# Patient Record
Sex: MALE | Race: WHITE | NOT HISPANIC OR LATINO
[De-identification: names, ages, dates, MRNs, and addresses within clinical notes are randomized per-mention and may not be internally consistent; named-entity substitution may affect disease eponyms.]

---

## 2020-02-03 ENCOUNTER — APPOINTMENT (OUTPATIENT)
Dept: ORTHOPEDIC SURGERY | Facility: CLINIC | Age: 65
End: 2020-02-03
Payer: COMMERCIAL

## 2020-02-03 VITALS — HEIGHT: 72 IN | BODY MASS INDEX: 27.09 KG/M2 | WEIGHT: 200 LBS

## 2020-02-03 VITALS — SYSTOLIC BLOOD PRESSURE: 160 MMHG | HEART RATE: 69 BPM | DIASTOLIC BLOOD PRESSURE: 73 MMHG

## 2020-02-03 PROCEDURE — 73030 X-RAY EXAM OF SHOULDER: CPT | Mod: LT

## 2020-02-03 PROCEDURE — 99203 OFFICE O/P NEW LOW 30 MIN: CPT

## 2020-04-15 ENCOUNTER — APPOINTMENT (OUTPATIENT)
Dept: ORTHOPEDIC SURGERY | Facility: CLINIC | Age: 65
End: 2020-04-15

## 2020-06-08 ENCOUNTER — APPOINTMENT (OUTPATIENT)
Dept: ORTHOPEDIC SURGERY | Facility: CLINIC | Age: 65
End: 2020-06-08
Payer: COMMERCIAL

## 2020-06-08 DIAGNOSIS — M17.11 UNILATERAL PRIMARY OSTEOARTHRITIS, RIGHT KNEE: ICD-10-CM

## 2020-06-08 DIAGNOSIS — M23.91 UNSPECIFIED INTERNAL DERANGEMENT OF RIGHT KNEE: ICD-10-CM

## 2020-06-08 PROCEDURE — 73562 X-RAY EXAM OF KNEE 3: CPT | Mod: RT

## 2020-06-08 PROCEDURE — 99214 OFFICE O/P EST MOD 30 MIN: CPT

## 2020-06-16 ENCOUNTER — OUTPATIENT (OUTPATIENT)
Dept: OUTPATIENT SERVICES | Facility: HOSPITAL | Age: 65
LOS: 1 days | End: 2020-06-16
Payer: COMMERCIAL

## 2020-06-16 ENCOUNTER — APPOINTMENT (OUTPATIENT)
Dept: MRI IMAGING | Facility: CLINIC | Age: 65
End: 2020-06-16
Payer: COMMERCIAL

## 2020-06-16 DIAGNOSIS — M17.11 UNILATERAL PRIMARY OSTEOARTHRITIS, RIGHT KNEE: ICD-10-CM

## 2020-06-16 PROCEDURE — 73721 MRI JNT OF LWR EXTRE W/O DYE: CPT | Mod: 26,RT

## 2020-06-16 PROCEDURE — 73721 MRI JNT OF LWR EXTRE W/O DYE: CPT

## 2020-06-19 VITALS
OXYGEN SATURATION: 96 % | WEIGHT: 199.96 LBS | HEART RATE: 52 BPM | HEIGHT: 72 IN | DIASTOLIC BLOOD PRESSURE: 52 MMHG | TEMPERATURE: 98 F | SYSTOLIC BLOOD PRESSURE: 137 MMHG | RESPIRATION RATE: 14 BRPM

## 2020-06-19 NOTE — H&P PST ADULT - ASSESSMENT
this is a 63 y/o male who is scheduled for left shoulder surgery on 6/23/20 this is a 65 y/o male who is scheduled for left shoulder surgery on 6/23/20    COVID negative

## 2020-06-19 NOTE — H&P PST ADULT - NSANTHOSAYNRD_GEN_A_CORE
No. JOLYNN screening performed.  STOP BANG Legend: 0-2 = LOW Risk; 3-4 = INTERMEDIATE Risk; 5-8 = HIGH Risk

## 2020-06-19 NOTE — H&P PST ADULT - NSICDXPASTSURGICALHX_GEN_ALL_CORE_FT
PAST SURGICAL HISTORY:  S/P arthroscopy of right shoulder     S/P eye surgery right eye retina and cataract    S/P foot surgery both

## 2020-06-19 NOTE — H&P PST ADULT - NSICDXPROBLEM_GEN_ALL_CORE_FT
PROBLEM DIAGNOSES  Problem: Left shoulder pain  Assessment and Plan: exam under anesthesia/arthroscopy left shoulder, possible rotator cuff repair

## 2020-06-19 NOTE — H&P PST ADULT - HISTORY OF PRESENT ILLNESS
this is a 63 y/o male who has had left shoulder pain for years which got worse, diagnosed with torn rotator cuff; to have repair of same, no pain meds

## 2020-06-21 ENCOUNTER — OUTPATIENT (OUTPATIENT)
Dept: OUTPATIENT SERVICES | Facility: HOSPITAL | Age: 65
LOS: 1 days | End: 2020-06-21
Payer: COMMERCIAL

## 2020-06-21 DIAGNOSIS — M75.102 UNSPECIFIED ROTATOR CUFF TEAR OR RUPTURE OF LEFT SHOULDER, NOT SPECIFIED AS TRAUMATIC: ICD-10-CM

## 2020-06-21 DIAGNOSIS — Z98.890 OTHER SPECIFIED POSTPROCEDURAL STATES: Chronic | ICD-10-CM

## 2020-06-21 DIAGNOSIS — Z01.818 ENCOUNTER FOR OTHER PREPROCEDURAL EXAMINATION: ICD-10-CM

## 2020-06-21 DIAGNOSIS — Z11.59 ENCOUNTER FOR SCREENING FOR OTHER VIRAL DISEASES: ICD-10-CM

## 2020-06-21 DIAGNOSIS — M25.512 PAIN IN LEFT SHOULDER: ICD-10-CM

## 2020-06-21 LAB — SARS-COV-2 RNA SPEC QL NAA+PROBE: SIGNIFICANT CHANGE UP

## 2020-06-21 PROCEDURE — G0463: CPT

## 2020-06-21 PROCEDURE — U0003: CPT

## 2020-06-22 ENCOUNTER — APPOINTMENT (OUTPATIENT)
Dept: ORTHOPEDIC SURGERY | Facility: CLINIC | Age: 65
End: 2020-06-22
Payer: COMMERCIAL

## 2020-06-22 ENCOUNTER — TRANSCRIPTION ENCOUNTER (OUTPATIENT)
Age: 65
End: 2020-06-22

## 2020-06-22 DIAGNOSIS — S83.241A OTHER TEAR OF MEDIAL MENISCUS, CURRENT INJURY, RIGHT KNEE, INITIAL ENCOUNTER: ICD-10-CM

## 2020-06-22 PROCEDURE — 99214 OFFICE O/P EST MOD 30 MIN: CPT

## 2020-06-23 ENCOUNTER — APPOINTMENT (OUTPATIENT)
Dept: ORTHOPEDIC SURGERY | Facility: HOSPITAL | Age: 65
End: 2020-06-23

## 2020-06-23 ENCOUNTER — OUTPATIENT (OUTPATIENT)
Dept: OUTPATIENT SERVICES | Facility: HOSPITAL | Age: 65
LOS: 1 days | End: 2020-06-23
Payer: COMMERCIAL

## 2020-06-23 ENCOUNTER — RESULT REVIEW (OUTPATIENT)
Age: 65
End: 2020-06-23

## 2020-06-23 VITALS
SYSTOLIC BLOOD PRESSURE: 137 MMHG | WEIGHT: 195.11 LBS | HEIGHT: 72 IN | HEART RATE: 52 BPM | TEMPERATURE: 97 F | RESPIRATION RATE: 14 BRPM | OXYGEN SATURATION: 96 % | DIASTOLIC BLOOD PRESSURE: 52 MMHG

## 2020-06-23 VITALS
SYSTOLIC BLOOD PRESSURE: 142 MMHG | RESPIRATION RATE: 15 BRPM | HEART RATE: 66 BPM | DIASTOLIC BLOOD PRESSURE: 64 MMHG | OXYGEN SATURATION: 95 %

## 2020-06-23 DIAGNOSIS — Z98.890 OTHER SPECIFIED POSTPROCEDURAL STATES: Chronic | ICD-10-CM

## 2020-06-23 DIAGNOSIS — M75.102 UNSPECIFIED ROTATOR CUFF TEAR OR RUPTURE OF LEFT SHOULDER, NOT SPECIFIED AS TRAUMATIC: ICD-10-CM

## 2020-06-23 LAB
ALBUMIN SERPL ELPH-MCNC: 3.3 G/DL — SIGNIFICANT CHANGE UP (ref 3.3–5)
ALP SERPL-CCNC: 65 U/L — SIGNIFICANT CHANGE UP (ref 30–120)
ALT FLD-CCNC: 44 U/L DA — SIGNIFICANT CHANGE UP (ref 10–60)
ANION GAP SERPL CALC-SCNC: 6 MMOL/L — SIGNIFICANT CHANGE UP (ref 5–17)
APTT BLD: 30.9 SEC — SIGNIFICANT CHANGE UP (ref 28.5–37)
AST SERPL-CCNC: 32 U/L — SIGNIFICANT CHANGE UP (ref 10–40)
BILIRUB SERPL-MCNC: 0.4 MG/DL — SIGNIFICANT CHANGE UP (ref 0.2–1.2)
BUN SERPL-MCNC: 16 MG/DL — SIGNIFICANT CHANGE UP (ref 7–23)
CALCIUM SERPL-MCNC: 8.6 MG/DL — SIGNIFICANT CHANGE UP (ref 8.4–10.5)
CHLORIDE SERPL-SCNC: 106 MMOL/L — SIGNIFICANT CHANGE UP (ref 96–108)
CO2 SERPL-SCNC: 29 MMOL/L — SIGNIFICANT CHANGE UP (ref 22–31)
CREAT SERPL-MCNC: 0.86 MG/DL — SIGNIFICANT CHANGE UP (ref 0.5–1.3)
GLUCOSE SERPL-MCNC: 101 MG/DL — HIGH (ref 70–99)
HCT VFR BLD CALC: 41.2 % — SIGNIFICANT CHANGE UP (ref 39–50)
HGB BLD-MCNC: 13.6 G/DL — SIGNIFICANT CHANGE UP (ref 13–17)
INR BLD: 1.23 RATIO — HIGH (ref 0.88–1.16)
MCHC RBC-ENTMCNC: 30.9 PG — SIGNIFICANT CHANGE UP (ref 27–34)
MCHC RBC-ENTMCNC: 33 GM/DL — SIGNIFICANT CHANGE UP (ref 32–36)
MCV RBC AUTO: 93.6 FL — SIGNIFICANT CHANGE UP (ref 80–100)
NRBC # BLD: 0 /100 WBCS — SIGNIFICANT CHANGE UP (ref 0–0)
PLATELET # BLD AUTO: 218 K/UL — SIGNIFICANT CHANGE UP (ref 150–400)
POTASSIUM SERPL-MCNC: 4 MMOL/L — SIGNIFICANT CHANGE UP (ref 3.5–5.3)
POTASSIUM SERPL-SCNC: 4 MMOL/L — SIGNIFICANT CHANGE UP (ref 3.5–5.3)
PROT SERPL-MCNC: 6.5 G/DL — SIGNIFICANT CHANGE UP (ref 6–8.3)
PROTHROM AB SERPL-ACNC: 13.7 SEC — HIGH (ref 10–12.9)
RBC # BLD: 4.4 M/UL — SIGNIFICANT CHANGE UP (ref 4.2–5.8)
RBC # FLD: 12.5 % — SIGNIFICANT CHANGE UP (ref 10.3–14.5)
SODIUM SERPL-SCNC: 141 MMOL/L — SIGNIFICANT CHANGE UP (ref 135–145)
WBC # BLD: 5.12 K/UL — SIGNIFICANT CHANGE UP (ref 3.8–10.5)
WBC # FLD AUTO: 5.12 K/UL — SIGNIFICANT CHANGE UP (ref 3.8–10.5)

## 2020-06-23 PROCEDURE — 29827 SHO ARTHRS SRG RT8TR CUF RPR: CPT | Mod: LT

## 2020-06-23 PROCEDURE — 85027 COMPLETE CBC AUTOMATED: CPT

## 2020-06-23 PROCEDURE — C1713: CPT

## 2020-06-23 PROCEDURE — 88304 TISSUE EXAM BY PATHOLOGIST: CPT

## 2020-06-23 PROCEDURE — 29828 SHO ARTHRS SRG BICP TENODSIS: CPT | Mod: LT

## 2020-06-23 PROCEDURE — 29826 SHO ARTHRS SRG DECOMPRESSION: CPT | Mod: LT

## 2020-06-23 PROCEDURE — 85610 PROTHROMBIN TIME: CPT

## 2020-06-23 PROCEDURE — 29823 SHO ARTHRS SRG XTNSV DBRDMT: CPT | Mod: LT

## 2020-06-23 PROCEDURE — 80053 COMPREHEN METABOLIC PANEL: CPT

## 2020-06-23 PROCEDURE — 36415 COLL VENOUS BLD VENIPUNCTURE: CPT

## 2020-06-23 PROCEDURE — 88304 TISSUE EXAM BY PATHOLOGIST: CPT | Mod: 26

## 2020-06-23 PROCEDURE — 85730 THROMBOPLASTIN TIME PARTIAL: CPT

## 2020-06-23 RX ORDER — APREPITANT 80 MG/1
40 CAPSULE ORAL ONCE
Refills: 0 | Status: COMPLETED | OUTPATIENT
Start: 2020-06-23 | End: 2020-06-23

## 2020-06-23 RX ORDER — HYDROMORPHONE HYDROCHLORIDE 2 MG/ML
0.5 INJECTION INTRAMUSCULAR; INTRAVENOUS; SUBCUTANEOUS
Refills: 0 | Status: DISCONTINUED | OUTPATIENT
Start: 2020-06-23 | End: 2020-06-24

## 2020-06-23 RX ORDER — OXYCODONE AND ACETAMINOPHEN 5; 325 MG/1; MG/1
1 TABLET ORAL
Qty: 20 | Refills: 0
Start: 2020-06-23

## 2020-06-23 RX ORDER — CHLORHEXIDINE GLUCONATE 213 G/1000ML
1 SOLUTION TOPICAL ONCE
Refills: 0 | Status: COMPLETED | OUTPATIENT
Start: 2020-06-23 | End: 2020-06-23

## 2020-06-23 RX ORDER — SODIUM CHLORIDE 9 MG/ML
1000 INJECTION, SOLUTION INTRAVENOUS
Refills: 0 | Status: DISCONTINUED | OUTPATIENT
Start: 2020-06-23 | End: 2020-06-24

## 2020-06-23 RX ORDER — ONDANSETRON 8 MG/1
4 TABLET, FILM COATED ORAL ONCE
Refills: 0 | Status: DISCONTINUED | OUTPATIENT
Start: 2020-06-23 | End: 2020-06-24

## 2020-06-23 RX ORDER — CEFAZOLIN SODIUM 1 G
2000 VIAL (EA) INJECTION ONCE
Refills: 0 | Status: COMPLETED | OUTPATIENT
Start: 2020-06-23 | End: 2020-06-23

## 2020-06-23 RX ADMIN — SODIUM CHLORIDE 75 MILLILITER(S): 9 INJECTION, SOLUTION INTRAVENOUS at 12:08

## 2020-06-23 RX ADMIN — CHLORHEXIDINE GLUCONATE 1 APPLICATION(S): 213 SOLUTION TOPICAL at 08:31

## 2020-06-23 RX ADMIN — APREPITANT 40 MILLIGRAM(S): 80 CAPSULE ORAL at 08:31

## 2020-06-23 NOTE — ASU PATIENT PROFILE, ADULT - PSH
S/P arthroscopy of right shoulder    S/P eye surgery  right eye retina and cataract  S/P foot surgery  both

## 2020-06-23 NOTE — BRIEF OPERATIVE NOTE - OPERATION/FINDINGS
RCT repaired with one OPUS anchor; large subacromial bone spur burred down. Frayed biceps tendon was released.

## 2020-06-23 NOTE — ASU DISCHARGE PLAN (ADULT/PEDIATRIC) - ASU DC SPECIAL INSTRUCTIONSFT
FOLLOW enclosed shoulder arthroscopy brochure.  NO active motion of left shoulder; sling for support; May remove for hygiene and to bend and extend left elbow.   Large bag of ice to left shoulder 30 minutes on and off.    Avoid constipation from pain meds with stool softeners, water and fiver.    Call Dr. Tomlin's office to set up appointment for sutures to be removed from shoulder and to discuss surgical findings.

## 2020-06-23 NOTE — ASU DISCHARGE PLAN (ADULT/PEDIATRIC) - CALL YOUR DOCTOR IF YOU HAVE ANY OF THE FOLLOWING:
Wound/Surgical Site with redness, or foul smelling discharge or pus/Numbness, tingling, color or temperature change to extremity/Pain not relieved by Medications/Inability to tolerate liquids or foods/Nausea and vomiting that does not stop/Bleeding that does not stop/Swelling that gets worse/Fever greater than (need to indicate Fahrenheit or Celsius)

## 2020-06-23 NOTE — BRIEF OPERATIVE NOTE - NSICDXBRIEFPOSTOP_GEN_ALL_CORE_FT
POST-OP DIAGNOSIS:  Tear of left rotator cuff 23-Jun-2020 12:00:34 with impingement syndrome Ana Damon

## 2020-06-23 NOTE — ASU DISCHARGE PLAN (ADULT/PEDIATRIC) - CARE PROVIDER_API CALL
Chao Tomlin  [facility]  [address] [place] [address]  Phone: [phone]  Fax: [phone]  Follow Up Time:

## 2020-06-23 NOTE — BRIEF OPERATIVE NOTE - NSICDXBRIEFPROCEDURE_GEN_ALL_CORE_FT
PROCEDURES:  Left shoulder arthroscopy 23-Jun-2020 11:56:57 with biceps tendon release, subacromial decompression and rotator cuff repair Ana Damon

## 2020-06-29 ENCOUNTER — APPOINTMENT (OUTPATIENT)
Dept: ORTHOPEDIC SURGERY | Facility: CLINIC | Age: 65
End: 2020-06-29
Payer: COMMERCIAL

## 2020-06-29 DIAGNOSIS — M23.92 UNSPECIFIED INTERNAL DERANGEMENT OF LEFT KNEE: ICD-10-CM

## 2020-06-29 PROCEDURE — 99214 OFFICE O/P EST MOD 30 MIN: CPT | Mod: 24

## 2020-06-29 PROCEDURE — 73030 X-RAY EXAM OF SHOULDER: CPT | Mod: LT

## 2020-07-06 ENCOUNTER — OUTPATIENT (OUTPATIENT)
Dept: OUTPATIENT SERVICES | Facility: HOSPITAL | Age: 65
LOS: 1 days | End: 2020-07-06
Payer: COMMERCIAL

## 2020-07-06 ENCOUNTER — RESULT REVIEW (OUTPATIENT)
Age: 65
End: 2020-07-06

## 2020-07-06 ENCOUNTER — APPOINTMENT (OUTPATIENT)
Dept: MRI IMAGING | Facility: CLINIC | Age: 65
End: 2020-07-06
Payer: COMMERCIAL

## 2020-07-06 DIAGNOSIS — Z98.890 OTHER SPECIFIED POSTPROCEDURAL STATES: Chronic | ICD-10-CM

## 2020-07-06 DIAGNOSIS — Z00.8 ENCOUNTER FOR OTHER GENERAL EXAMINATION: ICD-10-CM

## 2020-07-06 PROCEDURE — 73721 MRI JNT OF LWR EXTRE W/O DYE: CPT | Mod: 26,LT

## 2020-07-06 PROCEDURE — 73721 MRI JNT OF LWR EXTRE W/O DYE: CPT

## 2020-07-15 ENCOUNTER — APPOINTMENT (OUTPATIENT)
Dept: ORTHOPEDIC SURGERY | Facility: CLINIC | Age: 65
End: 2020-07-15
Payer: COMMERCIAL

## 2020-07-15 PROCEDURE — 99214 OFFICE O/P EST MOD 30 MIN: CPT | Mod: 24

## 2020-08-03 ENCOUNTER — APPOINTMENT (OUTPATIENT)
Dept: ORTHOPEDIC SURGERY | Facility: CLINIC | Age: 65
End: 2020-08-03
Payer: COMMERCIAL

## 2020-08-03 PROCEDURE — 99214 OFFICE O/P EST MOD 30 MIN: CPT | Mod: 24

## 2020-08-03 PROCEDURE — 73030 X-RAY EXAM OF SHOULDER: CPT | Mod: LT

## 2020-08-03 NOTE — ADDENDUM
[FreeTextEntry1] : I, Jose F Verma, acted solely as a scribe for Dr. Chao Tomlin on this date 08/03/2020.\par All medical record entries made by the Scribe were at my, Dr. Chao Tomlin, direction and personally dictated by me on 08/03/2020. I have reviewed the chart and agree that the record accurately reflects my personal performance of the history, physical exam, assessment and plan. I have also personally directed, reviewed, and agreed with the chart.

## 2020-08-03 NOTE — REASON FOR VISIT
[Post Operative Visit] : a post operative visit for [Knee Pain] : knee pain [FreeTextEntry2] : s/p left shoulder arthroscopy on 06/23/2020.

## 2020-08-03 NOTE — DISCUSSION/SUMMARY
[de-identified] : I discussed the underlying pathophysiology of the patient's condition in great detail with the patient. We discussed the use of ice, Tylenol and anti-inflammatories to relieve pain. The patient was instructed in ROM exercises they are to do at home. At-home strengthening, and stretching exercises were discussed and demonstrated with the patient. We went over the wide ranging benefits of exercise for the patient health and I encouraged him to begin a diligent exercise program. Regarding his left shoulder: He should avoid any heavy lifting, carrying, or overhead activities. He should not lift anything higher than 90° past his shoulder level. At this time, he will start a course of physical therapy for strengthening and flexibility. A prescription for physical therapy was provided. Regarding his knees: Patient understands he needs to consider bilateral knee arthroscopies given conservative measures are not providing enough relief and due to the results of the MRIs. The proper pre and post operative procedures and expectations were discussed in extensive detail with the patient. We had a lengthy discussion about the patient's issues, and talked about the benefits and risks of the procedure. The patient understands the most common risks include infection, allergy to the anesthetic and deep vein thrombosis. The risks are accepted. The patient was given no assurances that all the symptoms will be alleviated. He should begin to obtain medical clearance, including a COVID-19 test. All of his questions were answered. He understands and consents to the plan.\par \par FU 10 days after bilateral knee arthroscopies.\par after undergoing PT for his left shoulder.\par

## 2020-08-03 NOTE — HISTORY OF PRESENT ILLNESS
[de-identified] : The patient is a 65 year male who returns 6 weeks s/p Examination under anesthesia, arthroscopy of left shoulder, rotator cuff repair, biceps release and tenodesis, debridement of labrum and extensive synovectomy of left shoulder as well as subacromial decompression on 6/23/2020. The patient is recovering at home. He reports mild postoperative pain. He is thrilled with his progress and results. He gradually stopped wearing the arm sling a few weeks ago, and presents without it today. He also complains of bilateral knee pain, right worse than left, despite being inactive. He is known to have meniscal tears of both knees. He cannot pivot or twist on either knee, and has been having episodes of buckling and locking. He also notes swelling. He is scheduled for bilateral knee arthroscopies on 8/11/2020.  The right knee is the worst of the 2 and that should be the one that is done first.\par \par Radiology Results done 6/29/2020 revealed:\par o Left Shoulder : AP, internal and external rotation and outlet views were obtained and reveal metallic anchor in excellent position with concentric reduction of the glenohumeral joint.

## 2020-08-03 NOTE — PHYSICAL EXAM
[de-identified] : Constitutional\par o Appearance : well-nourished, well developed, alert, in no acute distress \par Head and Face\par o Head :\par ¦ Inspection : atraumatic, normocephalic\par o Face :\par ¦ Inspection : no visible rash or discoloration\par Respiratory\par o Respiratory Effort: breathing unlabored \par Neurologic\par o Sensation : Normal sensation \par Psychiatric\par o Mood and Affect: mood normal, affect appropriate \par Lymphatic\par o Additional Nodes : No palpable lymph nodes present \par \par o Cervical Spine\par ¦ Inspection/Palpation : alignment midline, normal degree of lordosis present, musculature is nontender to palpation,\par ¦ Range of Motion : arc of motion full in all planes, no crepitance or pain with ROM\par ¦ Skin : normal appearance, no masses or tenderness, trachea midline\par Tests: Negative Spurling’s test\par \par Right Upper Extremity\par o Right Shoulder :\par ¦ Inspection/Palpation : no tenderness, swelling or deformities\par ¦ Range of Motion : full and painless in all planes, no crepitance\par ¦ Strength :  forward elevation, internal rotation 5/5, external rotation 5/5, external rotation at 90 degrees of abduction, supraspinatus, adduction and abduction, biceps/triceps, 5/5\par ¦ Stability : no joint instability on provocative testing \par Tests: Mariano negative, Neer negative, negative drop arm test\par \par Left Upper Extremity\par o Left Shoulder :\par ¦ Inspection/Palpation : no glenohumeral joint or biceps tenderness, no swelling or deformities, arthroscopic portals are well healed, normal sensation to light touch in the left upper extremity.\par ¦ Range of Motion : full external rotation , internal rotation limited by 2 vertebral levels, no crepitance\par ¦ Strength :  internal rotation, external rotation, forward flexion, supraspinatus, adduction and abduction 4-/5, biceps 4+/5, triceps 5/5\par ¦ Stability : no joint instability on provocative testing\par  Tests: Mariano negative, negative Neer and Monserrat test, negative drop arm test, negative lift off test. \par \par Right Lower Extremity \par o Right Knee :\par ¦ Inspection/Palpation : posteromedial joint line tenderness to palpation, no lateral compartment tenderness, no swelling\par ¦ Range of Motion : active flexion and extension full and painless, no crepitance, good patellofemoral glide \par ¦ Stability : no valgus or varus instability present on provocative testing\par ¦ Strength : flexion and extension 5/5\par ¦ Tests and Signs : negative Anterior Drawer, negative Lachman, positive Jennifer \par \par Left Lower Extremity\par o Left Knee :\par ¦ Inspection/Palpation : posteromedial joint line tenderness to palpation, no lateral compartment tenderness, no swelling\par ¦ Range of Motion : active flexion and extension full and painless, no crepitance\par ¦ Stability : no valgus or varus instability present on provocative testing\par ¦ Strength : flexion and extension 5/5\par ¦ Tests and Signs : negative Anterior Drawer, negative Lachman, positive Jennifer \par \par Gait and Station:\par o Gait: antalgic on the right, stiff on the left, no significant extremity swelling or lymphedema, good proprioception and balance\par \par Radiology Results\par o Left Shoulder : AP, internal and external rotation and outlet views were obtained and reveal concentric reduction of the glenohumeral joint with metallic anchor in good position. Unchanged from previous x-rays that were done on 6/29/2020.

## 2020-08-05 VITALS
RESPIRATION RATE: 14 BRPM | DIASTOLIC BLOOD PRESSURE: 67 MMHG | SYSTOLIC BLOOD PRESSURE: 132 MMHG | OXYGEN SATURATION: 97 % | HEIGHT: 72 IN | WEIGHT: 199.96 LBS | TEMPERATURE: 98 F | HEART RATE: 56 BPM

## 2020-08-05 NOTE — H&P PST ADULT - NSICDXFAMILYHX_GEN_ALL_CORE_FT
FAMILY HISTORY:  Mother  Still living? No  Family history of breast cancer, Age at diagnosis: Age Unknown    Sibling  Still living? No  Family history of myotonic dystrophy, Age at diagnosis: Age Unknown  Family history of myotonic dystrophy, Age at diagnosis: Age Unknown  Family history of myotonic dystrophy, Age at diagnosis: Age Unknown  Family history of myotonic dystrophy, Age at diagnosis: Age Unknown

## 2020-08-05 NOTE — H&P PST ADULT - HISTORY OF PRESENT ILLNESS
64 yo male presents c/o bilateral knee pain for the last 10 years. States that pain was managed with intermittent PT and using ice. Denies injury, prior surgery or receiving any injections. Pain worsened over the last year and now is 1-2/10 at rest and increased to 5-6/10 with activity. Pain temporarily relieved with ice.

## 2020-08-05 NOTE — H&P PST ADULT - NSICDXPASTSURGICALHX_GEN_ALL_CORE_FT
PAST SURGICAL HISTORY:  History of arthroscopy of left shoulder June 2020    S/P arthroscopy of right shoulder 2005    S/P eye surgery right eye retina 2016 and cataract 2018    S/P foot surgery plantar fasciitis left 2019 and 2020 and right 2017

## 2020-08-05 NOTE — H&P PST ADULT - NSICDXPROBLEM_GEN_ALL_CORE_FT
PROBLEM DIAGNOSES  Problem: Bilateral knee pain  Assessment and Plan: operative arthroscopy bilateral knees. medical clearance, EKG and blood work were all done 8/4/20. surgical wash instructions reviewed and verbalized understanding. covid PCR appt confirmed for 8/9/20 at 0830

## 2020-08-05 NOTE — H&P PST ADULT - SOURCE OF INFORMATION, PROFILE
patient/medical history obtained via telephone as per current covid19 protocol, physical exam to be done day of surgery

## 2020-08-09 ENCOUNTER — OUTPATIENT (OUTPATIENT)
Dept: OUTPATIENT SERVICES | Facility: HOSPITAL | Age: 65
LOS: 1 days | End: 2020-08-09
Payer: COMMERCIAL

## 2020-08-09 DIAGNOSIS — Z11.59 ENCOUNTER FOR SCREENING FOR OTHER VIRAL DISEASES: ICD-10-CM

## 2020-08-09 DIAGNOSIS — Z98.890 OTHER SPECIFIED POSTPROCEDURAL STATES: Chronic | ICD-10-CM

## 2020-08-09 DIAGNOSIS — Z01.818 ENCOUNTER FOR OTHER PREPROCEDURAL EXAMINATION: ICD-10-CM

## 2020-08-09 DIAGNOSIS — M25.561 PAIN IN RIGHT KNEE: ICD-10-CM

## 2020-08-09 DIAGNOSIS — S83.241D OTHER TEAR OF MEDIAL MENISCUS, CURRENT INJURY, RIGHT KNEE, SUBSEQUENT ENCOUNTER: ICD-10-CM

## 2020-08-09 DIAGNOSIS — S83.242D OTHER TEAR OF MEDIAL MENISCUS, CURRENT INJURY, LEFT KNEE, SUBSEQUENT ENCOUNTER: ICD-10-CM

## 2020-08-09 LAB — SARS-COV-2 RNA SPEC QL NAA+PROBE: SIGNIFICANT CHANGE UP

## 2020-08-09 PROCEDURE — U0003: CPT

## 2020-08-09 PROCEDURE — G0463: CPT

## 2020-08-10 ENCOUNTER — TRANSCRIPTION ENCOUNTER (OUTPATIENT)
Age: 65
End: 2020-08-10

## 2020-08-10 NOTE — ASU PATIENT PROFILE, ADULT - PSH
History of arthroscopy of left shoulder  June 2020  S/P arthroscopy of right shoulder  2005  S/P eye surgery  right eye retina 2016 and cataract 2018  S/P foot surgery  plantar fasciitis left 2019 and 2020 and right 2017

## 2020-08-11 ENCOUNTER — RESULT REVIEW (OUTPATIENT)
Age: 65
End: 2020-08-11

## 2020-08-11 ENCOUNTER — APPOINTMENT (OUTPATIENT)
Dept: ORTHOPEDIC SURGERY | Facility: HOSPITAL | Age: 65
End: 2020-08-11

## 2020-08-11 ENCOUNTER — OUTPATIENT (OUTPATIENT)
Dept: OUTPATIENT SERVICES | Facility: HOSPITAL | Age: 65
LOS: 1 days | End: 2020-08-11
Payer: COMMERCIAL

## 2020-08-11 VITALS
DIASTOLIC BLOOD PRESSURE: 67 MMHG | HEART RATE: 56 BPM | RESPIRATION RATE: 14 BRPM | OXYGEN SATURATION: 97 % | WEIGHT: 197.75 LBS | TEMPERATURE: 98 F | SYSTOLIC BLOOD PRESSURE: 132 MMHG | HEIGHT: 72 IN

## 2020-08-11 VITALS
SYSTOLIC BLOOD PRESSURE: 122 MMHG | HEART RATE: 72 BPM | OXYGEN SATURATION: 97 % | DIASTOLIC BLOOD PRESSURE: 58 MMHG | RESPIRATION RATE: 14 BRPM

## 2020-08-11 DIAGNOSIS — S83.241D OTHER TEAR OF MEDIAL MENISCUS, CURRENT INJURY, RIGHT KNEE, SUBSEQUENT ENCOUNTER: ICD-10-CM

## 2020-08-11 DIAGNOSIS — Z98.890 OTHER SPECIFIED POSTPROCEDURAL STATES: Chronic | ICD-10-CM

## 2020-08-11 DIAGNOSIS — S83.242D OTHER TEAR OF MEDIAL MENISCUS, CURRENT INJURY, LEFT KNEE, SUBSEQUENT ENCOUNTER: ICD-10-CM

## 2020-08-11 PROCEDURE — 29880 ARTHRS KNE SRG MNISECTMY M&L: CPT | Mod: 79,RT

## 2020-08-11 PROCEDURE — 29880 ARTHRS KNE SRG MNISECTMY M&L: CPT | Mod: 50

## 2020-08-11 PROCEDURE — 88304 TISSUE EXAM BY PATHOLOGIST: CPT | Mod: 26

## 2020-08-11 PROCEDURE — 88304 TISSUE EXAM BY PATHOLOGIST: CPT

## 2020-08-11 PROCEDURE — 97161 PT EVAL LOW COMPLEX 20 MIN: CPT

## 2020-08-11 RX ORDER — HYDROMORPHONE HYDROCHLORIDE 2 MG/ML
0.5 INJECTION INTRAMUSCULAR; INTRAVENOUS; SUBCUTANEOUS
Refills: 0 | Status: DISCONTINUED | OUTPATIENT
Start: 2020-08-11 | End: 2020-08-11

## 2020-08-11 RX ORDER — SODIUM CHLORIDE 9 MG/ML
1000 INJECTION, SOLUTION INTRAVENOUS
Refills: 0 | Status: DISCONTINUED | OUTPATIENT
Start: 2020-08-11 | End: 2020-08-11

## 2020-08-11 RX ORDER — ONDANSETRON 8 MG/1
4 TABLET, FILM COATED ORAL ONCE
Refills: 0 | Status: DISCONTINUED | OUTPATIENT
Start: 2020-08-11 | End: 2020-08-11

## 2020-08-11 RX ORDER — OXYCODONE HYDROCHLORIDE 5 MG/1
5 TABLET ORAL ONCE
Refills: 0 | Status: DISCONTINUED | OUTPATIENT
Start: 2020-08-11 | End: 2020-08-11

## 2020-08-11 RX ORDER — CHLORHEXIDINE GLUCONATE 213 G/1000ML
1 SOLUTION TOPICAL DAILY
Refills: 0 | Status: DISCONTINUED | OUTPATIENT
Start: 2020-08-11 | End: 2020-08-11

## 2020-08-11 RX ORDER — CEFAZOLIN SODIUM 1 G
2000 VIAL (EA) INJECTION ONCE
Refills: 0 | Status: COMPLETED | OUTPATIENT
Start: 2020-08-11 | End: 2020-08-11

## 2020-08-11 RX ADMIN — CHLORHEXIDINE GLUCONATE 1 APPLICATION(S): 213 SOLUTION TOPICAL at 08:33

## 2020-08-11 NOTE — BRIEF OPERATIVE NOTE - NSICDXBRIEFPREOP_GEN_ALL_CORE_FT
PRE-OP DIAGNOSIS:  Tear of medial meniscus of knee 11-Aug-2020 12:22:36 both knees with DJD Ana Damon

## 2020-08-11 NOTE — BRIEF OPERATIVE NOTE - NSICDXBRIEFPOSTOP_GEN_ALL_CORE_FT
POST-OP DIAGNOSIS:  DJD (degenerative joint disease) of knee 11-Aug-2020 12:23:35 bilateral knees with medial and lateral menisci tears and plicas Ana Damon

## 2020-08-11 NOTE — BRIEF OPERATIVE NOTE - OPERATION/FINDINGS
Moderate DJD of all compartments of both knees noted with chondrocalcinosis noted throughout.  M/M and L/M tears in both knees.

## 2020-08-11 NOTE — ASU DISCHARGE PLAN (ADULT/PEDIATRIC) - CARE PROVIDER_API CALL
Chao Tomlin  [facility]  [address] [place] [address]  Phone: [phone]  Fax: [phone]  Follow Up Time:

## 2020-08-11 NOTE — PROGRESS NOTE ADULT - ASSESSMENT
64 yo male is scheduled for operative arthroscopy bilateral knees today at [facility]

## 2020-08-11 NOTE — BRIEF OPERATIVE NOTE - NSICDXBRIEFPROCEDURE_GEN_ALL_CORE_FT
PROCEDURES:  Bilateral knee arthroscopies 11-Aug-2020 12:19:36 partial medial and lateral menisectomies, excision plicas and chondroplasty of all compartments Ana Damon

## 2020-08-11 NOTE — ASU DISCHARGE PLAN (ADULT/PEDIATRIC) - CALL YOUR DOCTOR IF YOU HAVE ANY OF THE FOLLOWING:
Numbness, tingling, color or temperature change to extremity/Wound/Surgical Site with redness, or foul smelling discharge or pus/Fever greater than (need to indicate Fahrenheit or Celsius)/Bleeding that does not stop/Pain not relieved by Medications/Inability to tolerate liquids or foods/Nausea and vomiting that does not stop/Swelling that gets worse

## 2020-08-11 NOTE — ASU DISCHARGE PLAN (ADULT/PEDIATRIC) - ASU DC SPECIAL INSTRUCTIONSFT
FOLLOW enclosed arthroscopy brochure.    Call Dr. Tomlni's office to set up appointment for next week.

## 2020-08-11 NOTE — PROGRESS NOTE ADULT - SUBJECTIVE AND OBJECTIVE BOX
physical exam at bedside for admission today  bilateral knee pain >right  vital signs stable  NPO since 8/10/2020  NKDA

## 2020-08-19 ENCOUNTER — APPOINTMENT (OUTPATIENT)
Dept: ORTHOPEDIC SURGERY | Facility: CLINIC | Age: 65
End: 2020-08-19
Payer: COMMERCIAL

## 2020-08-19 PROCEDURE — 99024 POSTOP FOLLOW-UP VISIT: CPT

## 2020-09-30 ENCOUNTER — APPOINTMENT (OUTPATIENT)
Dept: ORTHOPEDIC SURGERY | Facility: CLINIC | Age: 65
End: 2020-09-30
Payer: COMMERCIAL

## 2020-09-30 PROCEDURE — 99213 OFFICE O/P EST LOW 20 MIN: CPT | Mod: 24

## 2020-09-30 NOTE — ADDENDUM
[FreeTextEntry1] : I, Jose F Verma, acted solely as a scribe for Dr. Chao Tomlin on this date 09/30/2020.\par All medical record entries made by the Scribe were at my, Dr. Chao Tomlin, direction and personally dictated by me on 09/30/2020. I have reviewed the chart and agree that the record accurately reflects my personal performance of the history, physical exam, assessment and plan. I have also personally directed, reviewed, and agreed with the chart.

## 2020-09-30 NOTE — HISTORY OF PRESENT ILLNESS
[de-identified] : The patient is a 65 year old male who returns for the 2nd postoperative visit, 7 weeks s/p bilateral knee arthroscopies done on 8/11/2020. The right knee was an Examination under anesthesia, arthroscopy right knee, partial medial and lateral meniscectomy, chondroplasty patellofemoral compartment as well as medial femoral condyle and excision of calcium deposits, excision of pathological plica. The left knee was an underwent Examination under anesthesia, arthroscopy left knee, partial medial and lateral meniscectomy, chondroplasty patellofemoral compartment, excision of calcific deposits as well as excision of pathological plica. He reports 70-80% improvement in his symptoms after his operations. Both knees feel about the same. He is thrilled with his progress and result in both knees. He is also 3 months s/p left shoulder rotator cuff repair done on 6/23/2020, and is doing well. He has been attending PT for both knees and his left shoulder and is still making progress.

## 2020-09-30 NOTE — REASON FOR VISIT
[Post Operative Visit] : a post operative visit for [Aftercare Following Surgery] : aftercare following surgery [FreeTextEntry2] : s/p bilateral knee arthroscopies done on 8/11/2020

## 2020-09-30 NOTE — DISCUSSION/SUMMARY
[de-identified] : I discussed the underlying pathophysiology of the patient's condition in great detail with the patient. We discussed the use of ice, Tylenol and anti-inflammatories to relieve pain. The patient was instructed in ROM exercises they are to do at home. At-home strengthening, and stretching exercises were discussed and demonstrated with the patient. We went over the wide ranging benefits of exercise for the patient health and I encouraged him to begin a diligent exercise program. He needs to avoid high-impact activities such as running and jumping. I recommend alternative activities such as riding a stationary bike on low tension, or the elliptical. He should focus on light weight and high repetition exercises. He should avoid squatting and kneeling. Regarding his shoulder: He should avoid any heavy lifting, carrying, or overhead activities. I am recommending the patient continue going to physical therapy to obtain increases in their strength and mobility. A prescription for PT was provided to the patient. All of his questions were answered. He understands and consents to the plan. \par \par FU 6 weeks.\par after continuing PT for his knees and left shoulder.

## 2020-09-30 NOTE — PHYSICAL EXAM
[de-identified] : Constitutional\par o Appearance : well-nourished, well developed, alert, in no acute distress \par Head and Face\par o Head :\par ¦ Inspection : atraumatic, normocephalic\par o Face :\par ¦ Inspection : no visible rash or discoloration\par Respiratory\par o Respiratory Effort: breathing unlabored \par Neurologic\par o Sensation : Normal sensation \par Psychiatric\par o Mood and Affect: mood normal, affect appropriate \par Lymphatic\par o Additional Nodes : No palpable lymph nodes present \par \par o Cervical Spine\par ¦ Inspection/Palpation : alignment midline, normal degree of lordosis present, musculature is nontender to palpation,\par ¦ Range of Motion : arc of motion full in all planes, no crepitance or pain with ROM\par ¦ Skin : normal appearance, no masses or tenderness, trachea midline\par Tests: Negative Spurling’s test\par \par Right Upper Extremity\par o Right Shoulder :\par ¦ Inspection/Palpation : no tenderness, swelling or deformities\par ¦ Range of Motion : full and painless in all planes, no crepitance\par ¦ Strength :  forward elevation, internal rotation 5/5, external rotation 5/5, external rotation at 90 degrees of abduction, supraspinatus, adduction and abduction, biceps/triceps, 5/5\par ¦ Stability : no joint instability on provocative testing \par Tests: Mariano negative, Neer negative, negative drop arm test\par \par Left Upper Extremity\par o Left Shoulder :\par ¦ Inspection/Palpation : no glenohumeral joint or biceps tenderness, no swelling, atrophy of the supraspinatus, low lying biceps, arthroscopic portals are well healed, normal sensation to light touch in the left upper extremity\par ¦ Range of Motion : full external rotation, full internal rotation with mild discomfort, no crepitance\par ¦ Strength :  internal rotation, external rotation, forward flexion, supraspinatus, adduction and abduction, external rotation at 90° of abduction, 4 to 4+/5 globally, biceps/triceps 5/5\par ¦ Stability : no joint instability on provocative testing\par  Tests: Mariano negative, negative Neer and Monserrat test, negative drop arm test, negative lift off test. \par \par Right Lower Extremity\par o Buttock : no tenderness, swelling or deformities\par o Right Hip :\par ¦ Inspection/Palpation : no tenderness, no swelling or deformities\par ¦ Range of Motion : full and painless in all planes, no crepitance\par ¦ Stability : joint stability intact\par ¦ Strength : extension, flexion, adduction, abduction, internal rotation and external rotation, 5/5\par Tests: Pamela’s test negative \par \par o Right Knee :\par ¦ Inspection/Palpation : minimal posteromedial tenderness, no swelling, arthroscopic portals are well healed. \par ¦ Range of Motion : active flexion and extension full, minimal discomfort on full flexion, no crepitance, good patellofemoral glide \par ¦ Stability : no valgus or varus instability present on provocative testing\par ¦ Strength : flexion and extension 4-/5\par ¦ Tests and Signs : negative Anterior Drawer, negative Lachman, negative Jennifer \par \par Left Lower Extremity\par o Buttock : no tenderness, swelling or deformities \par o Left Hip :\par ¦ Inspection/Palpation : no tenderness, no swelling or deformities\par ¦ Range of Motion : full and painless in all planes, no crepitance\par ¦ Stability : joint stability intact\par ¦ Strength : extension, flexion, adduction, abduction, internal rotation and external rotation, 5/5\par ¦ Tests: Pamela’s test negative \par \par o Left Knee :\par ¦ Inspection/Palpation : minimal posteromedial tenderness, no swelling, arthroscopic portals are well healed. \par ¦ Range of Motion : active flexion and extension full, minimal discomfort on full flexion, no crepitance, good patellofemoral glide\par ¦ Stability : no valgus or varus instability present on provocative testing\par ¦ Strength : flexion and extension 4/5 to 4+/5\par ¦ Tests and Signs : negative Anterior Drawer, negative Lachman, negative Jennifer \par \par Gait and Station:\par o Gait: heel/toe, no significant extremity swelling or lymphedema, good proprioception and balance

## 2020-11-04 ENCOUNTER — APPOINTMENT (OUTPATIENT)
Dept: ORTHOPEDIC SURGERY | Facility: CLINIC | Age: 65
End: 2020-11-04
Payer: COMMERCIAL

## 2020-11-04 DIAGNOSIS — M75.102 UNSPECIFIED ROTATOR CUFF TEAR OR RUPTURE OF LEFT SHOULDER, NOT SPECIFIED AS TRAUMATIC: ICD-10-CM

## 2020-11-04 PROCEDURE — 99024 POSTOP FOLLOW-UP VISIT: CPT

## 2020-12-16 ENCOUNTER — APPOINTMENT (OUTPATIENT)
Dept: ORTHOPEDIC SURGERY | Facility: CLINIC | Age: 65
End: 2020-12-16
Payer: COMMERCIAL

## 2020-12-16 VITALS — HEART RATE: 88 BPM | SYSTOLIC BLOOD PRESSURE: 153 MMHG | DIASTOLIC BLOOD PRESSURE: 81 MMHG

## 2020-12-16 DIAGNOSIS — S83.242D OTHER TEAR OF MEDIAL MENISCUS, CURRENT INJURY, LEFT KNEE, SUBSEQUENT ENCOUNTER: ICD-10-CM

## 2020-12-16 DIAGNOSIS — S83.241D OTHER TEAR OF MEDIAL MENISCUS, CURRENT INJURY, RIGHT KNEE, SUBSEQUENT ENCOUNTER: ICD-10-CM

## 2020-12-16 PROCEDURE — 20610 DRAIN/INJ JOINT/BURSA W/O US: CPT | Mod: RT

## 2020-12-16 PROCEDURE — 99214 OFFICE O/P EST MOD 30 MIN: CPT | Mod: 25

## 2020-12-16 PROCEDURE — 99072 ADDL SUPL MATRL&STAF TM PHE: CPT

## 2021-01-04 ENCOUNTER — APPOINTMENT (OUTPATIENT)
Dept: ORTHOPEDIC SURGERY | Facility: CLINIC | Age: 66
End: 2021-01-04
Payer: COMMERCIAL

## 2021-01-04 PROCEDURE — 99214 OFFICE O/P EST MOD 30 MIN: CPT

## 2021-01-04 PROCEDURE — 99072 ADDL SUPL MATRL&STAF TM PHE: CPT

## 2021-02-08 ENCOUNTER — APPOINTMENT (OUTPATIENT)
Dept: ORTHOPEDIC SURGERY | Facility: CLINIC | Age: 66
End: 2021-02-08
Payer: SELF-PAY

## 2021-02-08 ENCOUNTER — APPOINTMENT (OUTPATIENT)
Dept: ORTHOPEDIC SURGERY | Facility: CLINIC | Age: 66
End: 2021-02-08
Payer: COMMERCIAL

## 2021-02-08 DIAGNOSIS — M19.012 PRIMARY OSTEOARTHRITIS, LEFT SHOULDER: ICD-10-CM

## 2021-02-08 PROCEDURE — 99072 ADDL SUPL MATRL&STAF TM PHE: CPT

## 2021-02-08 PROCEDURE — 99213 OFFICE O/P EST LOW 20 MIN: CPT | Mod: 25

## 2021-02-08 PROCEDURE — 20610 DRAIN/INJ JOINT/BURSA W/O US: CPT | Mod: NC,RT

## 2021-02-10 NOTE — REASON FOR VISIT
[Follow-Up Visit] : a follow-up visit for [Knee Pain] : knee pain [FreeTextEntry2] : s/p bilateral knee arthroscopies done on 8/11/2020, s/p left rotator cuff repair 6/23/2020

## 2021-02-10 NOTE — PHYSICAL EXAM
[de-identified] : Constitutional\par o Appearance : well-nourished, well developed, alert, in no acute distress \par Head and Face\par o Head :\par ¦ Inspection : atraumatic, normocephalic\par o Face :\par ¦ Inspection : no visible rash or discoloration\par Respiratory\par o Respiratory Effort: breathing unlabored \par Neurologic\par o Sensation : Normal sensation \par Psychiatric\par o Mood and Affect: mood normal, affect appropriate \par Lymphatic\par o Additional Nodes : No palpable lymph nodes present \par \par o Cervical Spine\par ¦ Inspection/Palpation : alignment midline, normal degree of lordosis present, musculature is nontender to palpation,\par ¦ Range of Motion : arc of motion full in all planes, no crepitance or pain with ROM\par ¦ Skin : normal appearance, no masses or tenderness, trachea midline\par Tests: Negative Spurling’s test\par \par Right Upper Extremity\par o Right Shoulder :\par ¦ Inspection/Palpation : no tenderness, swelling or deformities\par ¦ Range of Motion : full and painless in all planes, no crepitance\par ¦ Strength :  forward elevation, internal rotation 5/5, external rotation 5/5, external rotation at 90 degrees of abduction, supraspinatus, adduction and abduction, biceps/triceps, 5/5\par ¦ Stability : no joint instability on provocative testing \par Tests: Mariano negative, Neer negative, negative drop arm test\par \par o Right Elbow :\par ¦ Inspection/Palpation : medial epicondylar tenderness, no swelling or deformities\par ¦ Range of Motion : full and painless in all planes, no crepitance\par ¦ Strength : flexion and extension 5/5\par ¦ Stability : no joint instability on provocative testing \par o Sensation : sensation intact to light touch\par Tests: Tinel’s Sign negative, no pain with gripping\par \par Left Upper Extremity\par o Left Shoulder :\par ¦ Inspection/Palpation : anterior capsular tenderness to palpation, no swelling, low lying biceps, well-healed arthroscopic portals\par ¦ Range of Motion : full and painless in all planes, no crepitance\par ¦ Strength : internal and external rotation 5/5, forward flexion, abduction and supraspinatus 5/5, external rotation at 90° of abduction 5/5, biceps/triceps 5/5\par ¦ Stability : no joint instability on provocative testing\par  Tests: Mariano negative, negative Neer and Monserrat test, negative drop arm test, negative lift off test. \par \par Right Lower Extremity\par o Buttock : no tenderness, swelling or deformities\par o Right Hip :\par ¦ Inspection/Palpation : no tenderness, no swelling or deformities\par ¦ Range of Motion : full and painless in all planes, no crepitance\par ¦ Stability : joint stability intact\par ¦ Strength : extension, flexion, adduction, abduction, internal rotation and external rotation, 5/5\par Tests: Pamela’s test negative \par \par o Right Knee :\par ¦ Inspection/Palpation : medial compartment tenderness, no swelling, no warmth, well-healed arthroscopic portals\par ¦ Range of Motion : FROM with minimal discomfort on full flexion, no crepitance, decreased patellofemoral glide \par ¦ Stability : no valgus or varus instability present on provocative testing\par ¦ Strength : flexion and extension 5/5\par ¦ Tests and Signs : negative Anterior Drawer, negative Lachman, negative Jennifer \par \par Left Lower Extremity\par o Buttock : no tenderness, swelling or deformities \par o Left Hip :\par ¦ Inspection/Palpation : no tenderness, no swelling or deformities\par ¦ Range of Motion : full and painless in all planes, no crepitance\par ¦ Stability : joint stability intact\par ¦ Strength : extension, flexion, adduction, abduction, internal rotation and external rotation, 5/5\par ¦ Tests: Pamela’s test negative \par \par o Left Knee :\par ¦ Inspection/Palpation : no medial or lateral compartment tenderness, no swelling, well-healed arthroscopic portals \par ¦ Range of Motion : active flexion and extension full and painless, no crepitance, good patellofemoral glide\par ¦ Stability : no valgus or varus instability present on provocative testing\par ¦ Strength : flexion and extension 5/5\par ¦ Tests and Signs : negative Anterior Drawer, negative Lachman, negative Jennifer \par \par Gait and Station:\par o Gait: normal gait, heel/toe, no significant extremity swelling or lymphedema, good proprioception and balance\par \par o Right Knee injection : Indication- knee osteoarthritis, Anatomic location- right intra-articular joint space, Spray - area was sterilized with Betadine and alcohol and anesthetized with Ethyl Chloride , needle used-20G, Medications given- 2 cc's Euflexxa (1/3), and patient tolerated it well.\par oLot# [id number]   oExp: 2021-09-22

## 2021-02-10 NOTE — DISCUSSION/SUMMARY
[de-identified] : I discussed the underlying pathophysiology of the patient's condition in great detail with the patient. We discussed the use of ice, Tylenol and anti-inflammatories to relieve pain. He will continue with his home exercises. Regarding his left shoulder: He was asked to avoid any shoulder exercises past 90° or straight overhead activities. Regarding his right knee: The patient elected to receive a Euflexxa injection into his right knee today, and tolerated it well. I instructed the pt on ROM exercises, and told them to take it easy. The use of ice and rest was reviewed with the patient. The patient may resume activities tomorrow. I reminded the patient that it takes 4 to 6 weeks after the final injection to feel symptom relief. Regarding his right elbow: At-home strengthening and stretching exercises were discussed and demonstrated with the patient in great detail. If his pain does not improve when he returns in 1 week then we will obtain right elbow x-rays. All of his questions were answered. He understands and consents to the plan.\par \par FU 1 week.\par after a right knee Euflexxa injection (1/3) today (02/08/2021).\par for injection 2/3.

## 2021-02-10 NOTE — HISTORY OF PRESENT ILLNESS
[de-identified] : 65 year old male presents 6 months s/p bilateral knee arthroscopies on 8/11/2020. He has been working diligently with a home exercise program and reports that he has maintained his strength and ROM. He is not limited in his activities and is thrilled with the result of his operations. He does not have any problems with his left knee. He notes that his right knee is still uncomfortable and swollen with activities, which restricts his motion. He notes one buckling episode. He received a right knee cortisone injection on 12/16/2020 with only 1-2 weeks of relief. He notes that his right knee has always been worse than the left. He has been considering viscosupplementation and is interested in trying Euflexxa. He is also s/p left shoulder rotator cuff repair on 6/23/2020. He had been doing well, but notes some discomfort and clicking with ROM that started recently. He admits to doing overhead exercises. He is also complaining of medial right elbow discomfort that has been going on for many years. It is worse when doing pushups and exercises with weights. There was no injury.

## 2021-02-10 NOTE — ADDENDUM
[FreeTextEntry1] : I, Jose F Verma, acted solely as a scribe for Dr. Chao Tomlin on this date 02/08/2021.\par All medical record entries made by the Scribe were at my, Dr. Chao Tomlin, direction and personally dictated by me on 02/08/2021. I have reviewed the chart and agree that the record accurately reflects my personal performance of the history, physical exam, assessment and plan. I have also personally directed, reviewed, and agreed with the chart.

## 2021-02-15 ENCOUNTER — APPOINTMENT (OUTPATIENT)
Dept: ORTHOPEDIC SURGERY | Facility: CLINIC | Age: 66
End: 2021-02-15
Payer: SELF-PAY

## 2021-02-15 PROCEDURE — 73080 X-RAY EXAM OF ELBOW: CPT | Mod: RT

## 2021-02-15 PROCEDURE — 20610 DRAIN/INJ JOINT/BURSA W/O US: CPT | Mod: NC,RT

## 2021-02-15 PROCEDURE — 99214 OFFICE O/P EST MOD 30 MIN: CPT | Mod: 25

## 2021-02-15 NOTE — DISCUSSION/SUMMARY
[de-identified] : Regarding his right knee: I went over the pathophysiology of the patient's symptoms in great detail with the patient. The patient elected to receive a Euflexxa injection into his right knee today, and tolerated it well. I instructed the pt on ROM exercises, and told them to take it easy. The use of ice and rest was reviewed with the patient. The patient may resume activities tomorrow. I reminded the patient that it takes 4 to 6 weeks after the final injection to feel symptom relief.\par Regarding his left shoulder: He was asked to avoid any shoulder exercises past 90° or straight overhead activities to avoid aggravating his shoulder. If his symptoms do not improve then a cortisone injection into the AC joint may be considered.\par Regarding his right elbow: I discussed the underlying pathophysiology of the patient's condition in great detail with the patient. I went over the patient's x-rays with them in great detail. The extent of the patient’s arthritis was discussed in great detail with them. I instructed him on picking up items with hands in the pronated position and not in the supinated position as to avoid aggravating his elbow. At this time, he will start a course of physical therapy for strengthening and flexibility. A prescription for physical therapy was provided. All of his questions were answered. He understands and consents to the plan. \par \par FU 1 week.\par after a right knee Euflexxa injection (2/3) today (02/15/2021).\par for injection 3/3.\par after undergoing PT for his right elbow.

## 2021-02-15 NOTE — HISTORY OF PRESENT ILLNESS
[de-identified] : 65 year old male presents with right knee pain. He is s/p bilateral knee arthroscopies on 8/11/2020. He does not have any problems with his left knee. He notes that his right knee is still uncomfortable and swollen with activities, which restricts his motion. He notes one buckling episode. He received a right knee cortisone injection on 12/16/2020 with only 1-2 weeks of relief. He presents for his second right knee Euflexxa injection today (02/15/2021). He denies any untoward effects after the first injection. He is also complaining of medial right elbow discomfort and sensitivity that has been going on for many years. There was no injury. The pain is worse when doing pushups and exercises with weights. He denies any numbness and tingling into his fingers. He is also s/p left shoulder rotator cuff repair on 6/23/2020. He had been doing well, but notes some discomfort and clicking with ROM that started recently. He admits to doing overhead exercises.

## 2021-02-15 NOTE — REASON FOR VISIT
[Follow-Up Visit] : a follow-up visit for [Knee Pain] : knee pain [FreeTextEntry2] : right elbow pain

## 2021-02-15 NOTE — PHYSICAL EXAM
[de-identified] : Constitutional\par o Appearance : well-nourished, well developed, alert, in no acute distress \par Head and Face\par o Head :\par ¦ Inspection : atraumatic, normocephalic\par o Face :\par ¦ Inspection : no visible rash or discoloration\par Respiratory\par o Respiratory Effort: breathing unlabored \par Neurologic\par o Sensation : Normal sensation \par Psychiatric\par o Mood and Affect: mood normal, affect appropriate \par Lymphatic\par o Additional Nodes : No palpable lymph nodes present \par \par o Cervical Spine\par ¦ Inspection/Palpation : alignment midline, normal degree of lordosis present, musculature is nontender to palpation,\par ¦ Range of Motion : arc of motion full in all planes, no crepitance or pain with ROM\par ¦ Skin : normal appearance, no masses or tenderness, trachea midline\par Tests: Negative Spurling’s test\par \par Right Upper Extremity\par o Right Shoulder :\par ¦ Inspection/Palpation : no tenderness, swelling or deformities\par ¦ Range of Motion : full and painless in all planes, no crepitance\par ¦ Strength :  forward elevation, internal rotation 5/5, external rotation 5/5, external rotation at 90 degrees of abduction, supraspinatus, adduction and abduction, biceps/triceps, 5/5\par ¦ Stability : no joint instability on provocative testing \par Tests: Mariano negative, Neer negative, negative drop arm test\par \par o Right Elbow :\par ¦ Inspection/Palpation : medial epicondylar and flexor pronator muscle group tenderness, no lateral epicondylar tenderness, no swelling or deformities\par ¦ Range of Motion : full and painless in all planes, no crepitance\par ¦ Strength : flexion and extension 5/5\par ¦ Stability : no joint instability on provocative testing\par o Sensation : sensation intact to light touch\par Tests: Tinel’s Sign minimally positive, no pain with gripping, no pain with resisted flexion or pronation\par \par Left Upper Extremity\par o Left Shoulder :\par ¦ Inspection/Palpation : anterior capsular tenderness to palpation, no swelling, low lying biceps, well-healed arthroscopic portals\par ¦ Range of Motion : full and painless in all planes, no crepitance\par ¦ Strength : internal and external rotation 5/5, forward flexion, abduction and supraspinatus 5/5, external rotation at 90° of abduction 5/5, biceps/triceps 5/5\par ¦ Stability : no joint instability on provocative testing\par  Tests: Mariano negative, negative Neer and Monserrat test, negative drop arm test, negative lift off test. \par \par o Left Elbow :\par ¦ Inspection/Palpation : no tenderness, no swelling or deformities\par ¦ Range of Motion : full and painless in all planes, no crepitance\par ¦ Strength : flexion and extension 5/5\par ¦ Stability : no joint instability on provocative testing \par o Sensation : sensation intact to light touch\par Tests: Tinel’s Sign minimally positive\par \par Right Lower Extremity\par o Buttock : no tenderness, swelling or deformities\par o Right Hip :\par ¦ Inspection/Palpation : no tenderness, no swelling or deformities\par ¦ Range of Motion : full and painless in all planes, no crepitance\par ¦ Stability : joint stability intact\par ¦ Strength : extension, flexion, adduction, abduction, internal rotation and external rotation, 5/5\par Tests: Pamela’s test negative \par \par o Right Knee :\par ¦ Inspection/Palpation : medial compartment tenderness, no swelling, no warmth, well-healed arthroscopic portals\par ¦ Range of Motion : FROM with minimal discomfort on full flexion, no crepitance, decreased patellofemoral glide \par ¦ Stability : no valgus or varus instability present on provocative testing\par ¦ Strength : flexion and extension 5/5\par ¦ Tests and Signs : negative Anterior Drawer, negative Lachman, negative Jennifer \par \par Left Lower Extremity\par o Buttock : no tenderness, swelling or deformities \par o Left Hip :\par ¦ Inspection/Palpation : no tenderness, no swelling or deformities\par ¦ Range of Motion : full and painless in all planes, no crepitance\par ¦ Stability : joint stability intact\par ¦ Strength : extension, flexion, adduction, abduction, internal rotation and external rotation, 5/5\par ¦ Tests: Pamela’s test negative \par \par o Left Knee :\par ¦ Inspection/Palpation : no medial or lateral compartment tenderness, no swelling, well-healed arthroscopic portals \par ¦ Range of Motion : active flexion and extension full and painless, no crepitance, good patellofemoral glide\par ¦ Stability : no valgus or varus instability present on provocative testing\par ¦ Strength : flexion and extension 5/5\par ¦ Tests and Signs : negative Anterior Drawer, negative Lachman, negative Jennifer \par \par Gait and Station:\par o Gait: normal gait, heel/toe, no significant extremity swelling or lymphedema, good proprioception and balance\par \par Radiology Results \par o Right Elbow : AP, lateral and oblique views were obtained and 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

## 2021-02-15 NOTE — REVIEW OF SYSTEMS
Telephone Encounter by Padma Dunne at 02/13/18 12:28 PM     Author:  Padma Dunne Service:  (none) Author Type:  Patient      Filed:  02/13/18 12:30 PM Encounter Date:  2/13/2018 Status:  Signed     :  Padma Dunne (Patient )              ROMINA ROSARIO    Patient Age: 28 year old    ACCT STATUS:   MESSAGE:   Received consult order.     Referring Provider:[CP1.1T] Dr. Simms[CP1.1M]  Reason for Referral:[CP1.1T] Depression and Anxiety[CP1.1M]  Additional Information:[CP1.1T] LM asking patient to return call to set up an appointment. [place] message sent as well.[CP1.1M]           Next and Last Visit with Provider and Department  Next visit with SHANTANU COVINGTON JR is on No match found  Next visit with PSYCHIATRY is on No match found  Last visit with SHANTANU COVINGTON JR was on No match found  Last visit with PSYCHIATRY was on No match found     WEIGHT AND HEIGHT: As of 02/10/2018 weight is 145 lbs.(65.772 kg). Height is 5' 3\"(1.600 m).   BMI is 25.69 kg/(m^2) calculated from:     Height 5' 3\" (1.6 m) as of 2/10/18     Weight 145 lb (65.772 kg) as of 2/10/18      No Known Allergies  Current outpatient prescriptions       Medication  Sig Dispense Refill   • venlafaxine (EFFEXOR XR) 37.5 MG 24 hr Cap Take 1 Cap by mouth daily. 30 Cap 0   • venlafaxine (EFFEXOR XR) 75 MG 24 hr Cap Take 1 Cap by mouth daily with breakfast. 30 Cap 0   • aluminum chloride (DRYSOL) 20 % external solution Apply  topically nightly. at bedtime to the affected areas of axillae. 60 mL 5   • amoxicillin (AMOXIL) 500 MG Cap Take 1 Cap by mouth 2 (two) times daily. 60 Cap 5   • ALPRAZolam (XANAX) 0.5 MG tablet Take 1 Tab by mouth 3 (three) times daily as needed for Anxiety (or panic attack). 30 Each 0   • tazarotene (TAZORAC) 0.1 % cream Apply  topically nightly. 45 g 3      PHARMACY to use:           Pharmacy preference(s) on file:    [facility] [place] [address]  [place], [place]    CALL BACK INFO:    ROUTING:          PCP: Haylie Simms MD         INS: Payor: [employer] OPEN ACCESS / Plan: N/A / Product Type: *No Product type* / Note: This is the primary coverage, but no account was found for this location or the patient's primary location.   ADDRESS:  [address] [place] [id number][CP1.1T]       Revision History        User Key Date/Time User Provider Type Action    > CP1.1 02/13/18 12:30 PM Padma Dunne Patient  Sign    M - Manual, T - Template             [Joint Pain] : joint pain [Negative] : Endocrine

## 2021-02-15 NOTE — ADDENDUM
[FreeTextEntry1] : I, Jose F Verma, acted solely as a scribe for Dr. Chao Tomlin on this date 02/15/2021.\par All medical record entries made by the Scribe were at my, Dr. Chao Tomlin, direction and personally dictated by me on 02/15/2021. I have reviewed the chart and agree that the record accurately reflects my personal performance of the history, physical exam, assessment and plan. I have also personally directed, reviewed, and agreed with the chart.

## 2021-02-22 ENCOUNTER — APPOINTMENT (OUTPATIENT)
Dept: ORTHOPEDIC SURGERY | Facility: CLINIC | Age: 66
End: 2021-02-22
Payer: COMMERCIAL

## 2021-02-22 DIAGNOSIS — M19.019 PRIMARY OSTEOARTHRITIS, UNSPECIFIED SHOULDER: ICD-10-CM

## 2021-02-22 PROCEDURE — 99072 ADDL SUPL MATRL&STAF TM PHE: CPT

## 2021-02-22 PROCEDURE — 20610 DRAIN/INJ JOINT/BURSA W/O US: CPT | Mod: NC,RT

## 2021-02-22 PROCEDURE — 99213 OFFICE O/P EST LOW 20 MIN: CPT | Mod: 25

## 2021-02-22 NOTE — DISCUSSION/SUMMARY
[de-identified] : I went over the pathophysiology of the patient's symptoms in great detail with the patient. The patient elected to receive a Euflexxa injection into his right knee today, and tolerated it well. I instructed the pt on ROM exercises, and told them to take it easy. The use of ice and rest was reviewed with the patient. The patient may resume activities tomorrow. I reminded the patient that it takes 4 to 6 weeks after the final injection to feel symptom relief. All of his questions were answered. He understands and consents to the plan. \par \par FU 4-6 weeks.\par after a right knee Euflexxa injection #3/3 today (02/22/2021).\par after undergoing PT for his right elbow.

## 2021-02-22 NOTE — HISTORY OF PRESENT ILLNESS
[de-identified] : 65 year old male presents with right knee pain. He is s/p bilateral knee arthroscopies on 8/11/2020. He does not have any problems with his left knee. He notes that his right knee is still uncomfortable and swollen with activities, which restricts his motion. He notes one buckling episode. He received a right knee cortisone injection on 12/16/2020 with 1-2 weeks of relief. He presents for his third right knee Euflexxa injection today (02/15/2021). He notes no untoward effects after the first 2 injections. He is also complaining of medial right elbow discomfort and sensitivity for many years. There was no injury. The pain is worse when doing pushups and exercises with weights. He denies any numbness and tingling into his fingers. He will be starting physical therapy for the elbow soon. He is also s/p left shoulder rotator cuff repair on 6/23/2020. He had been doing well, but notes some discomfort and clicking with ROM that started recently. He admits to doing overhead exercises.  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

## 2021-02-22 NOTE — ADDENDUM
[FreeTextEntry1] : I, Jose F Verma, acted solely as a scribe for Dr. Chao Tomlin on this date 02/22/2021.\par All medical record entries made by the Scribe were at my, Dr. Chao Tomlin, direction and personally dictated by me on 02/22/2021. I have reviewed the chart and agree that the record accurately reflects my personal performance of the history, physical exam, assessment and plan. I have also personally directed, reviewed, and agreed with the chart..

## 2021-02-22 NOTE — REASON FOR VISIT
[Follow-Up Visit] : a follow-up visit for [Shoulder Pain] : shoulder pain [Knee Pain] : knee pain [FreeTextEntry2] : right elbow pain

## 2021-02-22 NOTE — PHYSICAL EXAM
[de-identified] : Constitutional\par o Appearance : well-nourished, well developed, alert, in no acute distress \par Head and Face\par o Head :\par ¦ Inspection : atraumatic, normocephalic\par o Face :\par ¦ Inspection : no visible rash or discoloration\par Respiratory\par o Respiratory Effort: breathing unlabored \par Neurologic\par o Sensation : Normal sensation \par Psychiatric\par o Mood and Affect: mood normal, affect appropriate \par Lymphatic\par o Additional Nodes : No palpable lymph nodes present \par \par o Cervical Spine\par ¦ Inspection/Palpation : alignment midline, normal degree of lordosis present, musculature is nontender to palpation,\par ¦ Range of Motion : arc of motion full in all planes, no crepitance or pain with ROM\par ¦ Skin : normal appearance, no masses or tenderness, trachea midline\par Tests: Negative Spurling’s test\par \par Right Upper Extremity\par o Right Shoulder :\par ¦ Inspection/Palpation : no tenderness, swelling or deformities\par ¦ Range of Motion : full and painless in all planes, no crepitance\par ¦ Strength :  forward elevation, internal rotation 5/5, external rotation 5/5, external rotation at 90 degrees of abduction, supraspinatus, adduction and abduction, biceps/triceps, 5/5\par ¦ Stability : no joint instability on provocative testing \par Tests: Mariano negative, Neer negative, negative drop arm test\par \par o Right Elbow :\par ¦ Inspection/Palpation : medial epicondylar and flexor pronator muscle group tenderness, no lateral epicondylar tenderness, no swelling or deformities\par ¦ Range of Motion : full and painless in all planes, no crepitance\par ¦ Strength : flexion and extension 5/5\par ¦ Stability : no joint instability on provocative testing\par o Sensation : sensation intact to light touch\par Tests: Tinel’s Sign minimally positive, no pain with gripping, no pain with resisted flexion or pronation\par \par Left Upper Extremity\par o Left Shoulder :\par ¦ Inspection/Palpation : anterior capsular tenderness to palpation, no swelling, low lying biceps, well-healed arthroscopic portals\par ¦ Range of Motion : full and painless in all planes, no crepitance\par ¦ Strength : internal and external rotation 5/5, forward flexion, abduction and supraspinatus 5/5, external rotation at 90° of abduction 5/5, biceps/triceps 5/5\par ¦ Stability : no joint instability on provocative testing\par  Tests: Mariano negative, negative Neer and Monserrat test, negative drop arm test, negative lift off test. \par \par o Left Elbow :\par ¦ Inspection/Palpation : no tenderness, no swelling or deformities\par ¦ Range of Motion : full and painless in all planes, no crepitance\par ¦ Strength : flexion and extension 5/5\par ¦ Stability : no joint instability on provocative testing \par o Sensation : sensation intact to light touch\par Tests: Tinel’s Sign minimally positive\par \par Right Lower Extremity\par o Buttock : no tenderness, swelling or deformities\par o Right Hip :\par ¦ Inspection/Palpation : no tenderness, no swelling or deformities\par ¦ Range of Motion : full and painless in all planes, no crepitance\par ¦ Stability : joint stability intact\par ¦ Strength : extension, flexion, adduction, abduction, internal rotation and external rotation, 5/5\par Tests: Pamela’s test negative \par \par o Right Knee :\par ¦ Inspection/Palpation : mild medial compartment tenderness, no swelling, no warmth, well-healed arthroscopic portals\par ¦ Range of Motion : FROM with minimal discomfort on full flexion, no crepitance, decreased patellofemoral glide \par ¦ Stability : no valgus or varus instability present on provocative testing\par ¦ Strength : flexion and extension 5/5\par ¦ Tests and Signs : negative Anterior Drawer, negative Lachman, negative Jennifer \par \par Left Lower Extremity\par o Buttock : no tenderness, swelling or deformities \par o Left Hip :\par ¦ Inspection/Palpation : no tenderness, no swelling or deformities\par ¦ Range of Motion : full and painless in all planes, no crepitance\par ¦ Stability : joint stability intact\par ¦ Strength : extension, flexion, adduction, abduction, internal rotation and external rotation, 5/5\par ¦ Tests: Pamela’s test negative \par \par o Left Knee :\par ¦ Inspection/Palpation : no medial or lateral compartment tenderness, no swelling, well-healed arthroscopic portals \par ¦ Range of Motion : active flexion and extension full and painless, no crepitance, good patellofemoral glide\par ¦ Stability : no valgus or varus instability present on provocative testing\par ¦ Strength : flexion and extension 5/5\par ¦ Tests and Signs : negative Anterior Drawer, negative Lachman, negative Jennifer \par \par Gait and Station:\par o Gait: normal gait, heel/toe, no significant extremity swelling or lymphedema, good proprioception and balance\par \par o Right Knee injection : Indication- knee osteoarthritis, Anatomic location- right intra-articular joint space, Spray - area was sterilized with Betadine and alcohol and anesthetized with Ethyl Chloride , needle used-20G, Medications given- 2 cc's Euflexxa (3/3), and patient tolerated it well.\par Tony# [id number]   oExp: 2021-09-22

## 2021-03-24 ENCOUNTER — APPOINTMENT (OUTPATIENT)
Dept: ORTHOPEDIC SURGERY | Facility: CLINIC | Age: 66
End: 2021-03-24

## 2021-04-07 ENCOUNTER — APPOINTMENT (OUTPATIENT)
Dept: ORTHOPEDIC SURGERY | Facility: CLINIC | Age: 66
End: 2021-04-07
Payer: COMMERCIAL

## 2021-04-07 PROCEDURE — 99214 OFFICE O/P EST MOD 30 MIN: CPT

## 2021-04-07 PROCEDURE — 99072 ADDL SUPL MATRL&STAF TM PHE: CPT

## 2021-04-07 NOTE — ADDENDUM
[FreeTextEntry1] : I, Jose F Verma, acted solely as a scribe for Dr. Chao Tomlin on this date 04/07/2021.\par All medical record entries made by the Scribe were at my, Dr. Chao Tomlin, direction and personally dictated by me on 04/07/2021. I have reviewed the chart and agree that the record accurately reflects my personal performance of the history, physical exam, assessment and plan. I have also personally directed, reviewed, and agreed with the chart.

## 2021-04-07 NOTE — HISTORY OF PRESENT ILLNESS
[de-identified] : 65 year old male presents with right knee pain. He is s/p bilateral knee arthroscopies on 8/11/2020. He notes that his left knee is 100% better after his surgery, but his right knee was still bothering him. He received a right knee cortisone injection on 12/16/20 with 1-2 weeks of relief. He then finished a series of Euflexxa injections on 2/22/21. He reports significant relief that started about 2 months after the injections. He notes no buckling or swelling. He is also complaining of medial right elbow pain. It is worse when doing pushups and exercises with weights. He denies numbness or tingling into his fingers. He has been attending PT for his elbow and started making improvements over the past week. He does not have a wrist brace. 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

## 2021-04-07 NOTE — DISCUSSION/SUMMARY
[de-identified] : I went over the pathophysiology of the patient's symptoms in great detail with the patient. We discussed the use of ice, Tylenol and anti-inflammatories to relieve pain. At-home strengthening, and stretching exercises were discussed and demonstrated with the patient. I instructed him on picking up items with hands in the pronated position and not in the supinated position as to avoid aggravating his elbow. I am recommending the patient continue going to physical therapy to obtain increases in their strength and mobility. A prescription for PT was provided. If his right elbow is still bothering him in 1 month then a wrist brace will be considered. All of his questions were answered. He understands and consents to the plan. \par \par FU 1 month.\par after continuing PT for his right elbow.

## 2021-04-07 NOTE — PHYSICAL EXAM
[de-identified] : Constitutional\par o Appearance : well-nourished, well developed, alert, in no acute distress \par Head and Face\par o Head :\par ¦ Inspection : atraumatic, normocephalic\par o Face :\par ¦ Inspection : no visible rash or discoloration\par Respiratory\par o Respiratory Effort: breathing unlabored \par Neurologic\par o Sensation : Normal sensation \par Psychiatric\par o Mood and Affect: mood normal, affect appropriate \par Lymphatic\par o Additional Nodes : No palpable lymph nodes present \par \par o Cervical Spine\par ¦ Inspection/Palpation : alignment midline, normal degree of lordosis present, musculature is nontender to palpation,\par ¦ Range of Motion : arc of motion full in all planes, no crepitance or pain with ROM\par ¦ Skin : normal appearance, no masses or tenderness, trachea midline\par Tests: Negative Spurling’s test\par \par Right Upper Extremity\par o Right Shoulder :\par ¦ Inspection/Palpation : no tenderness, swelling or deformities\par ¦ Range of Motion : full and painless in all planes, no crepitance\par ¦ Strength :  forward elevation, internal rotation 5/5, external rotation 5/5, external rotation at 90 degrees of abduction, supraspinatus, adduction and abduction, biceps/triceps, 5/5\par ¦ Stability : no joint instability on provocative testing \par Tests: Mariano negative, Neer negative, negative drop arm test\par \par o Right Elbow :\par ¦ Inspection/Palpation : medial epicondylar and flexor pronator muscle group tenderness, no lateral epicondylar tenderness, no swelling or deformities\par ¦ Range of Motion : full and painless in all planes, no crepitance\par ¦ Strength : flexion and extension 5/5\par ¦ Stability : no joint instability on provocative testing\par o Sensation : sensation intact to light touch\par Tests: Tinel’s Sign minimally positive, no pain with gripping, no pain with pronation or supination\par \par Left Upper Extremity\par o Left Shoulder :\par ¦ Inspection/Palpation : anterior capsular tenderness to palpation, no swelling, low lying biceps, well-healed arthroscopic portals\par ¦ Range of Motion : full and painless in all planes, no crepitance\par ¦ Strength : internal and external rotation 5/5, forward flexion, abduction and supraspinatus 5/5, external rotation at 90° of abduction 5/5, biceps/triceps 5/5\par ¦ Stability : no joint instability on provocative testing\par  Tests: Mariano negative, negative Neer and Monserrat test, negative drop arm test, negative lift off test. \par \par o Left Elbow :\par ¦ Inspection/Palpation : no tenderness, no swelling or deformities\par ¦ Range of Motion : full and painless in all planes, no crepitance\par ¦ Strength : flexion and extension 5/5\par ¦ Stability : no joint instability on provocative testing \par o Sensation : sensation intact to light touch\par Tests: Tinel’s Sign minimally positive\par \par Right Lower Extremity\par o Buttock : no tenderness, swelling or deformities\par o Right Hip :\par ¦ Inspection/Palpation : no tenderness, no swelling or deformities\par ¦ Range of Motion : full and painless in all planes, no crepitance\par ¦ Stability : joint stability intact\par ¦ Strength : extension, flexion, adduction, abduction, internal rotation and external rotation, 5/5\par Tests: Pamela’s test negative \par \par o Right Knee :\par ¦ Inspection/Palpation : mild medial compartment tenderness, no swelling, no warmth, well-healed arthroscopic portals\par ¦ Range of Motion : FROM with no discomfort on full flexion, no crepitance, decreased patellofemoral glide \par ¦ Stability : no valgus or varus instability present on provocative testing\par ¦ Strength : flexion and extension 5/5\par ¦ Tests and Signs : negative Anterior Drawer, negative Lachman, negative Jennifer \par \par Left Lower Extremity\par o Buttock : no tenderness, swelling or deformities \par o Left Hip :\par ¦ Inspection/Palpation : no tenderness, no swelling or deformities\par ¦ Range of Motion : full and painless in all planes, no crepitance\par ¦ Stability : joint stability intact\par ¦ Strength : extension, flexion, adduction, abduction, internal rotation and external rotation, 5/5\par ¦ Tests: Pamela’s test negative \par \par o Left Knee :\par ¦ Inspection/Palpation : no medial or lateral compartment tenderness, no swelling, well-healed arthroscopic portals \par ¦ Range of Motion : active flexion and extension full and painless, no crepitance, good patellofemoral glide\par ¦ Stability : no valgus or varus instability present on provocative testing\par ¦ Strength : flexion and extension 5/5\par ¦ Tests and Signs : negative Anterior Drawer, negative Lachman, negative Jennifer \par \par Gait and Station:\par o Gait: normal gait, heel/toe, no significant extremity swelling or lymphedema, good proprioception and balance

## 2021-08-11 NOTE — ASU DISCHARGE PLAN (ADULT/PEDIATRIC) - RETURN TO WORK/SCHOOL
No...
Assessment and Plan: Patient scheduled for surgery on 8/18/21  Patient provided with verbal and written presurgical instructions; verbalized understanding  with teach back.    Patient provided with famotidine for GI prophylaxis; verbalized understanding.    Patient provided with Chlorhexidine wash, verbal and written instructions reviewed. Patient demonstrated understanding with teach back.   Patient confirmed COVID appointment

## 2021-09-20 ENCOUNTER — APPOINTMENT (OUTPATIENT)
Dept: ORTHOPEDIC SURGERY | Facility: CLINIC | Age: 66
End: 2021-09-20
Payer: COMMERCIAL

## 2021-09-20 DIAGNOSIS — M17.0 BILATERAL PRIMARY OSTEOARTHRITIS OF KNEE: ICD-10-CM

## 2021-09-20 PROCEDURE — 99214 OFFICE O/P EST MOD 30 MIN: CPT

## 2021-09-20 NOTE — DISCUSSION/SUMMARY
[de-identified] : I discussed the underlying pathophysiology of the patient's condition in great detail with the patient. I went over the patient's x-rays with them in great detail. At-home strengthening exercises were discussed and demonstrated with the patient.  He should focus on light weight and high repetition exercises. He should avoid deep squatting, running and jumping. The patient elected to receive a cortisone injection into his right knee today, and tolerated it well. I instructed the patient on ROM exercises, and told them to take it easy. The use of ice and rest was reviewed with the patient. The patient may resume activities tomorrow. We will look for an expiring kit of gel shots that we can give him pro tasha. We discussed a debridement for medial epicondylitis of the right elbow. All of his questions were answered. He understands and consents to the plan.\par \par FU 1 month.\par after a right knee cortisone injection today (09/20/2021).

## 2021-09-20 NOTE — PHYSICAL EXAM
[de-identified] : Constitutional\par o Appearance : well-nourished, well developed, alert, in no acute distress \par Head and Face\par o Head :\par ¦ Inspection : atraumatic, normocephalic\par o Face :\par ¦ Inspection : no visible rash or discoloration\par Respiratory\par o Respiratory Effort: breathing unlabored \par Neurologic\par o Sensation : Normal sensation \par Psychiatric\par o Mood and Affect: mood normal, affect appropriate \par Lymphatic\par o Additional Nodes : No palpable lymph nodes present \par \par o Cervical Spine\par ¦ Inspection/Palpation : alignment midline, normal degree of lordosis present, musculature is nontender to palpation,\par ¦ Range of Motion : arc of motion full in all planes, no crepitance or pain with ROM\par ¦ Skin : normal appearance, no masses or tenderness, trachea midline\par Tests: Negative Spurling’s test\par \par Right Upper Extremity\par o Right Shoulder :\par ¦ Inspection/Palpation : no tenderness, swelling or deformities\par ¦ Range of Motion : full and painless in all planes, no crepitance\par ¦ Strength : forward elevation, internal rotation 5/5, external rotation 5/5, external rotation at 90 degrees of abduction, supraspinatus, adduction and abduction, biceps/triceps, 5/5\par ¦ Stability : no joint instability on provocative testing \par Tests: Mariano negative, Neer negative, negative drop arm test\par \par o Right Elbow :\par ¦ Inspection/Palpation : medial epicondylar and flexor pronator muscle group tenderness, no lateral epicondylar tenderness, no swelling or deformities\par ¦ Range of Motion : full and painless in all planes, no crepitance\par ¦ Strength : flexion and extension 5/5\par ¦ Stability : no joint instability on provocative testing\par o Sensation : sensation intact to light touch\par Tests: Tinel’s Sign minimally positive, no pain with gripping, no pain with pronation or supination\par \par Left Upper Extremity\par o Left Shoulder :\par ¦ Inspection/Palpation : anterior capsular tenderness to palpation, no swelling, low lying biceps, well-healed arthroscopic portals\par ¦ Range of Motion : full and painless in all planes, no crepitance\par ¦ Strength : internal and external rotation 5/5, forward flexion, abduction and supraspinatus 5/5, external rotation at 90° of abduction 5/5, biceps/triceps 5/5\par ¦ Stability : no joint instability on provocative testing\par  Tests: Mariano negative, negative Neer and Monserrat test, negative drop arm test, negative lift off test. \par \par o Left Elbow :\par ¦ Inspection/Palpation : no tenderness, no swelling or deformities\par ¦ Range of Motion : full and painless in all planes, no crepitance\par ¦ Strength : flexion and extension 5/5\par ¦ Stability : no joint instability on provocative testing \par o Sensation : sensation intact to light touch\par Tests: Tinel’s Sign minimally positive\par \par Right Lower Extremity\par o Buttock : no tenderness, swelling or deformities\par o Right Hip :\par ¦ Inspection/Palpation : no tenderness, no swelling or deformities\par ¦ Range of Motion : full and painless in all planes, no crepitance\par ¦ Stability : joint stability intact\par ¦ Strength : extension, flexion, adduction, abduction, internal rotation and external rotation, 5/5\par Tests: Pamela’s test negative \par \par o Right Knee :\par ¦ Inspection/Palpation : mild posteromedial joint line tenderness, no lateral joint line tenderness, mild swelling, no warmth, well-healed arthroscopic portals\par ¦ Range of Motion : 3-120° with discomfort on full flexion, no crepitance, decreased patellofemoral glide \par ¦ Stability : no valgus or varus instability present on provocative testing\par ¦ Strength : flexion and extension 5/5\par ¦ Tests and Signs : negative Anterior Drawer, negative Lachman, negative Jennifer \par \par Left Lower Extremity\par o Buttock : no tenderness, swelling or deformities \par o Left Hip :\par ¦ Inspection/Palpation : no tenderness, no swelling or deformities\par ¦ Range of Motion : full and painless in all planes, no crepitance\par ¦ Stability : joint stability intact\par ¦ Strength : extension, flexion, adduction, abduction, internal rotation and external rotation, 5/5\par ¦ Tests: Pamela’s test negative \par \par o Left Knee :\par ¦ Inspection/Palpation : no medial or lateral compartment tenderness, no swelling, well-healed arthroscopic portals \par ¦ Range of Motion : active flexion and extension full and painless, no crepitance, good patellofemoral glide\par ¦ Stability : no valgus or varus instability present on provocative testing\par ¦ Strength : flexion and extension 5/5\par ¦ Tests and Signs : negative Anterior Drawer, negative Lachman, negative Jennifer \par \par Gait and Station:\par o Gait: normal gait, heel/toe, no significant extremity swelling or lymphedema, good proprioception and balance\par \par Radiology Results:\par o Right Knee : Standing AP, lateral and tunnel views of the knee were obtained and revealed severe medial and moderate patellofemoral arthritis with calcification of the lateral meniscus. \par o Left Knee : Standing AP, lateral and tunnel views of the knee were obtained and revealed significant medial and moderate patellofemoral arthritis with calcification of the lateral meniscus. \par \par o Right Knee injection : Indication- knee osteoarthritis, Anatomic location- right intra-articular joint space, Spray - area was sterilized with Betadine and alcohol and anesthetized with Ethyl Chloride , needle used-20G, Medications given- 5cc's lidocaine, 0.5cc's kenalog, 0.5 cc's dexamethasone, and patient tolerated it well.

## 2021-09-20 NOTE — ADDENDUM
[FreeTextEntry1] : I, Jose F Verma, acted solely as a scribe for Dr. Chao Tomlin on this date 09/20/2021.\par All medical record entries made by the Scribe were at my, Dr. Chao Tomlin, direction and personally dictated by me on 09/20/2021. I have reviewed the chart and agree that the record accurately reflects my personal performance of the history, physical exam, assessment and plan. I have also personally directed, reviewed, and agreed with the chart.

## 2021-09-20 NOTE — HISTORY OF PRESENT ILLNESS
[de-identified] : 65 year old male presents with right knee pain. He is s/p bilateral knee arthroscopies on 8/11/2020. He notes that his left knee is 99% better since the surgery. His right knee is still bothersome. He received a right knee cortisone injection on 12/16/2020 and finished a series of Euflexxa injections on 2/22/2021. He did very well with these injections and his right knee was doing well until recently. He notes discomfort and swelling if he runs too much. He plays racquetball and softball. He ices his knee. He is interested in another round of gel shots. He paid out of pocket for Euflexxa. He is also asking about a right elbow surgery.

## 2021-12-30 ENCOUNTER — APPOINTMENT (OUTPATIENT)
Dept: ORTHOPEDIC SURGERY | Facility: CLINIC | Age: 66
End: 2021-12-30
Payer: COMMERCIAL

## 2021-12-30 VITALS — HEIGHT: 67 IN | BODY MASS INDEX: 37.67 KG/M2 | WEIGHT: 240 LBS

## 2021-12-30 PROCEDURE — 99214 OFFICE O/P EST MOD 30 MIN: CPT

## 2021-12-30 PROCEDURE — 73070 X-RAY EXAM OF ELBOW: CPT | Mod: RT

## 2021-12-30 NOTE — HISTORY OF PRESENT ILLNESS
[de-identified] : 66 year old RHD male presents complaining of right medial elbow pain that he has had for many years. He denies injury. He recalls being told he has arthritis in the elbow. He has had multiple steroid injections in the past without much relief. He notes pain with any throwing or lifting. He denies numbness and tingling into his fingers. He wants to discuss an elbow surgery.

## 2021-12-30 NOTE — PHYSICAL EXAM
[de-identified] : Constitutional\par o Appearance : well-nourished, well developed, alert, in no acute distress \par Head and Face\par o Head :\par ¦ Inspection : atraumatic, normocephalic\par o Face :\par ¦ Inspection : no visible rash or discoloration\par Respiratory\par o Respiratory Effort: breathing unlabored \par Neurologic\par o Sensation : Normal sensation \par Psychiatric\par o Mood and Affect: mood normal, affect appropriate \par Lymphatic\par o Additional Nodes : No palpable lymph nodes present \par \par o Cervical Spine\par ¦ Inspection/Palpation : alignment midline, normal degree of lordosis present, musculature is nontender to palpation,\par ¦ Range of Motion : arc of motion full in all planes, no crepitance or pain with ROM\par ¦ Skin : normal appearance, no masses or tenderness, trachea midline\par Tests: Negative Spurling’s test\par \par Right Upper Extremity\par o Right Shoulder :\par ¦ Inspection/Palpation : no tenderness, swelling or deformities\par ¦ Range of Motion : full and painless in all planes, no crepitance\par ¦ Strength : forward elevation 5/5, internal rotation 5/5, external rotation 5/5, external rotation at 90° of abduction 5/5, supraspinatus 5/5, adduction and abduction 5/5, biceps/triceps 5/5 \par ¦ Stability : no joint instability on provocative testing \par Tests: Mariano negative, Neer negative, negative drop arm test\par \par o Right Elbow :\par ¦ Inspection/Palpation : tenderness over the medial epicondyle and flexor pronator common tendon tenderness, no soft spot tenderness, no lateral epicondylar tenderness , no swelling\par ¦ Range of Motion : full and painless in all planes, no crepitance\par ¦ Strength : flexion and extension 5/5\par ¦ Stability : no joint instability on provocative testing\par o Sensation : sensation intact to light touch\par o Tests: Tinel’s Sign negative, no pain with gripping, resisted pronation or supination o not cause discomfort, resisted flexion does not cause discomfort, resisted flexion of the fingers does not cause discomfort \par \par Left Upper Extremity\par o Left Shoulder :\par ¦ Inspection/Palpation : anterior capsular tenderness to palpation, no swelling, low lying biceps, well-healed arthroscopic portals\par ¦ Range of Motion : full and painless in all planes, no crepitance\par ¦ Strength : internal and external rotation 5/5, forward flexion, abduction and supraspinatus 5/5, external rotation at 90° of abduction 5/5, biceps/triceps 5/5\par ¦ Stability : no joint instability on provocative testing\par  Tests: Mariano negative, negative Neer and Monserrat test, negative drop arm test, negative lift off test. 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

## 2021-12-30 NOTE — DISCUSSION/SUMMARY
[de-identified] : I discussed the underlying pathophysiology of the patient's condition in great detail with the patient. I went over the patient's x-rays with them in great detail. The patient has had more than 3 months of conservative treatment home exercises and/or physical therapy. A steroid shot has also been given. These have not been effective and the patient requires surgical intervention. The proper pre and post operative procedures and expectations were discussed in extensive detail with the patient. We had a lengthy discussion about the patient's issues, and talked about the benefits and risks of the procedure. The patient understands the most common risks include infection and allergy to the anesthetic and medial or lateral epicondylitis. The risks are accepted. The patient was given no assurances that all of the symptoms will be alleviated. He should begin to obtain medical clearance including a COVID-19 test. All of his questions were answered. He understands and consents to the plan.\par \par FU after his right elbow surgery.

## 2021-12-30 NOTE — ADDENDUM
[FreeTextEntry1] : I, Jose F Verma, acted solely as a scribe for Dr. Chao Tomlin on this date 12/30/2021.\par All medical record entries made by the Scribe were at my, Dr. Chao Tomlin, direction and personally dictated by me on 12/30/2021. I have reviewed the chart and agree that the record accurately reflects my personal performance of the history, physical exam, assessment and plan. I have also personally directed, reviewed, and agreed with the chart.

## 2022-02-17 ENCOUNTER — OUTPATIENT (OUTPATIENT)
Dept: OUTPATIENT SERVICES | Facility: HOSPITAL | Age: 67
LOS: 1 days | End: 2022-02-17
Payer: COMMERCIAL

## 2022-02-17 VITALS
RESPIRATION RATE: 16 BRPM | TEMPERATURE: 98 F | HEART RATE: 59 BPM | DIASTOLIC BLOOD PRESSURE: 77 MMHG | OXYGEN SATURATION: 98 % | SYSTOLIC BLOOD PRESSURE: 150 MMHG | HEIGHT: 72 IN

## 2022-02-17 DIAGNOSIS — Z98.890 OTHER SPECIFIED POSTPROCEDURAL STATES: Chronic | ICD-10-CM

## 2022-02-17 DIAGNOSIS — Z01.818 ENCOUNTER FOR OTHER PREPROCEDURAL EXAMINATION: ICD-10-CM

## 2022-02-17 DIAGNOSIS — M77.01 MEDIAL EPICONDYLITIS, RIGHT ELBOW: ICD-10-CM

## 2022-02-17 LAB
ANION GAP SERPL CALC-SCNC: 7 MMOL/L — SIGNIFICANT CHANGE UP (ref 5–17)
BUN SERPL-MCNC: 16 MG/DL — SIGNIFICANT CHANGE UP (ref 7–23)
CALCIUM SERPL-MCNC: 8.6 MG/DL — SIGNIFICANT CHANGE UP (ref 8.4–10.5)
CHLORIDE SERPL-SCNC: 100 MMOL/L — SIGNIFICANT CHANGE UP (ref 96–108)
CO2 SERPL-SCNC: 27 MMOL/L — SIGNIFICANT CHANGE UP (ref 22–31)
CREAT SERPL-MCNC: 0.91 MG/DL — SIGNIFICANT CHANGE UP (ref 0.5–1.3)
GLUCOSE SERPL-MCNC: 111 MG/DL — HIGH (ref 70–99)
HCT VFR BLD CALC: 44 % — SIGNIFICANT CHANGE UP (ref 39–50)
HGB BLD-MCNC: 14.9 G/DL — SIGNIFICANT CHANGE UP (ref 13–17)
MCHC RBC-ENTMCNC: 30.4 PG — SIGNIFICANT CHANGE UP (ref 27–34)
MCHC RBC-ENTMCNC: 33.9 GM/DL — SIGNIFICANT CHANGE UP (ref 32–36)
MCV RBC AUTO: 89.8 FL — SIGNIFICANT CHANGE UP (ref 80–100)
NRBC # BLD: 0 /100 WBCS — SIGNIFICANT CHANGE UP (ref 0–0)
PLATELET # BLD AUTO: 237 K/UL — SIGNIFICANT CHANGE UP (ref 150–400)
POTASSIUM SERPL-MCNC: 4.4 MMOL/L — SIGNIFICANT CHANGE UP (ref 3.5–5.3)
POTASSIUM SERPL-SCNC: 4.4 MMOL/L — SIGNIFICANT CHANGE UP (ref 3.5–5.3)
RBC # BLD: 4.9 M/UL — SIGNIFICANT CHANGE UP (ref 4.2–5.8)
RBC # FLD: 12.1 % — SIGNIFICANT CHANGE UP (ref 10.3–14.5)
SODIUM SERPL-SCNC: 134 MMOL/L — LOW (ref 135–145)
WBC # BLD: 5.53 K/UL — SIGNIFICANT CHANGE UP (ref 3.8–10.5)
WBC # FLD AUTO: 5.53 K/UL — SIGNIFICANT CHANGE UP (ref 3.8–10.5)

## 2022-02-17 PROCEDURE — 36415 COLL VENOUS BLD VENIPUNCTURE: CPT

## 2022-02-17 PROCEDURE — G0463: CPT

## 2022-02-17 PROCEDURE — 93010 ELECTROCARDIOGRAM REPORT: CPT

## 2022-02-17 PROCEDURE — 85027 COMPLETE CBC AUTOMATED: CPT

## 2022-02-17 PROCEDURE — 80048 BASIC METABOLIC PNL TOTAL CA: CPT

## 2022-02-17 PROCEDURE — 93005 ELECTROCARDIOGRAM TRACING: CPT

## 2022-02-17 NOTE — H&P PST ADULT - NSICDXPASTSURGICALHX_GEN_ALL_CORE_FT
PAST SURGICAL HISTORY:  H/O arthroscopy of knee bilateral;    History of arthroscopy of left shoulder June 2020    S/P arthroscopy of right shoulder 2005    S/P eye surgery right eye retina 2016 and cataract 2018, left cataract 2021    S/P foot surgery plantar fasciitis left 2019 and 2020 and right 2017

## 2022-02-17 NOTE — H&P PST ADULT - HISTORY OF PRESENT ILLNESS
65 y/o male with right elbow pain for many years. No current 67 y/o male with right elbow pain for many years. No acute injury/trauma. Sports related injury in past. Pain with weight bearing and not taking any meds currently. progressively getting worst and was advised surgery.

## 2022-02-17 NOTE — H&P PST ADULT - ASSESSMENT
67 y/o male with right elbow pain  Planned surgery.  Will obtain medical clearance  Pre op instructions provided  Instructions provided on medications to continue and to take the day morning of surgery   65 y/o male with right elbow pain  Planned surgery.-right elbow debridement of flexor pronator tendon muscle  Will obtain medical clearance  covid test 2/27/22-2pm  Pre op instructions provided  Instructions provided on medications to continue and to take the day morning of surgery

## 2022-02-27 ENCOUNTER — OUTPATIENT (OUTPATIENT)
Dept: OUTPATIENT SERVICES | Facility: HOSPITAL | Age: 67
LOS: 1 days | End: 2022-02-27
Payer: COMMERCIAL

## 2022-02-27 DIAGNOSIS — Z98.890 OTHER SPECIFIED POSTPROCEDURAL STATES: Chronic | ICD-10-CM

## 2022-02-27 DIAGNOSIS — Z20.828 CONTACT WITH AND (SUSPECTED) EXPOSURE TO OTHER VIRAL COMMUNICABLE DISEASES: ICD-10-CM

## 2022-02-27 LAB — SARS-COV-2 RNA SPEC QL NAA+PROBE: SIGNIFICANT CHANGE UP

## 2022-02-27 PROCEDURE — U0005: CPT

## 2022-02-27 PROCEDURE — U0003: CPT

## 2022-02-28 ENCOUNTER — TRANSCRIPTION ENCOUNTER (OUTPATIENT)
Age: 67
End: 2022-02-28

## 2022-02-28 NOTE — ASU PATIENT PROFILE, ADULT - FALL HARM RISK - UNIVERSAL INTERVENTIONS
Bed in lowest position, wheels locked, appropriate side rails in place/Call bell, personal items and telephone in reach/Instruct patient to call for assistance before getting out of bed or chair/Non-slip footwear when patient is out of bed/[place] to call system/Physically safe environment - no spills, clutter or unnecessary equipment/Purposeful Proactive Rounding/Room/bathroom lighting operational, light cord in reach

## 2022-03-01 ENCOUNTER — APPOINTMENT (OUTPATIENT)
Dept: ORTHOPEDIC SURGERY | Facility: HOSPITAL | Age: 67
End: 2022-03-01

## 2022-03-01 ENCOUNTER — OUTPATIENT (OUTPATIENT)
Dept: OUTPATIENT SERVICES | Facility: HOSPITAL | Age: 67
LOS: 1 days | End: 2022-03-01
Payer: COMMERCIAL

## 2022-03-01 VITALS
TEMPERATURE: 98 F | HEIGHT: 72 IN | WEIGHT: 201.72 LBS | RESPIRATION RATE: 16 BRPM | OXYGEN SATURATION: 99 % | DIASTOLIC BLOOD PRESSURE: 72 MMHG | SYSTOLIC BLOOD PRESSURE: 147 MMHG | HEART RATE: 56 BPM

## 2022-03-01 VITALS
RESPIRATION RATE: 14 BRPM | OXYGEN SATURATION: 99 % | TEMPERATURE: 98 F | DIASTOLIC BLOOD PRESSURE: 63 MMHG | SYSTOLIC BLOOD PRESSURE: 129 MMHG | HEART RATE: 60 BPM

## 2022-03-01 DIAGNOSIS — Z98.890 OTHER SPECIFIED POSTPROCEDURAL STATES: Chronic | ICD-10-CM

## 2022-03-01 DIAGNOSIS — M77.01 MEDIAL EPICONDYLITIS, RIGHT ELBOW: ICD-10-CM

## 2022-03-01 PROCEDURE — 24359 REPAIR ELBOW DEB/ATTCH OPEN: CPT | Mod: RT

## 2022-03-01 PROCEDURE — 24359 REPAIR ELBOW DEB/ATTCH OPEN: CPT

## 2022-03-01 RX ORDER — OXYCODONE HYDROCHLORIDE 5 MG/1
5 TABLET ORAL ONCE
Refills: 0 | Status: DISCONTINUED | OUTPATIENT
Start: 2022-03-01 | End: 2022-03-01

## 2022-03-01 RX ORDER — CEFAZOLIN SODIUM 1 G
2000 VIAL (EA) INJECTION ONCE
Refills: 0 | Status: COMPLETED | OUTPATIENT
Start: 2022-03-01 | End: 2022-03-01

## 2022-03-01 RX ORDER — OXYCODONE AND ACETAMINOPHEN 5; 325 MG/1; MG/1
1 TABLET ORAL
Qty: 15 | Refills: 0
Start: 2022-03-01

## 2022-03-01 RX ORDER — SODIUM CHLORIDE 9 MG/ML
1000 INJECTION, SOLUTION INTRAVENOUS
Refills: 0 | Status: DISCONTINUED | OUTPATIENT
Start: 2022-03-01 | End: 2022-03-01

## 2022-03-01 RX ORDER — CHLORHEXIDINE GLUCONATE 213 G/1000ML
1 SOLUTION TOPICAL ONCE
Refills: 0 | Status: COMPLETED | OUTPATIENT
Start: 2022-03-01 | End: 2022-03-01

## 2022-03-01 RX ORDER — HYDROMORPHONE HYDROCHLORIDE 2 MG/ML
0.5 INJECTION INTRAMUSCULAR; INTRAVENOUS; SUBCUTANEOUS
Refills: 0 | Status: DISCONTINUED | OUTPATIENT
Start: 2022-03-01 | End: 2022-03-01

## 2022-03-01 RX ORDER — ONDANSETRON 8 MG/1
4 TABLET, FILM COATED ORAL ONCE
Refills: 0 | Status: DISCONTINUED | OUTPATIENT
Start: 2022-03-01 | End: 2022-03-01

## 2022-03-01 RX ADMIN — CHLORHEXIDINE GLUCONATE 1 APPLICATION(S): 213 SOLUTION TOPICAL at 09:30

## 2022-03-01 RX ADMIN — SODIUM CHLORIDE 75 MILLILITER(S): 9 INJECTION, SOLUTION INTRAVENOUS at 12:53

## 2022-03-01 NOTE — ASU DISCHARGE PLAN (ADULT/PEDIATRIC) - CARE PROVIDER_API CALL
Chao Tomlin)  Orthopaedic Surgery  [address]  [place] [address]  Phone: [phone]  Fax: [phone]  Follow Up Time:

## 2022-03-01 NOTE — BRIEF OPERATIVE NOTE - NSICDXBRIEFPOSTOP_GEN_ALL_CORE_FT
POST-OP DIAGNOSIS:  Right lateral epicondylitis 01-Mar-2022 12:58:32  Emeli Shook   POST-OP DIAGNOSIS:  Medial epicondylitis, right 01-Mar-2022 13:02:38  Emeli Shook

## 2022-03-01 NOTE — BRIEF OPERATIVE NOTE - NSICDXBRIEFPREOP_GEN_ALL_CORE_FT
PRE-OP DIAGNOSIS:  Lateral epicondylitis, right elbow 01-Mar-2022 12:58:13  Emeli Shook   PRE-OP DIAGNOSIS:  Medial epicondylitis, right 01-Mar-2022 13:02:03  Emeli Shook

## 2022-03-01 NOTE — ASU DISCHARGE PLAN (ADULT/PEDIATRIC) - ASU DC SPECIAL INSTRUCTIONSFT
ice 20 minutes on alternating with 20 minutes off for 48 hours post op  elevate  mobilize digits/wrist/elbow 20 times per hour to reduce possible stiffness and /or swelling  call office for appointment  to be seen  10 -14 days post op ice 20 minutes on alternating with 20 minutes off for 48 hours post op  elevate  mobilize digits/wrist/elbow 20 times per hour to reduce possible stiffness and /or swelling  call office for appointment  to be seen  10 days post op

## 2022-03-01 NOTE — ASU DISCHARGE PLAN (ADULT/PEDIATRIC) - CALL YOUR DOCTOR IF YOU HAVE ANY OF THE FOLLOWING:
Bleeding that does not stop/Swelling that gets worse/Pain not relieved by Medications/Fever greater than (need to indicate Fahrenheit or Celsius)/Wound/Surgical Site with redness, or foul smelling discharge or pus/Numbness, tingling, color or temperature change to extremity/Nausea and vomiting that does not stop Bleeding that does not stop/Swelling that gets worse/Pain not relieved by Medications/Fever greater than (need to indicate Fahrenheit or Celsius)/Wound/Surgical Site with redness, or foul smelling discharge or pus/Numbness, tingling, color or temperature change to extremity/Nausea and vomiting that does not stop/Inability to tolerate liquids or foods/Increased irritability or sluggishness

## 2022-03-01 NOTE — ASU DISCHARGE PLAN (ADULT/PEDIATRIC) - WILL THE PATIENT ACCEPT THE PFIZER COVID-19 VACCINE IF ELIGIBLE AND IT IS AVAILABLE?
Azalea  I have reviewed your recent labs. Here are the results:    -Microalbumin (urine protein) test is normal.  ADVISE: rechecking this annually.     If you have any questions please do not hesitate to contact our office via phone ([phone]) or [employer].    Theresa Poe, MS, PAPawelC  [facility]   Not applicable

## 2022-03-01 NOTE — ASU DISCHARGE PLAN (ADULT/PEDIATRIC) - ACTIVITY LEVEL
sling for comfort/Elevate extremity sling /splint/Elevate extremity sling /splint/Elevate extremity/No tub baths

## 2022-03-02 PROBLEM — Z87.898 PERSONAL HISTORY OF OTHER SPECIFIED CONDITIONS: Chronic | Status: ACTIVE | Noted: 2022-02-17

## 2022-03-02 PROBLEM — M25.521 PAIN IN RIGHT ELBOW: Chronic | Status: ACTIVE | Noted: 2022-02-17

## 2022-03-10 ENCOUNTER — APPOINTMENT (OUTPATIENT)
Dept: ORTHOPEDIC SURGERY | Facility: CLINIC | Age: 67
End: 2022-03-10
Payer: COMMERCIAL

## 2022-03-10 PROCEDURE — 99024 POSTOP FOLLOW-UP VISIT: CPT

## 2022-03-10 NOTE — HISTORY OF PRESENT ILLNESS
[de-identified] : 66 year old RHD male returns for the 1st postoperative visit, 9 days s/p Debridement of flexor pronator tendon right elbow with partial medial epicondylectomy and drilling; done on 03/01/22. The patient is recovering at home. He reports mild postoperative pain. 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

## 2022-03-10 NOTE — PHYSICAL EXAM
[de-identified] : Constitutional\par o Appearance : well-nourished, well developed, alert, in no acute distress \par Head and Face\par o Head :\par ¦ Inspection : atraumatic, normocephalic\par o Face :\par ¦ Inspection : no visible rash or discoloration\par Respiratory\par o Respiratory Effort: breathing unlabored \par Neurologic\par o Sensation : Normal sensation \par Psychiatric\par o Mood and Affect: mood normal, affect appropriate \par Lymphatic\par o Additional Nodes : No palpable lymph nodes present \par \par o Cervical Spine\par ¦ Inspection/Palpation : alignment midline, normal degree of lordosis present, musculature is nontender to palpation,\par ¦ Range of Motion : arc of motion full in all planes, no crepitance or pain with ROM\par ¦ Skin : normal appearance, no masses or tenderness, trachea midline\par Tests: Negative Spurling’s test\par \par Right Upper Extremity\par o Right Shoulder :\par ¦ Inspection/Palpation : no tenderness, swelling or deformities\par ¦ Range of Motion : full and painless in all planes, no crepitance\par ¦ Strength : forward elevation 5/5, internal rotation 5/5, external rotation 5/5, external rotation at 90° of abduction 5/5, supraspinatus 5/5, adduction and abduction 5/5\par ¦ Stability : no joint instability on provocative testing \par ¦ Tests: Mariano negative, Neer negative, negative drop arm test\par \par o Right Elbow :\par ¦ Inspection/Palpation :  medial epicondylar tenderness to palpation, mild swelling and ecchymosis, sutures were removed, steri strips and benzoin were applied, well-healed incisions \par ¦ Range of Motion : 5-120°, no pain with supination or rotation \par ¦ Strength : not tested\par ¦ Stability : not tested\par o Sensation : sensation intact to light touch\par o Tests: not tested \par \par Left Upper Extremity\par o Left Shoulder :\par ¦ Inspection/Palpation : anterior capsular tenderness to palpation, no swelling, low lying biceps, well-healed arthroscopic portals\par ¦ Range of Motion : full and painless in all planes, no crepitance\par ¦ Strength : internal and external rotation 5/5, forward flexion, abduction and supraspinatus 5/5, external rotation at 90° of abduction 5/5, biceps/triceps 5/5\par ¦ Stability : no joint instability on provocative testing\par ¦ Tests: Mariano negative, negative Neer and Monserrat test, negative drop arm test, negative lift off test. \par \par o Left Elbow :\par ¦ Inspection/Palpation : no tenderness, no swelling or deformities\par ¦ Range of Motion : full and painless in all planes, no crepitance\par ¦ Strength : flexion and extension 5/5\par ¦ Stability : no joint instability on provocative testing \par o Sensation : sensation intact to light touch\par o Tests: Tinel’s Sign minimally positive

## 2022-03-10 NOTE — ADDENDUM
[FreeTextEntry1] : I, Jose F Verma, acted solely as a scribe for Dr. Chao Tomlin on this date 03/10/2022.\par All medical record entries made by the Scribe were at my, Dr. Chao Tomlin, direction and personally dictated by me on 03/10/2022. I have reviewed the chart and agree that the record accurately reflects my personal performance of the history, physical exam, assessment and plan. I have also personally directed, reviewed, and agreed with the chart.

## 2022-03-10 NOTE — DISCUSSION/SUMMARY
[de-identified] : I went over the pathophysiology of the patient's symptoms in great detail with the patient. He should d/c the splint and wear a sling as much as possible. He has a sling at home. He should avoid essentially any lifting with his right arm. He was instructed in ROM and hand pumping exercises. He can jog but should avoid moving his elbow too much. He can ride a bike or treadmill but should avoid gripping too tightly. I showed the patient how to massage his scar with hand cream in order to desensitize the area and advised him to do it daily after the steri strips fall off. He should follow-up in 3-4 weeks. All of his questions were answered. He understands and consents to the plan.\par \par FU 3-4 weeks.\par after wearing a sling and doing ROM exercises.

## 2022-03-10 NOTE — REASON FOR VISIT
[Post Operative Visit] : a post operative visit for [Aftercare Following Surgery] : aftercare following surgery [FreeTextEntry2] : s/p Debridement of flexor pronator tendon right elbow with partial medial epicondylectomy and drilling; done on 03/01/22

## 2022-03-31 ENCOUNTER — APPOINTMENT (OUTPATIENT)
Dept: ORTHOPEDIC SURGERY | Facility: CLINIC | Age: 67
End: 2022-03-31
Payer: COMMERCIAL

## 2022-03-31 PROCEDURE — 99024 POSTOP FOLLOW-UP VISIT: CPT

## 2022-03-31 NOTE — PHYSICAL EXAM
[de-identified] : Constitutional\par o Appearance : well-nourished, well developed, alert, in no acute distress \par Head and Face\par o Head :\par ¦ Inspection : atraumatic, normocephalic\par o Face :\par ¦ Inspection : no visible rash or discoloration\par Respiratory\par o Respiratory Effort: breathing unlabored \par Neurologic\par o Sensation : Normal sensation \par Psychiatric\par o Mood and Affect: mood normal, affect appropriate \par Lymphatic\par o Additional Nodes : No palpable lymph nodes present \par \par o Cervical Spine\par ¦ Inspection/Palpation : alignment midline, normal degree of lordosis present, musculature is nontender to palpation,\par ¦ Range of Motion : arc of motion full in all planes, no crepitance or pain with ROM\par ¦ Skin : normal appearance, no masses or tenderness, trachea midline\par Tests: Negative Spurling’s test\par \par Right Upper Extremity\par o Right Shoulder :\par ¦ Inspection/Palpation : no tenderness, swelling or deformities\par ¦ Range of Motion : full and painless in all planes, no crepitance\par ¦ Strength : forward elevation 5/5, internal rotation 5/5, external rotation 5/5, external rotation at 90° of abduction 5/5, supraspinatus 5/5, adduction and abduction 5/5\par ¦ Stability : no joint instability on provocative testing \par ¦ Tests: Mariano negative, Neer negative, negative drop arm test\par \par o Right Elbow :\par ¦ Inspection/Palpation :  medial epicondylar tenderness to palpation, no swelling, incision well-healed \par ¦ Range of Motion : full flexion and extension, full pronation and supination \par ¦ Strength : not tested\par ¦ Stability : not tested\par o Sensation : sensation intact to light touch\par o Tests: not tested \par \par Left Upper Extremity\par o Left Shoulder :\par ¦ Inspection/Palpation : anterior capsular tenderness to palpation, no swelling, low lying biceps, well-healed arthroscopic portals\par ¦ Range of Motion : full and painless in all planes, no crepitance\par ¦ Strength : internal and external rotation 5/5, forward flexion, abduction and supraspinatus 5/5, external rotation at 90° of abduction 5/5, biceps/triceps 5/5\par ¦ Stability : no joint instability on provocative testing\par ¦ Tests: Mariano negative, negative Neer and Monserrat test, negative drop arm test, negative lift off test. \par \par o Left Elbow :\par ¦ Inspection/Palpation : no tenderness, no swelling or deformities\par ¦ Range of Motion : full and painless in all planes, no crepitance\par ¦ Strength : flexion and extension 5/5\par ¦ Stability : no joint instability on provocative testing \par o Sensation : sensation intact to light touch\par o Tests: Tinel’s Sign minimally positive

## 2022-03-31 NOTE — ADDENDUM
[FreeTextEntry1] : I, Jose F Verma, acted solely as a scribe for Dr. Chao Tomlin on this date 03/31/2022.\par All medical record entries made by the Scribe were at my, Dr. Chao Tomlin, direction and personally dictated by me on 03/31/2022. I have reviewed the chart and agree that the record accurately reflects my personal performance of the history, physical exam, assessment and plan. I have also personally directed, reviewed, and agreed with the chart.

## 2022-03-31 NOTE — DISCUSSION/SUMMARY
[de-identified] : I went over the pathophysiology of the patient's symptoms in great detail with the patient. I showed the patient how to massage his scar with hand cream in order to desensitize the area and advised him to do it daily. He should avoid leaning on his elbow. At this time, he will start a course of physical therapy for mild strengthening and flexibility. A prescription was provided. I reminded him his elbow is still healing and he should take it easy. All of his questions were answered. He understands and consents to the plan.\par \par FU 6 weeks.\par after undergoing PT for his right elbow.

## 2022-03-31 NOTE — HISTORY OF PRESENT ILLNESS
[de-identified] : 66 year old RHD male returns for the 2nd postoperative visit, 4 weeks s/p Debridement of flexor pronator tendon right elbow with partial medial epicondylectomy and drilling; done on 03/01/22. He is not in PT yet. He has no complaints and says his elbow feels terrific. He is thrilled with his progress and result. 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

## 2022-03-31 NOTE — REASON FOR VISIT
[Post Operative Visit] : a post operative visit for [FreeTextEntry2] : s/p Debridement of flexor pronator tendon right elbow with partial medial epicondylectomy and drilling; done on 03/01/22

## 2022-04-28 ENCOUNTER — APPOINTMENT (OUTPATIENT)
Dept: ORTHOPEDIC SURGERY | Facility: CLINIC | Age: 67
End: 2022-04-28
Payer: COMMERCIAL

## 2022-04-28 ENCOUNTER — APPOINTMENT (OUTPATIENT)
Dept: ORTHOPEDIC SURGERY | Facility: CLINIC | Age: 67
End: 2022-04-28

## 2022-04-28 DIAGNOSIS — M77.01 MEDIAL EPICONDYLITIS, RIGHT ELBOW: ICD-10-CM

## 2022-04-28 DIAGNOSIS — M19.029 PRIMARY OSTEOARTHRITIS, UNSPECIFIED ELBOW: ICD-10-CM

## 2022-04-28 PROCEDURE — 99024 POSTOP FOLLOW-UP VISIT: CPT

## 2022-04-28 NOTE — PHYSICAL EXAM
[de-identified] : Constitutional\par o Appearance : well-nourished, well developed, alert, in no acute distress \par Head and Face\par o Head :\par ¦ Inspection : atraumatic, normocephalic\par o Face :\par ¦ Inspection : no visible rash or discoloration\par Respiratory\par o Respiratory Effort: breathing unlabored \par Neurologic\par o Sensation : Normal sensation \par Psychiatric\par o Mood and Affect: mood normal, affect appropriate \par Lymphatic\par o Additional Nodes : No palpable lymph nodes present \par \par o Cervical Spine\par ¦ Inspection/Palpation : alignment midline, normal degree of lordosis present, musculature is nontender to palpation,\par ¦ Range of Motion : arc of motion full in all planes, no crepitance or pain with ROM\par ¦ Skin : normal appearance, no masses or tenderness, trachea midline\par Tests: Negative Spurling’s test\par \par Right Upper Extremity\par o Right Shoulder :\par ¦ Inspection/Palpation : no tenderness, swelling or deformities\par ¦ Range of Motion : full and painless in all planes, no crepitance\par ¦ Strength : forward elevation 5/5, internal rotation 5/5, external rotation 5/5, external rotation at 90° of abduction 5/5, supraspinatus 5/5, adduction and abduction 5/5\par ¦ Stability : no joint instability on provocative testing \par ¦ Tests: Mariano negative, Neer negative, negative drop arm test\par \par o Right Elbow :\par ¦ Inspection/Palpation :  no medial epicondylar tenderness to palpation, mild swelling, incision well-healed \par ¦ Range of Motion : full flexion and extension, full pronation and supination \par ¦ Strength : flexion and extension 5/5\par ¦ Stability : no joint instability present\par o Sensation : sensation intact to light touch\par o Tests: Tinel's negative, resisted supination does not cause discomfort, resisted extension of the wrist and middle finger do not cause discomfort\par \par Left Upper Extremity\par o Left Shoulder :\par ¦ Inspection/Palpation : anterior capsular tenderness to palpation, no swelling, low lying biceps, well-healed arthroscopic portals\par ¦ Range of Motion : full and painless in all planes, no crepitance\par ¦ Strength : internal and external rotation 5/5, forward flexion, abduction and supraspinatus 5/5, external rotation at 90° of abduction 5/5, biceps/triceps 5/5\par ¦ Stability : no joint instability on provocative testing\par ¦ Tests: Mariano negative, negative Neer and Monserrat test, negative drop arm test, negative lift off test. \par \par o Left Elbow :\par ¦ Inspection/Palpation : no tenderness, no swelling or deformities\par ¦ Range of Motion : full and painless in all planes, no crepitance\par ¦ Strength : flexion and extension 5/5\par ¦ Stability : no joint instability on provocative testing \par o Sensation : sensation intact to light touch\par o Tests: Tinel’s Sign minimally positive

## 2022-04-28 NOTE — ADDENDUM
[FreeTextEntry1] : I, Jose F Verma, acted solely as a scribe for Dr. Chao Tomlin on this date 04/28/2022.\par All medical record entries made by the Scribe were at my, Dr. Chao Tomlin, direction and personally dictated by me on 04/28/2022. I have reviewed the chart and agree that the record accurately reflects my personal performance of the history, physical exam, assessment and plan. I have also personally directed, reviewed, and agreed with the chart.

## 2022-04-28 NOTE — HISTORY OF PRESENT ILLNESS
[de-identified] : 66 year old RHD male returns for a postoperative visit, 8 weeks s/p Debridement of flexor pronator tendon right elbow with partial medial epicondylectomy and drilling; done on 03/01/22. He finished with PT yesterday. He has no complaints and says his elbow feels terrific. He is back to full activity with no restrictions. He has been playing softball. He is thrilled with his progress and result. 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

## 2022-04-28 NOTE — DISCUSSION/SUMMARY
[de-identified] : I went over the pathophysiology of the patient's symptoms in great detail with the patient. I instructed him on picking up items with hands in the pronated position and not in the supinated position as to avoid aggravating his elbow. He needs to continue the exercises that he was given in PT. At-home strengthening and stretching exercises were discussed and demonstrated with the patient. I do not recommend that he makes a hard throw in softball yet. He should follow-up in  6 weeks. All of his questions were answered. He understands and consents to the plan.\par \par FU in 6 weeks.

## 2022-05-12 ENCOUNTER — APPOINTMENT (OUTPATIENT)
Dept: ORTHOPEDIC SURGERY | Facility: CLINIC | Age: 67
End: 2022-05-12

## 2022-05-17 ENCOUNTER — NON-APPOINTMENT (OUTPATIENT)
Age: 67
End: 2022-05-17

## 2022-06-04 ENCOUNTER — NON-APPOINTMENT (OUTPATIENT)
Age: 67
End: 2022-06-04

## 2022-06-23 ENCOUNTER — APPOINTMENT (OUTPATIENT)
Dept: ORTHOPEDIC SURGERY | Facility: CLINIC | Age: 67
End: 2022-06-23

## 2022-08-05 ENCOUNTER — APPOINTMENT (OUTPATIENT)
Dept: ORTHOPEDIC SURGERY | Facility: CLINIC | Age: 67
End: 2022-08-05

## 2022-08-05 PROCEDURE — 73502 X-RAY EXAM HIP UNI 2-3 VIEWS: CPT

## 2022-08-05 PROCEDURE — 99214 OFFICE O/P EST MOD 30 MIN: CPT

## 2022-08-05 PROCEDURE — 72170 X-RAY EXAM OF PELVIS: CPT | Mod: RT

## 2022-08-05 NOTE — PHYSICAL EXAM
[de-identified] : Constitutional\par o Appearance : well-nourished, well developed, alert, in no acute distress \par Head and Face\par o Head :\par ¦ Inspection : atraumatic, normocephalic\par o Face :\par ¦ Inspection : no visible rash or discoloration\par Respiratory\par o Respiratory Effort: breathing unlabored \par Neurologic\par o Mental Status Examination :\par ¦ Orientation : alert and oriented X 3\par Psychiatric\par o Mood and Affect: mood normal, affect appropriate\par Cardiovascular\par o Observation/Palpation : - no swelling\par Lymphatic\par o Additional Nodes : No palpable lymph nodes present\par \par Lumbosacral Spine\par o Inspection : no visible rash or discoloration\par o Palpation : paraspinal musculature nontender\par o Range of Motion : full and painless arc of motion in all planes\par o Muscle Strength : paraspinal muscle strength and tone within normal limits\par o Muscle Tone : paraspinal muscle strength and tone within normal limits\par Tests: straight leg test negative\par  \par Right Lower Extremity\par o Buttock : no tenderness, swelling or deformities\par o Right Hip :\par ¦ Inspection/Palpation : minimal greater trochanteric and ITB tenderness, no swelling or deformities\par ¦ Range of Motion : full flexion, slight limitation of internal rotation but no pain, no crepitance\par ¦ Stability : joint stability intact\par ¦ Strength : extension, flexion 4-/5, adduction, abduction 4-/5, internal rotation and external rotation\par \par Left Lower Extremity\par o Buttock : no tenderness, swelling or deformities \par o Left Hip :\par ¦ Inspection/Palpation : no tenderness, swelling or deformities\par ¦ Range of Motion : full flexion, slight limitation of rotation but no pain, no crepitance\par ¦ Stability : joint stability intact\par ¦ Strength : extension, flexion 5/5, adduction, abduction 5/5, internal rotation and external rotation\par  \par Gait: gait normal, no significant extremity swelling or lymphedema\par \par Radiology Results \par o Pelvis and RIGHT hip: AP pelvis and lateral RIGHT hip were obtained and revealed moderate arthritis of both hips.

## 2022-08-05 NOTE — HISTORY OF PRESENT ILLNESS
[de-identified] : 67 year old male presents complaining of right hip pain for years that is progressively getting worse. He denies injury. He plays softball. He notes pain with any activity, including running and sitting for long periods of time. He denies putting on socks or underwear or tying shoelaces. He does wear orthotics in his shoes. He denies low back pain, radiation of pain down his legs, or numbness and tingling. Denies any left hip pain.

## 2022-08-05 NOTE — DISCUSSION/SUMMARY
[de-identified] : I discussed the underlying pathophysiology of the patient's condition in great detail with the patient. I went over the patient's x-rays with them in great detail. The extent of the patient’s arthritis was discussed in great detail with them. At this time, he will start a course of physical therapy for strengthening of the right hip and flexibility of both hips. A prescription was provided. I advised him to limit his running going forward to avoid a hip replacement. He needs to avoid high-impact activities such as running and jumping. I recommend alternate activities such as fast walking, biking, swimming, and the elliptical. He should focus on light weight and high repetition exercises. All of their questions were answered. They understand and consent to the plan.\par \par FU in 1 month.\par after undergoing PT for his right hip.

## 2022-08-05 NOTE — ADDENDUM
[FreeTextEntry1] : I, Jose F Verma, acted solely as a scribe for Dr. Chao Tomlin on this date 08/05/2022.\par All medical record entries made by the Scribe were at my, Dr. Chao Tomlin, direction and personally dictated by me on 08/05/2022. I have reviewed the chart and agree that the record accurately reflects my personal performance of the history, physical exam, assessment and plan. I have also personally directed, reviewed, and agreed with the chart.

## 2022-09-14 ENCOUNTER — APPOINTMENT (OUTPATIENT)
Dept: ORTHOPEDIC SURGERY | Facility: CLINIC | Age: 67
End: 2022-09-14

## 2022-09-14 PROCEDURE — 99213 OFFICE O/P EST LOW 20 MIN: CPT

## 2022-09-14 NOTE — HISTORY OF PRESENT ILLNESS
[de-identified] : 67 year old male presents for follow-up evaluation of right lateral hip pain. He has been attending PT for 6 weeks. He reports improving pain and motion for the last two weeks. He rates the average discomfort a 3-4/10. He notes being able to tie shoes and put on socks. He denies low back pain, radiation of pain down his legs, or numbness and tingling. He plays softball. His therapist suggested maybe there is a tear in the hip.\par He notes his knees have been doing great. He had gel shots last year (right knee Euflexxa 02/2021). \par He is also 7.5 months s/p Debridement of flexor pronator tendon with partial medial epicondylectomy and drilling; right elbow. DOS 03/01/22. He is RHD. \par \par Radiology Results 8/5/2022:\par o Pelvis and RIGHT hip: AP pelvis and lateral RIGHT hip were obtained and revealed moderate arthritis of both hips. 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

## 2022-09-14 NOTE — ADDENDUM
[FreeTextEntry1] : I, Jose F Verma, acted solely as a scribe for Dr. Chao Tomlin on this date 09/14/2022.\par All medical record entries made by the Scribe were at my, Dr. Chao Tomlin, direction and personally dictated by me on 09/14/2022. I have reviewed the chart and agree that the record accurately reflects my personal performance of the history, physical exam, assessment and plan. I have also personally directed, reviewed, and agreed with the chart.

## 2022-09-14 NOTE — REASON FOR VISIT
[Follow-Up Visit] : a follow-up visit for [FreeTextEntry2] : s/p Debridement of flexor pronator tendon right elbow with partial medial epicondylectomy and drilling; done on 03/01/22

## 2022-09-14 NOTE — DISCUSSION/SUMMARY
[de-identified] : I went over the pathophysiology of the patient's symptoms in great detail with the patient. I am not recommending an MRI at this point. I am recommending the patient continues going to physical therapy to obtain increases in his strength and mobility. A prescription was provided. I would like to see the patient back in the office in 4-6 weeks to reassess his condition. If there is no improvement an MRI could be considered. All of their questions were answered. They understand and consent to the plan.\par \par FU in 4-6 weeks.\par after continuing PT for his right hip.

## 2022-09-14 NOTE — PHYSICAL EXAM
[de-identified] : Constitutional\par o Appearance : well-nourished, well developed, alert, in no acute distress \par Head and Face\par o Head :\par ¦ Inspection : atraumatic, normocephalic\par o Face :\par ¦ Inspection : no visible rash or discoloration\par Respiratory\par o Respiratory Effort: breathing unlabored \par Neurologic\par o Mental Status Examination :\par ¦ Orientation : alert and oriented X 3\par Psychiatric\par o Mood and Affect: mood normal, affect appropriate\par Cardiovascular\par o Observation/Palpation : - no swelling\par Lymphatic\par o Additional Nodes : No palpable lymph nodes present\par \par Right Upper Extremity\par o Right Elbow :\par ¦ Inspection/Palpation : no medial epicondylar tenderness to palpation, no swelling, incision well-healed \par ¦ Range of Motion : full flexion and extension, full pronation and supination \par ¦ Strength : flexion and extension 5/5\par ¦ Stability : no joint instability present\par o Sensation : sensation intact to light touch\par o Tests: Tinel's negative, resisted supination does not cause discomfort, resisted extension of the wrist and middle finger do not cause discomfort\par \par Lumbosacral Spine\par o Inspection : no visible rash or discoloration\par o Palpation : paraspinal musculature nontender\par o Range of Motion : full and painless arc of motion in all planes\par o Muscle Strength : paraspinal muscle strength and tone within normal limits\par o Muscle Tone : paraspinal muscle strength and tone within normal limits\par Tests: straight leg test negative\par  \par Right Lower Extremity\par o Buttock : no tenderness, swelling or deformities\par o Right Hip :\par ¦ Inspection/Palpation : minimal greater trochanteric and ITB tenderness, no swelling or deformities\par ¦ Range of Motion : full flexion and rotation, no crepitance\par ¦ Stability : joint stability intact\par ¦ Strength : extension, flexion 4-/5, adduction, abduction 4/5, internal rotation and external rotation\par \par Left Lower Extremity\par o Buttock : no tenderness, swelling or deformities \par o Left Hip :\par ¦ Inspection/Palpation : no tenderness, swelling or deformities\par ¦ Range of Motion : full flexion, slight limitation of rotation but no pain, no crepitance\par ¦ Stability : joint stability intact\par ¦ Strength : extension, flexion 5/5, adduction, abduction 5/5, internal rotation and external rotation\par  \par Gait: gait normal, no significant extremity swelling or lymphedema\par \par Radiology Results \par o Pelvis and RIGHT hip: AP pelvis and lateral RIGHT hip were obtained and revealed moderate arthritis of both hips.

## 2022-10-26 ENCOUNTER — APPOINTMENT (OUTPATIENT)
Dept: ORTHOPEDIC SURGERY | Facility: CLINIC | Age: 67
End: 2022-10-26

## 2022-10-26 DIAGNOSIS — M16.0 BILATERAL PRIMARY OSTEOARTHRITIS OF HIP: ICD-10-CM

## 2022-10-26 PROCEDURE — 20610 DRAIN/INJ JOINT/BURSA W/O US: CPT | Mod: RT

## 2022-10-26 PROCEDURE — 99214 OFFICE O/P EST MOD 30 MIN: CPT | Mod: 25

## 2022-10-26 NOTE — PHYSICAL EXAM
[de-identified] : Constitutional\par o Appearance : well-nourished, well developed, alert, in no acute distress \par Head and Face\par o Head :\par ¦ Inspection : atraumatic, normocephalic\par o Face :\par ¦ Inspection : no visible rash or discoloration\par Respiratory\par o Respiratory Effort: breathing unlabored \par Neurologic\par o Mental Status Examination :\par ¦ Orientation : alert and oriented X 3\par Psychiatric\par o Mood and Affect: mood normal, affect appropriate\par Cardiovascular\par o Observation/Palpation : - no swelling\par Lymphatic\par o Additional Nodes : No palpable lymph nodes present\par \par Right Upper Extremity\par o Right Elbow :\par ¦ Inspection/Palpation : no medial epicondylar tenderness to palpation, no swelling, incision well-healed \par ¦ Range of Motion : full flexion and extension, full pronation and supination \par ¦ Strength : flexion and extension 5/5\par ¦ Stability : no joint instability present\par o Sensation : sensation intact to light touch\par o Tests: Tinel's negative, resisted supination does not cause discomfort, resisted extension of the wrist and middle finger do not cause discomfort\par \par Lumbosacral Spine\par o Inspection : no visible rash or discoloration\par o Palpation : paraspinal musculature nontender\par o Range of Motion : full and painless arc of motion in all planes\par o Muscle Strength : paraspinal muscle strength and tone within normal limits\par o Muscle Tone : paraspinal muscle strength and tone within normal limits\par Tests: straight leg test negative\par  \par Right Lower Extremity\par o Buttock : no tenderness, swelling or deformities\par o Right Hip :\par ¦ Inspection/Palpation : greater trochanteric tenderness, no swelling or deformities\par ¦ Range of Motion : full flexion and rotation without pain, no crepitance\par ¦ Stability : joint stability intact\par ¦ Strength : extension, flexion 5/5, adduction, abduction 5/5, internal rotation and external rotation\par \par Left Lower Extremity\par o Buttock : no tenderness, swelling or deformities \par o Left Hip :\par ¦ Inspection/Palpation : no tenderness, swelling or deformities\par ¦ Range of Motion : full flexion, slight limitation of rotation but no pain, no crepitance\par ¦ Stability : joint stability intact\par ¦ Strength : extension, flexion 5/5, adduction, abduction 5/5, internal rotation and external rotation\par  \par Gait: gait normal, no significant extremity swelling or lymphedema\par \par o Right Hip bursal injection: Anatomic location- right greater trochanteric bursa, Spray - area was sterilized with Betadine and alcohol and anesthetized with Ethyl Chloride , needle used-20G, Medications given- 3cc's lidocaine, 0.5cc's kenalog, 0.5 cc's dexamethasone, and patient tolerated it well.

## 2022-10-26 NOTE — DISCUSSION/SUMMARY
[de-identified] : I went over the pathophysiology of the patient's symptoms in great detail with the patient. A cortisone injection was given into the right hip greater trochanteric bursa. The use of ice and rest was reviewed with the patient. The patient may resume activities in a couple of days. We encouraged stretching and light weight high repetition exercises. He needs to avoid high-impact activities such as running and jumping. I recommend alternative activities such as riding a stationary bike on low tension. He will stop PT for now and should continue the exercises on his own. I would like to see the patient back in the office in 1 month to reassess his condition. If there is no improvement a right hip MRI will be obtained. All of their questions were answered. They understand and consent to the plan.\par \par FU in 1 month.\par after a right hip greater trochanteric bursal cortisone injection today (10/26/2022).

## 2022-10-26 NOTE — HISTORY OF PRESENT ILLNESS
[de-identified] : 67 year old male presents for follow-up evaluation of right lateral hip pain. He plays softball. He has been attending PT 2x/week. He reports no improvement in his pain. He complains about localized pain on the lateral aspect right hip, worse with inactivity like sitting or driving for 20+ minutes. Denies groin pain, low back pain, radiation of pain down his legs, or numbness and tingling. His therapist suggested maybe there is a tear in the hip.  \par He notes his knees have been doing great. He had gel shots last year (right knee Euflexxa 02/2021). \par He is also almost 8 months s/p Debridement of flexor pronator tendon with partial medial epicondylectomy and drilling; right elbow. DOS 03/01/22. He is RHD. 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

## 2022-10-26 NOTE — ADDENDUM
[FreeTextEntry1] : I, Jose F Verma, acted solely as a scribe for Dr. Chao Tomlin on this date 10/26/2022.\par \par All medical record entries made by the Scribe were at my, Dr. Chao Tomlin, direction and personally dictated by me on 10/26/2022. I have reviewed the chart and agree that the record accurately reflects my personal performance of the history, physical exam, assessment and plan. I have also personally directed, reviewed, and agreed with the chart.

## 2022-11-23 ENCOUNTER — APPOINTMENT (OUTPATIENT)
Dept: ORTHOPEDIC SURGERY | Facility: CLINIC | Age: 67
End: 2022-11-23

## 2022-11-23 DIAGNOSIS — M70.71 OTHER BURSITIS OF HIP, RIGHT HIP: ICD-10-CM

## 2022-11-23 PROCEDURE — 99213 OFFICE O/P EST LOW 20 MIN: CPT

## 2023-03-08 ENCOUNTER — APPOINTMENT (OUTPATIENT)
Dept: ORTHOPEDIC SURGERY | Facility: CLINIC | Age: 68
End: 2023-03-08
Payer: MEDICARE

## 2023-03-08 VITALS — BODY MASS INDEX: 27.09 KG/M2 | WEIGHT: 200 LBS | HEIGHT: 72 IN

## 2023-03-08 DIAGNOSIS — M77.12 LATERAL EPICONDYLITIS, LEFT ELBOW: ICD-10-CM

## 2023-03-08 PROCEDURE — 73080 X-RAY EXAM OF ELBOW: CPT | Mod: LT

## 2023-03-08 PROCEDURE — J3490M: CUSTOM | Mod: NC

## 2023-03-08 PROCEDURE — 20551 NJX 1 TENDON ORIGIN/INSJ: CPT | Mod: LT

## 2023-03-08 PROCEDURE — 99213 OFFICE O/P EST LOW 20 MIN: CPT | Mod: 25

## 2023-03-08 NOTE — HISTORY OF PRESENT ILLNESS
[8] : 8 [Dull/Aching] : dull/aching [Ice] : ice [] : no [FreeTextEntry1] : L ELBOW [FreeTextEntry3] : few months ago [FreeTextEntry5] : plays softball and now having soreness in elbow. no N/T [de-identified] : activity

## 2023-03-08 NOTE — IMAGING
[Left] : left elbow [There are no fractures, subluxations or dislocations. No significant abnormalities are seen] : There are no fractures, subluxations or dislocations. No significant abnormalities are seen [de-identified] : No swelling, no ecchymosis\par Tenderness to palpation over lateral epicondyle\par Full elbow flexion, extension, supination, pronation\par 5/5 strength in flexion, extension, supination, pronation\par Lateral Elbow pain with resisted wrist extension\par No Varus or Valgus instability\par Motor and sensory intact distally\par

## 2023-03-08 NOTE — PROCEDURE
[FreeTextEntry3] : Tendon origin injection was performed of the left lateral epicondyle. The indication for this procedure was pain and inflammation. The site was prepped with alcohol, betadine, ethyl chloride sprayed topically and sterile technique used. An injection of Betamethasone (Celestone) 1cc of 3mg, Bupivacaine (Marcaine) 2cc of 0.25%  was used.  Patient has tried OTC's including aspirin, Ibuprofen, Aleve, etc or prescription NSAIDS, and/or exercises at home and/or physical therapy without satisfactory response, patient had decreased mobility in the joint and the risks benefits, and alternatives have been discussed, and verbal consent was obtained.\par

## 2023-03-18 NOTE — ASU PATIENT PROFILE, ADULT - MENTAL HEALTH CONDITIONS/SYMPTOMS, PROFILE
none
decreased ability to use arms for pushing/pulling/decreased ability to use legs for bridging/pushing/impaired ability to control trunk for mobility

## 2023-05-09 ENCOUNTER — NON-APPOINTMENT (OUTPATIENT)
Age: 68
End: 2023-05-09

## 2023-05-09 ENCOUNTER — INPATIENT (INPATIENT)
Facility: HOSPITAL | Age: 68
LOS: 10 days | Discharge: HOME | DRG: 25 | End: 2023-05-20
Attending: NEUROLOGICAL SURGERY | Admitting: NEUROLOGICAL SURGERY
Payer: MEDICARE

## 2023-05-09 ENCOUNTER — APPOINTMENT (OUTPATIENT)
Dept: NEUROLOGY | Facility: CLINIC | Age: 68
End: 2023-05-09
Payer: MEDICARE

## 2023-05-09 VITALS
DIASTOLIC BLOOD PRESSURE: 82 MMHG | WEIGHT: 199.96 LBS | SYSTOLIC BLOOD PRESSURE: 164 MMHG | OXYGEN SATURATION: 95 % | TEMPERATURE: 98 F | RESPIRATION RATE: 17 BRPM | HEART RATE: 64 BPM | HEIGHT: 72 IN

## 2023-05-09 VITALS
RESPIRATION RATE: 16 BRPM | HEIGHT: 72 IN | OXYGEN SATURATION: 98 % | HEART RATE: 58 BPM | WEIGHT: 202 LBS | SYSTOLIC BLOOD PRESSURE: 142 MMHG | DIASTOLIC BLOOD PRESSURE: 66 MMHG | BODY MASS INDEX: 27.36 KG/M2

## 2023-05-09 DIAGNOSIS — Z98.890 OTHER SPECIFIED POSTPROCEDURAL STATES: Chronic | ICD-10-CM

## 2023-05-09 DIAGNOSIS — G93.89 OTHER SPECIFIED DISORDERS OF BRAIN: ICD-10-CM

## 2023-05-09 LAB
ALBUMIN SERPL ELPH-MCNC: 3.9 G/DL — SIGNIFICANT CHANGE UP (ref 3.3–5)
ALP SERPL-CCNC: 75 U/L — SIGNIFICANT CHANGE UP (ref 40–120)
ALT FLD-CCNC: 18 U/L — SIGNIFICANT CHANGE UP (ref 10–45)
ANION GAP SERPL CALC-SCNC: 11 MMOL/L — SIGNIFICANT CHANGE UP (ref 5–17)
APTT BLD: 30.1 SEC — SIGNIFICANT CHANGE UP (ref 27.5–35.5)
AST SERPL-CCNC: 26 U/L — SIGNIFICANT CHANGE UP (ref 10–40)
BASOPHILS # BLD AUTO: 0.04 K/UL — SIGNIFICANT CHANGE UP (ref 0–0.2)
BASOPHILS NFR BLD AUTO: 0.5 % — SIGNIFICANT CHANGE UP (ref 0–2)
BILIRUB SERPL-MCNC: 0.2 MG/DL — SIGNIFICANT CHANGE UP (ref 0.2–1.2)
BLD GP AB SCN SERPL QL: NEGATIVE — SIGNIFICANT CHANGE UP
BUN SERPL-MCNC: 15 MG/DL — SIGNIFICANT CHANGE UP (ref 7–23)
CALCIUM SERPL-MCNC: 9.1 MG/DL — SIGNIFICANT CHANGE UP (ref 8.4–10.5)
CHLORIDE SERPL-SCNC: 101 MMOL/L — SIGNIFICANT CHANGE UP (ref 96–108)
CO2 SERPL-SCNC: 23 MMOL/L — SIGNIFICANT CHANGE UP (ref 22–31)
CREAT SERPL-MCNC: 0.94 MG/DL — SIGNIFICANT CHANGE UP (ref 0.5–1.3)
EGFR: 90 ML/MIN/1.73M2 — SIGNIFICANT CHANGE UP
EOSINOPHIL # BLD AUTO: 0.28 K/UL — SIGNIFICANT CHANGE UP (ref 0–0.5)
EOSINOPHIL NFR BLD AUTO: 3.7 % — SIGNIFICANT CHANGE UP (ref 0–6)
GLUCOSE SERPL-MCNC: 98 MG/DL — SIGNIFICANT CHANGE UP (ref 70–99)
HCT VFR BLD CALC: 40.9 % — SIGNIFICANT CHANGE UP (ref 39–50)
HGB BLD-MCNC: 13.5 G/DL — SIGNIFICANT CHANGE UP (ref 13–17)
IMM GRANULOCYTES NFR BLD AUTO: 0.4 % — SIGNIFICANT CHANGE UP (ref 0–0.9)
INR BLD: 1.3 RATIO — HIGH (ref 0.88–1.16)
LYMPHOCYTES # BLD AUTO: 1.34 K/UL — SIGNIFICANT CHANGE UP (ref 1–3.3)
LYMPHOCYTES # BLD AUTO: 17.6 % — SIGNIFICANT CHANGE UP (ref 13–44)
MCHC RBC-ENTMCNC: 30.2 PG — SIGNIFICANT CHANGE UP (ref 27–34)
MCHC RBC-ENTMCNC: 33 GM/DL — SIGNIFICANT CHANGE UP (ref 32–36)
MCV RBC AUTO: 91.5 FL — SIGNIFICANT CHANGE UP (ref 80–100)
MONOCYTES # BLD AUTO: 0.76 K/UL — SIGNIFICANT CHANGE UP (ref 0–0.9)
MONOCYTES NFR BLD AUTO: 10 % — SIGNIFICANT CHANGE UP (ref 2–14)
NEUTROPHILS # BLD AUTO: 5.17 K/UL — SIGNIFICANT CHANGE UP (ref 1.8–7.4)
NEUTROPHILS NFR BLD AUTO: 67.8 % — SIGNIFICANT CHANGE UP (ref 43–77)
NRBC # BLD: 0 /100 WBCS — SIGNIFICANT CHANGE UP (ref 0–0)
PLATELET # BLD AUTO: 238 K/UL — SIGNIFICANT CHANGE UP (ref 150–400)
POTASSIUM SERPL-MCNC: 4.3 MMOL/L — SIGNIFICANT CHANGE UP (ref 3.5–5.3)
POTASSIUM SERPL-SCNC: 4.3 MMOL/L — SIGNIFICANT CHANGE UP (ref 3.5–5.3)
PROT SERPL-MCNC: 6.5 G/DL — SIGNIFICANT CHANGE UP (ref 6–8.3)
PROTHROM AB SERPL-ACNC: 15.1 SEC — HIGH (ref 10.5–13.4)
RBC # BLD: 4.47 M/UL — SIGNIFICANT CHANGE UP (ref 4.2–5.8)
RBC # FLD: 12.2 % — SIGNIFICANT CHANGE UP (ref 10.3–14.5)
RH IG SCN BLD-IMP: NEGATIVE — SIGNIFICANT CHANGE UP
SODIUM SERPL-SCNC: 135 MMOL/L — SIGNIFICANT CHANGE UP (ref 135–145)
WBC # BLD: 7.62 K/UL — SIGNIFICANT CHANGE UP (ref 3.8–10.5)
WBC # FLD AUTO: 7.62 K/UL — SIGNIFICANT CHANGE UP (ref 3.8–10.5)

## 2023-05-09 PROCEDURE — 99205 OFFICE O/P NEW HI 60 MIN: CPT

## 2023-05-09 PROCEDURE — 71045 X-RAY EXAM CHEST 1 VIEW: CPT | Mod: 26

## 2023-05-09 PROCEDURE — 71260 CT THORAX DX C+: CPT | Mod: 26

## 2023-05-09 PROCEDURE — 99285 EMERGENCY DEPT VISIT HI MDM: CPT

## 2023-05-09 PROCEDURE — 74177 CT ABD & PELVIS W/CONTRAST: CPT | Mod: 26

## 2023-05-09 RX ORDER — OXYCODONE AND ACETAMINOPHEN 5; 325 MG/1; MG/1
5-325 TABLET ORAL
Qty: 18 | Refills: 0 | Status: DISCONTINUED | COMMUNITY
Start: 2020-08-11 | End: 2023-05-09

## 2023-05-09 RX ORDER — OXYCODONE HYDROCHLORIDE 5 MG/1
10 TABLET ORAL EVERY 4 HOURS
Refills: 0 | Status: DISCONTINUED | OUTPATIENT
Start: 2023-05-09 | End: 2023-05-15

## 2023-05-09 RX ORDER — BROMFENAC SODIUM 0.7 MG/ML
0.07 SOLUTION/ DROPS OPHTHALMIC
Qty: 3 | Refills: 0 | Status: DISCONTINUED | COMMUNITY
Start: 2022-05-18 | End: 2023-05-09

## 2023-05-09 RX ORDER — MOMETASONE FUROATE 1 MG/G
0.1 CREAM TOPICAL
Qty: 45 | Refills: 0 | Status: DISCONTINUED | COMMUNITY
Start: 2022-09-09 | End: 2023-05-09

## 2023-05-09 RX ORDER — OFLOXACIN 3 MG/ML
0.3 SOLUTION/ DROPS OPHTHALMIC
Qty: 5 | Refills: 0 | Status: DISCONTINUED | COMMUNITY
Start: 2022-06-18 | End: 2023-05-09

## 2023-05-09 RX ORDER — ONDANSETRON 8 MG/1
4 TABLET, FILM COATED ORAL EVERY 6 HOURS
Refills: 0 | Status: DISCONTINUED | OUTPATIENT
Start: 2023-05-09 | End: 2023-05-15

## 2023-05-09 RX ORDER — LEVETIRACETAM 250 MG/1
500 TABLET, FILM COATED ORAL EVERY 12 HOURS
Refills: 0 | Status: DISCONTINUED | OUTPATIENT
Start: 2023-05-09 | End: 2023-05-10

## 2023-05-09 RX ORDER — BUPROPION HYDROCHLORIDE 300 MG/1
300 TABLET, EXTENDED RELEASE ORAL
Qty: 30 | Refills: 0 | Status: DISCONTINUED | COMMUNITY
Start: 2020-12-28 | End: 2023-05-09

## 2023-05-09 RX ORDER — POLYETHYLENE GLYCOL 3350 17 G/17G
17 POWDER, FOR SOLUTION ORAL DAILY
Refills: 0 | Status: DISCONTINUED | OUTPATIENT
Start: 2023-05-09 | End: 2023-05-15

## 2023-05-09 RX ORDER — SENNA PLUS 8.6 MG/1
2 TABLET ORAL AT BEDTIME
Refills: 0 | Status: DISCONTINUED | OUTPATIENT
Start: 2023-05-09 | End: 2023-05-15

## 2023-05-09 RX ORDER — ZOLPIDEM TARTRATE 10 MG/1
10 TABLET ORAL
Qty: 30 | Refills: 0 | Status: DISCONTINUED | COMMUNITY
Start: 2020-12-28 | End: 2023-05-09

## 2023-05-09 RX ORDER — ALPRAZOLAM 0.5 MG/1
0.5 TABLET ORAL
Qty: 30 | Refills: 0 | Status: DISCONTINUED | COMMUNITY
Start: 2022-08-18 | End: 2023-05-09

## 2023-05-09 RX ORDER — ACETAMINOPHEN 500 MG
650 TABLET ORAL EVERY 6 HOURS
Refills: 0 | Status: DISCONTINUED | OUTPATIENT
Start: 2023-05-09 | End: 2023-05-15

## 2023-05-09 RX ORDER — ENOXAPARIN SODIUM 100 MG/ML
40 INJECTION SUBCUTANEOUS EVERY 24 HOURS
Refills: 0 | Status: DISCONTINUED | OUTPATIENT
Start: 2023-05-09 | End: 2023-05-14

## 2023-05-09 RX ORDER — BUPROPION HYDROCHLORIDE 150 MG/1
150 TABLET, EXTENDED RELEASE ORAL
Qty: 30 | Refills: 0 | Status: DISCONTINUED | COMMUNITY
Start: 2022-06-27 | End: 2023-05-09

## 2023-05-09 RX ORDER — PANTOPRAZOLE SODIUM 20 MG/1
40 TABLET, DELAYED RELEASE ORAL
Refills: 0 | Status: DISCONTINUED | OUTPATIENT
Start: 2023-05-09 | End: 2023-05-15

## 2023-05-09 RX ORDER — SERTRALINE HYDROCHLORIDE 100 MG/1
100 TABLET, FILM COATED ORAL
Qty: 60 | Refills: 0 | Status: DISCONTINUED | COMMUNITY
Start: 2020-12-28 | End: 2023-05-09

## 2023-05-09 RX ORDER — OXYCODONE HYDROCHLORIDE 5 MG/1
5 TABLET ORAL EVERY 4 HOURS
Refills: 0 | Status: DISCONTINUED | OUTPATIENT
Start: 2023-05-09 | End: 2023-05-15

## 2023-05-09 RX ORDER — DEXAMETHASONE 0.5 MG/5ML
4 ELIXIR ORAL EVERY 6 HOURS
Refills: 0 | Status: DISCONTINUED | OUTPATIENT
Start: 2023-05-09 | End: 2023-05-12

## 2023-05-09 RX ORDER — METHYLPREDNISOLONE 4 MG/1
4 TABLET ORAL
Qty: 21 | Refills: 0 | Status: DISCONTINUED | COMMUNITY
Start: 2020-07-31 | End: 2023-05-09

## 2023-05-09 RX ORDER — TROPICAMIDE 10 MG/ML
1 SOLUTION/ DROPS OPHTHALMIC
Qty: 15 | Refills: 0 | Status: DISCONTINUED | COMMUNITY
Start: 2022-05-18 | End: 2023-05-09

## 2023-05-09 RX ORDER — PREDNISOLONE ACETATE 10 MG/ML
1 SUSPENSION/ DROPS OPHTHALMIC
Qty: 5 | Refills: 0 | Status: DISCONTINUED | COMMUNITY
Start: 2022-06-20 | End: 2023-05-09

## 2023-05-09 RX ADMIN — LEVETIRACETAM 400 MILLIGRAM(S): 250 TABLET, FILM COATED ORAL at 17:24

## 2023-05-09 RX ADMIN — Medication 4 MILLIGRAM(S): at 23:23

## 2023-05-09 RX ADMIN — Medication 4 MILLIGRAM(S): at 17:24

## 2023-05-09 NOTE — PHYSICAL EXAM
[FreeTextEntry1] : He is awake and alert - oriented\par Decreased fluency but able to name, follow commands across the midline, and repeat\par EOMI\par VFF\par Tongue protrudes midline and palate rises symmetrically\par No drift\par Strong in all muscle groups - bilaterally\par Vonda intact\par Ambulating independently and steadily.

## 2023-05-09 NOTE — ED ADULT NURSE REASSESSMENT NOTE - NS ED NURSE REASSESS COMMENT FT1
Report received from April RN, pt resting comfortably with wife at bedside, waiting for CT scan, speaking with some trouble because of new found mass in head. Pt followed by Nrsgy. Vitals stable, bed locked and in lowest position for pt safety.

## 2023-05-09 NOTE — H&P ADULT - ASSESSMENT
Reymundo Liu  67M retired , R handed, no pmh, p/w MRI demonstrating 3x3x4.5 cm ring enhancing mass in L ant. temporal lobe w/extension to BG and frontal lobe and associated vasogenic edema c/f GBM. He c/o word finding difficulty for past 3 wks. Exam: AO3, PERRL, EOMI, BROWN 5/5, sensation intact, difficulty with repetition, 1/3 w/ naming objects.    -Admit to 4[facility] under Dr. Bush  -MRI brain stereo w/wo and synaptive DTI protocol  -CT CAP done- pending read  -Needs med clearance  -Pre-op for tumor resection

## 2023-05-09 NOTE — ED PROVIDER NOTE - ATTENDING CONTRIBUTION TO CARE
I, Kristian Alfonso, performed a history and physical exam of the patient and discussed their management with the resident and/or advanced care provider. I reviewed the resident and/or advanced care provider's note and agree with the documented findings and plan of care. I was present and available for all procedures.    67-year-old male no past medical history presenting with left-sided brain mass diagnosed on MRI at outside institution.  Reports having worsening expressive aphasia over the last couple weeks went to PMD who scheduled for MRI MRI showing left temporal mass 5 cm otherwise sent to ER for further neurosurgical evaluation and admission for further work-up.  Patient denies smoking history drug use history of cancer history of weight loss has normal bowel movements denies bloody stools had recent colonoscopy which was normal last year otherwise no history of brain CT or imaging in the past.  Patient denies bodily complaints except for expressive aphasia    Well appearing and in NAD, head normal appearing atraumatic, trachea midline, no respiratory distress, lungs cta bilaterally, rrr no murmurs, soft NT ND abdomen, no visible extremity deformities, Alert and oriented, non focal neuro exam, skin warm and dry, normal affect and mood, no leg swelling, calf ttp or jvd, speech slightly slow as reported    Well-appearing reassuring neuro exam only with expressive aphasia with slow speech otherwise well-appearing neuro intact distally evaluate with neurosurgical consultation screening blood work admission for further management discussed with patient agreeable with plan possible further bodily imaging at discretion of neurosurgery

## 2023-05-09 NOTE — H&P ADULT - HISTORY OF PRESENT ILLNESS
Reymundo Liu  67M retired , R handed, no pmh, p/w MRI demonstrating 3x3x4.5 cm ring enhancing mass in L ant. temporal lobe w/extension to BG and frontal lobe and associated vasogenic edema c/f GBM. He c/o word finding difficulty for past 3 wks. Exam: AO3, PERRL, EOMI, BROWN 5/5, sensation intact, difficulty with repetition, 1/3 w/ naming objects.

## 2023-05-09 NOTE — H&P ADULT - NSHPPHYSICALEXAM_GEN_ALL_CORE
Exam: AO3, PERRL, EOMI, BROWN 5/5, sensation intact, difficulty with repetition, 1/3 w/ naming objects

## 2023-05-09 NOTE — ED ADULT TRIAGE NOTE - CHIEF COMPLAINT QUOTE
Pt states "sent in for abnormal MRI results today. Had MRI  due to confusion and loss of words x 3 weeks."

## 2023-05-09 NOTE — ED ADULT NURSE NOTE - OBJECTIVE STATEMENT
66 yo presents to the ED from home. A&Ox4, ambulatory with wife at bedside c/o abnormal MRI. Pt states "sent in for abnormal MRI results today. Had MRI done due to confusion and loss of words x 3 weeks." wife at bedside reports pt is experiencing difficulty word finding which has worsened and become more regular of an occurrence. denies any headache, vision changes. pt denies any trauma. reports history of detached retina recently with surgical repair. upon assessment, pt A&Ox4 following all instructions appropriately denies any numbness tingling, equal strength in all extremities. 20G inserted LAC. Patient undressed and placed into gown, call bell in hand and side rails up for safety. warm blanket provided, vital signs stable, pt in no acute distress. MD at bedside for eval. MRI disc sent to radiology.

## 2023-05-09 NOTE — ED PROVIDER NOTE - PHYSICAL EXAMINATION
General: Appears well and nontoxic  Mentation: AAO x 3  psych: mood appropriate  HEENT: airway patent, conjunctivae clear bilaterally, GIL  Resp: symmetric chest rise, speaking in complete sentences, no resp distress, breath sounds CTA bilaterally  Cardio: RRR, no m/r/g  GI: soft/nondistended/nontender  : no CVA tenderness  Neuro: sensation and motor function grossly intact, 5/5 muscle strength bilaterally in both UEX and JAN  Skin: no cyanosis, no jaundice  MSK: normal movement of all extremities  Lymph/Vasc: no LE edema

## 2023-05-09 NOTE — DISCUSSION/SUMMARY
[FreeTextEntry1] : In summary, this is a 68 yo man with a newly diagnosed brain mass. He is symptomatic with decreased fluency and irritability - would refer to ER for expedited evaluation and neurosurgical consultation.\par Should have CT C/A/P as well.\par 
- - -

## 2023-05-09 NOTE — ED PROVIDER NOTE - OBJECTIVE STATEMENT
67-year-old male presenting with abnormal MRI. Patient had confusion and expressive aphasia 3 weeks ago and subsequently saw a neurologist who ordered an MRI. Patient had the MRI this morning and was evaluated by neuro oncologist who sent him to the emergency department for a neurosurgical evaluation. Patient currently is having difficulty concentrating. Patient denies focal neurological deficits, chest pain, shortness of breath, nausea, vomiting, abdominal pain, fevers.

## 2023-05-09 NOTE — HISTORY OF PRESENT ILLNESS
[FreeTextEntry1] : Mr. Liu was referred by Dr. Garcia for evaluation of a newly diagnosed brain mass.\par \par Briefly, patient and his wife have noted over the past 3 weeks some difficulty with expressive speech - word substitutions and change in personality - more irritable that usual. He denies headaches, focal weakness,numbness, seizures, or gait instability.\par \par \par He reports good health - takes no medications and has no known drug allergies.\par PMH only notable for orthopedic issues - multiple joints - plays many sports - retired .\par \par He saw Dr. Garcia, neurology - had and MRI this am - I have personally reviewed this film - that shows a left temporal heterogeneously enhancing mass measuring 5.0 x 4.5 x 3.3 cm with extension into the left frontal lobe with surrounding edema and left to right midline shift.\par

## 2023-05-09 NOTE — ED CLERICAL - NS ED CLERK NOTE PRE-ARRIVAL INFORMATION; ADDITIONAL PRE-ARRIVAL INFORMATION
CC/Reason For referral: out patient mri  reveals left frontal mass ( copy with Patient)  Preferred Consultant(if applicable): [facility]  Who admits for you (if needed): na  Do you have documents you would like to fax over? no  Would you still like to speak to an ED attending? yes

## 2023-05-10 DIAGNOSIS — G93.89 OTHER SPECIFIED DISORDERS OF BRAIN: ICD-10-CM

## 2023-05-10 DIAGNOSIS — Z29.9 ENCOUNTER FOR PROPHYLACTIC MEASURES, UNSPECIFIED: ICD-10-CM

## 2023-05-10 DIAGNOSIS — Z01.818 ENCOUNTER FOR OTHER PREPROCEDURAL EXAMINATION: ICD-10-CM

## 2023-05-10 DIAGNOSIS — G93.6 CEREBRAL EDEMA: ICD-10-CM

## 2023-05-10 DIAGNOSIS — R00.1 BRADYCARDIA, UNSPECIFIED: ICD-10-CM

## 2023-05-10 DIAGNOSIS — G47.00 INSOMNIA, UNSPECIFIED: ICD-10-CM

## 2023-05-10 DIAGNOSIS — N40.0 BENIGN PROSTATIC HYPERPLASIA WITHOUT LOWER URINARY TRACT SYMPTOMS: ICD-10-CM

## 2023-05-10 LAB
MRSA PCR RESULT.: SIGNIFICANT CHANGE UP
S AUREUS DNA NOSE QL NAA+PROBE: SIGNIFICANT CHANGE UP

## 2023-05-10 PROCEDURE — 99223 1ST HOSP IP/OBS HIGH 75: CPT

## 2023-05-10 PROCEDURE — 99232 SBSQ HOSP IP/OBS MODERATE 35: CPT

## 2023-05-10 RX ORDER — LANOLIN ALCOHOL/MO/W.PET/CERES
5 CREAM (GRAM) TOPICAL AT BEDTIME
Refills: 0 | Status: DISCONTINUED | OUTPATIENT
Start: 2023-05-10 | End: 2023-05-12

## 2023-05-10 RX ORDER — LEVETIRACETAM 250 MG/1
500 TABLET, FILM COATED ORAL
Refills: 0 | Status: DISCONTINUED | OUTPATIENT
Start: 2023-05-10 | End: 2023-05-15

## 2023-05-10 RX ADMIN — Medication 4 MILLIGRAM(S): at 17:34

## 2023-05-10 RX ADMIN — Medication 4 MILLIGRAM(S): at 05:22

## 2023-05-10 RX ADMIN — LEVETIRACETAM 400 MILLIGRAM(S): 250 TABLET, FILM COATED ORAL at 05:23

## 2023-05-10 RX ADMIN — LEVETIRACETAM 500 MILLIGRAM(S): 250 TABLET, FILM COATED ORAL at 17:35

## 2023-05-10 RX ADMIN — PANTOPRAZOLE SODIUM 40 MILLIGRAM(S): 20 TABLET, DELAYED RELEASE ORAL at 05:23

## 2023-05-10 RX ADMIN — Medication 5 MILLIGRAM(S): at 21:53

## 2023-05-10 RX ADMIN — ENOXAPARIN SODIUM 40 MILLIGRAM(S): 100 INJECTION SUBCUTANEOUS at 05:24

## 2023-05-10 RX ADMIN — Medication 4 MILLIGRAM(S): at 11:47

## 2023-05-10 NOTE — CONSULT NOTE ADULT - PROBLEM SELECTOR RECOMMENDATION 4
asymptomatic. per patient, HR has always generally been in the 50s for most of his life.  - for completion sake, will check TSH w/ reflex FT4 in AM

## 2023-05-10 NOTE — PROGRESS NOTE ADULT - SUBJECTIVE AND OBJECTIVE BOX
SUBJECTIVE: Patient seen :     Vital Signs Last 24 Hrs  T(C): 36.6 (09 May 2023 22:55), Max: 36.8 (09 May 2023 16:50)  T(F): 97.8 (09 May 2023 22:55), Max: 98.3 (09 May 2023 16:50)  HR: 59 (09 May 2023 22:55) (59 - 64)  BP: 142/74 (09 May 2023 22:55) (142/74 - 164/82)  BP(mean): --  RR: 17 (09 May 2023 22:55) (17 - 20)  SpO2: 96% (09 May 2023 22:55) (95% - 97%)    Parameters below as of 09 May 2023 22:55  Patient On (Oxygen Delivery Method): room air        PHYSICAL EXAM:    Constitutional: No Acute Distress     Neurological: AOx3, Following Commands, Moving all Extremities     Motor exam:          Upper extremity                         Delt     Bicep     Tricep    HG                                                 R         5/5        5/5        5/5       5/5                                               L          5/5        5/5        5/5       5/5          Lower extremity                        HF         KF        KE       DF         PF                                                  R        5/5        5/5        5/5       5/5         5/5                                               L         5/5        5/5       5/5       5/5          5/5                                                 Sensation: [] intact to light touch  [] decreased:     Pulmonary: Clear to Auscultation, No rales, No rhonchi, No wheezes     Cardiovascular: S1, S2, Regular rate and rhythm     Gastrointestinal: Soft, Non-tender, Non-distended     Extremities: No calf tenderness     Incision:   LABS:                        13.5   7.62  )-----------( 238      ( 09 May 2023 16:31 )             40.9    05-09    135  |  101  |  15  ----------------------------<  98  4.3   |  23  |  0.94    Ca    9.1      09 May 2023 16:31    TPro  6.5  /  Alb  3.9  /  TBili  0.2  /  DBili  x   /  AST  26  /  ALT  18  /  AlkPhos  75  05-09  PT/INR - ( 09 May 2023 16:31 )   PT: 15.1 sec;   INR: 1.30 ratio         PTT - ( 09 May 2023 16:31 )  PTT:30.1 sec    DRAINS:     MEDICATIONS:  Anticoagulation:   enoxaparin Injectable 40 milliGRAM(s) SubCutaneous every 24 hours    Antibiotics:    Endo:  dexAMETHasone  Injectable 4 milliGRAM(s) IV Push every 6 hours    Neuro:  acetaminophen     Tablet .. 650 milliGRAM(s) Oral every 6 hours PRN Temp greater or equal to 38C (100.4F), Mild Pain (1 - 3)  levETIRAcetam 500 milliGRAM(s) Oral two times a day  ondansetron Injectable 4 milliGRAM(s) IV Push every 6 hours PRN Nausea and/or Vomiting  oxyCODONE    IR 5 milliGRAM(s) Oral every 4 hours PRN Moderate Pain (4 - 6)  oxyCODONE    IR 10 milliGRAM(s) Oral every 4 hours PRN Severe Pain (7 - 10)    Cardiac:    Pulm:    GI/:  pantoprazole    Tablet 40 milliGRAM(s) Oral before breakfast  polyethylene glycol 3350 17 Gram(s) Oral daily  senna 2 Tablet(s) Oral at bedtime    Other:     DIET:     IMAGING:    SUBJECTIVE: Patient seen. Patient complains about headaches and problems finding the right words.  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      Vital Signs Last 24 Hrs  T(C): 36.6 (09 May 2023 22:55), Max: 36.8 (09 May 2023 16:50)  T(F): 97.8 (09 May 2023 22:55), Max: 98.3 (09 May 2023 16:50)  HR: 59 (09 May 2023 22:55) (59 - 64)  BP: 142/74 (09 May 2023 22:55) (142/74 - 164/82)  BP(mean): --  RR: 17 (09 May 2023 22:55) (17 - 20)  SpO2: 96% (09 May 2023 22:55) (95% - 97%)    Parameters below as of 09 May 2023 22:55  Patient On (Oxygen Delivery Method): room air        PHYSICAL EXAM:    Constitutional: No Acute Distress     Neurological: AOx3, uriostegui 5/5 difficulty naming objects.      Pulmonary: Clear to Auscultation, No rales, No rhonchi, No wheezes     Cardiovascular: S1, S2, Regular rate and rhythm     Gastrointestinal: Soft, Non-tender, Non-distended     Extremities: No calf tenderness       LABS:                        13.5   7.62  )-----------( 238      ( 09 May 2023 16:31 )             40.9    05-09    135  |  101  |  15  ----------------------------<  98  4.3   |  23  |  0.94    Ca    9.1      09 May 2023 16:31    TPro  6.5  /  Alb  3.9  /  TBili  0.2  /  DBili  x   /  AST  26  /  ALT  18  /  AlkPhos  75  05-09  PT/INR - ( 09 May 2023 16:31 )   PT: 15.1 sec;   INR: 1.30 ratio         PTT - ( 09 May 2023 16:31 )  PTT:30.1 sec      MEDICATIONS:  Anticoagulation:   enoxaparin Injectable 40 milliGRAM(s) SubCutaneous every 24 hours    Antibiotics:    Endo:  dexAMETHasone  Injectable 4 milliGRAM(s) IV Push every 6 hours    Neuro:  acetaminophen     Tablet .. 650 milliGRAM(s) Oral every 6 hours PRN Temp greater or equal to 38C (100.4F), Mild Pain (1 - 3)  levETIRAcetam 500 milliGRAM(s) Oral two times a day  ondansetron Injectable 4 milliGRAM(s) IV Push every 6 hours PRN Nausea and/or Vomiting  oxyCODONE    IR 5 milliGRAM(s) Oral every 4 hours PRN Moderate Pain (4 - 6)  oxyCODONE    IR 10 milliGRAM(s) Oral every 4 hours PRN Severe Pain (7 - 10)    Cardiac:    Pulm:    GI/:  pantoprazole    Tablet 40 milliGRAM(s) Oral before breakfast  polyethylene glycol 3350 17 Gram(s) Oral daily  senna 2 Tablet(s) Oral at bedtime    Other:     DIET: regular     IMAGING:

## 2023-05-10 NOTE — CONSULT NOTE ADULT - NSCONSULTADDITIONALINFOA_GEN_ALL_CORE
.  Shea Gonzalez MD  Division of Hospital Medicine  [facility]   Spectra: [phone]    Plan discussed with patient, wife bedside, and neurosurgery JORDEN Otto.

## 2023-05-10 NOTE — PROGRESS NOTE ADULT - ASSESSMENT
HPI:  Reymundo Liu  67M retired , R handed, no pmh, p/w MRI demonstrating 3x3x4.5 cm ring enhancing mass in L ant. temporal lobe w/extension to BG and frontal lobe and associated vasogenic edema c/f GBM. He c/o word finding difficulty for past 3 wks.       (09 May 2023 16:58)    PAST MEDICAL & SURGICAL HISTORY:  H/O insomnia      Elbow pain, right      S/P foot surgery  plantar fasciitis left 2019 and 2020 and right 2017      S/P eye surgery  right eye retina 2016 and cataract 2018, left cataract 2021      S/P arthroscopy of right shoulder  2005      History of arthroscopy of left shoulder  June 2020      H/O arthroscopy of knee  bilateral;            PLAN:  Neuro: neuro and vital checks q 4  decadron 4 q 6 for cerebral edema  mri w/w/o tomorrow at 5/11 4pm   out of bed   tylenol and oxy for pain   OR depending on MRI   Respiratory: incentive spirometry  CV: keep sbp 100-160   Endocrine: euyglycemia   Heme/Onc:   stable           DVT ppx: lovenox and scs   Renal: ivl, voiding   ID: afebrile   GI:  miralax and senna   discuss with Dr. Bush at bedside          HPI:  Reymundo Liu  67M retired , R handed, no pmh, p/w MRI demonstrating 3x3x4.5 cm ring enhancing mass in L ant. temporal lobe w/extension to BG and frontal lobe and associated vasogenic edema c/f GBM. He c/o word finding difficulty for past 3 wks.       (09 May 2023 16:58)    PAST MEDICAL & SURGICAL HISTORY:  H/O insomnia      Elbow pain, right      S/P foot surgery  plantar fasciitis left 2019 and 2020 and right 2017      S/P eye surgery  right eye retina 2016 and cataract 2018, left cataract 2021      S/P arthroscopy of right shoulder  2005      History of arthroscopy of left shoulder  June 2020      H/O arthroscopy of knee  bilateral;            PLAN:  Neuro: neuro and vital checks q 4  decadron 4 q 6 for cerebral edema  mri w/w/o tomorrow at 5/11 4pm   out of bed   tylenol and oxy for pain   OR depending on MRI   Respiratory: incentive spirometry  CV: keep sbp 100-160   Endocrine: euyglycemia   Heme/Onc:   stable           DVT ppx: lovenox and scs   Renal: ivl, voiding   ID: afebrile   GI:  miralax and senna   discuss with Dr. Bush at bedside     Spent 40 minuted with patient and family examining and educating patient and treatment

## 2023-05-10 NOTE — CONSULT NOTE ADULT - SUBJECTIVE AND OBJECTIVE BOX
Shea Gonzalez MD  Division of Hospital Medicine  [facility]   Spectra: [phone]    PCP: Dr. Darnell Crowder    HPI:  66 yo male with PMH of enlarged prostate and insomnia who presents to the ED with abnormal MRI demonstrating 3x3x4.5 cm ring enhancing mass in L anterior temporal lobe with extension to BG and frontal lobe and associated vasogenic edema concerning for GBM. He states in the last 3 weeks, he has felt a mental fog with ongoing word finding difficulty. Denies any loss of strength nor gait instability. No fever, chills, headache, dizziness, lightheadedness, changes in vision, chest pain, SOB, abd pain, n/v, nor dysuria. No recent illness nor sick contacts. Medicine consulted for Pre-op clearance.    Interval History: intermittent headache overnight but improved this morning. no fever, chills, chest pain, sob, abd pain, n/v, nor dysuria. Overall, has been in good health with regular exercise. no chest pain nor dyspnea on exertion when he ambulates several blocks or ambulates up flight of stairs.      PAST MEDICAL & SURGICAL HISTORY:  H/O insomnia  Elbow pain, right  S/P foot surgery  plantar fasciitis left  and  and right 2017  S/P eye surgery  right eye retina  and cataract , left cataract   S/P arthroscopy of right shoulder    History of arthroscopy of left shoulder  2020  H/O arthroscopy of knee  bilateral;    Home Medications [x] :  Tylenol Extra Strength  mg-25 mg oral tablet: 1 orally once a day (at bedtime) as needed for  insomnia (10 May 2023 11:15)    Review of Systems:   CONSTITUTIONAL: No fever, chills  HEENT: No sore throat, vision changes  RESPIRATORY: No cough, wheezing, shortness of breath  CARDIOVASCULAR: No chest pain, palpitations, leg edema  GASTROINTESTINAL: No abdominal pain, nausea, vomiting, diarrhea or constipation. No melena or hematochezia.  GENITOURINARY: No dysuria, frequency, hematuria  NEUROLOGICAL: +intermittent headache, No memory loss, loss of strength, numbness, or tremors  SKIN: No itching, burning, rashes, or lesions   MUSCULOSKELETAL: No joint pain or swelling; No muscle, back, or extremity pain  PSYCHIATRIC: No depression, anxiety  HEME/LYMPH: No easy bruising, or bleeding gums      Allergies  No Known Allergies  Intolerances    Social History:   never smoker  denies EtOH use  retired     FAMILY HISTORY:  Family history of breast cancer (Mother)  mother   Family history of myotonic dystrophy (Sibling, Sibling, Sibling, Sibling)      MEDICATIONS  (STANDING):  dexAMETHasone  Injectable 4 milliGRAM(s) IV Push every 6 hours  enoxaparin Injectable 40 milliGRAM(s) SubCutaneous every 24 hours  levETIRAcetam 500 milliGRAM(s) Oral two times a day  pantoprazole    Tablet 40 milliGRAM(s) Oral before breakfast  polyethylene glycol 3350 17 Gram(s) Oral daily  senna 2 Tablet(s) Oral at bedtime    MEDICATIONS  (PRN):  acetaminophen     Tablet .. 650 milliGRAM(s) Oral every 6 hours PRN Temp greater or equal to 38C (100.4F), Mild Pain (1 - 3)  ondansetron Injectable 4 milliGRAM(s) IV Push every 6 hours PRN Nausea and/or Vomiting  oxyCODONE    IR 5 milliGRAM(s) Oral every 4 hours PRN Moderate Pain (4 - 6)  oxyCODONE    IR 10 milliGRAM(s) Oral every 4 hours PRN Severe Pain (7 - 10)        CAPILLARY BLOOD GLUCOSE        I&O's Summary      Physical Exam:  Vital Signs Last 24 Hrs  T(C): 36.7 (10 May 2023 09:37), Max: 36.8 (09 May 2023 16:50)  T(F): 98.1 (10 May 2023 09:37), Max: 98.3 (09 May 2023 16:50)  HR: 65 (10 May 2023 09:37) (59 - 65)  BP: 132/64 (10 May 2023 09:37) (132/64 - 164/82)  BP(mean): --  RR: 18 (10 May 2023 09:37) (17 - 20)  SpO2: 98% (10 May 2023 09:37) (95% - 98%)    Parameters below as of 09 May 2023 22:55  Patient On (Oxygen Delivery Method): room air        CONSTITUTIONAL: NAD, well-developed, well-groomed  EYES: PERRLA; conjunctiva and sclera clear  ENMT: Moist oral mucosa, no pharyngeal injection or exudates; normal dentition  NECK: Supple, no palpable masses; no thyromegaly  RESPIRATORY: Normal respiratory effort; lungs are clear to auscultation bilaterally  CARDIOVASCULAR: Regular rate and rhythm, normal S1 and S2, no murmur/rub/gallop; No lower extremity edema; Peripheral pulses are 2+ bilaterally  ABDOMEN: Soft, Nondistended, Nontender to palpation, normoactive bowel sounds  MUSCULOSKELETAL:  No clubbing or cyanosis of digits; no joint swelling or tenderness to palpation  PSYCH: A+O to person, place, and time; affect appropriate  NEUROLOGY: CN 2-12 are intact and symmetric; no gross sensory deficits, 5/5 strength throughout  SKIN: No rashes; no palpable lesions    LABS:                        13.5   7.62  )-----------( 238      ( 09 May 2023 16:31 )             40.9         135  |  101  |  15  ----------------------------<  98  4.3   |  23  |  0.94    Ca    9.1      09 May 2023 16:31    TPro  6.5  /  Alb  3.9  /  TBili  0.2  /  DBili  x   /  AST  26  /  ALT  18  /  AlkPhos  75      PT/INR - ( 09 May 2023 16:31 )   PT: 15.1 sec;   INR: 1.30 ratio         PTT - ( 09 May 2023 16:31 )  PTT:30.1 sec        RADIOLOGY & ADDITIONAL TESTS:  Results Reviewed: no leukocytosis, H/H stable, Cr wnl, electrolytes at goal  Imaging Personally Reviewed:  23 CXR with clear lungs    23 CT C/A/P:  FINDINGS:  CHEST:  LUNGS AND LARGE AIRWAYS: Patent central airways. No pulmonary nodules.  PLEURA: No pleural effusion.  VESSELS: Atherosclerotic changes of the aorta and coronary arteries.  HEART: Heart size is normal. No pericardial effusion.  MEDIASTINUM AND AVIVA: No lymphadenopathy.  CHEST WALL AND LOWER NECK: Within normal limits.    ABDOMEN AND PELVIS:  LIVER: Within normal limits.  BILE DUCTS: Normal caliber.  GALLBLADDER: Within normal limits.  SPLEEN: Within normal limits.  PANCREAS: Within normal limits.  ADRENALS: Within normal limits.  KIDNEYS/URETERS: No renal stones or hydronephrosis. Right renal cyst   measuring up to 3.1 cm.    BLADDER: Within normal limits.  REPRODUCTIVE ORGANS: Prostate within normal limits.    BOWEL: No bowel obstruction. Colonic diverticulosis without evidence of   diverticulitis. Appendix is normal.  PERITONEUM: No ascites.  VESSELS: Atherosclerotic changes.  RETROPERITONEUM/LYMPH NODES: No lymphadenopathy.  ABDOMINAL WALL: Small fat-containing umbilical hernia.  BONES: Degenerative changes.    IMPRESSION:  No CT evidence of occult malignancy in the chest, abdomen, or pelvis.  Electrocardiogram Personally Reviewed: NSR with HR 59, no ischemic changes    COORDINATION OF CARE:  Care Discussed with Consultants/Other Providers [Y]: Neurosurgery JORDEN Otto  Prior or Outpatient Records Reviewed [Y/N]:

## 2023-05-10 NOTE — CONSULT NOTE ADULT - ASSESSMENT
68 yo male with PMH of enlarged prostate and insomnia who presents with 3 weeks of word finding difficulty with outpatient abnormal MRI demonstrating 3x3x4.5 cm ring enhancing mass in L anterior temporal lobe with extension to BG and frontal lobe and associated vasogenic edema concerning for GBM. Medicine consulted for Pre-op clearance.    PCP: Dr. Darnell Crowder

## 2023-05-10 NOTE — CONSULT NOTE ADULT - PROBLEM SELECTOR RECOMMENDATION 5
longstanding history of insomnia  - has been prescribed ambien 10mg in the past, but states has not used  - has been taking tylenol longstanding history of insomnia  - has been prescribed ambien 10mg in the past, but states has not used  - has been taking tylenol PM  - start melatonin 5mg qHS standing

## 2023-05-10 NOTE — CONSULT NOTE ADULT - PROBLEM SELECTOR RECOMMENDATION 3
- patient with METS>4. no active chest pain nor dyspnea on exertion  - RCRI risk 0 pts, Class I Risk, 3.9% 30 day risk of death, MI, or cardiac arrest  - EKG sinus shelton HR 59, no ischemic changes  - no contraindication from medicine standpoint for OR

## 2023-05-10 NOTE — CONSULT NOTE ADULT - PROBLEM SELECTOR RECOMMENDATION 6
prescribed flomax outpatient but patient states has not been taking nor requiring  - monitor urine output

## 2023-05-10 NOTE — CONSULT NOTE ADULT - PROBLEM SELECTOR RECOMMENDATION 9
3 weeks of word finding difficulty  - outpatient MRI demonstrating 3x3x4.5 cm ring enhancing mass in L anterior temporal lobe with extension to BG and frontal lobe and associated vasogenic edema concerning for GBM  - CT chest/abd/pelv negative for occult malignancy  - pending MRI brain 5/11  - c/w dexamethasone for cerebral edema  - c/w keppra for seizure ppx

## 2023-05-10 NOTE — CONSULT NOTE ADULT - PROBLEM SELECTOR RECOMMENDATION 7
DVT ppx: chemical VTE ppx when clear from neurosurgery standpoint    Med rec confirmed with patient. Though it appears he has been filling prescriptions, he states he has not been taking any medications other than tylenol PM.

## 2023-05-11 LAB
HAV IGM SER-ACNC: SIGNIFICANT CHANGE UP
HBV CORE IGM SER-ACNC: SIGNIFICANT CHANGE UP
HBV SURFACE AG SER-ACNC: SIGNIFICANT CHANGE UP
HCV AB S/CO SERPL IA: 0.08 S/CO — SIGNIFICANT CHANGE UP (ref 0–0.99)
HCV AB SERPL-IMP: SIGNIFICANT CHANGE UP
HIV 1+2 AB+HIV1 P24 AG SERPL QL IA: SIGNIFICANT CHANGE UP
T4 FREE+ TSH PNL SERPL: 0.51 UIU/ML — SIGNIFICANT CHANGE UP (ref 0.27–4.2)

## 2023-05-11 PROCEDURE — 99232 SBSQ HOSP IP/OBS MODERATE 35: CPT

## 2023-05-11 PROCEDURE — 70553 MRI BRAIN STEM W/O & W/DYE: CPT | Mod: 26

## 2023-05-11 PROCEDURE — 99232 SBSQ HOSP IP/OBS MODERATE 35: CPT | Mod: FS

## 2023-05-11 PROCEDURE — 70555 FMRI BRAIN BY PHYS/PSYCH: CPT | Mod: 26,59

## 2023-05-11 RX ADMIN — ENOXAPARIN SODIUM 40 MILLIGRAM(S): 100 INJECTION SUBCUTANEOUS at 05:46

## 2023-05-11 RX ADMIN — PANTOPRAZOLE SODIUM 40 MILLIGRAM(S): 20 TABLET, DELAYED RELEASE ORAL at 05:45

## 2023-05-11 RX ADMIN — Medication 5 MILLIGRAM(S): at 21:07

## 2023-05-11 RX ADMIN — LEVETIRACETAM 500 MILLIGRAM(S): 250 TABLET, FILM COATED ORAL at 17:09

## 2023-05-11 RX ADMIN — Medication 4 MILLIGRAM(S): at 17:09

## 2023-05-11 RX ADMIN — Medication 4 MILLIGRAM(S): at 05:46

## 2023-05-11 RX ADMIN — Medication 650 MILLIGRAM(S): at 21:07

## 2023-05-11 RX ADMIN — LEVETIRACETAM 500 MILLIGRAM(S): 250 TABLET, FILM COATED ORAL at 05:45

## 2023-05-11 RX ADMIN — Medication 4 MILLIGRAM(S): at 00:45

## 2023-05-11 RX ADMIN — Medication 1 MILLIGRAM(S): at 09:35

## 2023-05-11 RX ADMIN — SENNA PLUS 2 TABLET(S): 8.6 TABLET ORAL at 21:07

## 2023-05-11 NOTE — PROGRESS NOTE ADULT - NSPROGADDITIONALINFOA_GEN_ALL_CORE
.  Shea Gonzalez MD  Division of Hospital Medicine  [facility]   Spectra: [phone]    Plan discussed with patient and neurosurgery JORDEN Wiggins.

## 2023-05-11 NOTE — PROGRESS NOTE ADULT - PROBLEM SELECTOR PLAN 4
asymptomatic. per patient, HR has always generally been in the 50s for most of his life  - TSH wnl  - no further work-up required

## 2023-05-11 NOTE — PROGRESS NOTE ADULT - ASSESSMENT
66 yo male with PMH of enlarged prostate and insomnia who presents with 3 weeks of word finding difficulty with outpatient abnormal MRI demonstrating 3x3x4.5 cm ring enhancing mass in L anterior temporal lobe with extension to BG and frontal lobe and associated vasogenic edema concerning for GBM. Medicine consulted for Pre-op clearance.    PCP: Dr. Darnell Crowder

## 2023-05-11 NOTE — PROGRESS NOTE ADULT - SUBJECTIVE AND OBJECTIVE BOX
Shae Gonzalez MD  Division of Hospital Medicine  [facility]  Spectra: [phone]      Patient is a 67y old  Male who presents with a chief complaint of brain mass (10 May 2023 11:16)      SUBJECTIVE / OVERNIGHT EVENTS: no acute events overnight. no fever, chills, chest pain, nor dyspnea. anxious about MRI but willing to try with ativan prior.  ADDITIONAL REVIEW OF SYSTEMS:    MEDICATIONS  (STANDING):  dexAMETHasone  Injectable 4 milliGRAM(s) IV Push every 6 hours  enoxaparin Injectable 40 milliGRAM(s) SubCutaneous every 24 hours  levETIRAcetam 500 milliGRAM(s) Oral two times a day  melatonin 5 milliGRAM(s) Oral at bedtime  pantoprazole    Tablet 40 milliGRAM(s) Oral before breakfast  polyethylene glycol 3350 17 Gram(s) Oral daily  senna 2 Tablet(s) Oral at bedtime    MEDICATIONS  (PRN):  acetaminophen     Tablet .. 650 milliGRAM(s) Oral every 6 hours PRN Temp greater or equal to 38C (100.4F), Mild Pain (1 - 3)  ondansetron Injectable 4 milliGRAM(s) IV Push every 6 hours PRN Nausea and/or Vomiting  oxyCODONE    IR 5 milliGRAM(s) Oral every 4 hours PRN Moderate Pain (4 - 6)  oxyCODONE    IR 10 milliGRAM(s) Oral every 4 hours PRN Severe Pain (7 - 10)      CAPILLARY BLOOD GLUCOSE        I&O's Summary      PHYSICAL EXAM:  Vital Signs Last 24 Hrs  T(C): 36.7 (11 May 2023 09:11), Max: 36.8 (10 May 2023 17:20)  T(F): 98.1 (11 May 2023 09:11), Max: 98.3 (10 May 2023 17:20)  HR: 68 (11 May 2023 09:11) (52 - 69)  BP: 119/68 (11 May 2023 09:11) (108/61 - 165/69)  BP(mean): --  RR: 18 (11 May 2023 09:11) (18 - 18)  SpO2: 98% (11 May 2023 09:11) (94% - 98%)    Parameters below as of 11 May 2023 05:07  Patient On (Oxygen Delivery Method): room air    CONSTITUTIONAL: NAD, well-developed, well-groomed  EYES: PERRLA; conjunctiva and sclera clear  ENMT: Moist oral mucosa, no pharyngeal injection or exudates; normal dentition  NECK: Supple, no palpable masses; no thyromegaly  RESPIRATORY: Normal respiratory effort; lungs are clear to auscultation bilaterally  CARDIOVASCULAR: Regular rate and rhythm, normal S1 and S2, no murmur/rub/gallop; No lower extremity edema; Peripheral pulses are 2+ bilaterally  ABDOMEN: Soft, Nondistended, Nontender to palpation, normoactive bowel sounds  MUSCULOSKELETAL:  No clubbing or cyanosis of digits; no joint swelling or tenderness to palpation  PSYCH: A+O to person, place, and time; affect appropriate  NEUROLOGY: CN 2-12 are intact and symmetric; no gross sensory deficits, 5/5 strength throughout  SKIN: No rashes; no palpable lesions    LABS:                        13.5   7.62  )-----------( 238      ( 09 May 2023 16:31 )             40.9     05-09    135  |  101  |  15  ----------------------------<  98  4.3   |  23  |  0.94    Ca    9.1      09 May 2023 16:31    TPro  6.5  /  Alb  3.9  /  TBili  0.2  /  DBili  x   /  AST  26  /  ALT  18  /  AlkPhos  75  05-09    PT/INR - ( 09 May 2023 16:31 )   PT: 15.1 sec;   INR: 1.30 ratio         PTT - ( 09 May 2023 16:31 )  PTT:30.1 sec        RADIOLOGY & ADDITIONAL TESTS:  Results Reviewed: TSH wnl  Imaging Personally Reviewed:  Electrocardiogram Personally Reviewed:    COORDINATION OF CARE:  Care Discussed with Consultants/Other Providers [Y]: Neurosurgery JORDEN Wiggins  Prior or Outpatient Records Reviewed [Y/N]:

## 2023-05-11 NOTE — PROGRESS NOTE ADULT - SUBJECTIVE AND OBJECTIVE BOX
Patient was seen at bedside this am. Patient is claustrophobic and requested ativan for MRI. Denied any headache, cp, sob, n/v, or abd pain.    OVERNIGHT EVENTS: No acute event overnight    Vital Signs Last 24 Hrs  T(C): 36.7 (11 May 2023 09:11), Max: 36.8 (10 May 2023 17:20)  T(F): 98.1 (11 May 2023 09:11), Max: 98.3 (10 May 2023 17:20)  HR: 68 (11 May 2023 09:11) (52 - 69)  BP: 119/68 (11 May 2023 09:11) (108/61 - 165/69)  BP(mean): --  RR: 18 (11 May 2023 09:11) (18 - 18)  SpO2: 98% (11 May 2023 09:11) (94% - 98%)    Parameters below as of 11 May 2023 05:07  Patient On (Oxygen Delivery Method): room air        I&O's Detail    I&O's Summary      PHYSICAL EXAM:  Neurological:  awake, alert, oriented to person, place, and date, PERRL, face symmetrical, mild WFD, following commands, moving all extremities 5/5, sensation intact to light touch, no drift  Cardiovascular: +s1, s2  Respiratory: clear to auscultation b/l  Gastrointestinal: soft, non-distended, non-tender  Genitourinary: voiding  Extremities: warm, dry        LABS:                        13.5   7.62  )-----------( 238      ( 09 May 2023 16:31 )             40.9     05-09    135  |  101  |  15  ----------------------------<  98  4.3   |  23  |  0.94    Ca    9.1      09 May 2023 16:31    TPro  6.5  /  Alb  3.9  /  TBili  0.2  /  DBili  x   /  AST  26  /  ALT  18  /  AlkPhos  75  05-09    PT/INR - ( 09 May 2023 16:31 )   PT: 15.1 sec;   INR: 1.30 ratio         PTT - ( 09 May 2023 16:31 )  PTT:30.1 sec        CAPILLARY BLOOD GLUCOSE          Drug Levels: [] N/A    CSF Analysis: [] N/A      Allergies    No Known Allergies    Intolerances      MEDICATIONS:  Antibiotics:    Neuro:  acetaminophen     Tablet .. 650 milliGRAM(s) Oral every 6 hours PRN  levETIRAcetam 500 milliGRAM(s) Oral two times a day  melatonin 5 milliGRAM(s) Oral at bedtime  ondansetron Injectable 4 milliGRAM(s) IV Push every 6 hours PRN  oxyCODONE    IR 5 milliGRAM(s) Oral every 4 hours PRN  oxyCODONE    IR 10 milliGRAM(s) Oral every 4 hours PRN    Anticoagulation:  enoxaparin Injectable 40 milliGRAM(s) SubCutaneous every 24 hours    OTHER:  dexAMETHasone  Injectable 4 milliGRAM(s) IV Push every 6 hours  pantoprazole    Tablet 40 milliGRAM(s) Oral before breakfast  polyethylene glycol 3350 17 Gram(s) Oral daily  senna 2 Tablet(s) Oral at bedtime    IVF:    CULTURES:    RADIOLOGY & ADDITIONAL TESTS:

## 2023-05-11 NOTE — PROGRESS NOTE ADULT - ASSESSMENT
HPI:  Reymundo Liu  67M retired , R handed, no pmh, p/w MRI demonstrating 3x3x4.5 cm ring enhancing mass in L ant. temporal lobe w/extension to BG and frontal lobe and associated vasogenic edema c/f GBM. He c/o word finding difficulty for past 3 wks. Exam: AO3, PERRL, EOMI, BROWN 5/5, sensation intact, difficulty with repetition, 1/3 w/ naming objects.      (09 May 2023 16:58)      PLAN:  - neuro and vital check Q4hrs  - MRI for surgical planning done, will f/u results  - pre-op for OR on Monday, cleared by medicine team  - pain control with tylenol and oxycodone prn  - keppra for seizure ppx  - decadron 4mg Q6hrs for cerebral edema  - tolerating regular diet  - senna and miralax for bowel regimens  - protonix for GI ppx while on decadron  - activity increase as tolerated  - incentive spirometer  - DVT ppx: b/l SCDs and SQL  Discharge planning: PT/OT eval post op    will [facility] above [facility] [id number]

## 2023-05-11 NOTE — PROGRESS NOTE ADULT - PROBLEM SELECTOR PLAN 5
longstanding history of insomnia  - prescribed ambien 10mg in the past, but states has not used  - has been taking tylenol PM at home  - c/w melatonin 5mg qHS standing

## 2023-05-11 NOTE — PROGRESS NOTE ADULT - PROBLEM SELECTOR PLAN 7
DVT ppx: chemical VTE ppx when clear from neurosurgery standpoint    Med rec confirmed with patient. Though it appears he has been filling prescriptions, he states he has not been taking any medications other than tylenol PM for his insomnia

## 2023-05-12 DIAGNOSIS — D72.829 ELEVATED WHITE BLOOD CELL COUNT, UNSPECIFIED: ICD-10-CM

## 2023-05-12 LAB
ALBUMIN SERPL ELPH-MCNC: 3.8 G/DL — SIGNIFICANT CHANGE UP (ref 3.3–5)
ALP SERPL-CCNC: 76 U/L — SIGNIFICANT CHANGE UP (ref 40–120)
ALT FLD-CCNC: 19 U/L — SIGNIFICANT CHANGE UP (ref 10–45)
ANION GAP SERPL CALC-SCNC: 13 MMOL/L — SIGNIFICANT CHANGE UP (ref 5–17)
APTT BLD: 25.4 SEC — LOW (ref 27.5–35.5)
AST SERPL-CCNC: 12 U/L — SIGNIFICANT CHANGE UP (ref 10–40)
BASOPHILS # BLD AUTO: 0.01 K/UL — SIGNIFICANT CHANGE UP (ref 0–0.2)
BASOPHILS NFR BLD AUTO: 0.1 % — SIGNIFICANT CHANGE UP (ref 0–2)
BILIRUB SERPL-MCNC: 0.2 MG/DL — SIGNIFICANT CHANGE UP (ref 0.2–1.2)
BUN SERPL-MCNC: 19 MG/DL — SIGNIFICANT CHANGE UP (ref 7–23)
CALCIUM SERPL-MCNC: 9.3 MG/DL — SIGNIFICANT CHANGE UP (ref 8.4–10.5)
CHLORIDE SERPL-SCNC: 102 MMOL/L — SIGNIFICANT CHANGE UP (ref 96–108)
CO2 SERPL-SCNC: 22 MMOL/L — SIGNIFICANT CHANGE UP (ref 22–31)
CREAT SERPL-MCNC: 0.96 MG/DL — SIGNIFICANT CHANGE UP (ref 0.5–1.3)
EGFR: 88 ML/MIN/1.73M2 — SIGNIFICANT CHANGE UP
EOSINOPHIL # BLD AUTO: 0 K/UL — SIGNIFICANT CHANGE UP (ref 0–0.5)
EOSINOPHIL NFR BLD AUTO: 0 % — SIGNIFICANT CHANGE UP (ref 0–6)
GLUCOSE SERPL-MCNC: 132 MG/DL — HIGH (ref 70–99)
HAV IGM SER-ACNC: SIGNIFICANT CHANGE UP
HBV CORE IGM SER-ACNC: SIGNIFICANT CHANGE UP
HBV SURFACE AB SER-ACNC: SIGNIFICANT CHANGE UP
HBV SURFACE AG SER-ACNC: SIGNIFICANT CHANGE UP
HCT VFR BLD CALC: 40.6 % — SIGNIFICANT CHANGE UP (ref 39–50)
HCV AB S/CO SERPL IA: 0.08 S/CO — SIGNIFICANT CHANGE UP (ref 0–0.99)
HCV AB SERPL-IMP: SIGNIFICANT CHANGE UP
HGB BLD-MCNC: 13.9 G/DL — SIGNIFICANT CHANGE UP (ref 13–17)
IMM GRANULOCYTES NFR BLD AUTO: 0.4 % — SIGNIFICANT CHANGE UP (ref 0–0.9)
INR BLD: 1.29 RATIO — HIGH (ref 0.88–1.16)
LYMPHOCYTES # BLD AUTO: 0.74 K/UL — LOW (ref 1–3.3)
LYMPHOCYTES # BLD AUTO: 4.7 % — LOW (ref 13–44)
MCHC RBC-ENTMCNC: 30.9 PG — SIGNIFICANT CHANGE UP (ref 27–34)
MCHC RBC-ENTMCNC: 34.2 GM/DL — SIGNIFICANT CHANGE UP (ref 32–36)
MCV RBC AUTO: 90.2 FL — SIGNIFICANT CHANGE UP (ref 80–100)
MONOCYTES # BLD AUTO: 1.03 K/UL — HIGH (ref 0–0.9)
MONOCYTES NFR BLD AUTO: 6.6 % — SIGNIFICANT CHANGE UP (ref 2–14)
NEUTROPHILS # BLD AUTO: 13.81 K/UL — HIGH (ref 1.8–7.4)
NEUTROPHILS NFR BLD AUTO: 88.2 % — HIGH (ref 43–77)
NRBC # BLD: 0 /100 WBCS — SIGNIFICANT CHANGE UP (ref 0–0)
PLATELET # BLD AUTO: 246 K/UL — SIGNIFICANT CHANGE UP (ref 150–400)
POTASSIUM SERPL-MCNC: 4 MMOL/L — SIGNIFICANT CHANGE UP (ref 3.5–5.3)
POTASSIUM SERPL-SCNC: 4 MMOL/L — SIGNIFICANT CHANGE UP (ref 3.5–5.3)
PROT SERPL-MCNC: 6.4 G/DL — SIGNIFICANT CHANGE UP (ref 6–8.3)
PROTHROM AB SERPL-ACNC: 14.9 SEC — HIGH (ref 10.5–13.4)
RBC # BLD: 4.5 M/UL — SIGNIFICANT CHANGE UP (ref 4.2–5.8)
RBC # FLD: 12.6 % — SIGNIFICANT CHANGE UP (ref 10.3–14.5)
SODIUM SERPL-SCNC: 137 MMOL/L — SIGNIFICANT CHANGE UP (ref 135–145)
TSH SERPL-MCNC: 0.52 UIU/ML — SIGNIFICANT CHANGE UP (ref 0.27–4.2)
WBC # BLD: 15.66 K/UL — HIGH (ref 3.8–10.5)
WBC # FLD AUTO: 15.66 K/UL — HIGH (ref 3.8–10.5)

## 2023-05-12 PROCEDURE — 99232 SBSQ HOSP IP/OBS MODERATE 35: CPT

## 2023-05-12 PROCEDURE — 93970 EXTREMITY STUDY: CPT | Mod: 26

## 2023-05-12 PROCEDURE — 93010 ELECTROCARDIOGRAM REPORT: CPT

## 2023-05-12 PROCEDURE — 99232 SBSQ HOSP IP/OBS MODERATE 35: CPT | Mod: FS

## 2023-05-12 RX ORDER — DEXAMETHASONE 0.5 MG/5ML
4 ELIXIR ORAL
Refills: 0 | Status: DISCONTINUED | OUTPATIENT
Start: 2023-05-12 | End: 2023-05-15

## 2023-05-12 RX ORDER — LANOLIN ALCOHOL/MO/W.PET/CERES
8 CREAM (GRAM) TOPICAL AT BEDTIME
Refills: 0 | Status: DISCONTINUED | OUTPATIENT
Start: 2023-05-12 | End: 2023-05-15

## 2023-05-12 RX ADMIN — Medication 8 MILLIGRAM(S): at 21:46

## 2023-05-12 RX ADMIN — ENOXAPARIN SODIUM 40 MILLIGRAM(S): 100 INJECTION SUBCUTANEOUS at 05:56

## 2023-05-12 RX ADMIN — LEVETIRACETAM 500 MILLIGRAM(S): 250 TABLET, FILM COATED ORAL at 05:56

## 2023-05-12 RX ADMIN — Medication 4 MILLIGRAM(S): at 05:56

## 2023-05-12 RX ADMIN — PANTOPRAZOLE SODIUM 40 MILLIGRAM(S): 20 TABLET, DELAYED RELEASE ORAL at 05:57

## 2023-05-12 RX ADMIN — Medication 4 MILLIGRAM(S): at 00:00

## 2023-05-12 RX ADMIN — Medication 4 MILLIGRAM(S): at 17:24

## 2023-05-12 RX ADMIN — LEVETIRACETAM 500 MILLIGRAM(S): 250 TABLET, FILM COATED ORAL at 17:24

## 2023-05-12 NOTE — PROGRESS NOTE ADULT - ASSESSMENT
66 yo male with PMH of enlarged prostate and insomnia who presents with 3 weeks of word finding difficulty with outpatient abnormal MRI demonstrating 3x3x4.5 cm ring enhancing mass in L anterior temporal lobe with extension to BG and frontal lobe and associated vasogenic edema concerning for GBM. Medicine consulted for pre-op clearance for OR on Mon 5/15.    PCP: Dr. Darnell Crowder

## 2023-05-12 NOTE — PROGRESS NOTE ADULT - SUBJECTIVE AND OBJECTIVE BOX
Shea Gonzalez MD  Division of Hospital Medicine  [facility]  Spectra: [phone]      Patient is a 67y old  Male who presents with a chief complaint of brain mass (12 May 2023 09:53)      SUBJECTIVE / OVERNIGHT EVENTS: no acute events overnight. feels well without complaints. no fever, chills, headache, dizziness, chest pain, nor dyspnea.   ADDITIONAL REVIEW OF SYSTEMS:    MEDICATIONS  (STANDING):  dexAMETHasone  Injectable 4 milliGRAM(s) IV Push two times a day  enoxaparin Injectable 40 milliGRAM(s) SubCutaneous every 24 hours  levETIRAcetam 500 milliGRAM(s) Oral two times a day  melatonin 8 milliGRAM(s) Oral at bedtime  pantoprazole    Tablet 40 milliGRAM(s) Oral before breakfast  polyethylene glycol 3350 17 Gram(s) Oral daily  senna 2 Tablet(s) Oral at bedtime    MEDICATIONS  (PRN):  acetaminophen     Tablet .. 650 milliGRAM(s) Oral every 6 hours PRN Temp greater or equal to 38C (100.4F), Mild Pain (1 - 3)  ondansetron Injectable 4 milliGRAM(s) IV Push every 6 hours PRN Nausea and/or Vomiting  oxyCODONE    IR 5 milliGRAM(s) Oral every 4 hours PRN Moderate Pain (4 - 6)  oxyCODONE    IR 10 milliGRAM(s) Oral every 4 hours PRN Severe Pain (7 - 10)      CAPILLARY BLOOD GLUCOSE        I&O's Summary      PHYSICAL EXAM:  Vital Signs Last 24 Hrs  T(C): 36.3 (12 May 2023 05:00), Max: 36.6 (11 May 2023 17:15)  T(F): 97.3 (12 May 2023 05:00), Max: 97.9 (11 May 2023 17:15)  HR: 50 (12 May 2023 05:00) (50 - 54)  BP: 144/62 (12 May 2023 05:00) (115/55 - 144/62)  BP(mean): --  RR: 18 (12 May 2023 05:00) (18 - 18)  SpO2: 94% (12 May 2023 05:00) (93% - 95%)    Parameters below as of 12 May 2023 05:00  Patient On (Oxygen Delivery Method): room air    CONSTITUTIONAL: NAD, well-developed, well-groomed  EYES: PERRLA; conjunctiva and sclera clear  ENMT: Moist oral mucosa, no pharyngeal injection or exudates; normal dentition  NECK: Supple, no palpable masses; no thyromegaly  RESPIRATORY: Normal respiratory effort; lungs are clear to auscultation bilaterally  CARDIOVASCULAR: Regular rate and rhythm, normal S1 and S2, no murmur/rub/gallop; No lower extremity edema; Peripheral pulses are 2+ bilaterally  ABDOMEN: Soft, Nondistended, Nontender to palpation, normoactive bowel sounds  MUSCULOSKELETAL:  No clubbing or cyanosis of digits; no joint swelling or tenderness to palpation  PSYCH: A+O to person, place, and time; affect appropriate  NEUROLOGY: CN 2-12 are intact and symmetric; no gross sensory deficits, 5/5 strength throughout  SKIN: No rashes; no palpable lesions    LABS:        RADIOLOGY & ADDITIONAL TESTS:  Results Reviewed:   Imaging Personally Reviewed:  5/11/23 MRI Brain:  FINDINGS:    Reidentified is a heterogeneously enhancing necrotic mass lesion centered   within the left temporal lobe and extending into the left periinsular   region with a cystic portion along the inferior aspect of the lesion.   There is a large degree of surrounding vasogenic edema throughout the   left parietal temporal lobes and left basal ganglia. This results in mass   effect on the surrounding parenchyma and on the left ventricular system.   There is mild mass effect in the left cerebral peduncle. This results in   left to right midline shift up to 5.5 mm at the level of the septum   pellucidum.    No acute infarct or hemorrhage.    The following functional paradigms were performed:    Finger tapping: No hyperactivity in the region of tumor or edema.    Foot tapping: No hyperactivity in the region of tumor or edema.    Objective naming: No hyperactivity in the region of tumor or edema.    Sentence completion: No hyperactivity in the region of tumor or edema.    Word generation: No hyperactivity in the region of tumor or edema.      IMPRESSION:    Reidentified is a heterogeneously enhancing necrotic mass lesion centered   within the left temporal lobe and extending into the left periinsular   region with a cystic portion along the inferior aspect of the lesion,   with a large degree of surrounding vasogenic edema throughout the left   parietal temporal lobes and left basal ganglia. This results in mass   effect on the surrounding parenchyma and on the left ventricular system.   There is mild mass effect in the left cerebral peduncle. This results in   left to right midline shift up to 5.5 mm at the level of the septum   pellucidum. 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 Functional   imaging demonstrates no hyperactivity in the region of tumor or vasogenic   edema.  Electrocardiogram Personally Reviewed:    COORDINATION OF CARE:  Care Discussed with Consultants/Other Providers [Y]: Neurosurgery JORDEN Wiggins.  Prior or Outpatient Records Reviewed [Y/N]:

## 2023-05-12 NOTE — PROGRESS NOTE ADULT - SUBJECTIVE AND OBJECTIVE BOX
Patient is cleared to leave hospital building to go outside on the hospital outdoor grounds with nursing staff accompaniment.    - Guanakito Marroquin MD, MS  Fellow, neurosurgery

## 2023-05-12 NOTE — PROGRESS NOTE ADULT - PROBLEM SELECTOR PLAN 6
prescribed flomax outpatient but patient states has not been taking nor requiring  - monitor urine output longstanding history of insomnia  - prescribed ambien 10mg in the past, but states has not used  - has been taking tylenol PM at home  - increase melatonin 8mg qHS standing  - we discussed at length bedside. would try to avoid ambien if possible, but can be used as last resort

## 2023-05-12 NOTE — PROGRESS NOTE ADULT - NSPROGADDITIONALINFOA_GEN_ALL_CORE
.  Shea Gonzalez MD  Division of Hospital Medicine  [facility]   Spectra: [phone]    Plan discussed with patient and neurosurgery JORDEN Wiggins. .  Shea Gonzalez MD  Division of Hospital Medicine  [facility]   Spectra: [phone]    Plan discussed with patient, daughter bedside, and neurosurgery JORDEN Wiggins.

## 2023-05-12 NOTE — PROGRESS NOTE ADULT - SUBJECTIVE AND OBJECTIVE BOX
Patient was seen at bedside this am. Patient felt well. Denied any headache, cp, sob, n/v, or abd pain.    OVERNIGHT EVENTS: No acute event overnight    Vital Signs Last 24 Hrs  T(C): 36.3 (12 May 2023 05:00), Max: 36.7 (11 May 2023 12:30)  T(F): 97.3 (12 May 2023 05:00), Max: 98 (11 May 2023 12:30)  HR: 50 (12 May 2023 05:00) (50 - 66)  BP: 144/62 (12 May 2023 05:00) (115/55 - 144/62)  BP(mean): --  RR: 18 (12 May 2023 05:00) (18 - 18)  SpO2: 94% (12 May 2023 05:00) (93% - 97%)    Parameters below as of 12 May 2023 05:00  Patient On (Oxygen Delivery Method): room air        I&O's Detail    I&O's Summary      PHYSICAL EXAM:  Neurological:  awake, alert, oriented to person, place, and date, PERRL, face symmetrical, mild WFD, following commands, moving all extremities 5/5, sensation intact to light touch, no drift  Cardiovascular: +s1, s2  Respiratory: clear to auscultation b/l  Gastrointestinal: soft, non-distended, non-tender  Genitourinary: voiding  Extremities: warm, dry    LABS:                  CAPILLARY BLOOD GLUCOSE          Drug Levels: [] N/A    CSF Analysis: [] N/A      Allergies    No Known Allergies    Intolerances      MEDICATIONS:  Antibiotics:    Neuro:  acetaminophen     Tablet .. 650 milliGRAM(s) Oral every 6 hours PRN  levETIRAcetam 500 milliGRAM(s) Oral two times a day  melatonin 5 milliGRAM(s) Oral at bedtime  ondansetron Injectable 4 milliGRAM(s) IV Push every 6 hours PRN  oxyCODONE    IR 5 milliGRAM(s) Oral every 4 hours PRN  oxyCODONE    IR 10 milliGRAM(s) Oral every 4 hours PRN    Anticoagulation:  enoxaparin Injectable 40 milliGRAM(s) SubCutaneous every 24 hours    OTHER:  dexAMETHasone  Injectable 4 milliGRAM(s) IV Push two times a day  pantoprazole    Tablet 40 milliGRAM(s) Oral before breakfast  polyethylene glycol 3350 17 Gram(s) Oral daily  senna 2 Tablet(s) Oral at bedtime    IVF:    CULTURES:    RADIOLOGY & ADDITIONAL TESTS:

## 2023-05-12 NOTE — PROGRESS NOTE ADULT - ASSESSMENT
HPI:  Reymundo Liu  67M retired , R handed, no pmh, p/w MRI demonstrating 3x3x4.5 cm ring enhancing mass in L ant. temporal lobe w/extension to BG and frontal lobe and associated vasogenic edema c/f GBM. He c/o word finding difficulty for past 3 wks. Exam: AO3, PERRL, EOMI, BROWN 5/5, sensation intact, difficulty with repetition, 1/3 w/ naming objects.      (09 May 2023 16:58)      PLAN:  - neuro and vital check Q4hrs  - MRI for surgical planning done  - pre-op for OR on Monday, cleared by medicine team  - pain control with tylenol and oxycodone prn  - keppra for seizure ppx  - decadron decreased to 4mg Q12hrs for cerebral edema  - tolerating regular diet  - senna and miralax for bowel regimens  - protonix for GI ppx while on decadron  - activity increase as tolerated  - incentive spirometer  - DVT ppx: b/l SCDs and SQL; screening LE doppler pending as pt is high risk upon admission  Discharge planning: PT/OT eval post op    will discuss above with Dr. Eleazar botello [phone]

## 2023-05-12 NOTE — PROGRESS NOTE ADULT - PROBLEM SELECTOR PLAN 4
asymptomatic. per patient, HR has always generally been in the 50s for most of his life  - TSH wnl  - no further work-up required new leukocytosis today but likely steroid induced  - remains afebrile with no signs/symptoms of infection   - had CT C/A/P on admission admission with no evidence of infection  - would continue to monitor  - if febrile, would send infectious work-up including UA, blood cx x2, and CXR  - no role for abx at this time

## 2023-05-12 NOTE — PROGRESS NOTE ADULT - PROBLEM SELECTOR PLAN 5
longstanding history of insomnia  - prescribed ambien 10mg in the past, but states has not used  - has been taking tylenol PM at home  - increase melatonin 8mg qHS standing  - we discussed at length bedside. would try to avoid ambien if possible, but can be used as last resort asymptomatic. per patient, HR has always generally been in the 50s for most of his life  - TSH wnl  - no further work-up required

## 2023-05-12 NOTE — PROGRESS NOTE ADULT - ASSESSMENT
In summary, this is a 66 yo man with PMH notable only for orthopedic issues now with short onset cognitive change - with a solitary left temporal brain mass.  Plan is for OR on Monday.  Clinically improved on steroids.

## 2023-05-12 NOTE — PROGRESS NOTE ADULT - SUBJECTIVE AND OBJECTIVE BOX
Mr. Liu was seen in neuro oncologic follow up. He was admitted to [facility] 5/9 after seeing him in the office with MRI revealing a heterogeneously enhancing mass in the left temporal region with edema - patient had clinical symptoms of "confusion."    Since admission - CT C/A/P negative  MRI functional showed no hyperactivity in the area of the mass for speech or motor function.    Patient was started on Keppra 500 mg bid and Decadron - 4 mg bid.    He reports that he feels he is communicating more easily.    On exam - he is awake and alert - oriented and fluent with intact comprehension.  Visual fields are full  Face is symmetric  No drift - FFM are equal  Strength is intact bilaterally - UE and LE  He is walking well and independently.

## 2023-05-12 NOTE — PROGRESS NOTE ADULT - PROBLEM SELECTOR PLAN 7
DVT ppx: chemical VTE ppx when clear from neurosurgery standpoint    Med rec confirmed with patient. Though it appears he has been filling prescriptions, he states he has not been taking any medications other than tylenol PM for his insomnia prescribed flomax outpatient but patient states has not been taking nor requiring  - monitor urine output

## 2023-05-12 NOTE — PROGRESS NOTE ADULT - SUBJECTIVE AND OBJECTIVE BOX
Physical Exam:     GEN: Healthy appearing, well-developed, NAD.  PSYCH: Good Judgment. AOx3. Normal memory, mood, and affect.  HEENT:  -Head: NC/AT;  -Eyes: No discharge or redness;  -Ears: External ears are normal.  -Nose: Normal nares.  -Mouth and throat: MMM. Normal gums, mucosa, palate,. Good dentition.  CV: RRR, no m/r/g.  LUNGS: CTAB, no w/r/c.  ABD: Soft, NT/ND, NBS, no masses or organomegaly.  : N/A  SKIN: Warm, well perfused. No skin rashes or abnormal lesions.  MSK: Normal gait. No deformities.  EXT: No clubbing, cyanosis, or edema.    Neuro Exam:     AAOx3, EOMI, PERRL  mild/intermittent expressive aphasia  5/5 in all groups to command  SILT  CN 2-12 intact otherwise  DTR 2+    KPS 90

## 2023-05-13 LAB
AMYLASE P1 CFR SERPL: 36 U/L — SIGNIFICANT CHANGE UP (ref 25–125)
CK SERPL-CCNC: 35 U/L — SIGNIFICANT CHANGE UP (ref 30–200)
GGT SERPL-CCNC: 14 U/L — SIGNIFICANT CHANGE UP (ref 9–50)
LDH SERPL L TO P-CCNC: 116 U/L — SIGNIFICANT CHANGE UP (ref 50–242)
MAGNESIUM SERPL-MCNC: 2 MG/DL — SIGNIFICANT CHANGE UP (ref 1.6–2.6)
PHOSPHATE SERPL-MCNC: 3.5 MG/DL — SIGNIFICANT CHANGE UP (ref 2.5–4.5)
URATE SERPL-MCNC: 3.7 MG/DL — SIGNIFICANT CHANGE UP (ref 3.4–8.8)

## 2023-05-13 PROCEDURE — 99232 SBSQ HOSP IP/OBS MODERATE 35: CPT | Mod: FS

## 2023-05-13 PROCEDURE — 99232 SBSQ HOSP IP/OBS MODERATE 35: CPT

## 2023-05-13 RX ORDER — CHLORHEXIDINE GLUCONATE 213 G/1000ML
1 SOLUTION TOPICAL ONCE
Refills: 0 | Status: COMPLETED | OUTPATIENT
Start: 2023-05-14 | End: 2023-05-14

## 2023-05-13 RX ADMIN — Medication 8 MILLIGRAM(S): at 22:24

## 2023-05-13 RX ADMIN — ENOXAPARIN SODIUM 40 MILLIGRAM(S): 100 INJECTION SUBCUTANEOUS at 05:19

## 2023-05-13 RX ADMIN — LEVETIRACETAM 500 MILLIGRAM(S): 250 TABLET, FILM COATED ORAL at 05:19

## 2023-05-13 RX ADMIN — PANTOPRAZOLE SODIUM 40 MILLIGRAM(S): 20 TABLET, DELAYED RELEASE ORAL at 05:19

## 2023-05-13 RX ADMIN — Medication 4 MILLIGRAM(S): at 17:25

## 2023-05-13 RX ADMIN — Medication 4 MILLIGRAM(S): at 05:20

## 2023-05-13 RX ADMIN — LEVETIRACETAM 500 MILLIGRAM(S): 250 TABLET, FILM COATED ORAL at 17:25

## 2023-05-13 NOTE — PROGRESS NOTE ADULT - PROBLEM SELECTOR PLAN 8
DVT ppx: chemical VTE ppx when clear from neurosurgery standpoint    Med rec confirmed with patient. Though it appears he has been filling prescriptions, he states he has not been taking any medications other than tylenol PM for his insomnia
DVT ppx: chemical VTE ppx when clear from neurosurgery standpoint    Med rec confirmed with patient. Though it appears he has been filling prescriptions, he states he has not been taking any medications other than tylenol PM for his insomnia

## 2023-05-13 NOTE — PROGRESS NOTE ADULT - ASSESSMENT
HPI:  Reymundo Liu  67M retired , R handed, no pmh, p/w MRI demonstrating 3x3x4.5 cm ring enhancing mass in L ant. temporal lobe w/extension to BG and frontal lobe and associated vasogenic edema c/f GBM. He c/o word finding difficulty for past 3 wks. Exam: AO3, PERRL, EOMI, BROWN 5/5, sensation intact, difficulty with repetition, 1/3 w/ naming objects.      (09 May 2023 16:58)      PLAN:  - neuro and vital check Q4hrs  - MRI for surgical planning done  - pre-op for OR on Monday, cleared by medicine team  - pain control with tylenol and oxycodone prn  - keppra for seizure ppx  - continue decadron 4mg Q12hrs for cerebral edema  - tolerating regular diet  - senna and miralax for bowel regimens  - protonix for GI ppx while on decadron  - activity increase as tolerated  - incentive spirometer  - DVT ppx: b/l SCDs and SQL; screening LE doppler negative upon admission  Discharge planning: PT/OT eval post op    will discuss above with Dr. Bush    [facility] [id number]

## 2023-05-13 NOTE — PROGRESS NOTE ADULT - SUBJECTIVE AND OBJECTIVE BOX
[facility] Division of Hospital Medicine  Astrid Jalloh DO  Available on Teams Georgie    Patient is a 67y old  Male who presents with a chief complaint of brain mass (12 May 2023 09:53)      SUBJECTIVE / OVERNIGHT EVENTS: none. Patient has some word finding difficulties still, but says he feels well. He is not able to get good sleep at night due to being woken up for blood draws, medications, etc.   ADDITIONAL REVIEW OF SYSTEMS: negative    MEDICATIONS  (STANDING):  dexAMETHasone  Injectable 4 milliGRAM(s) IV Push two times a day  enoxaparin Injectable 40 milliGRAM(s) SubCutaneous every 24 hours  levETIRAcetam 500 milliGRAM(s) Oral two times a day  melatonin 8 milliGRAM(s) Oral at bedtime  pantoprazole    Tablet 40 milliGRAM(s) Oral before breakfast  polyethylene glycol 3350 17 Gram(s) Oral daily  senna 2 Tablet(s) Oral at bedtime    MEDICATIONS  (PRN):  acetaminophen     Tablet .. 650 milliGRAM(s) Oral every 6 hours PRN Temp greater or equal to 38C (100.4F), Mild Pain (1 - 3)  ondansetron Injectable 4 milliGRAM(s) IV Push every 6 hours PRN Nausea and/or Vomiting  oxyCODONE    IR 5 milliGRAM(s) Oral every 4 hours PRN Moderate Pain (4 - 6)  oxyCODONE    IR 10 milliGRAM(s) Oral every 4 hours PRN Severe Pain (7 - 10)      CAPILLARY BLOOD GLUCOSE        I&O's Summary      PHYSICAL EXAM:  Vital Signs Last 24 Hrs  T(C): 36.4 (13 May 2023 08:49), Max: 36.8 (12 May 2023 17:26)  T(F): 97.6 (13 May 2023 08:49), Max: 98.2 (12 May 2023 17:26)  HR: 52 (13 May 2023 08:49) (50 - 65)  BP: 125/74 (13 May 2023 08:49) (125/74 - 159/83)  BP(mean): --  RR: 18 (13 May 2023 08:49) (18 - 18)  SpO2: 96% (13 May 2023 08:49) (94% - 96%)    Parameters below as of 13 May 2023 08:49  Patient On (Oxygen Delivery Method): room air    CONSTITUTIONAL: NAD, well-developed, well-groomed  EYES: PERRLA; conjunctiva and sclera clear  ENMT: Moist oral mucosa, no pharyngeal injection or exudates; normal dentition  NECK: Supple, no palpable masses; no thyromegaly  RESPIRATORY: Normal respiratory effort; lungs are clear to auscultation bilaterally  CARDIOVASCULAR: Regular rate and rhythm, normal S1 and S2, no murmur/rub/gallop; No lower extremity edema; Peripheral pulses are 2+ bilaterally  ABDOMEN: Soft, Nondistended, Nontender to palpation, normoactive bowel sounds  MUSCULOSKELETAL:  No clubbing or cyanosis of digits; no joint swelling or tenderness to palpation  PSYCH: A+O to person, place, and time; affect appropriate  NEUROLOGY: CN 2-12 are intact and symmetric; no gross sensory deficits, 5/5 strength throughout. +word finding difficulty  SKIN: No rashes; no palpable lesions    LABS:                        13.9   15.66 )-----------( 246      ( 12 May 2023 14:30 )             40.6     05-12    137  |  102  |  19  ----------------------------<  132<H>  4.0   |  22  |  0.96    Ca    9.3      12 May 2023 14:29  Phos  3.5     05-13  Mg     2.0     05-13    TPro  6.4  /  Alb  3.8  /  TBili  0.2  /  DBili  x   /  AST  12  /  ALT  19  /  AlkPhos  76  05-12    PT/INR - ( 12 May 2023 14:29 )   PT: 14.9 sec;   INR: 1.29 ratio         PTT - ( 12 May 2023 14:29 )  PTT:25.4 sec  CARDIAC MARKERS ( 13 May 2023 07:18 )  x     / x     / 35 U/L / x     / x

## 2023-05-13 NOTE — PROGRESS NOTE ADULT - SUBJECTIVE AND OBJECTIVE BOX
Patient was seen at bedside this am. Patient felt well. Denied any headache, cp, sob, n/v, or abd pain.    OVERNIGHT EVENTS: No acute event overnight    Vital Signs Last 24 Hrs  T(C): 36.4 (13 May 2023 08:49), Max: 36.8 (12 May 2023 17:26)  T(F): 97.6 (13 May 2023 08:49), Max: 98.2 (12 May 2023 17:26)  HR: 52 (13 May 2023 08:49) (50 - 65)  BP: 125/74 (13 May 2023 08:49) (125/74 - 159/83)  BP(mean): --  RR: 18 (13 May 2023 08:49) (18 - 18)  SpO2: 96% (13 May 2023 08:49) (94% - 96%)    Parameters below as of 13 May 2023 08:49  Patient On (Oxygen Delivery Method): room air        I&O's Detail    I&O's Summary      PHYSICAL EXAM:  Neurological:  awake, alert, oriented to person, place, and date, PERRL, face symmetrical, mild WFD, following commands, moving all extremities 5/5, sensation intact to light touch, no drift  Cardiovascular: +s1, s2  Respiratory: clear to auscultation b/l  Gastrointestinal: soft, non-distended, non-tender  Genitourinary: voiding  Extremities: warm, dry    LABS:                        13.9   15.66 )-----------( 246      ( 12 May 2023 14:30 )             40.6     05-12    137  |  102  |  19  ----------------------------<  132<H>  4.0   |  22  |  0.96    Ca    9.3      12 May 2023 14:29  Phos  3.5     05-13  Mg     2.0     05-13    TPro  6.4  /  Alb  3.8  /  TBili  0.2  /  DBili  x   /  AST  12  /  ALT  19  /  AlkPhos  76  05-12    PT/INR - ( 12 May 2023 14:29 )   PT: 14.9 sec;   INR: 1.29 ratio         PTT - ( 12 May 2023 14:29 )  PTT:25.4 sec    CARDIAC MARKERS ( 13 May 2023 07:18 )  x     / x     / 35 U/L / x     / x          CAPILLARY BLOOD GLUCOSE          Drug Levels: [] N/A    CSF Analysis: [] N/A      Allergies    No Known Allergies    Intolerances      MEDICATIONS:  Antibiotics:    Neuro:  acetaminophen     Tablet .. 650 milliGRAM(s) Oral every 6 hours PRN  levETIRAcetam 500 milliGRAM(s) Oral two times a day  melatonin 8 milliGRAM(s) Oral at bedtime  ondansetron Injectable 4 milliGRAM(s) IV Push every 6 hours PRN  oxyCODONE    IR 5 milliGRAM(s) Oral every 4 hours PRN  oxyCODONE    IR 10 milliGRAM(s) Oral every 4 hours PRN    Anticoagulation:  enoxaparin Injectable 40 milliGRAM(s) SubCutaneous every 24 hours    OTHER:  dexAMETHasone  Injectable 4 milliGRAM(s) IV Push two times a day  pantoprazole    Tablet 40 milliGRAM(s) Oral before breakfast  polyethylene glycol 3350 17 Gram(s) Oral daily  senna 2 Tablet(s) Oral at bedtime    IVF:    CULTURES:    RADIOLOGY & ADDITIONAL TESTS:

## 2023-05-13 NOTE — PROGRESS NOTE ADULT - PROBLEM SELECTOR PLAN 4
new leukocytosis likely steroid induced  - remains afebrile with no signs/symptoms of infection   - had CT C/A/P on admission with no evidence of infection  - would continue to monitor  - if febrile, would send infectious work-up including UA, blood cx x2, and CXR  - no role for abx at this time

## 2023-05-13 NOTE — PROGRESS NOTE ADULT - ASSESSMENT
68 yo male with PMH of enlarged prostate and insomnia who presents with 3 weeks of word finding difficulty with outpatient abnormal MRI demonstrating 3x3x4.5 cm ring enhancing mass in L anterior temporal lobe with extension to BG and frontal lobe and associated vasogenic edema concerning for GBM. Medicine consulted for pre-op clearance for OR on Mon 5/15.    PCP: Dr. Darnell Crowder

## 2023-05-13 NOTE — PROGRESS NOTE ADULT - PROBLEM SELECTOR PLAN 6
longstanding history of insomnia  - prescribed ambien 10mg in the past, but states has not used  - has been taking tylenol PM at home  - increased melatonin 8mg qHS standing

## 2023-05-14 ENCOUNTER — TRANSCRIPTION ENCOUNTER (OUTPATIENT)
Age: 68
End: 2023-05-14

## 2023-05-14 LAB
ANION GAP SERPL CALC-SCNC: 11 MMOL/L — SIGNIFICANT CHANGE UP (ref 5–17)
APTT BLD: 24.5 SEC — LOW (ref 27.5–35.5)
BLD GP AB SCN SERPL QL: NEGATIVE — SIGNIFICANT CHANGE UP
BUN SERPL-MCNC: 15 MG/DL — SIGNIFICANT CHANGE UP (ref 7–23)
CALCIUM SERPL-MCNC: 9.4 MG/DL — SIGNIFICANT CHANGE UP (ref 8.4–10.5)
CHLORIDE SERPL-SCNC: 102 MMOL/L — SIGNIFICANT CHANGE UP (ref 96–108)
CO2 SERPL-SCNC: 24 MMOL/L — SIGNIFICANT CHANGE UP (ref 22–31)
CREAT SERPL-MCNC: 0.8 MG/DL — SIGNIFICANT CHANGE UP (ref 0.5–1.3)
EGFR: 98 ML/MIN/1.73M2 — SIGNIFICANT CHANGE UP
GLUCOSE SERPL-MCNC: 100 MG/DL — HIGH (ref 70–99)
HCT VFR BLD CALC: 43.1 % — SIGNIFICANT CHANGE UP (ref 39–50)
HGB BLD-MCNC: 14.7 G/DL — SIGNIFICANT CHANGE UP (ref 13–17)
INR BLD: 1.23 RATIO — HIGH (ref 0.88–1.16)
MCHC RBC-ENTMCNC: 30.7 PG — SIGNIFICANT CHANGE UP (ref 27–34)
MCHC RBC-ENTMCNC: 34.1 GM/DL — SIGNIFICANT CHANGE UP (ref 32–36)
MCV RBC AUTO: 90 FL — SIGNIFICANT CHANGE UP (ref 80–100)
MRSA PCR RESULT.: SIGNIFICANT CHANGE UP
NRBC # BLD: 0 /100 WBCS — SIGNIFICANT CHANGE UP (ref 0–0)
PLATELET # BLD AUTO: 233 K/UL — SIGNIFICANT CHANGE UP (ref 150–400)
POTASSIUM SERPL-MCNC: 4 MMOL/L — SIGNIFICANT CHANGE UP (ref 3.5–5.3)
POTASSIUM SERPL-SCNC: 4 MMOL/L — SIGNIFICANT CHANGE UP (ref 3.5–5.3)
PROTHROM AB SERPL-ACNC: 14.3 SEC — HIGH (ref 10.5–13.4)
RBC # BLD: 4.79 M/UL — SIGNIFICANT CHANGE UP (ref 4.2–5.8)
RBC # FLD: 12.3 % — SIGNIFICANT CHANGE UP (ref 10.3–14.5)
RH IG SCN BLD-IMP: NEGATIVE — SIGNIFICANT CHANGE UP
S AUREUS DNA NOSE QL NAA+PROBE: SIGNIFICANT CHANGE UP
SODIUM SERPL-SCNC: 137 MMOL/L — SIGNIFICANT CHANGE UP (ref 135–145)
WBC # BLD: 10.39 K/UL — SIGNIFICANT CHANGE UP (ref 3.8–10.5)
WBC # FLD AUTO: 10.39 K/UL — SIGNIFICANT CHANGE UP (ref 3.8–10.5)

## 2023-05-14 PROCEDURE — 99024 POSTOP FOLLOW-UP VISIT: CPT

## 2023-05-14 RX ORDER — AMINOLEVULINIC ACID HYDROCHLORIDE 1500 MG/1
1815 POWDER, FOR SOLUTION ORAL ONCE
Refills: 0 | Status: COMPLETED | OUTPATIENT
Start: 2023-05-15 | End: 2023-05-15

## 2023-05-14 RX ADMIN — Medication 4 MILLIGRAM(S): at 17:15

## 2023-05-14 RX ADMIN — PANTOPRAZOLE SODIUM 40 MILLIGRAM(S): 20 TABLET, DELAYED RELEASE ORAL at 06:16

## 2023-05-14 RX ADMIN — LEVETIRACETAM 500 MILLIGRAM(S): 250 TABLET, FILM COATED ORAL at 17:14

## 2023-05-14 RX ADMIN — LEVETIRACETAM 500 MILLIGRAM(S): 250 TABLET, FILM COATED ORAL at 06:16

## 2023-05-14 RX ADMIN — Medication 8 MILLIGRAM(S): at 21:21

## 2023-05-14 RX ADMIN — POLYETHYLENE GLYCOL 3350 17 GRAM(S): 17 POWDER, FOR SOLUTION ORAL at 11:06

## 2023-05-14 RX ADMIN — Medication 4 MILLIGRAM(S): at 06:16

## 2023-05-14 RX ADMIN — ENOXAPARIN SODIUM 40 MILLIGRAM(S): 100 INJECTION SUBCUTANEOUS at 06:15

## 2023-05-14 RX ADMIN — CHLORHEXIDINE GLUCONATE 1 APPLICATION(S): 213 SOLUTION TOPICAL at 17:16

## 2023-05-14 NOTE — PROGRESS NOTE ADULT - ASSESSMENT
HPI:  67M retired , R handed, no pmh, p/w MRI demonstrating 3x3x4.5 cm ring enhancing mass in L ant. temporal lobe w/extension to BG and frontal lobe and associated vasogenic edema c/f GBM. He c/o word finding difficulty for past 3 wks. Exam: AO3, PERRL, EOMI, BROWN 5/5, sensation intact, difficulty with repetition, 1/3 w/ naming objects.       PLAN:  Neuro:   - plan for left craniotomy for brain tumor resection tomorrow with gleolan  - NPO after midnight  - CT chest/abd/pelv negative for occult malignancy  - MRI brain done 5/11 for surgical planning  - continue keppra for seizure ppx  - contineu decadron 4q12  - medical clearance documented  Respiratory:   - satting well on room air  CV:  - vitals stable  DVT ppx:   - venodynes, sq lovenox- will stop for pending surgery  GI:    - bowel regimen  PT/OT:   -pending after surgery      [employer] # [id number]

## 2023-05-14 NOTE — PROGRESS NOTE ADULT - SUBJECTIVE AND OBJECTIVE BOX
SUBJECTIVE: Pt seen and examined, ambulating freely around the floor, no complaints    OVERNIGHT EVENTS: none    Vital Signs Last 24 Hrs  T(C): 36.7 (14 May 2023 09:09), Max: 36.7 (13 May 2023 21:15)  T(F): 98.1 (14 May 2023 09:09), Max: 98.1 (14 May 2023 09:09)  HR: 81 (14 May 2023 09:09) (45 - 81)  BP: 139/77 (14 May 2023 09:09) (134/72 - 139/77)  BP(mean): --  RR: 20 (14 May 2023 09:09) (18 - 20)  SpO2: 97% (14 May 2023 09:09) (96% - 98%)    Parameters below as of 14 May 2023 09:09  Patient On (Oxygen Delivery Method): room air        PHYSICAL EXAM:    General: No Acute Distress     Neurological: Awake, alert oriented to person, place and time, Following Commands, Face Symmetrical, Speech Fluent, Moving all extremities 5/5, No Drift, Sensation to Light Touch Intact    Pulmonary: Clear to Auscultation, No Rales, No Rhonchi, No Wheezes     Cardiovascular: S1, S2, Regular Rate and Rhythm     Gastrointestinal: Soft, Nontender, Nondistended     Incision: none    LABS:                        14.7   10.39 )-----------( 233      ( 14 May 2023 06:37 )             43.1    05-14    137  |  102  |  15  ----------------------------<  100<H>  4.0   |  24  |  0.80    Ca    9.4      14 May 2023 06:37  Phos  3.5     05-13  Mg     2.0     05-13    TPro  6.4  /  Alb  3.8  /  TBili  0.2  /  DBili  x   /  AST  12  /  ALT  19  /  AlkPhos  76  05-12  PT/INR - ( 14 May 2023 06:37 )   PT: 14.3 sec;   INR: 1.23 ratio         PTT - ( 14 May 2023 06:37 )  PTT:24.5 sec      05-13 @ 07:01  -  05-14 @ 07:00  --------------------------------------------------------  IN: 480 mL / OUT: 0 mL / NET: 480 mL        MEDICATIONS:  Antibiotics:    Neuro:  acetaminophen     Tablet .. 650 milliGRAM(s) Oral every 6 hours PRN Temp greater or equal to 38C (100.4F), Mild Pain (1 - 3)  levETIRAcetam 500 milliGRAM(s) Oral two times a day  melatonin 8 milliGRAM(s) Oral at bedtime  ondansetron Injectable 4 milliGRAM(s) IV Push every 6 hours PRN Nausea and/or Vomiting  oxyCODONE    IR 5 milliGRAM(s) Oral every 4 hours PRN Moderate Pain (4 - 6)  oxyCODONE    IR 10 milliGRAM(s) Oral every 4 hours PRN Severe Pain (7 - 10)    Cardiac:    Pulm:    GI/:  pantoprazole    Tablet 40 milliGRAM(s) Oral before breakfast  polyethylene glycol 3350 17 Gram(s) Oral daily  senna 2 Tablet(s) Oral at bedtime    Other:   chlorhexidine 4% Liquid 1 Application(s) Topical once  dexAMETHasone  Injectable 4 milliGRAM(s) IV Push two times a day  enoxaparin Injectable 40 milliGRAM(s) SubCutaneous every 24 hours    DIET: [x] Regular [] CCD [] Renal [] Puree [] Dysphagia [] Tube Feeds:     IMAGING:   < from: CT Abdomen and Pelvis w/ IV Cont (05.09.23 @ 19:36) >    IMPRESSION:  No CT evidence of occult malignancy in the chest, abdomen, or pelvis.    < end of copied text >  < from: MR Brain Functional- MD/Phd (05.11.23 @ 13:11) >      IMPRESSION:    Reidentified is a heterogeneously enhancing necrotic mass lesion centered   within the left temporal lobe and extending into the left periinsular   region with a cystic portion along the inferior aspect of the lesion,   with a large degree of surrounding vasogenic edema throughout the left   parietal temporal lobes and left basal ganglia. This results in mass   effect on the surrounding parenchyma and on the left ventricular system.   There is mild mass effect in the left cerebral peduncle. This results in   left to right midline shift up to 5.5 mm at the level of the septum   pellucidum. 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 Functional   imaging demonstrates no hyperactivity in the region of tumor or vasogenic   edema.    --- End of Report ---    < end of copied text >

## 2023-05-15 ENCOUNTER — RESULT REVIEW (OUTPATIENT)
Age: 68
End: 2023-05-15

## 2023-05-15 ENCOUNTER — APPOINTMENT (OUTPATIENT)
Dept: NEUROSURGERY | Facility: HOSPITAL | Age: 68
End: 2023-05-15

## 2023-05-15 ENCOUNTER — TRANSCRIPTION ENCOUNTER (OUTPATIENT)
Age: 68
End: 2023-05-15

## 2023-05-15 LAB
ANION GAP SERPL CALC-SCNC: 13 MMOL/L — SIGNIFICANT CHANGE UP (ref 5–17)
BASOPHILS # BLD AUTO: 0 K/UL — SIGNIFICANT CHANGE UP (ref 0–0.2)
BASOPHILS NFR BLD AUTO: 0 % — SIGNIFICANT CHANGE UP (ref 0–2)
BUN SERPL-MCNC: 17 MG/DL — SIGNIFICANT CHANGE UP (ref 7–23)
CALCIUM SERPL-MCNC: 9.2 MG/DL — SIGNIFICANT CHANGE UP (ref 8.4–10.5)
CHLORIDE SERPL-SCNC: 102 MMOL/L — SIGNIFICANT CHANGE UP (ref 96–108)
CO2 SERPL-SCNC: 22 MMOL/L — SIGNIFICANT CHANGE UP (ref 22–31)
CREAT SERPL-MCNC: 0.76 MG/DL — SIGNIFICANT CHANGE UP (ref 0.5–1.3)
EGFR: 100 ML/MIN/1.73M2 — SIGNIFICANT CHANGE UP
EOSINOPHIL # BLD AUTO: 0 K/UL — SIGNIFICANT CHANGE UP (ref 0–0.5)
EOSINOPHIL NFR BLD AUTO: 0 % — SIGNIFICANT CHANGE UP (ref 0–6)
GAS PNL BLDA: SIGNIFICANT CHANGE UP
GLUCOSE SERPL-MCNC: 136 MG/DL — HIGH (ref 70–99)
HCT VFR BLD CALC: 37.8 % — LOW (ref 39–50)
HGB BLD-MCNC: 13 G/DL — SIGNIFICANT CHANGE UP (ref 13–17)
LYMPHOCYTES # BLD AUTO: 0 % — LOW (ref 13–44)
LYMPHOCYTES # BLD AUTO: 0 K/UL — LOW (ref 1–3.3)
MAGNESIUM SERPL-MCNC: 2 MG/DL — SIGNIFICANT CHANGE UP (ref 1.6–2.6)
MANUAL SMEAR VERIFICATION: SIGNIFICANT CHANGE UP
MCHC RBC-ENTMCNC: 30.6 PG — SIGNIFICANT CHANGE UP (ref 27–34)
MCHC RBC-ENTMCNC: 34.4 GM/DL — SIGNIFICANT CHANGE UP (ref 32–36)
MCV RBC AUTO: 88.9 FL — SIGNIFICANT CHANGE UP (ref 80–100)
METAMYELOCYTES # FLD: 0.8 % — HIGH (ref 0–0)
MONOCYTES # BLD AUTO: 1.53 K/UL — HIGH (ref 0–0.9)
MONOCYTES NFR BLD AUTO: 9.3 % — SIGNIFICANT CHANGE UP (ref 2–14)
NEUTROPHILS # BLD AUTO: 14.78 K/UL — HIGH (ref 1.8–7.4)
NEUTROPHILS NFR BLD AUTO: 89.9 % — HIGH (ref 43–77)
PHOSPHATE SERPL-MCNC: 4.1 MG/DL — SIGNIFICANT CHANGE UP (ref 2.5–4.5)
PLAT MORPH BLD: NORMAL — SIGNIFICANT CHANGE UP
PLATELET # BLD AUTO: 206 K/UL — SIGNIFICANT CHANGE UP (ref 150–400)
POTASSIUM SERPL-MCNC: 3.9 MMOL/L — SIGNIFICANT CHANGE UP (ref 3.5–5.3)
POTASSIUM SERPL-SCNC: 3.9 MMOL/L — SIGNIFICANT CHANGE UP (ref 3.5–5.3)
RBC # BLD: 4.25 M/UL — SIGNIFICANT CHANGE UP (ref 4.2–5.8)
RBC # FLD: 12.6 % — SIGNIFICANT CHANGE UP (ref 10.3–14.5)
RBC BLD AUTO: NORMAL — SIGNIFICANT CHANGE UP
SODIUM SERPL-SCNC: 137 MMOL/L — SIGNIFICANT CHANGE UP (ref 135–145)
WBC # BLD: 16.44 K/UL — HIGH (ref 3.8–10.5)
WBC # FLD AUTO: 16.44 K/UL — HIGH (ref 3.8–10.5)

## 2023-05-15 PROCEDURE — 88360 TUMOR IMMUNOHISTOCHEM/MANUAL: CPT | Mod: 26

## 2023-05-15 PROCEDURE — 61510 CRNEC TREPH EXC BRN TUM STTL: CPT

## 2023-05-15 PROCEDURE — 88331 PATH CONSLTJ SURG 1 BLK 1SPC: CPT | Mod: 26

## 2023-05-15 PROCEDURE — 70450 CT HEAD/BRAIN W/O DYE: CPT | Mod: 26

## 2023-05-15 PROCEDURE — 99232 SBSQ HOSP IP/OBS MODERATE 35: CPT

## 2023-05-15 PROCEDURE — 69990 MICROSURGERY ADD-ON: CPT | Mod: 59

## 2023-05-15 PROCEDURE — 88342 IMHCHEM/IMCYTCHM 1ST ANTB: CPT | Mod: 26

## 2023-05-15 PROCEDURE — 88341 IMHCHEM/IMCYTCHM EA ADD ANTB: CPT | Mod: 26

## 2023-05-15 PROCEDURE — 88307 TISSUE EXAM BY PATHOLOGIST: CPT | Mod: 26

## 2023-05-15 PROCEDURE — 88334 PATH CONSLTJ SURG CYTO XM EA: CPT | Mod: 26,59

## 2023-05-15 PROCEDURE — 61781 SCAN PROC CRANIAL INTRA: CPT

## 2023-05-15 PROCEDURE — 99291 CRITICAL CARE FIRST HOUR: CPT

## 2023-05-15 DEVICE — SURGIFLO MATRIX WITH THROMBIN KIT: Type: IMPLANTABLE DEVICE | Site: LEFT | Status: FUNCTIONAL

## 2023-05-15 DEVICE — GRAFT DURAGEN PLUS 3X3IN: Type: IMPLANTABLE DEVICE | Site: LEFT | Status: FUNCTIONAL

## 2023-05-15 DEVICE — SURGICEL FIBRILLAR 2 X 4": Type: IMPLANTABLE DEVICE | Site: LEFT | Status: FUNCTIONAL

## 2023-05-15 DEVICE — MAYFIELD SKULL PIN ADULT PLASTIC: Type: IMPLANTABLE DEVICE | Site: LEFT | Status: FUNCTIONAL

## 2023-05-15 DEVICE — SURGIFOAM PAD 8CM X 12.5CM X 10MM (100): Type: IMPLANTABLE DEVICE | Site: LEFT | Status: FUNCTIONAL

## 2023-05-15 DEVICE — KIT A-LINE 1LUM 20G X 12CM SAFE KIT: Type: IMPLANTABLE DEVICE | Site: LEFT | Status: FUNCTIONAL

## 2023-05-15 DEVICE — SCREW UN3 AXS SELF DRILL 1.5X4MM: Type: IMPLANTABLE DEVICE | Site: LEFT | Status: FUNCTIONAL

## 2023-05-15 DEVICE — PLATE COVER BURRHOLE UN3 W/TAB 10MM: Type: IMPLANTABLE DEVICE | Site: LEFT | Status: FUNCTIONAL

## 2023-05-15 DEVICE — SURGICEL 2 X 14": Type: IMPLANTABLE DEVICE | Site: LEFT | Status: FUNCTIONAL

## 2023-05-15 DEVICE — PLATE UN3 2 HOLE RIGID: Type: IMPLANTABLE DEVICE | Site: LEFT | Status: FUNCTIONAL

## 2023-05-15 DEVICE — PLATE COVER BURRHOLE UN3 W/ TAB 20MM: Type: IMPLANTABLE DEVICE | Site: LEFT | Status: FUNCTIONAL

## 2023-05-15 RX ORDER — LABETALOL HCL 100 MG
5 TABLET ORAL ONCE
Refills: 0 | Status: COMPLETED | OUTPATIENT
Start: 2023-05-15 | End: 2023-05-15

## 2023-05-15 RX ORDER — ONDANSETRON 8 MG/1
4 TABLET, FILM COATED ORAL EVERY 6 HOURS
Refills: 0 | Status: DISCONTINUED | OUTPATIENT
Start: 2023-05-15 | End: 2023-05-19

## 2023-05-15 RX ORDER — HYDROMORPHONE HYDROCHLORIDE 2 MG/ML
0.5 INJECTION INTRAMUSCULAR; INTRAVENOUS; SUBCUTANEOUS
Refills: 0 | Status: DISCONTINUED | OUTPATIENT
Start: 2023-05-15 | End: 2023-05-16

## 2023-05-15 RX ORDER — HYDROMORPHONE HYDROCHLORIDE 2 MG/ML
1 INJECTION INTRAMUSCULAR; INTRAVENOUS; SUBCUTANEOUS
Refills: 0 | Status: DISCONTINUED | OUTPATIENT
Start: 2023-05-15 | End: 2023-05-16

## 2023-05-15 RX ORDER — DEXAMETHASONE 0.5 MG/5ML
4 ELIXIR ORAL EVERY 6 HOURS
Refills: 0 | Status: DISCONTINUED | OUTPATIENT
Start: 2023-05-15 | End: 2023-05-19

## 2023-05-15 RX ORDER — LANOLIN ALCOHOL/MO/W.PET/CERES
5 CREAM (GRAM) TOPICAL AT BEDTIME
Refills: 0 | Status: DISCONTINUED | OUTPATIENT
Start: 2023-05-15 | End: 2023-05-19

## 2023-05-15 RX ORDER — SODIUM CHLORIDE 9 MG/ML
1000 INJECTION INTRAMUSCULAR; INTRAVENOUS; SUBCUTANEOUS
Refills: 0 | Status: DISCONTINUED | OUTPATIENT
Start: 2023-05-15 | End: 2023-05-16

## 2023-05-15 RX ORDER — CHLORHEXIDINE GLUCONATE 213 G/1000ML
1 SOLUTION TOPICAL
Refills: 0 | Status: DISCONTINUED | OUTPATIENT
Start: 2023-05-15 | End: 2023-05-19

## 2023-05-15 RX ORDER — POLYETHYLENE GLYCOL 3350 17 G/17G
17 POWDER, FOR SOLUTION ORAL DAILY
Refills: 0 | Status: DISCONTINUED | OUTPATIENT
Start: 2023-05-15 | End: 2023-05-19

## 2023-05-15 RX ORDER — ACETAMINOPHEN 500 MG
650 TABLET ORAL EVERY 6 HOURS
Refills: 0 | Status: DISCONTINUED | OUTPATIENT
Start: 2023-05-15 | End: 2023-05-17

## 2023-05-15 RX ORDER — POTASSIUM CHLORIDE 20 MEQ
10 PACKET (EA) ORAL
Refills: 0 | Status: COMPLETED | OUTPATIENT
Start: 2023-05-15 | End: 2023-05-16

## 2023-05-15 RX ORDER — TAMSULOSIN HYDROCHLORIDE 0.4 MG/1
0.4 CAPSULE ORAL AT BEDTIME
Refills: 0 | Status: DISCONTINUED | OUTPATIENT
Start: 2023-05-15 | End: 2023-05-19

## 2023-05-15 RX ORDER — NICARDIPINE HYDROCHLORIDE 30 MG/1
5 CAPSULE, EXTENDED RELEASE ORAL
Qty: 40 | Refills: 0 | Status: DISCONTINUED | OUTPATIENT
Start: 2023-05-15 | End: 2023-05-16

## 2023-05-15 RX ORDER — PANTOPRAZOLE SODIUM 20 MG/1
40 TABLET, DELAYED RELEASE ORAL
Refills: 0 | Status: DISCONTINUED | OUTPATIENT
Start: 2023-05-15 | End: 2023-05-19

## 2023-05-15 RX ORDER — ONDANSETRON 8 MG/1
4 TABLET, FILM COATED ORAL ONCE
Refills: 0 | Status: DISCONTINUED | OUTPATIENT
Start: 2023-05-15 | End: 2023-05-19

## 2023-05-15 RX ORDER — LEVETIRACETAM 250 MG/1
500 TABLET, FILM COATED ORAL
Refills: 0 | Status: DISCONTINUED | OUTPATIENT
Start: 2023-05-15 | End: 2023-05-19

## 2023-05-15 RX ORDER — SENNA PLUS 8.6 MG/1
2 TABLET ORAL AT BEDTIME
Refills: 0 | Status: DISCONTINUED | OUTPATIENT
Start: 2023-05-15 | End: 2023-05-19

## 2023-05-15 RX ADMIN — Medication 5 MILLIGRAM(S): at 22:49

## 2023-05-15 RX ADMIN — AMINOLEVULINIC ACID HYDROCHLORIDE 1815 MILLIGRAM(S): 1500 POWDER, FOR SOLUTION ORAL at 12:06

## 2023-05-15 RX ADMIN — PANTOPRAZOLE SODIUM 40 MILLIGRAM(S): 20 TABLET, DELAYED RELEASE ORAL at 05:10

## 2023-05-15 RX ADMIN — LEVETIRACETAM 500 MILLIGRAM(S): 250 TABLET, FILM COATED ORAL at 05:10

## 2023-05-15 RX ADMIN — Medication 4 MILLIGRAM(S): at 05:10

## 2023-05-15 RX ADMIN — NICARDIPINE HYDROCHLORIDE 25 MG/HR: 30 CAPSULE, EXTENDED RELEASE ORAL at 22:48

## 2023-05-15 RX ADMIN — SODIUM CHLORIDE 75 MILLILITER(S): 9 INJECTION INTRAMUSCULAR; INTRAVENOUS; SUBCUTANEOUS at 22:49

## 2023-05-15 NOTE — PRE-ANESTHESIA EVALUATION ADULT - NSANTHVITALSIGNSFT_GEN_ALL_CORE
ICU Vital Signs Last 24 Hrs  T(C): 36.5 (15 May 2023 05:00), Max: 36.9 (14 May 2023 12:10)  T(F): 97.7 (15 May 2023 05:00), Max: 98.4 (14 May 2023 12:10)  HR: 50 (15 May 2023 05:00) (50 - 81)  BP: 149/68 (15 May 2023 05:00) (127/72 - 149/76)  BP(mean): --  ABP: --  ABP(mean): --  RR: 18 (15 May 2023 05:00) (18 - 20)  SpO2: 95% (15 May 2023 05:00) (94% - 97%)    O2 Parameters below as of 15 May 2023 05:00  Patient On (Oxygen Delivery Method): room air

## 2023-05-15 NOTE — PRE-ANESTHESIA EVALUATION ADULT - NSANTHPMHFT_GEN_ALL_CORE
67M with above pmhx, here today for Let Craniotomy for Tumor Resection. Meds taken appropriately this morning/last night. Pt states they can ambulate 2 blocks w/o shortness of breath. Able to lay flat w/o issue. Pt explained need for general anesthesia. Risks and benefits explained extensively.

## 2023-05-15 NOTE — PROGRESS NOTE ADULT - SUBJECTIVE AND OBJECTIVE BOX
Brandon Perkins   contact via pager or TEAMS        CC: Patient is a 67y old  Male who presents with a chief complaint of Brain tumor (14 May 2023 11:13)      SUBJECTIVE / OVERNIGHT EVENTS:    MEDICATIONS  (STANDING):  aminolevulinic acid Oral Solution 1815 milliGRAM(s) Oral once  dexAMETHasone  Injectable 4 milliGRAM(s) IV Push two times a day  levETIRAcetam 500 milliGRAM(s) Oral two times a day  melatonin 8 milliGRAM(s) Oral at bedtime  pantoprazole    Tablet 40 milliGRAM(s) Oral before breakfast  polyethylene glycol 3350 17 Gram(s) Oral daily  senna 2 Tablet(s) Oral at bedtime    MEDICATIONS  (PRN):  acetaminophen     Tablet .. 650 milliGRAM(s) Oral every 6 hours PRN Temp greater or equal to 38C (100.4F), Mild Pain (1 - 3)  ondansetron Injectable 4 milliGRAM(s) IV Push every 6 hours PRN Nausea and/or Vomiting  oxyCODONE    IR 5 milliGRAM(s) Oral every 4 hours PRN Moderate Pain (4 - 6)  oxyCODONE    IR 10 milliGRAM(s) Oral every 4 hours PRN Severe Pain (7 - 10)      Vital Signs Last 24 Hrs  T(C): 36.5 (15 May 2023 08:56), Max: 37 (15 May 2023 08:55)  T(F): 98.6 (15 May 2023 08:55), Max: 98.6 (15 May 2023 08:55)  HR: 50 (15 May 2023 08:56) (50 - 62)  BP: 149/68 (15 May 2023 08:56) (127/72 - 149/76)  BP(mean): --  RR: 18 (15 May 2023 08:56) (18 - 18)  SpO2: 95% (15 May 2023 08:56) (94% - 96%)  CAPILLARY BLOOD GLUCOSE        I&O's Summary    14 May 2023 07:01  -  15 May 2023 07:00  --------------------------------------------------------  IN: 600 mL / OUT: 0 mL / NET: 600 mL      tele:    PHYSICAL EXAM:    GENERAL: NAD   HEENT: EOMI, PERRL  PULM: Clear to auscultation bilaterally  CV: Regular rate and rhythm; nl S1, S2; No murmurs, rubs, or gallops  ABDOMEN: Soft, Nontender, Nondistended; Bowel sounds present  EXTREMITIES/MSK:  No edema, calf tenderness   PSYCH: AAOx3  NEUROLOGY: non-focal          LABS:                        14.7   10.39 )-----------( 233      ( 14 May 2023 06:37 )             43.1     05-14    137  |  102  |  15  ----------------------------<  100<H>  4.0   |  24  |  0.80    Ca    9.4      14 May 2023 06:37      PT/INR - ( 14 May 2023 06:37 )   PT: 14.3 sec;   INR: 1.23 ratio         PTT - ( 14 May 2023 06:37 )  PTT:24.5 sec            RADIOLOGY & ADDITIONAL TESTS:    Imaging Personally Reviewed:    Consultant(s) Notes Reviewed:      Care Discussed with Consultants/Other Providers:   Brandon Perkins   contact via pager or TEAMS        CC: Patient is a 67y old  Male who presents with a chief complaint of Brain tumor (14 May 2023 11:13)      SUBJECTIVE / OVERNIGHT EVENTS: feels good. denies any complaints on ROS.     MEDICATIONS  (STANDING):  aminolevulinic acid Oral Solution 1815 milliGRAM(s) Oral once  dexAMETHasone  Injectable 4 milliGRAM(s) IV Push two times a day  levETIRAcetam 500 milliGRAM(s) Oral two times a day  melatonin 8 milliGRAM(s) Oral at bedtime  pantoprazole    Tablet 40 milliGRAM(s) Oral before breakfast  polyethylene glycol 3350 17 Gram(s) Oral daily  senna 2 Tablet(s) Oral at bedtime    MEDICATIONS  (PRN):  acetaminophen     Tablet .. 650 milliGRAM(s) Oral every 6 hours PRN Temp greater or equal to 38C (100.4F), Mild Pain (1 - 3)  ondansetron Injectable 4 milliGRAM(s) IV Push every 6 hours PRN Nausea and/or Vomiting  oxyCODONE    IR 5 milliGRAM(s) Oral every 4 hours PRN Moderate Pain (4 - 6)  oxyCODONE    IR 10 milliGRAM(s) Oral every 4 hours PRN Severe Pain (7 - 10)      Vital Signs Last 24 Hrs  T(C): 36.5 (15 May 2023 08:56), Max: 37 (15 May 2023 08:55)  T(F): 98.6 (15 May 2023 08:55), Max: 98.6 (15 May 2023 08:55)  HR: 50 (15 May 2023 08:56) (50 - 62)  BP: 149/68 (15 May 2023 08:56) (127/72 - 149/76)  BP(mean): --  RR: 18 (15 May 2023 08:56) (18 - 18)  SpO2: 95% (15 May 2023 08:56) (94% - 96%)  CAPILLARY BLOOD GLUCOSE        I&O's Summary    14 May 2023 07:01  -  15 May 2023 07:00  --------------------------------------------------------  IN: 600 mL / OUT: 0 mL / NET: 600 mL          PHYSICAL EXAM:    GENERAL: NAD   HEENT: EOMI, PERRL  PULM: Clear to auscultation bilaterally  CV: Regular rate and rhythm; nl S1, S2; No murmurs, rubs, or gallops  ABDOMEN: Soft, Nontender, Nondistended; Bowel sounds present  EXTREMITIES/MSK:  No edema, calf tenderness   PSYCH: AAOx3  NEUROLOGY: non-focal          LABS:                        14.7   10.39 )-----------( 233      ( 14 May 2023 06:37 )             43.1     05-14    137  |  102  |  15  ----------------------------<  100<H>  4.0   |  24  |  0.80    Ca    9.4      14 May 2023 06:37      PT/INR - ( 14 May 2023 06:37 )   PT: 14.3 sec;   INR: 1.23 ratio         PTT - ( 14 May 2023 06:37 )  PTT:24.5 sec            RADIOLOGY & ADDITIONAL TESTS:    Imaging Personally Reviewed:    Consultant(s) Notes Reviewed:      Care Discussed with Consultants/Other Providers: neurosurg   Odomzo Pregnancy And Lactation Text: This medication is Pregnancy Category X and is absolutely contraindicated during pregnancy. It is unknown if it is excreted in breast milk.

## 2023-05-15 NOTE — CHART NOTE - NSCHARTNOTEFT_GEN_A_CORE
CAPRINI SCORE [CLOT]    AGE RELATED RISK FACTORS                                                       MOBILITY RELATED FACTORS  [ ] Age 41-60 years                                            (1 Point)                  [ ] Bed rest                                                        (1 Point)  [ x] Age: 61-74 years                                           (2 Points)                 [ ] Plaster cast                                                   (2 Points)  [ ] Age= 75 years                                              (3 Points)                 [ ] Bed bound for more than 72 hours                 (2 Points)    DISEASE RELATED RISK FACTORS                                               GENDER SPECIFIC FACTORS  [ ] Edema in the lower extremities                       (1 Point)                  [ ] Pregnancy                                                     (1 Point)  [ ] Varicose veins                                               (1 Point)                  [ ] Post-partum < 6 weeks                                   (1 Point)             [ ] BMI > 25 Kg/m2                                            (1 Point)                  [ ] Hormonal therapy  or oral contraception          (1 Point)                 [ ] Sepsis (in the previous month)                        (1 Point)                  [ ] History of pregnancy complications                 (1 point)  [ ] Pneumonia or serious lung disease                                               [ ] Unexplained or recurrent                     (1 Point)           (in the previous month)                               (1 Point)  [ ] Abnormal pulmonary function test                     (1 Point)                 SURGERY RELATED RISK FACTORS  [ ] Acute myocardial infarction                              (1 Point)                 [ ]  Section                                             (1 Point)  [ ] Congestive heart failure (in the previous month)  (1 Point)               [ ] Minor surgery                                                  (1 Point)   [ ] Inflammatory bowel disease                             (1 Point)                 [ ] Arthroscopic surgery                                        (2 Points)  [ ] Central venous access                                      (2 Points)                [x ] General surgery lasting more than 45 minutes   (2 Points)       [ ] Stroke (in the previous month)                          (5 Points)               [ ] Elective arthroplasty                                         (5 Points)                                                                                                                                               HEMATOLOGY RELATED FACTORS                                                 TRAUMA RELATED RISK FACTORS  [ ] Prior episodes of VTE                                     (3 Points)                [ ] Fracture of the hip, pelvis, or leg                       (5 Points)  [ ] Positive family history for VTE                         (3 Points)                 [ ] Acute spinal cord injury (in the previous month)  (5 Points)  [ ] Prothrombin 18733 A                                     (3 Points)                 [ ] Paralysis  (less than 1 month)                             (5 Points)  [ ] Factor V Leiden                                             (3 Points)                  [ ] Multiple Trauma within 1 month                        (5 Points)  [ ] Lupus anticoagulants                                     (3 Points)                                                           [ ] Anticardiolipin antibodies                               (3 Points)                                                       [ ] High homocysteine in the blood                      (3 Points)                                             [ ] Other congenital or acquired thrombophilia      (3 Points)                                                [ ] Heparin induced thrombocytopenia                  (3 Points)                                          Total Score [      4    ]  Chemical DVT ppx held given post-op day 0    Caprini Score 0 - 2:  Low Risk, No VTE Prophylaxis required for most patients, encourage ambulation  Caprini Score 3 - 6:  At Risk, pharmacologic VTE prophylaxis is indicated for most patients (in the absence of a contraindication)  Caprini Score Greater than or = 7:  High Risk, pharmacologic VTE prophylaxis is indicated for most patients (in the absence of a contraindication)

## 2023-05-15 NOTE — PROGRESS NOTE ADULT - PROBLEM SELECTOR PLAN 5
longstanding history of insomnia  - prescribed ambien 10mg in the past, but states has not used  - has been taking tylenol PM at home

## 2023-05-15 NOTE — PRE-OP CHECKLIST - BP NONINVASIVE SYSTOLIC (MM HG)
Doing well status post laser ablation right lower extremity with multiple stab phlebectomies  Her venous ulcer has healed nicely as well as an area of concern in the right heel, she is wearing her support stockings religiously and also feels that a light Tubigrip at nighttime also decreases her discomfort  Plan:  Follow-up in the office on an as-needed basis  I did ask her to change her support hose at least every 6-8 months as they lose their therapeutic effect by that time 
150

## 2023-05-15 NOTE — PROGRESS NOTE ADULT - SUBJECTIVE AND OBJECTIVE BOX
HPI:  Reymundo Liu  67M retired , R handed, no pmh, p/w MRI demonstrating 3x3x4.5 cm ring enhancing mass in L ant. temporal lobe w/extension to BG and frontal lobe and associated vasogenic edema c/f GBM. He c/o word finding difficulty for past 3 wks. Exam: AO3, PERRL, EOMI, BROWN 5/5, sensation intact, difficulty with repetition, 1/3 w/ naming objects.      (09 May 2023 16:58)    SURGERY: Craniotomy for tumor with stereotactic guidance    Abdominoplasty          EVENTS:   POD from crani for tumor resection       ICU Vital Signs Last 24 Hrs  T(C): 36.4 (15 May 2023 12:15), Max: 37 (15 May 2023 08:55)  T(F): 97.5 (15 May 2023 12:15), Max: 98.6 (15 May 2023 08:55)  HR: 50 (15 May 2023 12:15) (50 - 62)  BP: 150/81 (15 May 2023 12:15) (141/72 - 150/81)  BP(mean): --  ABP: --  ABP(mean): --  RR: 14 (15 May 2023 12:15) (14 - 18)  SpO2: 97% (15 May 2023 12:15) (95% - 97%)    O2 Parameters below as of 15 May 2023 08:55  Patient On (Oxygen Delivery Method): room air           05-14 @ 07:01  -  05-15 @ 07:00  --------------------------------------------------------  IN: 600 mL / OUT: 0 mL / NET: 600 mL                             14.7   10.39 )-----------( 233      ( 14 May 2023 06:37 )             43.1    05-14    137  |  102  |  15  ----------------------------<  100<H>  4.0   |  24  |  0.80    Ca    9.4      14 May 2023 06:37     ABG - ( 15 May 2023 18:47 )  pH, Arterial: 7.38  pH, Blood: x     /  pCO2: 34    /  pO2: 227   / HCO3: 20    / Base Excess: -4.3  /  SaO2: 99.5                     PHYSICAL EXAM:    General: No Acute Distress     Neurological:     Pulmonary: Clear to Auscultation, No Rales, No Rhonchi, No Wheezes     Cardiovascular: S1, S2, Regular Rate and Rhythm     Gastrointestinal: Soft, Nontender, Nondistended     Extremities: No calf tenderness     Incision:       MEDICATIONS:  Antibiotics:      Neurological:     Cardiac:     Pulm:    Heme:     Other:        DEVICES: [] Restraints [] HUDSON/HMV []LD [] ET tube [] Trach [] Chest Tube [] A-line [] Hollis [] NGT [] Rectal Tube       A/P:     L ant. temporal lobe w/extension to BG and frontal lobe and associated vasogenic edema and mass effect  concern for GBM   POD0 from crani for mass resection       Neuro: neuro checks q 1 hr  CT head tomorrow  MRI brain wwo   decadron for vasogenic edema   s/p 5 ALA, photosensitivity keep in dark room   follow official path   Respiratory:  RA   CV: SBP goal 100-150 mmhg   Endocrine: sugar 120-180   Heme/Onc:  cbc post-op pending            DVT ppx: hold chemoprophylaxis POD 0   Renal: NS 75 ml/hr  BPH resume flomax    ID: [email or web address]   GI: NPO advance diet as tolerated   PPI while on decadron   Social/Family: updated at bedside   Discharge planning:     Code Status: [x] Full Code [] DNR [] DNI [] Goals of Care:   Disposition: [x] ICU [] Stroke Unit [] RCU []PCU []Floor [] Discharge Home     Patient at high risk for neurologic deterioration, critical care time, excluding procedures: 45 minutes                    HPI:  Reymundo Liu  67M retired , R handed, no pmh, p/w MRI demonstrating 3x3x4.5 cm ring enhancing mass in L ant. temporal lobe w/extension to BG and frontal lobe and associated vasogenic edema c/f GBM. He c/o word finding difficulty for past 3 wks. Exam: AO3, PERRL, EOMI, BROWN 5/5, sensation intact, difficulty with repetition, 1/3 w/ naming objects.      (09 May 2023 16:58)    SURGERY: Craniotomy for tumor with stereotactic guidance    Abdominoplasty          EVENTS:   POD from crani for tumor resection       ICU Vital Signs Last 24 Hrs  T(C): 36.4 (15 May 2023 12:15), Max: 37 (15 May 2023 08:55)  T(F): 97.5 (15 May 2023 12:15), Max: 98.6 (15 May 2023 08:55)  HR: 50 (15 May 2023 12:15) (50 - 62)  BP: 150/81 (15 May 2023 12:15) (141/72 - 150/81)  BP(mean): --  ABP: --  ABP(mean): --  RR: 14 (15 May 2023 12:15) (14 - 18)  SpO2: 97% (15 May 2023 12:15) (95% - 97%)    O2 Parameters below as of 15 May 2023 08:55  Patient On (Oxygen Delivery Method): room air           05-14 @ 07:01  -  05-15 @ 07:00  --------------------------------------------------------  IN: 600 mL / OUT: 0 mL / NET: 600 mL                             14.7   10.39 )-----------( 233      ( 14 May 2023 06:37 )             43.1    05-14    137  |  102  |  15  ----------------------------<  100<H>  4.0   |  24  |  0.80    Ca    9.4      14 May 2023 06:37     ABG - ( 15 May 2023 18:47 )  pH, Arterial: 7.38  pH, Blood: x     /  pCO2: 34    /  pO2: 227   / HCO3: 20    / Base Excess: -4.3  /  SaO2: 99.5                     PHYSICAL EXAM:    General: No Acute Distress     Neurological:     Pulmonary: Clear to Auscultation, No Rales, No Rhonchi, No Wheezes     Cardiovascular: S1, S2, Regular Rate and Rhythm     Gastrointestinal: Soft, Nontender, Nondistended     Extremities: No calf tenderness     Incision:       MEDICATIONS:  Antibiotics:      Neurological:     Cardiac:     Pulm:    Heme:     Other:        DEVICES: [] Restraints [] HUDSON/HMV []LD [] ET tube [] Trach [] Chest Tube [] A-line [] Hollis [] NGT [] Rectal Tube       A/P:     L ant. temporal lobe w/extension to BG and frontal lobe and associated vasogenic edema and mass effect  concern for GBM   POD0 from crani for mass resection       Neuro: neuro checks q 1 hr  CT head tomorrow  MRI brain wwo   decadron for vasogenic edema   s/p 5 ALA, photosensitivity keep in dark room   follow official path   Respiratory:  RA   CV: SBP goal 100-150 mmhg   HTN on nicardipine, enalapril 5 mg daily   Endocrine: sugar 120-180   Heme/Onc:  cbc post-op pending            DVT ppx: hold chemoprophylaxis POD 0   Renal: NS 75 ml/hr  BPH resume flomax    ID: [email or web address]   GI: NPO advance diet as tolerated   PPI while on decadron   Social/Family: updated at bedside   Discharge planning:     Code Status: [x] Full Code [] DNR [] DNI [] Goals of Care:   Disposition: [x] ICU [] Stroke Unit [] RCU []PCU []Floor [] Discharge Home     Patient at high risk for neurologic deterioration, critical care time, excluding procedures: 45 minutes                    HPI:  Reymundo Liu  67M retired , R handed, no pmh, p/w MRI demonstrating 3x3x4.5 cm ring enhancing mass in L ant. temporal lobe w/extension to BG and frontal lobe and associated vasogenic edema c/f GBM. He c/o word finding difficulty for past 3 wks. Exam: AO3, PERRL, EOMI, BROWN 5/5, sensation intact, difficulty with repetition, 1/3 w/ naming objects.      (09 May 2023 16:58)    SURGERY: Craniotomy for tumor with stereotactic guidance    Abdominoplasty          EVENTS:   POD from crani for tumor resection       ICU Vital Signs Last 24 Hrs  T(C): 36.4 (15 May 2023 12:15), Max: 37 (15 May 2023 08:55)  T(F): 97.5 (15 May 2023 12:15), Max: 98.6 (15 May 2023 08:55)  HR: 50 (15 May 2023 12:15) (50 - 62)  BP: 150/81 (15 May 2023 12:15) (141/72 - 150/81)  BP(mean): --  ABP: --  ABP(mean): --  RR: 14 (15 May 2023 12:15) (14 - 18)  SpO2: 97% (15 May 2023 12:15) (95% - 97%)    O2 Parameters below as of 15 May 2023 08:55  Patient On (Oxygen Delivery Method): room air           05-14 @ 07:01  -  05-15 @ 07:00  --------------------------------------------------------  IN: 600 mL / OUT: 0 mL / NET: 600 mL                             14.7   10.39 )-----------( 233      ( 14 May 2023 06:37 )             43.1    05-14    137  |  102  |  15  ----------------------------<  100<H>  4.0   |  24  |  0.80    Ca    9.4      14 May 2023 06:37     ABG - ( 15 May 2023 18:47 )  pH, Arterial: 7.38  pH, Blood: x     /  pCO2: 34    /  pO2: 227   / HCO3: 20    / Base Excess: -4.3  /  SaO2: 99.5                     PHYSICAL EXAM:    General: No Acute Distress     Neurological: sleepy, follows sample commands, SUSANNE, intact EOM, disoriented to pace thinks he is in the store oriented to person only, hypophonic, antigravity all over but not compliant with the exam     Pulmonary: Clear to Auscultation, No Rales, No Rhonchi, No Wheezes     Cardiovascular: S1, S2, Regular Rate and Rhythm     Gastrointestinal: Soft, Nontender, Nondistended     Extremities: No calf tenderness     Incision:       MEDICATIONS:  Antibiotics:      Neurological:     Cardiac:     Pulm:    Heme:     Other:        DEVICES: [] Restraints [] HUDSON/HMV []LD [] ET tube [] Trach [] Chest Tube [] A-line [] Hollis [] NGT [] Rectal Tube       A/P:     L ant. temporal lobe w/extension to BG and frontal lobe and associated vasogenic edema and mass effect  concern for GBM   POD0 from crani for mass resection       Neuro: neuro checks q 1 hr  CT head tomorrow  MRI brain wwo   decadron for vasogenic edema   s/p 5 ALA, photosensitivity keep in dark room   follow official path   Respiratory:  RA   CV: SBP goal 100-150 mmhg   HTN on nicardipine, enalapril 5 mg daily   Endocrine: sugar 120-180   Heme/Onc:  cbc post-op pending            DVT ppx: hold chemoprophylaxis POD 0   Renal: NS 75 ml/hr  BPH resume flomax    ID: [email or web address]   GI: NPO advance diet as tolerated   PPI while on decadron   Social/Family: updated at bedside   Discharge planning:     Code Status: [x] Full Code [] DNR [] DNI [] Goals of Care:   Disposition: [x] ICU [] Stroke Unit [] RCU []PCU []Floor [] Discharge Home     Patient at high risk for neurologic deterioration, critical care time, excluding procedures: 45 minutes

## 2023-05-15 NOTE — PRE-ANESTHESIA EVALUATION ADULT - NSANTHLABRESULTSFT_GEN_ALL_CORE
14.7   10.39 )-----------( 233      ( 14 May 2023 06:37 )             43.1       05-14    137  |  102  |  15  ----------------------------<  100<H>  4.0   |  24  |  0.80    Ca    9.4      14 May 2023 06:37                    PT/INR - ( 14 May 2023 06:37 )   PT: 14.3 sec;   INR: 1.23 ratio         PTT - ( 14 May 2023 06:37 )  PTT:24.5 sec    Lactate Trend            CAPILLARY BLOOD GLUCOSE

## 2023-05-16 LAB
ALBUMIN SERPL ELPH-MCNC: 3.8 G/DL — SIGNIFICANT CHANGE UP (ref 3.3–5)
ALP SERPL-CCNC: 74 U/L — SIGNIFICANT CHANGE UP (ref 40–120)
ALT FLD-CCNC: 572 U/L — HIGH (ref 10–45)
AMYLASE P1 CFR SERPL: 38 U/L — SIGNIFICANT CHANGE UP (ref 25–125)
ANION GAP SERPL CALC-SCNC: 13 MMOL/L — SIGNIFICANT CHANGE UP (ref 5–17)
ANION GAP SERPL CALC-SCNC: 13 MMOL/L — SIGNIFICANT CHANGE UP (ref 5–17)
APTT BLD: 22.6 SEC — LOW (ref 27.5–35.5)
AST SERPL-CCNC: 334 U/L — HIGH (ref 10–40)
BASOPHILS # BLD AUTO: 0 K/UL — SIGNIFICANT CHANGE UP (ref 0–0.2)
BASOPHILS # BLD AUTO: 0.03 K/UL — SIGNIFICANT CHANGE UP (ref 0–0.2)
BASOPHILS NFR BLD AUTO: 0 % — SIGNIFICANT CHANGE UP (ref 0–2)
BASOPHILS NFR BLD AUTO: 0.2 % — SIGNIFICANT CHANGE UP (ref 0–2)
BILIRUB DIRECT SERPL-MCNC: 0.1 MG/DL — SIGNIFICANT CHANGE UP (ref 0–0.3)
BILIRUB SERPL-MCNC: 0.8 MG/DL — SIGNIFICANT CHANGE UP (ref 0.2–1.2)
BUN SERPL-MCNC: 16 MG/DL — SIGNIFICANT CHANGE UP (ref 7–23)
BUN SERPL-MCNC: 16 MG/DL — SIGNIFICANT CHANGE UP (ref 7–23)
CALCIUM SERPL-MCNC: 9 MG/DL — SIGNIFICANT CHANGE UP (ref 8.4–10.5)
CALCIUM SERPL-MCNC: 9.1 MG/DL — SIGNIFICANT CHANGE UP (ref 8.4–10.5)
CHLORIDE SERPL-SCNC: 100 MMOL/L — SIGNIFICANT CHANGE UP (ref 96–108)
CHLORIDE SERPL-SCNC: 102 MMOL/L — SIGNIFICANT CHANGE UP (ref 96–108)
CK SERPL-CCNC: 205 U/L — HIGH (ref 30–200)
CO2 SERPL-SCNC: 22 MMOL/L — SIGNIFICANT CHANGE UP (ref 22–31)
CO2 SERPL-SCNC: 24 MMOL/L — SIGNIFICANT CHANGE UP (ref 22–31)
CREAT SERPL-MCNC: 0.74 MG/DL — SIGNIFICANT CHANGE UP (ref 0.5–1.3)
CREAT SERPL-MCNC: 0.84 MG/DL — SIGNIFICANT CHANGE UP (ref 0.5–1.3)
EGFR: 101 ML/MIN/1.73M2 — SIGNIFICANT CHANGE UP
EGFR: 97 ML/MIN/1.73M2 — SIGNIFICANT CHANGE UP
EOSINOPHIL # BLD AUTO: 0 K/UL — SIGNIFICANT CHANGE UP (ref 0–0.5)
EOSINOPHIL # BLD AUTO: 0 K/UL — SIGNIFICANT CHANGE UP (ref 0–0.5)
EOSINOPHIL NFR BLD AUTO: 0 % — SIGNIFICANT CHANGE UP (ref 0–6)
EOSINOPHIL NFR BLD AUTO: 0 % — SIGNIFICANT CHANGE UP (ref 0–6)
FIBRINOGEN AG PPP IA-MCNC: 393 MG/DL — SIGNIFICANT CHANGE UP (ref 233–496)
FIBRINOGEN PPP-MCNC: 460 MG/DL — HIGH (ref 200–445)
FSP PPP-MCNC: < 5 UG/ML — SIGNIFICANT CHANGE UP
GGT SERPL-CCNC: 76 U/L — HIGH (ref 9–50)
GLUCOSE SERPL-MCNC: 139 MG/DL — HIGH (ref 70–99)
GLUCOSE SERPL-MCNC: 145 MG/DL — HIGH (ref 70–99)
HCT VFR BLD CALC: 39 % — SIGNIFICANT CHANGE UP (ref 39–50)
HCT VFR BLD CALC: 39.2 % — SIGNIFICANT CHANGE UP (ref 39–50)
HGB BLD-MCNC: 13.4 G/DL — SIGNIFICANT CHANGE UP (ref 13–17)
HGB BLD-MCNC: 13.6 G/DL — SIGNIFICANT CHANGE UP (ref 13–17)
IMM GRANULOCYTES NFR BLD AUTO: 1 % — HIGH (ref 0–0.9)
INR BLD: 1.42 RATIO — HIGH (ref 0.88–1.16)
LDH SERPL L TO P-CCNC: 380 U/L — HIGH (ref 50–242)
LIDOCAIN IGE QN: 18 U/L — SIGNIFICANT CHANGE UP (ref 7–60)
LYMPHOCYTES # BLD AUTO: 0.6 K/UL — LOW (ref 1–3.3)
LYMPHOCYTES # BLD AUTO: 0.68 K/UL — LOW (ref 1–3.3)
LYMPHOCYTES # BLD AUTO: 3.3 % — LOW (ref 13–44)
LYMPHOCYTES # BLD AUTO: 3.8 % — LOW (ref 13–44)
MAGNESIUM SERPL-MCNC: 1.9 MG/DL — SIGNIFICANT CHANGE UP (ref 1.6–2.6)
MAGNESIUM SERPL-MCNC: 2 MG/DL — SIGNIFICANT CHANGE UP (ref 1.6–2.6)
MANUAL SMEAR VERIFICATION: SIGNIFICANT CHANGE UP
MCHC RBC-ENTMCNC: 30.3 PG — SIGNIFICANT CHANGE UP (ref 27–34)
MCHC RBC-ENTMCNC: 30.6 PG — SIGNIFICANT CHANGE UP (ref 27–34)
MCHC RBC-ENTMCNC: 34.4 GM/DL — SIGNIFICANT CHANGE UP (ref 32–36)
MCHC RBC-ENTMCNC: 34.7 GM/DL — SIGNIFICANT CHANGE UP (ref 32–36)
MCV RBC AUTO: 88.1 FL — SIGNIFICANT CHANGE UP (ref 80–100)
MCV RBC AUTO: 88.2 FL — SIGNIFICANT CHANGE UP (ref 80–100)
MONOCYTES # BLD AUTO: 2.29 K/UL — HIGH (ref 0–0.9)
MONOCYTES # BLD AUTO: 2.4 K/UL — HIGH (ref 0–0.9)
MONOCYTES NFR BLD AUTO: 12.8 % — SIGNIFICANT CHANGE UP (ref 2–14)
MONOCYTES NFR BLD AUTO: 13.1 % — SIGNIFICANT CHANGE UP (ref 2–14)
MYELOCYTES NFR BLD: 0.8 % — HIGH (ref 0–0)
NEUTROPHILS # BLD AUTO: 14.67 K/UL — HIGH (ref 1.8–7.4)
NEUTROPHILS # BLD AUTO: 15.02 K/UL — HIGH (ref 1.8–7.4)
NEUTROPHILS NFR BLD AUTO: 82 % — HIGH (ref 43–77)
NEUTROPHILS NFR BLD AUTO: 82.2 % — HIGH (ref 43–77)
NRBC # BLD: 0 /100 WBCS — SIGNIFICANT CHANGE UP (ref 0–0)
PHOSPHATE SERPL-MCNC: 2.8 MG/DL — SIGNIFICANT CHANGE UP (ref 2.5–4.5)
PHOSPHATE SERPL-MCNC: 3 MG/DL — SIGNIFICANT CHANGE UP (ref 2.5–4.5)
PLAT MORPH BLD: NORMAL — SIGNIFICANT CHANGE UP
PLATELET # BLD AUTO: 185 K/UL — SIGNIFICANT CHANGE UP (ref 150–400)
PLATELET # BLD AUTO: 193 K/UL — SIGNIFICANT CHANGE UP (ref 150–400)
POTASSIUM SERPL-MCNC: 4.1 MMOL/L — SIGNIFICANT CHANGE UP (ref 3.5–5.3)
POTASSIUM SERPL-MCNC: 4.2 MMOL/L — SIGNIFICANT CHANGE UP (ref 3.5–5.3)
POTASSIUM SERPL-SCNC: 4.1 MMOL/L — SIGNIFICANT CHANGE UP (ref 3.5–5.3)
POTASSIUM SERPL-SCNC: 4.2 MMOL/L — SIGNIFICANT CHANGE UP (ref 3.5–5.3)
PROMYELOCYTES # FLD: 0.8 % — HIGH (ref 0–0)
PROT SERPL-MCNC: 5.9 G/DL — LOW (ref 6–8.3)
PROTHROM AB SERPL-ACNC: 16.4 SEC — HIGH (ref 10.5–13.4)
RBC # BLD: 4.42 M/UL — SIGNIFICANT CHANGE UP (ref 4.2–5.8)
RBC # BLD: 4.45 M/UL — SIGNIFICANT CHANGE UP (ref 4.2–5.8)
RBC # FLD: 12.2 % — SIGNIFICANT CHANGE UP (ref 10.3–14.5)
RBC # FLD: 12.3 % — SIGNIFICANT CHANGE UP (ref 10.3–14.5)
RBC BLD AUTO: SIGNIFICANT CHANGE UP
SODIUM SERPL-SCNC: 137 MMOL/L — SIGNIFICANT CHANGE UP (ref 135–145)
SODIUM SERPL-SCNC: 137 MMOL/L — SIGNIFICANT CHANGE UP (ref 135–145)
URATE SERPL-MCNC: 2 MG/DL — LOW (ref 3.4–8.8)
WBC # BLD: 17.85 K/UL — HIGH (ref 3.8–10.5)
WBC # BLD: 18.32 K/UL — HIGH (ref 3.8–10.5)
WBC # FLD AUTO: 17.85 K/UL — HIGH (ref 3.8–10.5)
WBC # FLD AUTO: 18.32 K/UL — HIGH (ref 3.8–10.5)

## 2023-05-16 PROCEDURE — 99233 SBSQ HOSP IP/OBS HIGH 50: CPT

## 2023-05-16 PROCEDURE — 70450 CT HEAD/BRAIN W/O DYE: CPT | Mod: 26

## 2023-05-16 PROCEDURE — 70552 MRI BRAIN STEM W/DYE: CPT | Mod: 26

## 2023-05-16 RX ORDER — NAFCILLIN 10 G/100ML
1 INJECTION, POWDER, FOR SOLUTION INTRAVENOUS ONCE
Refills: 0 | Status: COMPLETED | OUTPATIENT
Start: 2023-05-16 | End: 2023-05-16

## 2023-05-16 RX ORDER — MAGNESIUM SULFATE 500 MG/ML
1 VIAL (ML) INJECTION ONCE
Refills: 0 | Status: COMPLETED | OUTPATIENT
Start: 2023-05-16 | End: 2023-05-17

## 2023-05-16 RX ORDER — SODIUM,POTASSIUM PHOSPHATES 278-250MG
1 POWDER IN PACKET (EA) ORAL ONCE
Refills: 0 | Status: COMPLETED | OUTPATIENT
Start: 2023-05-16 | End: 2023-05-16

## 2023-05-16 RX ORDER — CEFAZOLIN SODIUM 1 G
2000 VIAL (EA) INJECTION EVERY 8 HOURS
Refills: 0 | Status: DISCONTINUED | OUTPATIENT
Start: 2023-05-16 | End: 2023-05-16

## 2023-05-16 RX ORDER — NAFCILLIN 10 G/100ML
INJECTION, POWDER, FOR SOLUTION INTRAVENOUS
Refills: 0 | Status: DISCONTINUED | OUTPATIENT
Start: 2023-05-16 | End: 2023-05-16

## 2023-05-16 RX ORDER — NAFCILLIN 10 G/100ML
1 INJECTION, POWDER, FOR SOLUTION INTRAVENOUS EVERY 4 HOURS
Refills: 0 | Status: COMPLETED | OUTPATIENT
Start: 2023-05-16 | End: 2023-05-16

## 2023-05-16 RX ORDER — CALCIUM GLUCONATE 100 MG/ML
1 VIAL (ML) INTRAVENOUS ONCE
Refills: 0 | Status: COMPLETED | OUTPATIENT
Start: 2023-05-16 | End: 2023-05-16

## 2023-05-16 RX ORDER — NAFCILLIN 10 G/100ML
INJECTION, POWDER, FOR SOLUTION INTRAVENOUS
Refills: 0 | Status: COMPLETED | OUTPATIENT
Start: 2023-05-16 | End: 2023-05-16

## 2023-05-16 RX ORDER — ACETAMINOPHEN 500 MG
1000 TABLET ORAL ONCE
Refills: 0 | Status: COMPLETED | OUTPATIENT
Start: 2023-05-16 | End: 2023-05-16

## 2023-05-16 RX ADMIN — NAFCILLIN 100 GRAM(S): 10 INJECTION, POWDER, FOR SOLUTION INTRAVENOUS at 05:16

## 2023-05-16 RX ADMIN — Medication 4 MILLIGRAM(S): at 23:28

## 2023-05-16 RX ADMIN — SENNA PLUS 2 TABLET(S): 8.6 TABLET ORAL at 22:21

## 2023-05-16 RX ADMIN — Medication 5 MILLIGRAM(S): at 22:22

## 2023-05-16 RX ADMIN — Medication 4 MILLIGRAM(S): at 17:56

## 2023-05-16 RX ADMIN — CHLORHEXIDINE GLUCONATE 1 APPLICATION(S): 213 SOLUTION TOPICAL at 22:22

## 2023-05-16 RX ADMIN — NAFCILLIN 100 GRAM(S): 10 INJECTION, POWDER, FOR SOLUTION INTRAVENOUS at 01:44

## 2023-05-16 RX ADMIN — Medication 5 MILLIGRAM(S): at 23:28

## 2023-05-16 RX ADMIN — Medication 1000 MILLIGRAM(S): at 13:00

## 2023-05-16 RX ADMIN — PANTOPRAZOLE SODIUM 40 MILLIGRAM(S): 20 TABLET, DELAYED RELEASE ORAL at 06:05

## 2023-05-16 RX ADMIN — Medication 100 MILLIEQUIVALENT(S): at 01:30

## 2023-05-16 RX ADMIN — Medication 4 MILLIGRAM(S): at 05:16

## 2023-05-16 RX ADMIN — SODIUM CHLORIDE 75 MILLILITER(S): 9 INJECTION INTRAMUSCULAR; INTRAVENOUS; SUBCUTANEOUS at 09:51

## 2023-05-16 RX ADMIN — NAFCILLIN 100 GRAM(S): 10 INJECTION, POWDER, FOR SOLUTION INTRAVENOUS at 09:51

## 2023-05-16 RX ADMIN — Medication 4 MILLIGRAM(S): at 00:01

## 2023-05-16 RX ADMIN — TAMSULOSIN HYDROCHLORIDE 0.4 MILLIGRAM(S): 0.4 CAPSULE ORAL at 22:22

## 2023-05-16 RX ADMIN — Medication 100 GRAM(S): at 05:17

## 2023-05-16 RX ADMIN — Medication 4 MILLIGRAM(S): at 11:40

## 2023-05-16 RX ADMIN — CHLORHEXIDINE GLUCONATE 1 APPLICATION(S): 213 SOLUTION TOPICAL at 00:01

## 2023-05-16 RX ADMIN — POLYETHYLENE GLYCOL 3350 17 GRAM(S): 17 POWDER, FOR SOLUTION ORAL at 11:40

## 2023-05-16 RX ADMIN — Medication 100 MILLIEQUIVALENT(S): at 00:01

## 2023-05-16 RX ADMIN — LEVETIRACETAM 500 MILLIGRAM(S): 250 TABLET, FILM COATED ORAL at 05:17

## 2023-05-16 RX ADMIN — LEVETIRACETAM 500 MILLIGRAM(S): 250 TABLET, FILM COATED ORAL at 17:56

## 2023-05-16 RX ADMIN — Medication 1 PACKET(S): at 23:28

## 2023-05-16 RX ADMIN — Medication 400 MILLIGRAM(S): at 12:28

## 2023-05-16 NOTE — PROGRESS NOTE ADULT - ASSESSMENT
ASSESSMENT & PLAN    L ant. temporal lobe w/extension to BG and frontal lobe and associated vasogenic edema and mass effect concern for GBM   POD 1 from crani for mass resection       Neuro: neuro checks q 1 hr  CT head THIS AM  MRI brain wwo   decadron for vasogenic edema   s/p 5 ALA, photosensitivity keep in dark room   follow official path     Respiratory:  RA     CV: SBP goal 100-150 mmhg   HTN on nicardipine, enalapril 5 mg daily     Endocrine: sugar 120-180     Heme/Onc:  cbc post-op pending              DVT ppx: resume chemoprophylaxis per neurosurgery recommendations     Renal: NS 75 ml/hr  BPH resume flomax      ID:  Afebrile, leukocyte count elevated, likely 2/2 steroids  malik-op ABX     GI: NPO advance diet as tolerated   PPI while on decadron   LBM     Social/Family: updated at bedside   Discharge planning:     Code Status: [x] Full Code [] DNR [] DNI [] Goals of Care:   Disposition: [x] ICU [] Stroke Unit [] RCU []PCU []Floor [] Discharge Home     Patient at high risk for neurologic deterioration, critical care time, excluding procedures: 45 minutes            ASSESSMENT & PLAN    L ant. temporal lobe w/extension to BG and frontal lobe and associated vasogenic edema and mass effect concern for GBM   POD 1 from crani for mass resection       Neuro: neuro checks q 1 hr  CT head THIS AM- reviewed   MRI brain wwo today   decadron 4q6 for vasogenic edema   s/p 5 ALA, photosensitivity keep in dark room   follow official path   Follow up LFT    Respiratory:  RA     CV: SBP goal 100-150 mmhg   No BP meds at this point     Endocrine: sugar 120-180     Heme/Onc:    H/H stable   DVT ppx: resume chemoprophylaxis per neurosurgery recommendations     Renal: NS 75 ml/hr  BPH resume flomax      ID:  Afebrile, leukocyte count elevated, likely 2/2 steroids  malik-op ABX     GI: Regular diet  PPI while on decadron, bowel regimen  LBM not yet    Social/Family: updated at bedside   Discharge planning:     Code Status: [x] Full Code [] DNR [] DNI [] Goals of Care:   Disposition: [x] ICU [] Stroke Unit [] RCU []PCU []Floor [] Discharge Home     Patient at high risk for neurologic deterioration, critical care time, excluding procedures: 45 minutes

## 2023-05-16 NOTE — PROGRESS NOTE ADULT - SUBJECTIVE AND OBJECTIVE BOX
Patient seen and examined at bedside s/p left crani for tumor resection.  Postop CTH minor heme in resection cavity without significant mass effect.    --Anticoagulation--    T(C): 36.6 (05-15-23 @ 23:15), Max: 37 (05-15-23 @ 08:55)  HR: 80 (05-16-23 @ 00:00) (50 - 102)  BP: 150/81 (05-15-23 @ 12:15) (141/72 - 150/81)  RR: 13 (05-16-23 @ 00:00) (10 - 23)  SpO2: 99% (05-16-23 @ 00:00) (90% - 100%)  Wt(kg): --    Exam: aox1 with choice, mixed aphasia, intermittently FC, moves all extremities AG, LUE > RUE    .  LABS:                         13.0   16.44 )-----------( 206      ( 15 May 2023 22:14 )             37.8     05-15    137  |  102  |  17  ----------------------------<  136<H>  3.9   |  22  |  0.76    Ca    9.2      15 May 2023 22:14  Phos  4.1     05-15  Mg     2.0     05-15      PT/INR - ( 14 May 2023 06:37 )   PT: 14.3 sec;   INR: 1.23 ratio         PTT - ( 14 May 2023 06:37 )  PTT:24.5 sec          RADIOLOGY, EKG & ADDITIONAL TESTS: Reviewed.

## 2023-05-16 NOTE — PHYSICAL THERAPY INITIAL EVALUATION ADULT - PERTINENT HX OF CURRENT PROBLEM, REHAB EVAL
Pt is a 66 y/o male with PMH of enlarged prostate and insomnia who presents with 3 weeks of word finding difficulty with outpatient abnormal MRI demonstrating 3x3x4.5 cm ring enhancing mass in L anterior temporal lobe with extension to BG and frontal lobe and associated vasogenic edema concerning for GBM. Pt s/p L craniotomy for tumor with stereotactic guidance, abdominoplasty on 5/15.     Imagin/9 CXR: Clear lungs.   CT Chest/Ab/Pelvis: No CT evidence of occult malignancy in the chest, abdomen, or pelvis.   MR Brain: Reidentified is a heterogeneously enhancing necrotic mass lesion centered within the left temporal lobe and extending into the left periinsular region with a cystic portion along the inferior aspect of the lesion, with a large degree of surrounding vasogenic edema throughout the left   parietal temporal lobes and left basal ganglia. This results in mass effect on the surrounding parenchyma and on the left ventricular system. There is mild mass effect in the left cerebral peduncle. This results in left to right midline shift up to 5.5 mm at the level of the septum pellucidum. 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 Functional imaging demonstrates no hyperactivity in the region of tumor or vasogenic edema.   Duplex BLE: No evidence of deep venous thrombosis in either lower extremity.

## 2023-05-16 NOTE — PROGRESS NOTE ADULT - SUBJECTIVE AND OBJECTIVE BOX
HPI:  Reymundo Liu  67M retired , R handed, no pmh, p/w MRI demonstrating 3x3x4.5 cm ring enhancing mass in L ant. temporal lobe w/extension to BG and frontal lobe and associated vasogenic edema c/f GBM. He c/o word finding difficulty for past 3 wks. Exam: AO3, PERRL, EOMI, BROWN 5/5, sensation intact, difficulty with repetition, 1/3 w/ naming objects.      (09 May 2023 16:58)    SURGERY: Craniotomy for tumor with stereotactic guidance    Abdominoplasty      EVENTS:   POD1 from crani for tumor resection       ICU Vital Signs Last 24 Hrs  T(C): 36.8 (16 May 2023 07:00), Max: 37 (15 May 2023 08:55)  T(F): 98.2 (16 May 2023 07:00), Max: 98.6 (15 May 2023 08:55)  HR: 71 (16 May 2023 08:00) (50 - 102)  BP: 150/81 (15 May 2023 12:15) (141/72 - 150/81)  BP(mean): --  ABP: 150/50 (16 May 2023 08:00) (118/35 - 169/51)  ABP(mean): 81 (16 May 2023 08:00) (59 - 95)  RR: 15 (16 May 2023 08:00) (10 - 23)  SpO2: 94% (16 May 2023 08:00) (90% - 100%)    O2 Parameters below as of 16 May 2023 07:00  Patient On (Oxygen Delivery Method): room air        MEDICATIONS  (STANDING):  chlorhexidine 4% Liquid 1 Application(s) Topical <User Schedule>  dexAMETHasone  Injectable 4 milliGRAM(s) IV Push every 6 hours  levETIRAcetam 500 milliGRAM(s) Oral two times a day  melatonin 5 milliGRAM(s) Oral at bedtime  nafcillin  IVPB      nafcillin  IVPB 1 Gram(s) IV Intermittent every 4 hours  niCARdipine Infusion 5 mG/Hr (25 mL/Hr) IV Continuous <Continuous>  pantoprazole    Tablet 40 milliGRAM(s) Oral before breakfast  polyethylene glycol 3350 17 Gram(s) Oral daily  senna 2 Tablet(s) Oral at bedtime  sodium chloride 0.9%. 1000 milliLiter(s) (75 mL/Hr) IV Continuous <Continuous>  tamsulosin 0.4 milliGRAM(s) Oral at bedtime    MEDICATIONS  (PRN):  acetaminophen     Tablet .. 650 milliGRAM(s) Oral every 6 hours PRN Temp greater or equal to 38C (100.4F), Mild Pain (1 - 3)  HYDROmorphone  Injectable 1 milliGRAM(s) IV Push every 10 minutes PRN Severe Pain (7 - 10)  HYDROmorphone  Injectable 0.5 milliGRAM(s) IV Push every 10 minutes PRN Moderate Pain (4 - 6)  ondansetron Injectable 4 milliGRAM(s) IV Push once PRN Nausea and/or Vomiting  ondansetron Injectable 4 milliGRAM(s) IV Push every 6 hours PRN Nausea and/or Vomiting                     PHYSICAL EXAM:    General: No Acute Distress     Neurological: sleepy, follows sample commands, left irregular pupil but reactive, intact EOM, Oriented to self, not to year or hospital. Follows simple central commands. Mixed aphasia, Moves all 4 extremities with RUE drift.     Pulmonary: Clear to Auscultation, No Rales, No Rhonchi, No Wheezes     Cardiovascular: S1, S2, Regular Rate and Rhythm     Gastrointestinal: Soft, Nontender, Nondistended     Extremities: No calf tenderness     Incision:       MEDICATIONS:  Antibiotics:      Neurological:     Cardiac:     Pulm:    Heme:     Other:        DEVICES: [] Restraints [] HUDSON/HMV []LD [] ET tube [] Trach [] Chest Tube [] A-line [] Hollis [] NGT [] Rectal Tube              HPI:  Reymundo Liu  67M retired , R handed, no pmh, p/w MRI demonstrating 3x3x4.5 cm ring enhancing mass in L ant. temporal lobe w/extension to BG and frontal lobe and associated vasogenic edema c/f GBM. He c/o word finding difficulty for past 3 wks. Exam: AO3, PERRL, EOMI, BROWN 5/5, sensation intact, difficulty with repetition, 1/3 w/ naming objects.      (09 May 2023 16:58)    SURGERY: Craniotomy for tumor with stereotactic guidance    Abdominoplasty      EVENTS:   POD1 from crani for tumor resection, APHASIA still present. Otherwise exam stable.      ICU Vital Signs Last 24 Hrs  T(C): 36.8 (16 May 2023 07:00), Max: 37 (15 May 2023 08:55)  T(F): 98.2 (16 May 2023 07:00), Max: 98.6 (15 May 2023 08:55)  HR: 71 (16 May 2023 08:00) (50 - 102)  BP: 150/81 (15 May 2023 12:15) (141/72 - 150/81)  BP(mean): --  ABP: 150/50 (16 May 2023 08:00) (118/35 - 169/51)  ABP(mean): 81 (16 May 2023 08:00) (59 - 95)  RR: 15 (16 May 2023 08:00) (10 - 23)  SpO2: 94% (16 May 2023 08:00) (90% - 100%)    O2 Parameters below as of 16 May 2023 07:00  Patient On (Oxygen Delivery Method): room air        MEDICATIONS  (STANDING):  chlorhexidine 4% Liquid 1 Application(s) Topical <User Schedule>  dexAMETHasone  Injectable 4 milliGRAM(s) IV Push every 6 hours  levETIRAcetam 500 milliGRAM(s) Oral two times a day  melatonin 5 milliGRAM(s) Oral at bedtime  nafcillin  IVPB      nafcillin  IVPB 1 Gram(s) IV Intermittent every 4 hours  niCARdipine Infusion 5 mG/Hr (25 mL/Hr) IV Continuous <Continuous>  pantoprazole    Tablet 40 milliGRAM(s) Oral before breakfast  polyethylene glycol 3350 17 Gram(s) Oral daily  senna 2 Tablet(s) Oral at bedtime  sodium chloride 0.9%. 1000 milliLiter(s) (75 mL/Hr) IV Continuous <Continuous>  tamsulosin 0.4 milliGRAM(s) Oral at bedtime    MEDICATIONS  (PRN):  acetaminophen     Tablet .. 650 milliGRAM(s) Oral every 6 hours PRN Temp greater or equal to 38C (100.4F), Mild Pain (1 - 3)  HYDROmorphone  Injectable 1 milliGRAM(s) IV Push every 10 minutes PRN Severe Pain (7 - 10)  HYDROmorphone  Injectable 0.5 milliGRAM(s) IV Push every 10 minutes PRN Moderate Pain (4 - 6)  ondansetron Injectable 4 milliGRAM(s) IV Push once PRN Nausea and/or Vomiting  ondansetron Injectable 4 milliGRAM(s) IV Push every 6 hours PRN Nausea and/or Vomiting                     PHYSICAL EXAM:    General: No Acute Distress     Neurological: sleepy, follows sample commands, left irregular pupil but reactive, intact EOM, Oriented to self, not to year or hospital. Follows simple central commands. Mixed aphasia, Moves all 4 extremities with RUE drift.     Pulmonary: Clear to Auscultation, No Rales, No Rhonchi, No Wheezes     Cardiovascular: S1, S2, Regular Rate and Rhythm     Gastrointestinal: Soft, Nontender, Nondistended     Extremities: No calf tenderness     Incision:       MEDICATIONS:  Antibiotics:      Neurological:     Cardiac:     Pulm:    Heme:     Other:        DEVICES: [] Restraints [] HUDSON/HMV [] LD [] ET tube [] Trach [] Chest Tube [] A-line [] Hollis [] NGT [] Rectal Tube

## 2023-05-16 NOTE — PROGRESS NOTE ADULT - SUBJECTIVE AND OBJECTIVE BOX
Night rounds   Patient seen and examined    T(C): 36.8 (05-16-23 @ 15:00), Max: 36.9 (05-16-23 @ 11:00)  HR: 69 (05-16-23 @ 18:00) (59 - 102)  BP: --  RR: 13 (05-16-23 @ 18:00) (10 - 23)  SpO2: 95% (05-16-23 @ 18:00) (90% - 100%)  05-15-23 @ 07:01  -  05-16-23 @ 07:00  --------------------------------------------------------  IN: 1337.5 mL / OUT: 1825 mL / NET: -487.5 mL    05-16-23 @ 07:01  -  05-16-23 @ 18:57  --------------------------------------------------------  IN: 1300 mL / OUT: 2550 mL / NET: -1250 mL    acetaminophen     Tablet .. 650 milliGRAM(s) Oral every 6 hours PRN  chlorhexidine 4% Liquid 1 Application(s) Topical <User Schedule>  dexAMETHasone  Injectable 4 milliGRAM(s) IV Push every 6 hours  levETIRAcetam 500 milliGRAM(s) Oral two times a day  melatonin 5 milliGRAM(s) Oral at bedtime  ondansetron Injectable 4 milliGRAM(s) IV Push every 6 hours PRN  ondansetron Injectable 4 milliGRAM(s) IV Push once PRN  pantoprazole    Tablet 40 milliGRAM(s) Oral before breakfast  polyethylene glycol 3350 17 Gram(s) Oral daily  senna 2 Tablet(s) Oral at bedtime  sodium chloride 0.9%. 1000 milliLiter(s) IV Continuous <Continuous>  tamsulosin 0.4 milliGRAM(s) Oral at bedtime        Exam unchanged from day rounds     labs and imaging reviewed     Continue same management   elevated LFT krystyna from 5 ALA , will monitor   Barb Guerra [facility] [place]

## 2023-05-17 LAB
ALBUMIN SERPL ELPH-MCNC: 3.4 G/DL — SIGNIFICANT CHANGE UP (ref 3.3–5)
ALBUMIN SERPL ELPH-MCNC: 3.5 G/DL — SIGNIFICANT CHANGE UP (ref 3.3–5)
ALP SERPL-CCNC: 79 U/L — SIGNIFICANT CHANGE UP (ref 40–120)
ALP SERPL-CCNC: 86 U/L — SIGNIFICANT CHANGE UP (ref 40–120)
ALT FLD-CCNC: 529 U/L — HIGH (ref 10–45)
ALT FLD-CCNC: 601 U/L — HIGH (ref 10–45)
AMYLASE P1 CFR SERPL: 42 U/L — SIGNIFICANT CHANGE UP (ref 25–125)
ANION GAP SERPL CALC-SCNC: 10 MMOL/L — SIGNIFICANT CHANGE UP (ref 5–17)
ANION GAP SERPL CALC-SCNC: 10 MMOL/L — SIGNIFICANT CHANGE UP (ref 5–17)
ANION GAP SERPL CALC-SCNC: 13 MMOL/L — SIGNIFICANT CHANGE UP (ref 5–17)
APTT BLD: 23.3 SEC — LOW (ref 27.5–35.5)
APTT BLD: 25.7 SEC — LOW (ref 27.5–35.5)
AST SERPL-CCNC: 134 U/L — HIGH (ref 10–40)
AST SERPL-CCNC: 197 U/L — HIGH (ref 10–40)
BASOPHILS # BLD AUTO: 0.02 K/UL — SIGNIFICANT CHANGE UP (ref 0–0.2)
BASOPHILS NFR BLD AUTO: 0.1 % — SIGNIFICANT CHANGE UP (ref 0–2)
BILIRUB DIRECT SERPL-MCNC: 0.1 MG/DL — SIGNIFICANT CHANGE UP (ref 0–0.3)
BILIRUB DIRECT SERPL-MCNC: 0.2 MG/DL — SIGNIFICANT CHANGE UP (ref 0–0.3)
BILIRUB INDIRECT FLD-MCNC: 0.4 MG/DL — SIGNIFICANT CHANGE UP (ref 0.2–1)
BILIRUB INDIRECT FLD-MCNC: 0.5 MG/DL — SIGNIFICANT CHANGE UP (ref 0.2–1)
BILIRUB SERPL-MCNC: 0.5 MG/DL — SIGNIFICANT CHANGE UP (ref 0.2–1.2)
BILIRUB SERPL-MCNC: 0.7 MG/DL — SIGNIFICANT CHANGE UP (ref 0.2–1.2)
BLD GP AB SCN SERPL QL: NEGATIVE — SIGNIFICANT CHANGE UP
BUN SERPL-MCNC: 20 MG/DL — SIGNIFICANT CHANGE UP (ref 7–23)
BUN SERPL-MCNC: 21 MG/DL — SIGNIFICANT CHANGE UP (ref 7–23)
BUN SERPL-MCNC: 21 MG/DL — SIGNIFICANT CHANGE UP (ref 7–23)
CALCIUM SERPL-MCNC: 8.1 MG/DL — LOW (ref 8.4–10.5)
CALCIUM SERPL-MCNC: 8.7 MG/DL — SIGNIFICANT CHANGE UP (ref 8.4–10.5)
CALCIUM SERPL-MCNC: 8.9 MG/DL — SIGNIFICANT CHANGE UP (ref 8.4–10.5)
CHLORIDE SERPL-SCNC: 101 MMOL/L — SIGNIFICANT CHANGE UP (ref 96–108)
CHLORIDE SERPL-SCNC: 104 MMOL/L — SIGNIFICANT CHANGE UP (ref 96–108)
CHLORIDE SERPL-SCNC: 99 MMOL/L — SIGNIFICANT CHANGE UP (ref 96–108)
CK SERPL-CCNC: 127 U/L — SIGNIFICANT CHANGE UP (ref 30–200)
CO2 SERPL-SCNC: 21 MMOL/L — LOW (ref 22–31)
CO2 SERPL-SCNC: 23 MMOL/L — SIGNIFICANT CHANGE UP (ref 22–31)
CO2 SERPL-SCNC: 23 MMOL/L — SIGNIFICANT CHANGE UP (ref 22–31)
CREAT SERPL-MCNC: 0.72 MG/DL — SIGNIFICANT CHANGE UP (ref 0.5–1.3)
CREAT SERPL-MCNC: 0.83 MG/DL — SIGNIFICANT CHANGE UP (ref 0.5–1.3)
CREAT SERPL-MCNC: 0.88 MG/DL — SIGNIFICANT CHANGE UP (ref 0.5–1.3)
EGFR: 101 ML/MIN/1.73M2 — SIGNIFICANT CHANGE UP
EGFR: 95 ML/MIN/1.73M2 — SIGNIFICANT CHANGE UP
EGFR: 97 ML/MIN/1.73M2 — SIGNIFICANT CHANGE UP
EOSINOPHIL # BLD AUTO: 0 K/UL — SIGNIFICANT CHANGE UP (ref 0–0.5)
EOSINOPHIL NFR BLD AUTO: 0 % — SIGNIFICANT CHANGE UP (ref 0–6)
GGT SERPL-CCNC: 83 U/L — HIGH (ref 9–50)
GLUCOSE SERPL-MCNC: 141 MG/DL — HIGH (ref 70–99)
GLUCOSE SERPL-MCNC: 150 MG/DL — HIGH (ref 70–99)
GLUCOSE SERPL-MCNC: 150 MG/DL — HIGH (ref 70–99)
HCT VFR BLD CALC: 39 % — SIGNIFICANT CHANGE UP (ref 39–50)
HCT VFR BLD CALC: 39.5 % — SIGNIFICANT CHANGE UP (ref 39–50)
HGB BLD-MCNC: 13.6 G/DL — SIGNIFICANT CHANGE UP (ref 13–17)
HGB BLD-MCNC: 13.8 G/DL — SIGNIFICANT CHANGE UP (ref 13–17)
IMM GRANULOCYTES NFR BLD AUTO: 1.4 % — HIGH (ref 0–0.9)
INR BLD: 1.34 RATIO — HIGH (ref 0.88–1.16)
INR BLD: 1.61 RATIO — HIGH (ref 0.88–1.16)
LDH SERPL L TO P-CCNC: 178 U/L — SIGNIFICANT CHANGE UP (ref 50–242)
LIDOCAIN IGE QN: 32 U/L — SIGNIFICANT CHANGE UP (ref 7–60)
LYMPHOCYTES # BLD AUTO: 0.56 K/UL — LOW (ref 1–3.3)
LYMPHOCYTES # BLD AUTO: 4.1 % — LOW (ref 13–44)
MAGNESIUM SERPL-MCNC: 1.9 MG/DL — SIGNIFICANT CHANGE UP (ref 1.6–2.6)
MAGNESIUM SERPL-MCNC: 2 MG/DL — SIGNIFICANT CHANGE UP (ref 1.6–2.6)
MAGNESIUM SERPL-MCNC: 2.1 MG/DL — SIGNIFICANT CHANGE UP (ref 1.6–2.6)
MCHC RBC-ENTMCNC: 30.5 PG — SIGNIFICANT CHANGE UP (ref 27–34)
MCHC RBC-ENTMCNC: 30.6 PG — SIGNIFICANT CHANGE UP (ref 27–34)
MCHC RBC-ENTMCNC: 34.9 GM/DL — SIGNIFICANT CHANGE UP (ref 32–36)
MCHC RBC-ENTMCNC: 34.9 GM/DL — SIGNIFICANT CHANGE UP (ref 32–36)
MCV RBC AUTO: 87.4 FL — SIGNIFICANT CHANGE UP (ref 80–100)
MCV RBC AUTO: 87.6 FL — SIGNIFICANT CHANGE UP (ref 80–100)
MONOCYTES # BLD AUTO: 2.31 K/UL — HIGH (ref 0–0.9)
MONOCYTES NFR BLD AUTO: 16.7 % — HIGH (ref 2–14)
NEUTROPHILS # BLD AUTO: 10.72 K/UL — HIGH (ref 1.8–7.4)
NEUTROPHILS NFR BLD AUTO: 77.7 % — HIGH (ref 43–77)
NRBC # BLD: 0 /100 WBCS — SIGNIFICANT CHANGE UP (ref 0–0)
NRBC # BLD: 0 /100 WBCS — SIGNIFICANT CHANGE UP (ref 0–0)
PHOSPHATE SERPL-MCNC: 2.5 MG/DL — SIGNIFICANT CHANGE UP (ref 2.5–4.5)
PHOSPHATE SERPL-MCNC: 2.6 MG/DL — SIGNIFICANT CHANGE UP (ref 2.5–4.5)
PHOSPHATE SERPL-MCNC: 3 MG/DL — SIGNIFICANT CHANGE UP (ref 2.5–4.5)
PLATELET # BLD AUTO: 195 K/UL — SIGNIFICANT CHANGE UP (ref 150–400)
PLATELET # BLD AUTO: 202 K/UL — SIGNIFICANT CHANGE UP (ref 150–400)
POTASSIUM SERPL-MCNC: 3.9 MMOL/L — SIGNIFICANT CHANGE UP (ref 3.5–5.3)
POTASSIUM SERPL-MCNC: 3.9 MMOL/L — SIGNIFICANT CHANGE UP (ref 3.5–5.3)
POTASSIUM SERPL-MCNC: 4.1 MMOL/L — SIGNIFICANT CHANGE UP (ref 3.5–5.3)
POTASSIUM SERPL-SCNC: 3.9 MMOL/L — SIGNIFICANT CHANGE UP (ref 3.5–5.3)
POTASSIUM SERPL-SCNC: 3.9 MMOL/L — SIGNIFICANT CHANGE UP (ref 3.5–5.3)
POTASSIUM SERPL-SCNC: 4.1 MMOL/L — SIGNIFICANT CHANGE UP (ref 3.5–5.3)
PROT SERPL-MCNC: 6 G/DL — SIGNIFICANT CHANGE UP (ref 6–8.3)
PROT SERPL-MCNC: 6.2 G/DL — SIGNIFICANT CHANGE UP (ref 6–8.3)
PROTHROM AB SERPL-ACNC: 15.6 SEC — HIGH (ref 10.5–13.4)
PROTHROM AB SERPL-ACNC: 18.6 SEC — HIGH (ref 10.5–13.4)
RBC # BLD: 4.46 M/UL — SIGNIFICANT CHANGE UP (ref 4.2–5.8)
RBC # BLD: 4.51 M/UL — SIGNIFICANT CHANGE UP (ref 4.2–5.8)
RBC # FLD: 12.4 % — SIGNIFICANT CHANGE UP (ref 10.3–14.5)
RBC # FLD: 12.8 % — SIGNIFICANT CHANGE UP (ref 10.3–14.5)
RH IG SCN BLD-IMP: NEGATIVE — SIGNIFICANT CHANGE UP
SODIUM SERPL-SCNC: 132 MMOL/L — LOW (ref 135–145)
SODIUM SERPL-SCNC: 135 MMOL/L — SIGNIFICANT CHANGE UP (ref 135–145)
SODIUM SERPL-SCNC: 137 MMOL/L — SIGNIFICANT CHANGE UP (ref 135–145)
URATE SERPL-MCNC: 2.7 MG/DL — LOW (ref 3.4–8.8)
WBC # BLD: 13.81 K/UL — HIGH (ref 3.8–10.5)
WBC # BLD: 16.55 K/UL — HIGH (ref 3.8–10.5)
WBC # FLD AUTO: 13.81 K/UL — HIGH (ref 3.8–10.5)
WBC # FLD AUTO: 16.55 K/UL — HIGH (ref 3.8–10.5)

## 2023-05-17 PROCEDURE — 99233 SBSQ HOSP IP/OBS HIGH 50: CPT

## 2023-05-17 RX ORDER — FENTANYL CITRATE 50 UG/ML
50 INJECTION INTRAVENOUS ONCE
Refills: 0 | Status: DISCONTINUED | OUTPATIENT
Start: 2023-05-17 | End: 2023-05-17

## 2023-05-17 RX ORDER — MIDAZOLAM HYDROCHLORIDE 1 MG/ML
2 INJECTION, SOLUTION INTRAMUSCULAR; INTRAVENOUS ONCE
Refills: 0 | Status: DISCONTINUED | OUTPATIENT
Start: 2023-05-17 | End: 2023-05-17

## 2023-05-17 RX ORDER — HYDRALAZINE HCL 50 MG
10 TABLET ORAL ONCE
Refills: 0 | Status: COMPLETED | OUTPATIENT
Start: 2023-05-17 | End: 2023-05-17

## 2023-05-17 RX ORDER — DEXMEDETOMIDINE HYDROCHLORIDE IN 0.9% SODIUM CHLORIDE 4 UG/ML
0.5 INJECTION INTRAVENOUS
Qty: 200 | Refills: 0 | Status: DISCONTINUED | OUTPATIENT
Start: 2023-05-17 | End: 2023-05-17

## 2023-05-17 RX ORDER — HYDRALAZINE HCL 50 MG
5 TABLET ORAL ONCE
Refills: 0 | Status: COMPLETED | OUTPATIENT
Start: 2023-05-17 | End: 2023-05-17

## 2023-05-17 RX ORDER — ENOXAPARIN SODIUM 100 MG/ML
40 INJECTION SUBCUTANEOUS
Refills: 0 | Status: DISCONTINUED | OUTPATIENT
Start: 2023-05-17 | End: 2023-05-18

## 2023-05-17 RX ORDER — LIDOCAINE HCL 20 MG/ML
40 VIAL (ML) INJECTION ONCE
Refills: 0 | Status: COMPLETED | OUTPATIENT
Start: 2023-05-17 | End: 2023-05-17

## 2023-05-17 RX ORDER — PHENYLEPHRINE HYDROCHLORIDE 10 MG/ML
1 INJECTION INTRAVENOUS
Qty: 40 | Refills: 0 | Status: DISCONTINUED | OUTPATIENT
Start: 2023-05-17 | End: 2023-05-17

## 2023-05-17 RX ORDER — TRAMADOL HYDROCHLORIDE 50 MG/1
25 TABLET ORAL EVERY 6 HOURS
Refills: 0 | Status: DISCONTINUED | OUTPATIENT
Start: 2023-05-17 | End: 2023-05-19

## 2023-05-17 RX ORDER — SODIUM CHLORIDE 9 MG/ML
1000 INJECTION INTRAMUSCULAR; INTRAVENOUS; SUBCUTANEOUS ONCE
Refills: 0 | Status: COMPLETED | OUTPATIENT
Start: 2023-05-17 | End: 2023-05-17

## 2023-05-17 RX ORDER — NICARDIPINE HYDROCHLORIDE 30 MG/1
5 CAPSULE, EXTENDED RELEASE ORAL
Qty: 40 | Refills: 0 | Status: DISCONTINUED | OUTPATIENT
Start: 2023-05-17 | End: 2023-05-17

## 2023-05-17 RX ORDER — FENTANYL CITRATE 50 UG/ML
25 INJECTION INTRAVENOUS ONCE
Refills: 0 | Status: DISCONTINUED | OUTPATIENT
Start: 2023-05-17 | End: 2023-05-17

## 2023-05-17 RX ORDER — PHYTONADIONE (VIT K1) 5 MG
5 TABLET ORAL DAILY
Refills: 0 | Status: DISCONTINUED | OUTPATIENT
Start: 2023-05-17 | End: 2023-05-19

## 2023-05-17 RX ADMIN — MIDAZOLAM HYDROCHLORIDE 2 MILLIGRAM(S): 1 INJECTION, SOLUTION INTRAMUSCULAR; INTRAVENOUS at 09:07

## 2023-05-17 RX ADMIN — TRAMADOL HYDROCHLORIDE 25 MILLIGRAM(S): 50 TABLET ORAL at 11:43

## 2023-05-17 RX ADMIN — Medication 5 MILLIGRAM(S): at 01:15

## 2023-05-17 RX ADMIN — Medication 4 MILLIGRAM(S): at 23:09

## 2023-05-17 RX ADMIN — NICARDIPINE HYDROCHLORIDE 25 MG/HR: 30 CAPSULE, EXTENDED RELEASE ORAL at 05:29

## 2023-05-17 RX ADMIN — MIDAZOLAM HYDROCHLORIDE 2 MILLIGRAM(S): 1 INJECTION, SOLUTION INTRAMUSCULAR; INTRAVENOUS at 09:20

## 2023-05-17 RX ADMIN — LEVETIRACETAM 500 MILLIGRAM(S): 250 TABLET, FILM COATED ORAL at 05:29

## 2023-05-17 RX ADMIN — Medication 4 MILLIGRAM(S): at 17:01

## 2023-05-17 RX ADMIN — SODIUM CHLORIDE 1000 MILLILITER(S): 9 INJECTION INTRAMUSCULAR; INTRAVENOUS; SUBCUTANEOUS at 10:15

## 2023-05-17 RX ADMIN — CHLORHEXIDINE GLUCONATE 1 APPLICATION(S): 213 SOLUTION TOPICAL at 21:33

## 2023-05-17 RX ADMIN — Medication 4 MILLIGRAM(S): at 11:43

## 2023-05-17 RX ADMIN — SENNA PLUS 2 TABLET(S): 8.6 TABLET ORAL at 21:33

## 2023-05-17 RX ADMIN — Medication 10 MILLIGRAM(S): at 05:29

## 2023-05-17 RX ADMIN — Medication 5 MILLIGRAM(S): at 01:30

## 2023-05-17 RX ADMIN — FENTANYL CITRATE 50 MICROGRAM(S): 50 INJECTION INTRAVENOUS at 09:07

## 2023-05-17 RX ADMIN — TAMSULOSIN HYDROCHLORIDE 0.4 MILLIGRAM(S): 0.4 CAPSULE ORAL at 21:33

## 2023-05-17 RX ADMIN — MIDAZOLAM HYDROCHLORIDE 2 MILLIGRAM(S): 1 INJECTION, SOLUTION INTRAMUSCULAR; INTRAVENOUS at 09:30

## 2023-05-17 RX ADMIN — Medication 4 MILLIGRAM(S): at 05:29

## 2023-05-17 RX ADMIN — Medication 100 GRAM(S): at 00:35

## 2023-05-17 RX ADMIN — LEVETIRACETAM 500 MILLIGRAM(S): 250 TABLET, FILM COATED ORAL at 17:01

## 2023-05-17 RX ADMIN — Medication 5 MILLIGRAM(S): at 12:53

## 2023-05-17 RX ADMIN — Medication 10 MILLIGRAM(S): at 02:42

## 2023-05-17 RX ADMIN — Medication 40 MILLILITER(S): at 09:26

## 2023-05-17 RX ADMIN — FENTANYL CITRATE 50 MICROGRAM(S): 50 INJECTION INTRAVENOUS at 09:20

## 2023-05-17 RX ADMIN — FENTANYL CITRATE 25 MICROGRAM(S): 50 INJECTION INTRAVENOUS at 09:23

## 2023-05-17 RX ADMIN — Medication 5 MILLIGRAM(S): at 21:33

## 2023-05-17 RX ADMIN — ENOXAPARIN SODIUM 40 MILLIGRAM(S): 100 INJECTION SUBCUTANEOUS at 17:01

## 2023-05-17 RX ADMIN — POLYETHYLENE GLYCOL 3350 17 GRAM(S): 17 POWDER, FOR SOLUTION ORAL at 12:53

## 2023-05-17 RX ADMIN — TRAMADOL HYDROCHLORIDE 25 MILLIGRAM(S): 50 TABLET ORAL at 12:03

## 2023-05-17 NOTE — PROGRESS NOTE ADULT - ASSESSMENT
ASSESSMENT & PLAN    L ant. temporal lobe w/extension to BG and frontal lobe and associated vasogenic edema and mass effect concern for GBM   POD 2 from crani for mass resection       Neuro: neuro checks q 1 hr  CT head post op stable  MRI brain wwo done, follow up official read  decadron 4q6 for vasogenic edema   5/17 Patient s/p abdominal implantation of IMVAX implants   5/19- removal of implants per neurosurgery   s/p 5 ALA, LFT's elevated, will continue to follow  follow official path   Follow up LFT    Respiratory:   Non rebreather, wean off    CV: SBP goal 100-160 mmhg   On enalapril 10 mg QD, increase/ add another anti hypertensive as needed     Endocrine: sugar 120-180     Heme/Onc:    H/H stable   INR elevated, will give vitamin K x 3  DVT ppx: resume chemoprophylaxis tonight     Renal: IVL  BPH resume flomax      ID:  Afebrile, leukocyte count elevated, likely 2/2 steroids    GI: reg diet  PPI while on decadron, bowel regimen  LBM PTA    Social/Family: updated at bedside   Discharge planning:     Code Status: [x] Full Code [] DNR [] DNI [] Goals of Care:   Disposition: [x] ICU [] Stroke Unit [] RCU []PCU []Floor [] Discharge Home     Patient at high risk for neurologic deterioration, critical care time, excluding procedures: 45 minutes            ASSESSMENT & PLAN    L ant. temporal lobe w/extension to BG and frontal lobe and associated vasogenic edema and mass effect concern for GBM   POD 2 from crani for mass resection       Neuro: neuro checks q 2 hr  CT head post op stable  MRI brain wwo done, follow up official read  decadron 4q6 for vasogenic edema   5/17 Patient s/p abdominal implantation of IMVAX implants   5/19- removal of implants per neurosurgery   s/p 5 ALA, LFT's elevated, will continue to follow  follow official path   Follow up LFT    Respiratory:   Non rebreather, wean off    CV: SBP goal 100-160 mmhg   On enalapril 10 mg QD, increase/ add another anti hypertensive as needed     Endocrine: sugar 120-180     Heme/Onc:    H/H stable   INR elevated, will give vitamin K x 3  DVT ppx: resume chemoprophylaxis tonight     Renal: Na trending down, poss SIADH, check Ur lytes, and Na, IVL   fuid restrict 1L  BPH resume flomax      ID:  Afebrile, leukocyte count elevated, likely 2/2 steroids    GI: reg diet  PPI while on decadron, bowel regimen  LBM PTA add dulcolax    Social/Family: updated at bedside   Discharge planning:     Code Status: [x] Full Code [] DNR [] DNI [] Goals of Care:   Disposition: [x] ICU [] Stroke Unit [] RCU []PCU []Floor [] Discharge Home     Patient at high risk for neurologic deterioration, critical care time, excluding procedures: 45 minutes            ASSESSMENT & PLAN    L ant. temporal lobe w/extension to BG and frontal lobe and associated vasogenic edema and mass effect concern for GBM   POD 2 from crani for mass resection       Neuro: neuro checks q 2 hr  CT head post op stable  MRI brain wwo done, follow up official read  decadron 4q6 for vasogenic edema   5/17 Patient s/p abdominal implantation of IMVAX implants   5/19- removal of implants per neurosurgery   s/p 5 ALA, LFT's elevated, will continue to follow  follow official path   Follow up LFT    Respiratory:   RA    CV: SBP goal 100-160 mmhg   On enalapril 10 mg QD, increase/ add another anti hypertensive as needed     Endocrine: sugar 120-180     Heme/Onc:    H/H stable   INR elevated, will give vitamin K x 3  DVT ppx: resume chemoprophylaxis tonight     Renal: Na trending down, poss SIADH, check Ur lytes, and Na, IVL   fuid restrict 1L  BPH resume flomax      ID:  Afebrile, leukocyte count elevated, likely 2/2 steroids    GI: reg diet  PPI while on decadron, bowel regimen  LBM PTA add dulcolax    Social/Family: updated at bedside   Discharge planning:     Code Status: [x] Full Code [] DNR [] DNI [] Goals of Care:   Disposition: [x] ICU [] Stroke Unit [] RCU []PCU []Floor [] Discharge Home     Patient at high risk for neurologic deterioration, critical care time, excluding procedures: 45 minutes

## 2023-05-17 NOTE — PROCEDURE NOTE - ADDITIONAL PROCEDURE DETAILS
The patient was sedated with conscious sedation utilizing Versed and fentanyl. When appropriately sedated the BLQs were sterilely prepped and draped. The wound was then infiltrated with 0.5% Sensorcaine and reopened to the rectus sheath in each LQ. The rectus sheath was reopened and 10 bioditfusion chambers implanted between the rectus sheath and muscle on each side for a total of 20. Time of final biodiffusion chamber insertion was 09:41.    The wounds were first irrigated with sterile saline and closed. The rectus sheaths were closed with 2-0 Vieryl figure-of-eight sutures; the deep and superficial fascia both with 2-0 Vieryl sutures, and skin was closed with a running 3-0 nylon suture.    The wounds were then dressed with bacitracin ointment and sterile dressing. Counts correct and EBL, none. Patient tolerated the procedure well.

## 2023-05-17 NOTE — PROGRESS NOTE ADULT - SUBJECTIVE AND OBJECTIVE BOX
HPI:  Liu, Reymundo  67M retired , R handed, no pmh, p/w MRI demonstrating 3x3x4.5 cm ring enhancing mass in L ant. temporal lobe w/extension to BG and frontal lobe and associated vasogenic edema c/f GBM. He c/o word finding difficulty for past 3 wks. Exam: AO3, PERRL, EOMI, BROWN 5/5, sensation intact, difficulty with repetition, 1/3 w/ naming objects.    SURGERY: Craniotomy for tumor with stereotactic guidance    Abdominoplasty      EVENTS:   POD2 from crani for tumor resection, APHASIA still present. Otherwise exam stable. Patient underwent IMVAX implant this AM at bedside.     VITALS/MEDS/LABS/ORDERS: Reviewed    PHYSICAL EXAM:    General: No Acute Distress     Neurological: Awake, follows sample commands, left irregular pupil but reactive, intact EOM, Oriented to self, not to year or hospital. Follows simple central commands. Mixed aphasia, receptive worse than expressive, worse than previously noted. Moves all 4 extremities with RUE drift.     Pulmonary: Clear to Auscultation, No Rales, No Rhonchi, No Wheezes     Cardiovascular: S1, S2, Regular Rate and Rhythm     Gastrointestinal: Soft, Nontender, Nondistended     Extremities: No calf tenderness        DEVICES: [] Restraints [] HUDSON/HMV [] LD [] ET tube [] Trach [] Chest Tube [] A-line [] Hollis [] NGT [] Rectal Tube

## 2023-05-17 NOTE — DIETITIAN INITIAL EVALUATION ADULT - NSFNSPHYEXAMSKINFT_GEN_A_CORE
No noted pressure injuries as per documentation. Surgical incisions s/p craniotomy and abdominoplasty.

## 2023-05-17 NOTE — PROVIDER CONTACT NOTE (OTHER) - SITUATION
pt is aphasic and is intermediately  following commands.
Patient under conscious sedation with Lopez Craft and MD bhardwaj at bedside for placement of IMVAX implants. During conscious sedation, end tidal CO2 equipment malfunctioned.

## 2023-05-17 NOTE — DIETITIAN INITIAL EVALUATION ADULT - PERTINENT MEDS FT
MEDICATIONS  (STANDING):  chlorhexidine 4% Liquid 1 Application(s) Topical <User Schedule>  dexAMETHasone  Injectable 4 milliGRAM(s) IV Push every 6 hours  enalapril 10 milliGRAM(s) Oral daily  enoxaparin Injectable 40 milliGRAM(s) SubCutaneous <User Schedule>  levETIRAcetam 500 milliGRAM(s) Oral two times a day  melatonin 5 milliGRAM(s) Oral at bedtime  pantoprazole    Tablet 40 milliGRAM(s) Oral before breakfast  phenylephrine    Infusion 1 MICROgram(s)/kG/Min (34 mL/Hr) IV Continuous <Continuous>  phytonadione   Solution 5 milliGRAM(s) Oral daily  polyethylene glycol 3350 17 Gram(s) Oral daily  senna 2 Tablet(s) Oral at bedtime  sodium chloride 0.9% Bolus 1000 milliLiter(s) IV Bolus once  tamsulosin 0.4 milliGRAM(s) Oral at bedtime    MEDICATIONS  (PRN):  acetaminophen     Tablet .. 650 milliGRAM(s) Oral every 6 hours PRN Temp greater or equal to 38C (100.4F), Mild Pain (1 - 3)  ondansetron Injectable 4 milliGRAM(s) IV Push once PRN Nausea and/or Vomiting  ondansetron Injectable 4 milliGRAM(s) IV Push every 6 hours PRN Nausea and/or Vomiting  traMADol 25 milliGRAM(s) Oral every 6 hours PRN Moderate Pain (4 - 6)

## 2023-05-17 NOTE — DIETITIAN INITIAL EVALUATION ADULT - ORAL INTAKE PTA/DIET HISTORY
Per wife at bedside (pt sedated s/p procedure), pt with good intake and appetite PTA, was trying to follow a healthy diet. No noted/reported micronutrient supplementation PTA. NKFA or intolerances reported. Pt normally with no difficulty chewing/swallowing - wife states s/p craniotomy he was having some discomfort swallowing (pt currently NPO).

## 2023-05-17 NOTE — PROGRESS NOTE ADULT - SUBJECTIVE AND OBJECTIVE BOX
Patient seen and examined at bedside.  Postop CTH stable.    --Anticoagulation--    T(C): 36.8 (05-17-23 @ 03:00), Max: 37.1 (05-16-23 @ 19:00)  HR: 88 (05-17-23 @ 03:15) (59 - 88)  BP: 155/60 (05-17-23 @ 03:00) (155/60 - 169/55)  RR: 15 (05-17-23 @ 03:15) (12 - 19)  SpO2: 97% (05-17-23 @ 03:15) (92% - 97%)  Wt(kg): --    Exam: aox1 with choice, mixed aphasia, intermittently FC, moves all extremities AG, LUE > RUE    .  LABS:                         13.4   17.85 )-----------( 185      ( 16 May 2023 22:36 )             39.0     05-16    137  |  102  |  16  ----------------------------<  139<H>  4.1   |  22  |  0.74    Ca    9.0      16 May 2023 22:36  Phos  2.8     05-16  Mg     1.9     05-16    TPro  x   /  Alb  x   /  TBili  x   /  DBili  0.1  /  AST  x   /  ALT  x   /  AlkPhos  x   05-16    PT/INR - ( 16 May 2023 15:42 )   PT: 16.4 sec;   INR: 1.42 ratio         PTT - ( 16 May 2023 15:42 )  PTT:22.6 sec          RADIOLOGY, EKG & ADDITIONAL TESTS: Reviewed.

## 2023-05-17 NOTE — PROVIDER CONTACT NOTE (OTHER) - ASSESSMENT
pt is arousable to voice. RASS -1. nods correctly to name with choices. when asked questions, pt continues to say " I am doing fine" when no appropriate. L pupil 4mm irregular and briskly reactive and R 3mm round and briskly reactive. pt has mild facial. LUE is no drift, RUE has drift present. b/l LE equal in strength. pt can show thumbs up and two fingers on LUE. bp is maintained on cardene
See conscious sedation flow sheet for full vital signs, ermias sore, and oxygen administration. Equipment utilized for end tidal CO2 included non-rebreather with ETCO2 monitoring and telemetry module. During procedure module stopped working, unable to record ETCO2

## 2023-05-17 NOTE — PROGRESS NOTE ADULT - SUBJECTIVE AND OBJECTIVE BOX
HPI:  Liu, Reymundo  67M retired , R handed, no pmh, p/w MRI demonstrating 3x3x4.5 cm ring enhancing mass in L ant. temporal lobe w/extension to BG and frontal lobe and associated vasogenic edema c/f GBM. He c/o word finding difficulty for past 3 wks. Exam: AO3, PERRL, EOMI, BROWN 5/5, sensation intact, difficulty with repetition, 1/3 w/ naming objects.      (09 May 2023 16:58)    SURGERY: Craniotomy for tumor with stereotactic guidance    Abdominoplasty      EVENTS:   POD2 from crani for tumor resection, APHASIA still present. Otherwise exam stable.                 PHYSICAL EXAM:    General: No Acute Distress     Neurological: Awake, follows sample commands, left irregular pupil but reactive, intact EOM, Oriented to self, not to year or hospital. Follows simple central commands. Mixed aphasia, receptive worse than expressive, worse than previously noted. Moves all 4 extremities with RUE drift.     Pulmonary: Clear to Auscultation, No Rales, No Rhonchi, No Wheezes     Cardiovascular: S1, S2, Regular Rate and Rhythm     Gastrointestinal: Soft, Nontender, Nondistended     Extremities: No calf tenderness        DEVICES: [] Restraints [] HUDSON/HMV [] LD [] ET tube [] Trach [] Chest Tube [] A-line [] Hollis [] NGT [] Rectal Tube              HPI:  Liu, Reymundo  67M retired , R handed, no pmh, p/w MRI demonstrating 3x3x4.5 cm ring enhancing mass in L ant. temporal lobe w/extension to BG and frontal lobe and associated vasogenic edema c/f GBM. He c/o word finding difficulty for past 3 wks. Exam: AO3, PERRL, EOMI, BROWN 5/5, sensation intact, difficulty with repetition, 1/3 w/ naming objects.    SURGERY: Craniotomy for tumor with stereotactic guidance    Abdominoplasty      EVENTS:   POD2 from crani for tumor resection, APHASIA still present. Otherwise exam stable. Patient underwent IMVAX implant this AM at bedside. Recieved 6 mg of Versed and 125 mcg of fentanyl at bedside for conscious sedation.                 PHYSICAL EXAM:    General: No Acute Distress     Neurological: Awake, follows sample commands, left irregular pupil but reactive, intact EOM, Oriented to self, not to year or hospital. Follows simple central commands. Mixed aphasia, receptive worse than expressive, worse than previously noted. Moves all 4 extremities with RUE drift.     Pulmonary: Clear to Auscultation, No Rales, No Rhonchi, No Wheezes     Cardiovascular: S1, S2, Regular Rate and Rhythm     Gastrointestinal: Soft, Nontender, Nondistended     Extremities: No calf tenderness        DEVICES: [] Restraints [] HUDSON/HMV [] LD [] ET tube [] Trach [] Chest Tube [] A-line [] Hollis [] NGT [] Rectal Tube

## 2023-05-17 NOTE — DIETITIAN INITIAL EVALUATION ADULT - OTHER INFO
-- Phenylephrine for hemodynamic support.  -- Pt receiving Decadron, may contribute to elevated BG levels. No HbA1c available.    Weight Hx:  -- Per wife, pt's UBW 204lbs. Dosing wt 200lbs, will continue to monitor/trend.

## 2023-05-17 NOTE — PROVIDER CONTACT NOTE (OTHER) - ACTION/TREATMENT ORDERED:
Unable to record ETCO2 as to exchange module vital signs would not be present. Therefore, MD Chatman at bedside monitoring patient respiratory status, patient maintained non-rebreather for procedure.
ct scan

## 2023-05-17 NOTE — DIETITIAN INITIAL EVALUATION ADULT - REASON INDICATOR FOR ASSESSMENT
Assessment warranted for length of stay.  Information obtained from RN, pt's wife at bedside, medical record.

## 2023-05-17 NOTE — DIETITIAN INITIAL EVALUATION ADULT - CONTINUE CURRENT NUTRITION CARE PLAN
Diet advancement per discretion of team; diet textures/consistencies per team. Monitor need for consistent carbohydrate restriction while on Decadron.

## 2023-05-17 NOTE — DIETITIAN INITIAL EVALUATION ADULT - PERTINENT LABORATORY DATA
05-17    137  |  101  |  20  ----------------------------<  150<H>  3.9   |  23  |  0.83    Ca    8.9      17 May 2023 08:30  Phos  2.6     05-17  Mg     2.1     05-17    TPro  6.0  /  Alb  3.5  /  TBili  0.7  /  DBili  0.2  /  AST  197<H>  /  ALT  601<H>  /  AlkPhos  79  05-17

## 2023-05-17 NOTE — PROGRESS NOTE ADULT - ASSESSMENT
ASSESSMENT & PLAN    L ant. temporal lobe w/extension to BG and frontal lobe and associated vasogenic edema and mass effect concern for GBM   POD 2 from crani for mass resection       Neuro: neuro checks q 1 hr  CT head post op stable  MRI brain wwo done, follow up   decadron 4q6 for vasogenic edema   Patient to undergo new trial treatment today per neurosurgery  s/p 5 ALA, LFT's elevated, will continue to follow  follow official path   Follow up LFT    Respiratory:  RA     CV: SBP goal 100-150 mmhg   On enalapril 10 mg QD     Endocrine: sugar 120-180     Heme/Onc:    H/H stable   DVT ppx: resume chemoprophylaxis per neurosurgery recommendations     Renal: NS 75 ml/hr  BPH resume flomax      ID:  Afebrile, leukocyte count elevated, likely 2/2 steroids    GI: Regular diet  PPI while on decadron, bowel regimen  LBM not yet    Social/Family: updated at bedside   Discharge planning:     Code Status: [x] Full Code [] DNR [] DNI [] Goals of Care:   Disposition: [x] ICU [] Stroke Unit [] RCU []PCU []Floor [] Discharge Home     Patient at high risk for neurologic deterioration, critical care time, excluding procedures: 45 minutes            ASSESSMENT & PLAN    L ant. temporal lobe w/extension to BG and frontal lobe and associated vasogenic edema and mass effect concern for GBM   POD 3 from crani for mass resection       Neuro: neuro checks q 1 hr  CT head post op stable  MRI brain wwo done, follow up official read  decadron 4q6 for vasogenic edema   5/17 Patient s/p abdominal implantation of IMVAX implants   5/19- removal of implants per neurosurgery   s/p 5 ALA, LFT's elevated, will continue to follow  follow official path   Follow up LFT    Respiratory:   Non rebreather, wean off    CV: SBP goal 100-150 mmhg   On enalapril 10 mg QD, add another anti hypertensive as needed     Endocrine: sugar 120-180     Heme/Onc:    H/H stable   INR elevated, will give vitamin K x 5   DVT ppx: resume chemoprophylaxis tonight     Renal: NS 75 ml/hr  BPH resume flomax      ID:  Afebrile, leukocyte count elevated, likely 2/2 steroids    GI: NPO restart as tolerated   PPI while on decadron, bowel regimen  LBM not yet    Social/Family: updated at bedside   Discharge planning:     Code Status: [x] Full Code [] DNR [] DNI [] Goals of Care:   Disposition: [x] ICU [] Stroke Unit [] RCU []PCU []Floor [] Discharge Home     Patient at high risk for neurologic deterioration, critical care time, excluding procedures: 45 minutes

## 2023-05-17 NOTE — DIETITIAN INITIAL EVALUATION ADULT - REASON FOR ADMISSION
Pt is a 67M retired , R handed, no pmh, p/w MRI demonstrating 3x3x4.5 cm ring enhancing mass in L ant. temporal lobe w/extension to BG and frontal lobe and associated vasogenic edema c/f GBM. S/p Craniotomy for tumor and IMVAX implant this AM at bedside.

## 2023-05-17 NOTE — PROVIDER CONTACT NOTE (OTHER) - BACKGROUND
pt adm to [facility] s/p left craniectomy for tumor resection. pt presented to hospital with word finding difficulty as per MD Marroquin
S/p Crani on 5/15 for GBM resection with gleolan. IMVAX implant today.

## 2023-05-18 ENCOUNTER — TRANSCRIPTION ENCOUNTER (OUTPATIENT)
Age: 68
End: 2023-05-18

## 2023-05-18 LAB
ANION GAP SERPL CALC-SCNC: 11 MMOL/L — SIGNIFICANT CHANGE UP (ref 5–17)
APPEARANCE UR: CLEAR — SIGNIFICANT CHANGE UP
BACTERIA # UR AUTO: NEGATIVE — SIGNIFICANT CHANGE UP
BILIRUB UR-MCNC: NEGATIVE — SIGNIFICANT CHANGE UP
BUN SERPL-MCNC: 20 MG/DL — SIGNIFICANT CHANGE UP (ref 7–23)
CALCIUM SERPL-MCNC: 8.7 MG/DL — SIGNIFICANT CHANGE UP (ref 8.4–10.5)
CHLORIDE SERPL-SCNC: 103 MMOL/L — SIGNIFICANT CHANGE UP (ref 96–108)
CO2 SERPL-SCNC: 22 MMOL/L — SIGNIFICANT CHANGE UP (ref 22–31)
COLOR SPEC: YELLOW — SIGNIFICANT CHANGE UP
CREAT SERPL-MCNC: 0.75 MG/DL — SIGNIFICANT CHANGE UP (ref 0.5–1.3)
DIFF PNL FLD: NEGATIVE — SIGNIFICANT CHANGE UP
EGFR: 100 ML/MIN/1.73M2 — SIGNIFICANT CHANGE UP
EPI CELLS # UR: 0 /HPF — SIGNIFICANT CHANGE UP
GLUCOSE SERPL-MCNC: 139 MG/DL — HIGH (ref 70–99)
GLUCOSE UR QL: NEGATIVE — SIGNIFICANT CHANGE UP
KETONES UR-MCNC: NEGATIVE — SIGNIFICANT CHANGE UP
LEUKOCYTE ESTERASE UR-ACNC: NEGATIVE — SIGNIFICANT CHANGE UP
NITRITE UR-MCNC: NEGATIVE — SIGNIFICANT CHANGE UP
OSMOLALITY UR: 897 MOS/KG — SIGNIFICANT CHANGE UP (ref 300–900)
PH UR: 6.5 — SIGNIFICANT CHANGE UP (ref 5–8)
POTASSIUM SERPL-MCNC: 4.4 MMOL/L — SIGNIFICANT CHANGE UP (ref 3.5–5.3)
POTASSIUM SERPL-SCNC: 4.4 MMOL/L — SIGNIFICANT CHANGE UP (ref 3.5–5.3)
PROT UR-MCNC: ABNORMAL
RBC CASTS # UR COMP ASSIST: 3 /HPF — SIGNIFICANT CHANGE UP (ref 0–4)
SODIUM SERPL-SCNC: 136 MMOL/L — SIGNIFICANT CHANGE UP (ref 135–145)
SODIUM UR-SCNC: 131 MMOL/L — SIGNIFICANT CHANGE UP
SP GR SPEC: 1.03 — HIGH (ref 1.01–1.02)
UROBILINOGEN FLD QL: NEGATIVE — SIGNIFICANT CHANGE UP
WBC UR QL: 0 /HPF — SIGNIFICANT CHANGE UP (ref 0–5)

## 2023-05-18 PROCEDURE — 99232 SBSQ HOSP IP/OBS MODERATE 35: CPT

## 2023-05-18 RX ADMIN — Medication 5 MILLIGRAM(S): at 21:31

## 2023-05-18 RX ADMIN — Medication 4 MILLIGRAM(S): at 11:56

## 2023-05-18 RX ADMIN — TAMSULOSIN HYDROCHLORIDE 0.4 MILLIGRAM(S): 0.4 CAPSULE ORAL at 21:31

## 2023-05-18 RX ADMIN — PANTOPRAZOLE SODIUM 40 MILLIGRAM(S): 20 TABLET, DELAYED RELEASE ORAL at 07:48

## 2023-05-18 RX ADMIN — SENNA PLUS 2 TABLET(S): 8.6 TABLET ORAL at 21:31

## 2023-05-18 RX ADMIN — Medication 10 MILLIGRAM(S): at 05:01

## 2023-05-18 RX ADMIN — CHLORHEXIDINE GLUCONATE 1 APPLICATION(S): 213 SOLUTION TOPICAL at 21:31

## 2023-05-18 RX ADMIN — LEVETIRACETAM 500 MILLIGRAM(S): 250 TABLET, FILM COATED ORAL at 17:08

## 2023-05-18 RX ADMIN — Medication 4 MILLIGRAM(S): at 17:08

## 2023-05-18 RX ADMIN — Medication 5 MILLIGRAM(S): at 11:56

## 2023-05-18 RX ADMIN — Medication 4 MILLIGRAM(S): at 05:01

## 2023-05-18 RX ADMIN — LEVETIRACETAM 500 MILLIGRAM(S): 250 TABLET, FILM COATED ORAL at 05:01

## 2023-05-18 RX ADMIN — POLYETHYLENE GLYCOL 3350 17 GRAM(S): 17 POWDER, FOR SOLUTION ORAL at 11:55

## 2023-05-18 NOTE — PHARMACOTHERAPY INTERVENTION NOTE - COMMENTS
Reviewed appropriate route of medication administration 
Reviewed appropriate routes of medication administration     MEDICATIONS  (STANDING):  chlorhexidine 4% Liquid 1 Application(s) Topical <User Schedule>  dexAMETHasone  Injectable 4 milliGRAM(s) IV Push every 6 hours  levETIRAcetam 500 milliGRAM(s) Oral two times a day  melatonin 5 milliGRAM(s) Oral at bedtime  pantoprazole    Tablet 40 milliGRAM(s) Oral before breakfast  polyethylene glycol 3350 17 Gram(s) Oral daily  senna 2 Tablet(s) Oral at bedtime  sodium chloride 0.9%. 1000 milliLiter(s) (75 mL/Hr) IV Continuous <Continuous>  tamsulosin 0.4 milliGRAM(s) Oral at bedtime    MEDICATIONS  (PRN):  acetaminophen     Tablet .. 650 milliGRAM(s) Oral every 6 hours PRN Temp greater or equal to 38C (100.4F), Mild Pain (1 - 3)  ondansetron Injectable 4 milliGRAM(s) IV Push once PRN Nausea and/or Vomiting  ondansetron Injectable 4 milliGRAM(s) IV Push every 6 hours PRN Nausea and/or Vomiting  
Review appropriate route of medication administration   MEDICATIONS  (STANDING):  bisacodyl 5 milliGRAM(s) Oral at bedtime  chlorhexidine 4% Liquid 1 Application(s) Topical <User Schedule>  dexAMETHasone  Injectable 4 milliGRAM(s) IV Push every 6 hours  enalapril 10 milliGRAM(s) Oral daily  levETIRAcetam 500 milliGRAM(s) Oral two times a day  melatonin 5 milliGRAM(s) Oral at bedtime  pantoprazole    Tablet 40 milliGRAM(s) Oral before breakfast  phytonadione   Solution 5 milliGRAM(s) Oral daily  polyethylene glycol 3350 17 Gram(s) Oral daily  senna 2 Tablet(s) Oral at bedtime  tamsulosin 0.4 milliGRAM(s) Oral at bedtime    MEDICATIONS  (PRN):  ondansetron Injectable 4 milliGRAM(s) IV Push once PRN Nausea and/or Vomiting  ondansetron Injectable 4 milliGRAM(s) IV Push every 6 hours PRN Nausea and/or Vomiting  traMADol 25 milliGRAM(s) Oral every 6 hours PRN Moderate Pain (4 - 6)

## 2023-05-18 NOTE — PROGRESS NOTE ADULT - SUBJECTIVE AND OBJECTIVE BOX
Mr. Liu is POD 3 after partial resection of left frontal mass - enrolled in IMVAX study. He reports that he is feeling better this morning and that his speech has improved. He denies any pain.'    On exam he is awake and alert - he is able to name multiple high frequency objects, follow simple and more complex commands across the midline.    /69, P 65, T 98.6F    He knows that he is in the hospital - oriented to May 16 (close) had trouble with the year.  EOMI, VFF  Trace right NLF flattening  No drift  FFM equal  Strength full in all 4 limbs.    All neuroimaging reviewed.

## 2023-05-18 NOTE — PROGRESS NOTE ADULT - SUBJECTIVE AND OBJECTIVE BOX
Patient seen and examined at bedside.    --Anticoagulation--  enoxaparin Injectable 40 milliGRAM(s) SubCutaneous <User Schedule>    T(C): 36.3 (05-18-23 @ 03:00), Max: 36.8 (05-17-23 @ 15:00)  HR: 63 (05-18-23 @ 03:00) (55 - 110)  BP: 132/63 (05-18-23 @ 03:00) (93/51 - 152/60)  RR: 16 (05-18-23 @ 03:00) (6 - 20)  SpO2: 96% (05-18-23 @ 03:00) (94% - 100%)  Wt(kg): --    Exam: AOx3 (name and year w/ choices), Pupils R (left irregular) EOMI, mixed aphasia receptive> expressive, FC, BROWN 5/5 L>R    .  LABS:                         13.6   13.81 )-----------( 202      ( 17 May 2023 17:33 )             39.0     05-17    132<L>  |  99  |  21  ----------------------------<  141<H>  4.1   |  23  |  0.88    Ca    8.7      17 May 2023 23:23  Phos  3.0     05-17  Mg     2.0     05-17    TPro  6.2  /  Alb  3.4  /  TBili  0.5  /  DBili  0.1  /  AST  134<H>  /  ALT  529<H>  /  AlkPhos  86  05-17    PT/INR - ( 17 May 2023 23:23 )   PT: 15.6 sec;   INR: 1.34 ratio         PTT - ( 17 May 2023 23:23 )  PTT:25.7 sec          RADIOLOGY, EKG & ADDITIONAL TESTS: Reviewed.

## 2023-05-18 NOTE — PROGRESS NOTE ADULT - ASSESSMENT
In summary, Mr. Liu is improving post tumor resection - enrolled in IMVAX study - plan is to add RT/ Temodar after discharge and complete pathologic review.

## 2023-05-18 NOTE — PROGRESS NOTE ADULT - ASSESSMENT
ASSESSMENT & PLAN    L ant. temporal lobe w/extension to BG and frontal lobe and associated vasogenic edema and mass effect concern for GBM   POD 3 from crani for mass resection     Neuro: neuro checks q 4hrs  MRI brain wwo- nodular enhancement with surrounding vasogenic edema, otherwise stable post op changes  decadron 4q6 for vasogenic edema   Continue Keppra 500 mg BID   5/17 Patient s/p abdominal implantation of IMVAX implants   5/19- removal of implants per neurosurgery   s/p 5 ALA, LFT's trending down  follow official path   Follow up LFT    Respiratory:   RA    CV: SBP goal 100-160 mmhg   On enalapril 10 mg QD, increase/ add another anti hypertensive as needed     Endocrine: sugar 120-180     Heme/Onc:    H/H stable   INR trending down, vitamin K x 3, received 1 dose 5/17  DVT ppx: SQL    Renal: Na stable  IVL   BPH resume flomax      ID:  Afebrile, leukocyte count elevated, likely 2/2 steroids    GI: reg diet, NPO post midnight   PPI while on decadron, bowel regimen  LBM PTA add dulcolax    Social/Family: updated at bedside   Discharge planning:     Code Status: [x] Full Code [] DNR [] DNI [] Goals of Care:   Disposition: [] ICU [] Stroke Unit [] RCU []PCU [x] Floor [] Discharge Home

## 2023-05-18 NOTE — PROGRESS NOTE ADULT - ASSESSMENT
ASSESSMENT & PLAN    L ant. temporal lobe w/extension to BG and frontal lobe and associated vasogenic edema and mass effect concern for GBM   POD 3 from crani for mass resection       Neuro: neuro checks q 2 hrs  MRI brain wwo- nodular enhancement with surrounding vasogenic edema, otherwise stable post op changes  decadron 4q6 for vasogenic edema   5/17 Patient s/p abdominal implantation of IMVAX implants   5/19- removal of implants per neurosurgery   s/p 5 ALA, LFT's trending down  follow official path   Follow up LFT    Respiratory:   RA    CV: SBP goal 100-160 mmhg   On enalapril 10 mg QD, increase/ add another anti hypertensive as needed     Endocrine: sugar 120-180     Heme/Onc:    H/H stable   INR trending down, vitamin K x 3, received 1 dose 5/17  DVT ppx: SQL    Renal: Na stable  IVL   BPH resume flomax      ID:  Afebrile, leukocyte count elevated, likely 2/2 steroids    GI: reg diet  PPI while on decadron, bowel regimen  LBM PTA add dulcolax    Social/Family: updated at bedside   Discharge planning:     Code Status: [x] Full Code [] DNR [] DNI [] Goals of Care:   Disposition: [] ICU [] Stroke Unit [] RCU []PCU [x] Floor [] Discharge Home     Patient at high risk for neurologic deterioration, critical care time, excluding procedures: 45 minutes            ASSESSMENT & PLAN    L ant. temporal lobe w/extension to BG and frontal lobe and associated vasogenic edema and mass effect concern for GBM   POD 3 from crani for mass resection       Neuro: neuro checks q 4hrs  MRI brain wwo- nodular enhancement with surrounding vasogenic edema, otherwise stable post op changes  decadron 4q6 for vasogenic edema   Continue Keppra 500 mg BID   5/17 Patient s/p abdominal implantation of IMVAX implants   5/19- removal of implants per neurosurgery   s/p 5 ALA, LFT's trending down  follow official path   Follow up LFT    Respiratory:   RA    CV: SBP goal 100-160 mmhg   On enalapril 10 mg QD, increase/ add another anti hypertensive as needed     Endocrine: sugar 120-180     Heme/Onc:    H/H stable   INR trending down, vitamin K x 3, received 1 dose 5/17  DVT ppx: SQL    Renal: Na stable  IVL   BPH resume flomax      ID:  Afebrile, leukocyte count elevated, likely 2/2 steroids    GI: reg diet, NPO post midnight   PPI while on decadron, bowel regimen  LBM PTA add dulcolax    Social/Family: updated at bedside   Discharge planning:     Code Status: [x] Full Code [] DNR [] DNI [] Goals of Care:   Disposition: [] ICU [] Stroke Unit [] RCU []PCU [x] Floor [] Discharge Home     Patient at high risk for neurologic deterioration, critical care time, excluding procedures: 45 minutes            ASSESSMENT & PLAN    L ant. temporal lobe w/extension to BG and frontal lobe and associated vasogenic edema and mass effect concern for GBM   POD 3 from crani for mass resection     Neuro: neuro checks q 4hrs  MRI brain wwo- nodular enhancement with surrounding vasogenic edema, otherwise stable post op changes  decadron 4q6 for vasogenic edema   Continue Keppra 500 mg BID   5/17 Patient s/p abdominal implantation of IMVAX implants   5/19- removal of implants per neurosurgery   s/p 5 ALA, LFT's trending down  follow official path   Follow up LFT    Respiratory:   RA    CV: SBP goal 100-160 mmhg   On enalapril 10 mg QD, increase/ add another anti hypertensive as needed     Endocrine: sugar 120-180     Heme/Onc:    H/H stable   INR trending down, vitamin K x 3, received 1 dose 5/17  DVT ppx: SQL    Renal: Na stable  IVL   BPH resume flomax      ID:  Afebrile, leukocyte count elevated, likely 2/2 steroids    GI: reg diet, NPO post midnight   PPI while on decadron, bowel regimen  LBM PTA add dulcolax    Social/Family: updated at bedside   Discharge planning:     Code Status: [x] Full Code [] DNR [] DNI [] Goals of Care:   Disposition: [] ICU [] Stroke Unit [] RCU []PCU [x] Floor [] Discharge Home

## 2023-05-18 NOTE — PROGRESS NOTE ADULT - ASSESSMENT
5/19 IMVAX removal procedure in AM  Started on VitK 5 for 3 days  Path pending  Trending LFT's  1L fluid restriction, monitor BMP (last Na 132)

## 2023-05-18 NOTE — PROGRESS NOTE ADULT - SUBJECTIVE AND OBJECTIVE BOX
HPI:  Reymundo Liu  67M retired , R handed, no pmh, p/w MRI demonstrating 3x3x4.5 cm ring enhancing mass in L ant. temporal lobe w/extension to BG and frontal lobe and associated vasogenic edema c/f GBM. He c/o word finding difficulty for past 3 wks. Exam: AO3, PERRL, EOMI, BROWN 5/5, sensation intact, difficulty with repetition, 1/3 w/ naming objects.    SURGERY: Craniotomy for tumor with stereotactic guidance    Abdominoplasty    REVIEW OF SYSTEMS: [] Unable to Assess due to neurologic exam   [x ] All ROS addressed below are non-contributory, except:  Neuro: [x ] Headache [ ] Back pain [ ] Numbness [ ] Weakness [ ] Ataxia [ ] Dizziness [x ] Aphasia [ ] Dysarthria [ ] Visual disturbance  Resp: [ ] Shortness of breath/dyspnea [ ] Orthopnea [ ] Cough  CV: [ ] Chest pain [ ] Palpitation [ ] Lightheadedness [ ] Syncope  Renal: [ ] Thirst [ ] Edema  GI: [ ] Nausea [ ] Emesis [ ] Abdominal pain [ ] Constipation [ ] Diarrhea  Hem: [ ] Hematemesis [ ] bBright red blood per rectum  ID: [ ] Fever [ ] Chills [ ] Dysuria  ENT: [ ] Rhinorrhea    EVENTS:   POD3 from crani for tumor resection, APHASIA still present. Otherwise exam stable. Patient underwent IMVAX implant 5/17, implants to be removed tomorrow (5/19). Overnight improvement in examination. Otherwise no events.    VITALS/MEDS/LABS/ORDERS: Reviewed    PHYSICAL EXAM:    General: No Acute Distress     Neurological: Awake, follows commands, left irregular pupil but reactive, intact EOM, Oriented x3. Aphasia significantly improved, comprehension intact, some expressive aphasia. Mild left facial asymmetry. Moving all 4 extremities 5/5.     Pulmonary: Clear to Auscultation, No Rales, No Rhonchi, No Wheezes     Cardiovascular: S1, S2, Regular Rate and Rhythm     Gastrointestinal: Soft, Nontender, Nondistended     Extremities: No calf tenderness        DEVICES: [] Restraints [] HUDSON/HMV [] LD [] ET tube [] Trach [] Chest Tube [] A-line [] Hollis [] NGT [] Rectal Tube

## 2023-05-18 NOTE — PROGRESS NOTE ADULT - SUBJECTIVE AND OBJECTIVE BOX
HPI:  Reymundo Liu  67M retired , R handed, no pmh, p/w MRI demonstrating 3x3x4.5 cm ring enhancing mass in L ant. temporal lobe w/extension to BG and frontal lobe and associated vasogenic edema c/f GBM. He c/o word finding difficulty for past 3 wks. Exam: AO3, PERRL, EOMI, BROWN 5/5, sensation intact, difficulty with repetition, 1/3 w/ naming objects.    SURGERY: Craniotomy for tumor with stereotactic guidance    Abdominoplasty      EVENTS:   POD3 from crani for tumor resection, APHASIA still present. Otherwise exam stable. Patient underwent IMVAX implant 5/17, implants to be removed tomorrow (5/19). Overnight improvement in examination. Otherwise no events.    VITALS/MEDS/LABS/ORDERS: Reviewed    PHYSICAL EXAM:    General: No Acute Distress     Neurological: Awake, follows commands, left irregular pupil but reactive, intact EOM, Oriented x3. No aphasia noted. Mild left facial asymmetry. Moving all 4 extremities 5/5.     Pulmonary: Clear to Auscultation, No Rales, No Rhonchi, No Wheezes     Cardiovascular: S1, S2, Regular Rate and Rhythm     Gastrointestinal: Soft, Nontender, Nondistended     Extremities: No calf tenderness        DEVICES: [] Restraints [] HUDSON/HMV [] LD [] ET tube [] Trach [] Chest Tube [] A-line [] Hollis [] NGT [] Rectal Tube              HPI:  Reymundo Liu  67M retired , R handed, no pmh, p/w MRI demonstrating 3x3x4.5 cm ring enhancing mass in L ant. temporal lobe w/extension to BG and frontal lobe and associated vasogenic edema c/f GBM. He c/o word finding difficulty for past 3 wks. Exam: AO3, PERRL, EOMI, BROWN 5/5, sensation intact, difficulty with repetition, 1/3 w/ naming objects.    SURGERY: Craniotomy for tumor with stereotactic guidance    Abdominoplasty      EVENTS:   POD3 from crani for tumor resection, APHASIA still present. Otherwise exam stable. Patient underwent IMVAX implant 5/17, implants to be removed tomorrow (5/19). Overnight improvement in examination. Otherwise no events.    VITALS/MEDS/LABS/ORDERS: Reviewed    PHYSICAL EXAM:    General: No Acute Distress     Neurological: Awake, follows commands, left irregular pupil but reactive, intact EOM, Oriented x3 (sometimes gets the year wrong). Aphasia significantly improved, comprehension intact, some expressive aphasia. Mild left facial asymmetry. Moving all 4 extremities 5/5.     Pulmonary: Clear to Auscultation, No Rales, No Rhonchi, No Wheezes     Cardiovascular: S1, S2, Regular Rate and Rhythm     Gastrointestinal: Soft, Nontender, Nondistended     Extremities: No calf tenderness        DEVICES: [] Restraints [] HUDSON/HMV [] LD [] ET tube [] Trach [] Chest Tube [] A-line [] Hollis [] NGT [] Rectal Tube              HPI:  Reymundo Liu  67M retired , R handed, no pmh, p/w MRI demonstrating 3x3x4.5 cm ring enhancing mass in L ant. temporal lobe w/extension to BG and frontal lobe and associated vasogenic edema c/f GBM. He c/o word finding difficulty for past 3 wks. Exam: AO3, PERRL, EOMI, BROWN 5/5, sensation intact, difficulty with repetition, 1/3 w/ naming objects.    SURGERY: Craniotomy for tumor with stereotactic guidance    Abdominoplasty    REVIEW OF SYSTEMS: [] Unable to Assess due to neurologic exam   [x ] All ROS addressed below are non-contributory, except:  Neuro: [x ] Headache [ ] Back pain [ ] Numbness [ ] Weakness [ ] Ataxia [ ] Dizziness [x ] Aphasia [ ] Dysarthria [ ] Visual disturbance  Resp: [ ] Shortness of breath/dyspnea [ ] Orthopnea [ ] Cough  CV: [ ] Chest pain [ ] Palpitation [ ] Lightheadedness [ ] Syncope  Renal: [ ] Thirst [ ] Edema  GI: [ ] Nausea [ ] Emesis [ ] Abdominal pain [ ] Constipation [ ] Diarrhea  Hem: [ ] Hematemesis [ ] bBright red blood per rectum  ID: [ ] Fever [ ] Chills [ ] Dysuria  ENT: [ ] Rhinorrhea    EVENTS:   POD3 from crani for tumor resection, APHASIA still present. Otherwise exam stable. Patient underwent IMVAX implant 5/17, implants to be removed tomorrow (5/19). Overnight improvement in examination. Otherwise no events.    VITALS/MEDS/LABS/ORDERS: Reviewed    PHYSICAL EXAM:    General: No Acute Distress     Neurological: Awake, follows commands, left irregular pupil but reactive, intact EOM, Oriented x3 (sometimes gets the year wrong). Aphasia significantly improved, comprehension intact, some expressive aphasia. Mild left facial asymmetry. Moving all 4 extremities 5/5.     Pulmonary: Clear to Auscultation, No Rales, No Rhonchi, No Wheezes     Cardiovascular: S1, S2, Regular Rate and Rhythm     Gastrointestinal: Soft, Nontender, Nondistended     Extremities: No calf tenderness        DEVICES: [] Restraints [] HUDSON/HMV [] LD [] ET tube [] Trach [] Chest Tube [] A-line [] Hollis [] NGT [] Rectal Tube

## 2023-05-18 NOTE — PROGRESS NOTE ADULT - TIME BILLING
examination of patient, discussion, review of neuroimaging.
review of imaging, complex medical issue and discussion with other physicians including neuroradiology and neurosurgery.
inpatient post op are as above
- Ordering, reviewing, and interpreting labs, testing, and imaging.  - Independently obtaining a review of systems and performing a physical exam  - Reviewing consultant documentation/recommendations in addition to discussing plan of care with consultants.  - Counselling and educating patient and family regarding interpretation of aforementioned items and plan of care.

## 2023-05-18 NOTE — PROGRESS NOTE ADULT - ATTENDING COMMENTS
not critically ill post op care as above  stable for transfer to floor
Agree with above.
Agree with above.

## 2023-05-19 ENCOUNTER — APPOINTMENT (OUTPATIENT)
Dept: NEUROSURGERY | Facility: HOSPITAL | Age: 68
End: 2023-05-19

## 2023-05-19 LAB
ALBUMIN SERPL ELPH-MCNC: 3.7 G/DL — SIGNIFICANT CHANGE UP (ref 3.3–5)
ALP SERPL-CCNC: 97 U/L — SIGNIFICANT CHANGE UP (ref 40–120)
ALT FLD-CCNC: 439 U/L — HIGH (ref 10–45)
AMYLASE P1 CFR SERPL: 38 U/L — SIGNIFICANT CHANGE UP (ref 25–125)
ANION GAP SERPL CALC-SCNC: 11 MMOL/L — SIGNIFICANT CHANGE UP (ref 5–17)
APTT BLD: 24.8 SEC — LOW (ref 27.5–35.5)
AST SERPL-CCNC: 71 U/L — HIGH (ref 10–40)
BASOPHILS # BLD AUTO: 0.02 K/UL — SIGNIFICANT CHANGE UP (ref 0–0.2)
BASOPHILS NFR BLD AUTO: 0.2 % — SIGNIFICANT CHANGE UP (ref 0–2)
BILIRUB DIRECT SERPL-MCNC: 0.1 MG/DL — SIGNIFICANT CHANGE UP (ref 0–0.3)
BILIRUB INDIRECT FLD-MCNC: 0.5 MG/DL — SIGNIFICANT CHANGE UP (ref 0.2–1)
BILIRUB SERPL-MCNC: 0.6 MG/DL — SIGNIFICANT CHANGE UP (ref 0.2–1.2)
BILIRUB SERPL-MCNC: 0.6 MG/DL — SIGNIFICANT CHANGE UP (ref 0.2–1.2)
BUN SERPL-MCNC: 20 MG/DL — SIGNIFICANT CHANGE UP (ref 7–23)
CALCIUM SERPL-MCNC: 9.4 MG/DL — SIGNIFICANT CHANGE UP (ref 8.4–10.5)
CHLORIDE SERPL-SCNC: 101 MMOL/L — SIGNIFICANT CHANGE UP (ref 96–108)
CK SERPL-CCNC: 186 U/L — SIGNIFICANT CHANGE UP (ref 30–200)
CO2 SERPL-SCNC: 25 MMOL/L — SIGNIFICANT CHANGE UP (ref 22–31)
CREAT SERPL-MCNC: 0.86 MG/DL — SIGNIFICANT CHANGE UP (ref 0.5–1.3)
EGFR: 96 ML/MIN/1.73M2 — SIGNIFICANT CHANGE UP
EOSINOPHIL # BLD AUTO: 0.02 K/UL — SIGNIFICANT CHANGE UP (ref 0–0.5)
EOSINOPHIL NFR BLD AUTO: 0.2 % — SIGNIFICANT CHANGE UP (ref 0–6)
FIBRINOGEN PPP-MCNC: 766 MG/DL — HIGH (ref 200–445)
GGT SERPL-CCNC: 116 U/L — HIGH (ref 9–50)
GLUCOSE SERPL-MCNC: 115 MG/DL — HIGH (ref 70–99)
HCT VFR BLD CALC: 42.1 % — SIGNIFICANT CHANGE UP (ref 39–50)
HGB BLD-MCNC: 14.4 G/DL — SIGNIFICANT CHANGE UP (ref 13–17)
IMM GRANULOCYTES NFR BLD AUTO: 1.6 % — HIGH (ref 0–0.9)
INR BLD: 1.27 RATIO — HIGH (ref 0.88–1.16)
LDH SERPL L TO P-CCNC: 166 U/L — SIGNIFICANT CHANGE UP (ref 50–242)
LIDOCAIN IGE QN: 24 U/L — SIGNIFICANT CHANGE UP (ref 7–60)
LYMPHOCYTES # BLD AUTO: 1.01 K/UL — SIGNIFICANT CHANGE UP (ref 1–3.3)
LYMPHOCYTES # BLD AUTO: 8.5 % — LOW (ref 13–44)
MAGNESIUM SERPL-MCNC: 2 MG/DL — SIGNIFICANT CHANGE UP (ref 1.6–2.6)
MCHC RBC-ENTMCNC: 30.4 PG — SIGNIFICANT CHANGE UP (ref 27–34)
MCHC RBC-ENTMCNC: 34.2 GM/DL — SIGNIFICANT CHANGE UP (ref 32–36)
MCV RBC AUTO: 88.8 FL — SIGNIFICANT CHANGE UP (ref 80–100)
MONOCYTES # BLD AUTO: 1.62 K/UL — HIGH (ref 0–0.9)
MONOCYTES NFR BLD AUTO: 13.6 % — SIGNIFICANT CHANGE UP (ref 2–14)
NEUTROPHILS # BLD AUTO: 9.05 K/UL — HIGH (ref 1.8–7.4)
NEUTROPHILS NFR BLD AUTO: 75.9 % — SIGNIFICANT CHANGE UP (ref 43–77)
NRBC # BLD: 0 /100 WBCS — SIGNIFICANT CHANGE UP (ref 0–0)
PHOSPHATE SERPL-MCNC: 3.5 MG/DL — SIGNIFICANT CHANGE UP (ref 2.5–4.5)
PLATELET # BLD AUTO: 218 K/UL — SIGNIFICANT CHANGE UP (ref 150–400)
POTASSIUM SERPL-MCNC: 4 MMOL/L — SIGNIFICANT CHANGE UP (ref 3.5–5.3)
POTASSIUM SERPL-SCNC: 4 MMOL/L — SIGNIFICANT CHANGE UP (ref 3.5–5.3)
PROT SERPL-MCNC: 6.8 G/DL — SIGNIFICANT CHANGE UP (ref 6–8.3)
PROTHROM AB SERPL-ACNC: 14.6 SEC — HIGH (ref 10.5–13.4)
RBC # BLD: 4.74 M/UL — SIGNIFICANT CHANGE UP (ref 4.2–5.8)
RBC # FLD: 12.2 % — SIGNIFICANT CHANGE UP (ref 10.3–14.5)
SODIUM SERPL-SCNC: 137 MMOL/L — SIGNIFICANT CHANGE UP (ref 135–145)
SURGICAL PATHOLOGY STUDY: SIGNIFICANT CHANGE UP
URATE SERPL-MCNC: 2.9 MG/DL — LOW (ref 3.4–8.8)
WBC # BLD: 11.91 K/UL — HIGH (ref 3.8–10.5)
WBC # FLD AUTO: 11.91 K/UL — HIGH (ref 3.8–10.5)

## 2023-05-19 PROCEDURE — 93010 ELECTROCARDIOGRAM REPORT: CPT

## 2023-05-19 PROCEDURE — 20999 UNLISTED PX MUSCSKEL GENERAL: CPT | Mod: 78

## 2023-05-19 RX ORDER — DEXAMETHASONE 0.5 MG/5ML
4 ELIXIR ORAL EVERY 8 HOURS
Refills: 0 | Status: DISCONTINUED | OUTPATIENT
Start: 2023-05-19 | End: 2023-05-20

## 2023-05-19 RX ORDER — LEVETIRACETAM 250 MG/1
500 TABLET, FILM COATED ORAL EVERY 12 HOURS
Refills: 0 | Status: DISCONTINUED | OUTPATIENT
Start: 2023-05-19 | End: 2023-05-20

## 2023-05-19 RX ORDER — DIAZEPAM 5 MG
5 TABLET ORAL EVERY 8 HOURS
Refills: 0 | Status: DISCONTINUED | OUTPATIENT
Start: 2023-05-19 | End: 2023-05-20

## 2023-05-19 RX ORDER — ONDANSETRON 8 MG/1
8 TABLET, FILM COATED ORAL ONCE
Refills: 0 | Status: DISCONTINUED | OUTPATIENT
Start: 2023-05-19 | End: 2023-05-19

## 2023-05-19 RX ORDER — OXYCODONE HYDROCHLORIDE 5 MG/1
5 TABLET ORAL EVERY 4 HOURS
Refills: 0 | Status: DISCONTINUED | OUTPATIENT
Start: 2023-05-19 | End: 2023-05-20

## 2023-05-19 RX ORDER — PHYTONADIONE (VIT K1) 5 MG
5 TABLET ORAL DAILY
Refills: 0 | Status: DISCONTINUED | OUTPATIENT
Start: 2023-05-19 | End: 2023-05-20

## 2023-05-19 RX ORDER — CEFAZOLIN SODIUM 1 G
1000 VIAL (EA) INJECTION EVERY 8 HOURS
Refills: 0 | Status: COMPLETED | OUTPATIENT
Start: 2023-05-19 | End: 2023-05-20

## 2023-05-19 RX ORDER — LANOLIN ALCOHOL/MO/W.PET/CERES
5 CREAM (GRAM) TOPICAL AT BEDTIME
Refills: 0 | Status: DISCONTINUED | OUTPATIENT
Start: 2023-05-19 | End: 2023-05-20

## 2023-05-19 RX ORDER — HYDROMORPHONE HYDROCHLORIDE 2 MG/ML
1 INJECTION INTRAMUSCULAR; INTRAVENOUS; SUBCUTANEOUS
Refills: 0 | Status: DISCONTINUED | OUTPATIENT
Start: 2023-05-19 | End: 2023-05-19

## 2023-05-19 RX ORDER — ACETAMINOPHEN 500 MG
1000 TABLET ORAL ONCE
Refills: 0 | Status: DISCONTINUED | OUTPATIENT
Start: 2023-05-19 | End: 2023-05-19

## 2023-05-19 RX ORDER — TRAMADOL HYDROCHLORIDE 50 MG/1
25 TABLET ORAL EVERY 6 HOURS
Refills: 0 | Status: DISCONTINUED | OUTPATIENT
Start: 2023-05-19 | End: 2023-05-20

## 2023-05-19 RX ORDER — TAMSULOSIN HYDROCHLORIDE 0.4 MG/1
0.4 CAPSULE ORAL AT BEDTIME
Refills: 0 | Status: DISCONTINUED | OUTPATIENT
Start: 2023-05-19 | End: 2023-05-20

## 2023-05-19 RX ORDER — POLYETHYLENE GLYCOL 3350 17 G/17G
17 POWDER, FOR SOLUTION ORAL DAILY
Refills: 0 | Status: DISCONTINUED | OUTPATIENT
Start: 2023-05-19 | End: 2023-05-20

## 2023-05-19 RX ORDER — SENNA PLUS 8.6 MG/1
2 TABLET ORAL AT BEDTIME
Refills: 0 | Status: DISCONTINUED | OUTPATIENT
Start: 2023-05-19 | End: 2023-05-20

## 2023-05-19 RX ORDER — SODIUM CHLORIDE 9 MG/ML
1000 INJECTION, SOLUTION INTRAVENOUS
Refills: 0 | Status: DISCONTINUED | OUTPATIENT
Start: 2023-05-19 | End: 2023-05-19

## 2023-05-19 RX ORDER — ACETAMINOPHEN 500 MG
650 TABLET ORAL EVERY 6 HOURS
Refills: 0 | Status: DISCONTINUED | OUTPATIENT
Start: 2023-05-19 | End: 2023-05-20

## 2023-05-19 RX ORDER — HYDROMORPHONE HYDROCHLORIDE 2 MG/ML
0.5 INJECTION INTRAMUSCULAR; INTRAVENOUS; SUBCUTANEOUS
Refills: 0 | Status: DISCONTINUED | OUTPATIENT
Start: 2023-05-19 | End: 2023-05-19

## 2023-05-19 RX ORDER — PANTOPRAZOLE SODIUM 20 MG/1
40 TABLET, DELAYED RELEASE ORAL
Refills: 0 | Status: DISCONTINUED | OUTPATIENT
Start: 2023-05-19 | End: 2023-05-20

## 2023-05-19 RX ADMIN — Medication 100 MILLIGRAM(S): at 17:45

## 2023-05-19 RX ADMIN — Medication 4 MILLIGRAM(S): at 00:39

## 2023-05-19 RX ADMIN — LEVETIRACETAM 500 MILLIGRAM(S): 250 TABLET, FILM COATED ORAL at 05:14

## 2023-05-19 RX ADMIN — TAMSULOSIN HYDROCHLORIDE 0.4 MILLIGRAM(S): 0.4 CAPSULE ORAL at 22:39

## 2023-05-19 RX ADMIN — Medication 5 MILLIGRAM(S): at 15:52

## 2023-05-19 RX ADMIN — SODIUM CHLORIDE 75 MILLILITER(S): 9 INJECTION, SOLUTION INTRAVENOUS at 13:33

## 2023-05-19 RX ADMIN — Medication 5 MILLIGRAM(S): at 22:39

## 2023-05-19 RX ADMIN — Medication 4 MILLIGRAM(S): at 05:14

## 2023-05-19 RX ADMIN — Medication 4 MILLIGRAM(S): at 15:15

## 2023-05-19 RX ADMIN — Medication 5 MILLIGRAM(S): at 16:38

## 2023-05-19 RX ADMIN — PANTOPRAZOLE SODIUM 40 MILLIGRAM(S): 20 TABLET, DELAYED RELEASE ORAL at 07:50

## 2023-05-19 RX ADMIN — Medication 4 MILLIGRAM(S): at 22:39

## 2023-05-19 RX ADMIN — LEVETIRACETAM 400 MILLIGRAM(S): 250 TABLET, FILM COATED ORAL at 17:44

## 2023-05-19 RX ADMIN — Medication 10 MILLIGRAM(S): at 05:13

## 2023-05-19 NOTE — PROGRESS NOTE ADULT - SUBJECTIVE AND OBJECTIVE BOX
Patient seen and examined at bedside.    ICU Vital Signs Last 24 Hrs  T(C): 36.9 (18 May 2023 23:00), Max: 37.1 (18 May 2023 11:00)  T(F): 98.4 (18 May 2023 23:00), Max: 98.8 (18 May 2023 11:00)  HR: 54 (19 May 2023 01:00) (54 - 68)  BP: 152/68 (19 May 2023 01:00) (126/67 - 152/70)  BP(mean): 82 (19 May 2023 01:00) (82 - 92)  ABP: --  ABP(mean): --  RR: 16 (19 May 2023 01:00) (10 - 16)  SpO2: 97% (19 May 2023 01:00) (95% - 99%)    O2 Parameters below as of 18 May 2023 23:00  Patient On (Oxygen Delivery Method): room air    Exam: AOx3 (name and year w/ choices), Pupils R (left irregular) EOMI, much improved mixed aphasia receptive> expressive, FC, BROWN 5/5 L>R    .  LABS:                         13.6   13.81 )-----------( 202      ( 17 May 2023 17:33 )             39.0     05-18    136  |  103  |  20  ----------------------------<  139<H>  4.4   |  22  |  0.75    Ca    8.7      18 May 2023 05:12  Phos  3.0     05-  Mg     2.0     -    TPro  6.2  /  Alb  3.4  /  TBili  0.5  /  DBili  0.1  /  AST  134<H>  /  ALT  529<H>  /  AlkPhos  86  05-17    PT/INR - ( 17 May 2023 23:23 )   PT: 15.6 sec;   INR: 1.34 ratio         PTT - ( 17 May 2023 23:23 )  PTT:25.7 sec  Urinalysis Basic - ( 18 May 2023 05:14 )    Color: Yellow / Appearance: Clear / S.026 / pH: x  Gluc: x / Ketone: Negative  / Bili: Negative / Urobili: Negative   Blood: x / Protein: Trace / Nitrite: Negative   Leuk Esterase: Negative / RBC: 3 /hpf / WBC 0 /HPF   Sq Epi: x / Non Sq Epi: x / Bacteria: Negative            RADIOLOGY, EKG & ADDITIONAL TESTS: Reviewed.

## 2023-05-19 NOTE — BRIEF OPERATIVE NOTE - NSICDXBRIEFPOSTOP_GEN_ALL_CORE_FT
POST-OP DIAGNOSIS:  Brain tumor 15-May-2023 19:48:09  Tea Sapp  
POST-OP DIAGNOSIS:  Brain tumor 15-May-2023 19:48:09  Tea Sapp

## 2023-05-19 NOTE — BRIEF OPERATIVE NOTE - OPERATION/FINDINGS
N/A, implants removed
Left pterional craniotomy for brain tumor resection. 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 Bilateral abdominal incisions made for later implantation of drug treatment

## 2023-05-19 NOTE — CHART NOTE - NSCHARTNOTEFT_GEN_A_CORE
SUBJECTIVE: Pt seen in PACU postop at this time. Doing well w/o complaints. NAD    Vital Signs Last 24 Hrs  T(C): 36.4 (19 May 2023 13:00), Max: 37 (18 May 2023 19:00)  T(F): 97.5 (19 May 2023 13:00), Max: 98.6 (18 May 2023 19:00)  HR: 51 (19 May 2023 13:00) (48 - 68)  BP: 112/54 (19 May 2023 13:00) (108/52 - 152/70)  BP(mean): 78 (19 May 2023 13:00) (75 - 93)  RR: 18 (19 May 2023 13:00) (10 - 20)  SpO2: 97% (19 May 2023 13:00) (92% - 100%)    Parameters below as of 19 May 2023 13:00  Patient On (Oxygen Delivery Method): room air    IVF: [ ] IVL [X ] LR @75/H  DIET: [X ] Regular [ ] CCD [ ] Renal [ ] Puree [ ] Dysphagia [ ] Tube Feeds:   PCA: [ ] YES [ X] NO   CURRY: [ ] YES [X ] NO [X ] VOID   BM: [ X] YES-on 5/19    DRAINS: NA    PHYSICAL EXAM:    General: No Acute Distress     Neurological: AAOx3, but choices with year, left irregular pupil, but reactive, intact EOM,  Mild Expressive aphasia, comprehension intact, Mild left facial asymmetry. Moving all 4 extremities 5/5.     Pulmonary: Clear to Auscultation, No Rales, No Rhonchi, No Wheezes     Cardiovascular: S1, S2, Regular Rate and Rhythm     Gastrointestinal: Soft, Nontender, Nondistended     Incision: +crani staples, B/L Abd inc/Dsg CDI/Flat    LABS:                        14.4   11.91 )-----------( 218      ( 19 May 2023 03:03 )             42.1    05-19    137  |  101  |  20  ----------------------------<  115<H>  4.0   |  25  |  0.86    Ca    9.4      19 May 2023 03:03  Phos  3.5     05-19  Mg     2.0     05-19    TPro  6.8  /  Alb  3.7  /  TBili  0.6  /  DBili  0.1  /  AST  71<H>  /  ALT  439<H>  /  AlkPhos  97  05-19    PT/INR - ( 19 May 2023 03:03 )   PT: 14.6 sec;   INR: 1.27 ratio    PTT - ( 19 May 2023 03:03 )  PTT:24.8 sec      05-18 @ 07:01  -  05-19 @ 07:00  --------------------------------------------------------  IN: 1312 mL / OUT: 2000 mL / NET: -688 mL    05-19 @ 07:01  -  05-19 @ 13:32  --------------------------------------------------------  IN: 0 mL / OUT: 300 mL / NET: -300 mL    IMAGING:   < from: MR Brain Stereotactic w/ IV Cont (05.16.23 @ 17:18) >  Reidentified is left-sided craniotomy for resection of tumor. There is   fluid and layering hemorrhage in the surgical cavity in the left anterior   temporal lobe. There is residual nodular enhancement within the left   insular region and left anterior temporal lobe, posterior to the larger   surgical cavity, compatible with residual disease, with the largest   portion measuring approximately 2.1 cm AP by 2.3 cm TR by 3.4 cm cc.   There is surrounding vasogenic edema and/or nonenhancing tumor in the   surrounding parenchyma. Very small amount of acute ischemic injury   surrounding the surgical cavity. There is persistent mass effect on the   ventricular system, with left to right midline shift of approximately 4   to 5 mm.    There are scattered mucosal inflammatory changes in the paranasal sinuses.    < end of copied text >    < from: CT Head No Cont (05.16.23 @ 08:46) >    IMPRESSION:  Status post resection left temporal neoplasm as seen onthe   prior. Resolving postoperative changes including decreased mass effect on   the left lateral ventricle with decrease midline shift to the right.    < end of copied text >    < from: VA Duplex Lower Ext Vein Scan, Bilat (05.12.23 @ 16:02) >    No evidence of deep venous thrombosis in either lower extremity.    < end of copied text >    HPI: 67M retired , R handed, no pmh, p/w MRI demonstrating 3x3x4.5 cm ring enhancing mass in L ant. temporal lobe w/extension to BG and frontal lobe and associated vasogenic edema c/f GBM. He c/o word finding difficulty for past 3 wks. Exam: AO3, PERRL, EOMI, BROWN 5/5, sensation intact, difficulty with repetition, 1/3 w/ naming objects.      (09 May 2023 16:58)    PROCEDURE: Craniotomy for tumor with stereotactic guidance. 5/19 Removal b/l Abd IMVAX beads     POD#2/1    PLAN:  Neuro: 4 Dwyer bed-P. Keep SBP<160. Dec 4Q8h, rapid taper x 1 week. Cont Keppra. Valium PRN For PTSD.  Inc activity/OOB. PT-P. Start SQL 5/20 6pm.    Respiratory: Patient instructed to use incentive spirometer [ X] YES [ ] NO              DVT ppx: [ X] SQL [ ] SQH and Venodynes [ ] Left [ ] Right [ X] Bilateral    Discharge Planning:  PT-P FU.  He was subsequently DC on >>> in stable condition.

## 2023-05-19 NOTE — PROGRESS NOTE ADULT - ASSESSMENT
5/19 IMVAX removal procedure in AM  Started on VitK 5 for 3 days  Path pending  Trending LFT's  1L fluid restriction, monitor BMP (last Na 136)

## 2023-05-19 NOTE — PATIENT PROFILE ADULT - FALL HARM RISK - HARM RISK INTERVENTIONS

## 2023-05-19 NOTE — PROGRESS NOTE ADULT - SUBJECTIVE AND OBJECTIVE BOX
HPI:  Reymundo Liu  67M retired , R handed, no pmh, p/w MRI demonstrating 3x3x4.5 cm ring enhancing mass in L ant. temporal lobe w/extension to BG and frontal lobe and associated vasogenic edema c/f GBM. He c/o word finding difficulty for past 3 wks. Exam: AO3, PERRL, EOMI, BROWN 5/5, sensation intact, difficulty with repetition, 1/3 w/ naming objects.    SURGERY: Craniotomy for tumor with stereotactic guidance.    Abdominoplasty    REVIEW OF SYSTEMS: [] Unable to Assess due to neurologic exam   [x ] All ROS addressed below are non-contributory, except:  Neuro: [x ] Headache [ ] Back pain [ ] Numbness [ ] Weakness [ ] Ataxia [ ] Dizziness [x ] Aphasia [ ] Dysarthria [ ] Visual disturbance  Resp: [ ] Shortness of breath/dyspnea [ ] Orthopnea [ ] Cough  CV: [ ] Chest pain [ ] Palpitation [ ] Lightheadedness [ ] Syncope  Renal: [ ] Thirst [ ] Edema  GI: [ ] Nausea [ ] Emesis [ ] Abdominal pain [ ] Constipation [ ] Diarrhea  Hem: [ ] Hematemesis [ ] bBright red blood per rectum  ID: [ ] Fever [ ] Chills [ ] Dysuria  ENT: [ ] Rhinorrhea    EVENTS:   POD4 from crani for tumor resection, APHASIA still present. Otherwise exam stable. Patient underwent IMVAX implant 5/17, implants to be removed TODAY (5/19). Overnight no changes in examination. No overnight events.    VITALS/MEDS/LABS/ORDERS: Reviewed    PHYSICAL EXAM:    General: No Acute Distress     Neurological: Awake, follows commands, left irregular pupil but reactive, intact EOM, Oriented x3 with choices. Mild Expressive aphasia, comprehension intact, some expressive aphasia. Mild left facial asymmetry. Moving all 4 extremities 5/5.     Pulmonary: Clear to Auscultation, No Rales, No Rhonchi, No Wheezes     Cardiovascular: S1, S2, Regular Rate and Rhythm     Gastrointestinal: Soft, Nontender, Nondistended     Extremities: No calf tenderness        DEVICES: [] Restraints [] HUDSON/HMV [] LD [] ET tube [] Trach [] Chest Tube [] A-line [] Hollis [] NGT [] Rectal Tube

## 2023-05-19 NOTE — PROGRESS NOTE ADULT - ASSESSMENT
ASSESSMENT & PLAN    L ant. temporal lobe w/extension to BG and frontal lobe and associated vasogenic edema and mass effect concern for GBM   POD 4 from crani for mass resection     Neuro: neuro checks q 4hrs  MRI brain wwo- nodular enhancement with surrounding vasogenic edema, otherwise stable post op changes  decadron 4q6 for vasogenic edema   Continue Keppra 500 mg BID   5/17 Patient s/p abdominal implantation of IMVAX implants   5/19- removal of implants per neurosurgery   s/p 5 ALA, LFT's trending down  follow official path     Respiratory:   RA    CV: SBP goal 100-160 mmhg   On enalapril 10 mg QD, increase/ add another anti hypertensive as needed     Endocrine: sugar 120-180     Heme/Onc:    H/H stable   INR trending down, vitamin K x 3, received 1 dose 5/17  DVT ppx: SQL    Renal: Na stable  IVL   BPH resume flomax      ID:  Afebrile, leukocyte count elevated, likely 2/2 steroids    GI: reg diet, NPO post midnight   PPI while on decadron, bowel regimen  LBM PTA add dulcolax    Social/Family: updated at bedside   Discharge planning:     Code Status: [x] Full Code [] DNR [] DNI [] Goals of Care:   Disposition: [] ICU [] Stroke Unit [] RCU []PCU [x] Floor [] Discharge Home               ASSESSMENT & PLAN    L ant. temporal lobe w/extension to BG and frontal lobe and associated vasogenic edema and mass effect concern for GBM   POD 4 from crani for mass resection     Neuro: neuro checks q 4hrs  MRI brain wwo- nodular enhancement with surrounding vasogenic edema, otherwise stable post op changes  decadron 4q6 for vasogenic edema   Continue Keppra 500 mg BID   5/17 Patient s/p abdominal implantation of IMVAX implants   5/19- removal of implants per neurosurgery   s/p 5 ALA, LFT's trending down  follow official path     Respiratory:   RA    CV: SBP goal 100-160 mmhg   On enalapril 10 mg QD    Endocrine: sugar 120-180     Heme/Onc:    H/H stable   INR trending down, vitamin K x 3, received 2 dose 5/17  DVT ppx: SQL HELD for the procedure     Renal:   IVL   BPH resume flomax      ID:  Afebrile, leukocyte count elevated but trending down, likely 2/2 steroids    GI: NPO  PPI while on decadron, bowel regimen  LBM 5/18    Social/Family: updated at bedside   Discharge planning:     Code Status: [x] Full Code [] DNR [] DNI [] Goals of Care:   Disposition: [] ICU [] Stroke Unit [] RCU []PCU [x] Floor [] Discharge Home

## 2023-05-19 NOTE — BRIEF OPERATIVE NOTE - NSICDXBRIEFPROCEDURE_GEN_ALL_CORE_FT
PROCEDURES:  Craniotomy for tumor with stereotactic guidance 15-May-2023 19:47:20  Tea Sapp  Abdominoplasty 15-May-2023 19:47:52  Tea Sapp  
PROCEDURES:  Abdominoplasty 15-May-2023 19:47:52  Tea Sapp

## 2023-05-19 NOTE — BRIEF OPERATIVE NOTE - NSICDXBRIEFPREOP_GEN_ALL_CORE_FT
PRE-OP DIAGNOSIS:  Brain tumor 15-May-2023 19:48:01  Tea Sapp  
PRE-OP DIAGNOSIS:  Brain tumor 15-May-2023 19:48:01  Tea Sapp

## 2023-05-19 NOTE — PRE-ANESTHESIA EVALUATION ADULT - NSANTHOSAYNRD_GEN_A_CORE
No. JOLYNN screening performed.  STOP BANG Legend: 0-2 = LOW Risk; 3-4 = INTERMEDIATE Risk; 5-8 = HIGH Risk
No. JOLYNN screening performed.  STOP BANG Legend: 0-2 = LOW Risk; 3-4 = INTERMEDIATE Risk; 5-8 = HIGH Risk

## 2023-05-20 ENCOUNTER — TRANSCRIPTION ENCOUNTER (OUTPATIENT)
Age: 68
End: 2023-05-20

## 2023-05-20 VITALS — DIASTOLIC BLOOD PRESSURE: 81 MMHG | SYSTOLIC BLOOD PRESSURE: 144 MMHG | HEART RATE: 92 BPM | OXYGEN SATURATION: 97 %

## 2023-05-20 DIAGNOSIS — G93.89 OTHER SPECIFIED DISORDERS OF BRAIN: ICD-10-CM

## 2023-05-20 LAB
ALBUMIN SERPL ELPH-MCNC: 3.6 G/DL — SIGNIFICANT CHANGE UP (ref 3.3–5)
ALP SERPL-CCNC: 114 U/L — SIGNIFICANT CHANGE UP (ref 40–120)
ALT FLD-CCNC: 278 U/L — HIGH (ref 10–45)
ANION GAP SERPL CALC-SCNC: 16 MMOL/L — SIGNIFICANT CHANGE UP (ref 5–17)
AST SERPL-CCNC: 44 U/L — HIGH (ref 10–40)
BILIRUB DIRECT SERPL-MCNC: <0.1 MG/DL — SIGNIFICANT CHANGE UP (ref 0–0.3)
BILIRUB INDIRECT FLD-MCNC: >0.4 MG/DL — SIGNIFICANT CHANGE UP (ref 0.2–1)
BILIRUB SERPL-MCNC: 0.5 MG/DL — SIGNIFICANT CHANGE UP (ref 0.2–1.2)
BUN SERPL-MCNC: 21 MG/DL — SIGNIFICANT CHANGE UP (ref 7–23)
CALCIUM SERPL-MCNC: 9.1 MG/DL — SIGNIFICANT CHANGE UP (ref 8.4–10.5)
CHLORIDE SERPL-SCNC: 97 MMOL/L — SIGNIFICANT CHANGE UP (ref 96–108)
CO2 SERPL-SCNC: 19 MMOL/L — LOW (ref 22–31)
CREAT SERPL-MCNC: 0.74 MG/DL — SIGNIFICANT CHANGE UP (ref 0.5–1.3)
EGFR: 101 ML/MIN/1.73M2 — SIGNIFICANT CHANGE UP
GLUCOSE SERPL-MCNC: 190 MG/DL — HIGH (ref 70–99)
HCT VFR BLD CALC: 40.8 % — SIGNIFICANT CHANGE UP (ref 39–50)
HGB BLD-MCNC: 13.6 G/DL — SIGNIFICANT CHANGE UP (ref 13–17)
MAGNESIUM SERPL-MCNC: 1.8 MG/DL — SIGNIFICANT CHANGE UP (ref 1.6–2.6)
MCHC RBC-ENTMCNC: 29.8 PG — SIGNIFICANT CHANGE UP (ref 27–34)
MCHC RBC-ENTMCNC: 33.3 GM/DL — SIGNIFICANT CHANGE UP (ref 32–36)
MCV RBC AUTO: 89.3 FL — SIGNIFICANT CHANGE UP (ref 80–100)
NRBC # BLD: 0 /100 WBCS — SIGNIFICANT CHANGE UP (ref 0–0)
PHOSPHATE SERPL-MCNC: 2.5 MG/DL — SIGNIFICANT CHANGE UP (ref 2.5–4.5)
PLATELET # BLD AUTO: 272 K/UL — SIGNIFICANT CHANGE UP (ref 150–400)
POTASSIUM SERPL-MCNC: 4.2 MMOL/L — SIGNIFICANT CHANGE UP (ref 3.5–5.3)
POTASSIUM SERPL-SCNC: 4.2 MMOL/L — SIGNIFICANT CHANGE UP (ref 3.5–5.3)
PROT SERPL-MCNC: 6.6 G/DL — SIGNIFICANT CHANGE UP (ref 6–8.3)
RBC # BLD: 4.57 M/UL — SIGNIFICANT CHANGE UP (ref 4.2–5.8)
RBC # FLD: 12.5 % — SIGNIFICANT CHANGE UP (ref 10.3–14.5)
SODIUM SERPL-SCNC: 132 MMOL/L — LOW (ref 135–145)
WBC # BLD: 13.52 K/UL — HIGH (ref 3.8–10.5)
WBC # FLD AUTO: 13.52 K/UL — HIGH (ref 3.8–10.5)

## 2023-05-20 PROCEDURE — 87640 STAPH A DNA AMP PROBE: CPT

## 2023-05-20 PROCEDURE — 82947 ASSAY GLUCOSE BLOOD QUANT: CPT

## 2023-05-20 PROCEDURE — 82435 ASSAY OF BLOOD CHLORIDE: CPT

## 2023-05-20 PROCEDURE — 86706 HEP B SURFACE ANTIBODY: CPT

## 2023-05-20 PROCEDURE — 84295 ASSAY OF SERUM SODIUM: CPT

## 2023-05-20 PROCEDURE — 80053 COMPREHEN METABOLIC PANEL: CPT

## 2023-05-20 PROCEDURE — 82550 ASSAY OF CK (CPK): CPT

## 2023-05-20 PROCEDURE — 82803 BLOOD GASES ANY COMBINATION: CPT

## 2023-05-20 PROCEDURE — 36415 COLL VENOUS BLD VENIPUNCTURE: CPT

## 2023-05-20 PROCEDURE — 96376 TX/PRO/DX INJ SAME DRUG ADON: CPT

## 2023-05-20 PROCEDURE — 86900 BLOOD TYPING SEROLOGIC ABO: CPT

## 2023-05-20 PROCEDURE — 93970 EXTREMITY STUDY: CPT

## 2023-05-20 PROCEDURE — 85014 HEMATOCRIT: CPT

## 2023-05-20 PROCEDURE — 74177 CT ABD & PELVIS W/CONTRAST: CPT | Mod: MA

## 2023-05-20 PROCEDURE — 88342 IMHCHEM/IMCYTCHM 1ST ANTB: CPT

## 2023-05-20 PROCEDURE — 84300 ASSAY OF URINE SODIUM: CPT

## 2023-05-20 PROCEDURE — 85385 FIBRINOGEN ANTIGEN: CPT

## 2023-05-20 PROCEDURE — 84132 ASSAY OF SERUM POTASSIUM: CPT

## 2023-05-20 PROCEDURE — 80076 HEPATIC FUNCTION PANEL: CPT

## 2023-05-20 PROCEDURE — 82247 BILIRUBIN TOTAL: CPT

## 2023-05-20 PROCEDURE — 70555 FMRI BRAIN BY PHYS/PSYCH: CPT

## 2023-05-20 PROCEDURE — C1889: CPT

## 2023-05-20 PROCEDURE — 88341 IMHCHEM/IMCYTCHM EA ADD ANTB: CPT

## 2023-05-20 PROCEDURE — 82040 ASSAY OF SERUM ALBUMIN: CPT

## 2023-05-20 PROCEDURE — 85027 COMPLETE CBC AUTOMATED: CPT

## 2023-05-20 PROCEDURE — 70552 MRI BRAIN STEM W/DYE: CPT

## 2023-05-20 PROCEDURE — 85384 FIBRINOGEN ACTIVITY: CPT

## 2023-05-20 PROCEDURE — 88331 PATH CONSLTJ SURG 1 BLK 1SPC: CPT

## 2023-05-20 PROCEDURE — 86901 BLOOD TYPING SEROLOGIC RH(D): CPT

## 2023-05-20 PROCEDURE — 88307 TISSUE EXAM BY PATHOLOGIST: CPT

## 2023-05-20 PROCEDURE — 85018 HEMOGLOBIN: CPT

## 2023-05-20 PROCEDURE — 96375 TX/PRO/DX INJ NEW DRUG ADDON: CPT

## 2023-05-20 PROCEDURE — P9045: CPT

## 2023-05-20 PROCEDURE — 88360 TUMOR IMMUNOHISTOCHEM/MANUAL: CPT

## 2023-05-20 PROCEDURE — 82248 BILIRUBIN DIRECT: CPT

## 2023-05-20 PROCEDURE — 84155 ASSAY OF PROTEIN SERUM: CPT

## 2023-05-20 PROCEDURE — 70450 CT HEAD/BRAIN W/O DYE: CPT

## 2023-05-20 PROCEDURE — 82565 ASSAY OF CREATININE: CPT

## 2023-05-20 PROCEDURE — 84443 ASSAY THYROID STIM HORMONE: CPT

## 2023-05-20 PROCEDURE — 82977 ASSAY OF GGT: CPT

## 2023-05-20 PROCEDURE — 82330 ASSAY OF CALCIUM: CPT

## 2023-05-20 PROCEDURE — 85610 PROTHROMBIN TIME: CPT

## 2023-05-20 PROCEDURE — 85025 COMPLETE CBC W/AUTO DIFF WBC: CPT

## 2023-05-20 PROCEDURE — 83605 ASSAY OF LACTIC ACID: CPT

## 2023-05-20 PROCEDURE — C1769: CPT

## 2023-05-20 PROCEDURE — 84100 ASSAY OF PHOSPHORUS: CPT

## 2023-05-20 PROCEDURE — 80048 BASIC METABOLIC PNL TOTAL CA: CPT

## 2023-05-20 PROCEDURE — 83935 ASSAY OF URINE OSMOLALITY: CPT

## 2023-05-20 PROCEDURE — 87389 HIV-1 AG W/HIV-1&-2 AB AG IA: CPT

## 2023-05-20 PROCEDURE — 83690 ASSAY OF LIPASE: CPT

## 2023-05-20 PROCEDURE — 71260 CT THORAX DX C+: CPT | Mod: MA

## 2023-05-20 PROCEDURE — 84550 ASSAY OF BLOOD/URIC ACID: CPT

## 2023-05-20 PROCEDURE — 84460 ALANINE AMINO (ALT) (SGPT): CPT

## 2023-05-20 PROCEDURE — 83735 ASSAY OF MAGNESIUM: CPT

## 2023-05-20 PROCEDURE — 87641 MR-STAPH DNA AMP PROBE: CPT

## 2023-05-20 PROCEDURE — A9585: CPT

## 2023-05-20 PROCEDURE — 93005 ELECTROCARDIOGRAM TRACING: CPT

## 2023-05-20 PROCEDURE — 82150 ASSAY OF AMYLASE: CPT

## 2023-05-20 PROCEDURE — 99285 EMERGENCY DEPT VISIT HI MDM: CPT | Mod: 25

## 2023-05-20 PROCEDURE — 85362 FIBRIN DEGRADATION PRODUCTS: CPT

## 2023-05-20 PROCEDURE — 71045 X-RAY EXAM CHEST 1 VIEW: CPT

## 2023-05-20 PROCEDURE — 84450 TRANSFERASE (AST) (SGOT): CPT

## 2023-05-20 PROCEDURE — 80074 ACUTE HEPATITIS PANEL: CPT

## 2023-05-20 PROCEDURE — 86850 RBC ANTIBODY SCREEN: CPT

## 2023-05-20 PROCEDURE — 83615 LACTATE (LD) (LDH) ENZYME: CPT

## 2023-05-20 PROCEDURE — 85730 THROMBOPLASTIN TIME PARTIAL: CPT

## 2023-05-20 PROCEDURE — 81001 URINALYSIS AUTO W/SCOPE: CPT

## 2023-05-20 PROCEDURE — 96374 THER/PROPH/DIAG INJ IV PUSH: CPT

## 2023-05-20 PROCEDURE — 99233 SBSQ HOSP IP/OBS HIGH 50: CPT

## 2023-05-20 PROCEDURE — 70553 MRI BRAIN STEM W/O & W/DYE: CPT | Mod: MA

## 2023-05-20 PROCEDURE — 88334 PATH CONSLTJ SURG CYTO XM EA: CPT

## 2023-05-20 PROCEDURE — 84075 ASSAY ALKALINE PHOSPHATASE: CPT

## 2023-05-20 PROCEDURE — C9399: CPT

## 2023-05-20 PROCEDURE — 97161 PT EVAL LOW COMPLEX 20 MIN: CPT

## 2023-05-20 PROCEDURE — C1713: CPT

## 2023-05-20 RX ORDER — DEXAMETHASONE 0.5 MG/5ML
1 ELIXIR ORAL
Qty: 30 | Refills: 0
Start: 2023-05-20

## 2023-05-20 RX ORDER — SODIUM CHLORIDE 9 MG/ML
1 INJECTION INTRAMUSCULAR; INTRAVENOUS; SUBCUTANEOUS
Qty: 6 | Refills: 0
Start: 2023-05-20 | End: 2023-05-22

## 2023-05-20 RX ORDER — LANOLIN ALCOHOL/MO/W.PET/CERES
1 CREAM (GRAM) TOPICAL
Qty: 0 | Refills: 0 | DISCHARGE
Start: 2023-05-20

## 2023-05-20 RX ORDER — PHYTONADIONE (VIT K1) 5 MG
5 TABLET ORAL ONCE
Refills: 0 | Status: COMPLETED | OUTPATIENT
Start: 2023-05-20 | End: 2023-05-20

## 2023-05-20 RX ORDER — TAMSULOSIN HYDROCHLORIDE 0.4 MG/1
1 CAPSULE ORAL
Qty: 30 | Refills: 0
Start: 2023-05-20 | End: 2023-06-18

## 2023-05-20 RX ORDER — LEVETIRACETAM 250 MG/1
500 TABLET, FILM COATED ORAL
Refills: 0 | Status: DISCONTINUED | OUTPATIENT
Start: 2023-05-20 | End: 2023-05-20

## 2023-05-20 RX ORDER — ASPIRIN/ACETAMINOPHEN/CAFFEINE 250-250-65
1 TABLET ORAL
Refills: 0 | DISCHARGE

## 2023-05-20 RX ORDER — DEXAMETHASONE 0.5 MG/5ML
4 ELIXIR ORAL EVERY 12 HOURS
Refills: 0 | Status: DISCONTINUED | OUTPATIENT
Start: 2023-05-20 | End: 2023-05-20

## 2023-05-20 RX ORDER — LEVETIRACETAM 250 MG/1
1 TABLET, FILM COATED ORAL
Qty: 60 | Refills: 0
Start: 2023-05-20 | End: 2023-06-18

## 2023-05-20 RX ORDER — POLYETHYLENE GLYCOL 3350 17 G/17G
17 POWDER, FOR SOLUTION ORAL
Qty: 0 | Refills: 0 | DISCHARGE
Start: 2023-05-20

## 2023-05-20 RX ORDER — SENNA PLUS 8.6 MG/1
2 TABLET ORAL
Qty: 0 | Refills: 0 | DISCHARGE
Start: 2023-05-20

## 2023-05-20 RX ORDER — DEXAMETHASONE 0.5 MG/5ML
1 ELIXIR ORAL
Qty: 30 | Refills: 0 | DISCHARGE
Start: 2023-05-20

## 2023-05-20 RX ORDER — SODIUM CHLORIDE 9 MG/ML
1 INJECTION INTRAMUSCULAR; INTRAVENOUS; SUBCUTANEOUS
Refills: 0 | Status: DISCONTINUED | OUTPATIENT
Start: 2023-05-20 | End: 2023-05-20

## 2023-05-20 RX ORDER — DEXAMETHASONE 0.5 MG/5ML
4 ELIXIR ORAL EVERY 8 HOURS
Refills: 0 | Status: DISCONTINUED | OUTPATIENT
Start: 2023-05-20 | End: 2023-05-20

## 2023-05-20 RX ORDER — PANTOPRAZOLE SODIUM 20 MG/1
1 TABLET, DELAYED RELEASE ORAL
Qty: 30 | Refills: 0
Start: 2023-05-20 | End: 2023-06-18

## 2023-05-20 RX ADMIN — Medication 100 MILLIGRAM(S): at 00:26

## 2023-05-20 RX ADMIN — Medication 5 MILLIGRAM(S): at 13:35

## 2023-05-20 RX ADMIN — PANTOPRAZOLE SODIUM 40 MILLIGRAM(S): 20 TABLET, DELAYED RELEASE ORAL at 05:28

## 2023-05-20 RX ADMIN — SODIUM CHLORIDE 1 GRAM(S): 9 INJECTION INTRAMUSCULAR; INTRAVENOUS; SUBCUTANEOUS at 13:14

## 2023-05-20 RX ADMIN — Medication 4 MILLIGRAM(S): at 05:25

## 2023-05-20 RX ADMIN — Medication 100 MILLIGRAM(S): at 09:19

## 2023-05-20 RX ADMIN — LEVETIRACETAM 400 MILLIGRAM(S): 250 TABLET, FILM COATED ORAL at 05:23

## 2023-05-20 RX ADMIN — Medication 10 MILLIGRAM(S): at 05:22

## 2023-05-20 NOTE — DISCHARGE NOTE PROVIDER - REASON FOR ADMISSION
Left Brain mass s/p craniotomy for resection with gleolan 5/15/2023  IMVAX Trial (abdominal biologic implantation 5/17/2023, removal 5/19/2023)

## 2023-05-20 NOTE — PROGRESS NOTE ADULT - ASSESSMENT
ASSESSMENT: 67M retired , R handed, no pmh, p/w MRI demonstrating 3x3x4.5 cm ring enhancing mass in L ant. temporal lobe w/extension to BG and frontal lobe and associated vasogenic edema c/f GBM. He c/o word finding difficulty for past 3 wks. Patient enrolled in IMVAX Trial  s/p Craniotomy for tumor with stereotactic guidance on 5/15 with gleolan   s/p abdominal implantation of biologics per imvax trial 5/17 bedside procedure  s/p removal of implanted biologic devices per imvax trial on 5/19 bedside procedure    NEURO:  Neurochecks and vitals q4H  MRI brain wwo- nodular enhancement with surrounding vasogenic edema, otherwise stable post op changes  Keppra 500 mg BID for seizure ppx  Decadron taper for cerebral edema   Pain control   PT assessment   Hospitalist following for comanagement appreciate recommendations   Patient to follow outpatient with Dr. Galvez neuro-onc for "plan to add RT/ Temodar after discharge and complete pathologic review" as outpt.     PULM: on RA   Incentive spirometry, mobilize as tolerated.     CV:  -160 mmhg   c/w enalapril 10 mg qd     RENAL:  IVL     GI:  regular diet LBM 5/19  s/p 5ALA, LFT's now downtrending. Will defer imaging now pt to obtain outpatient with his GI/PMD. asymptomatic   CT A/P negative for intraabdominal pathology     ENDO:   Goal euglycemia (-180)    HEME/ONC:  VTE prophylaxis: [x] SCDs [] chemoprophylaxis [x] hold chemoprophylaxis due to: procedure [x] high risk of DVT/PE on admission due to: malignancy   H/H stable     ID:  afebrile   reactive leukocytosis- pt on steroids and has remained afebrile     SOCIAL/FAMILY: wife updated at bedside. dispo planning is pending PT assessment     Will d/w Dr. Bush   [id number] ASSESSMENT: 67M retired , R handed, no pmh, p/w MRI demonstrating 3x3x4.5 cm ring enhancing mass in L ant. temporal lobe w/extension to BG and frontal lobe and associated vasogenic edema c/f GBM. He c/o word finding difficulty for past 3 wks. Patient enrolled in IMVAX Trial  s/p Craniotomy for tumor with stereotactic guidance on 5/15 with gleolan   s/p abdominal implantation of biologics per imvax trial 5/17 bedside procedure  s/p removal of implanted biologic devices per imvax trial on 5/19 bedside procedure    NEURO:  Neurochecks and vitals q4H  MRI brain wwo- nodular enhancement with surrounding vasogenic edema, otherwise stable post op changes  Keppra 500 mg BID for seizure ppx  Decadron taper over 1 week to 2 BID for cerebral edema   Pain control   No PT/OT needs   Hospitalist following for comanagement appreciate recommendations   to follow outpatient with Dr. Galvez neuro-onc     PULM: on RA   Incentive spirometry, mobilize as tolerated.     CV:  -160 mmhg   c/w enalapril 10 mg qd     RENAL:  IVL     GI:  regular diet LBM 5/19  s/p 5ALA, LFT's now downtrending. Will follow up outpatient for labs   CT A/P negative for intraabdominal pathology     ENDO:   Goal euglycemia (-180)    HEME/ONC:  VTE prophylaxis: [x] SCDs  [x] high risk of DVT/PE on admission due to: malignancy   H/H stable     ID:  afebrile   reactive leukocytosis- pt on steroids and has remained afebrile     SOCIAL/FAMILY: wife updated at bedside. patient cleared for home, no PT/OT needs. discharge planning for home and all questions answered    Will d/w Dr. Bush   [id number]

## 2023-05-20 NOTE — PROGRESS NOTE ADULT - PROBLEM SELECTOR PROBLEM 2
Vasogenic cerebral edema

## 2023-05-20 NOTE — DISCHARGE NOTE PROVIDER - PROVIDER TOKENS
PROVIDER:[TOKEN:[[id number]]],PROVIDER:[TOKEN:[[id number]]] PROVIDER:[TOKEN:[[id number]]],PROVIDER:[TOKEN:[[id number]]],PROVIDER:[TOKEN:[[id number]]]

## 2023-05-20 NOTE — PROGRESS NOTE ADULT - SUBJECTIVE AND OBJECTIVE BOX
Patient is a 67y old  Male who presents with a chief complaint of brain tumor (20 May 2023 11:17)      SUBJECTIVE / OVERNIGHT EVENTS: Pt sitting up in chair, feels well, able to walk.     MEDICATIONS  (STANDING):  dexAMETHasone     Tablet 4 milliGRAM(s) Oral every 12 hours  enalapril 10 milliGRAM(s) Oral daily  levETIRAcetam 500 milliGRAM(s) Oral two times a day  melatonin 5 milliGRAM(s) Oral at bedtime  pantoprazole    Tablet 40 milliGRAM(s) Oral before breakfast  phytonadione   Solution 5 milliGRAM(s) Oral daily  polyethylene glycol 3350 17 Gram(s) Oral daily  senna 2 Tablet(s) Oral at bedtime  tamsulosin 0.4 milliGRAM(s) Oral at bedtime    MEDICATIONS  (PRN):  diazepam    Tablet 5 milliGRAM(s) Oral every 8 hours PRN Anxiety  oxyCODONE    IR 5 milliGRAM(s) Oral every 4 hours PRN Moderate Pain (4 - 6)  traMADol 25 milliGRAM(s) Oral every 6 hours PRN Mild Pain (1 - 3)      CAPILLARY BLOOD GLUCOSE        I&O's Summary    19 May 2023 07:01  -  20 May 2023 07:00  --------------------------------------------------------  IN: 375 mL / OUT: 500 mL / NET: -125 mL    20 May 2023 07:01  -  20 May 2023 12:06  --------------------------------------------------------  IN: 240 mL / OUT: 0 mL / NET: 240 mL        PHYSICAL EXAM:  T(C): 36.5 (05-20-23 @ 05:13), Max: 37.2 (05-19-23 @ 16:00)  HR: 59 (05-20-23 @ 05:13) (51 - 67)  BP: 135/65 (05-20-23 @ 05:13) (103/63 - 135/65)  RR: 18 (05-20-23 @ 05:13) (16 - 18)  SpO2: 97% (05-20-23 @ 05:13) (95% - 100%)  CONSTITUTIONAL: NAD, well-developed, well-groomed  EYES: PERRLA; conjunctiva and sclera clear  ENMT: Moist oral mucosa, no pharyngeal injection or exudates; normal dentition  NECK: Supple, no palpable masses; no thyromegaly  RESPIRATORY: Normal respiratory effort; lungs are clear to auscultation bilaterally  CARDIOVASCULAR: Regular rate and rhythm, normal S1 and S2, no murmur/rub/gallop; No lower extremity edema; Peripheral pulses are 2+ bilaterally  ABDOMEN: Nontender to palpation, normoactive bowel sounds, no rebound/guarding; No hepatosplenomegaly  MUSCULOSKELETAL:  Normal gait; no clubbing or cyanosis of digits; no joint swelling or tenderness to palpation  PSYCH: A+O to person, place, and time; affect appropriate  NEUROLOGY: CN 2-12 are intact and symmetric; no gross sensory deficits   SKIN: No rashes; no palpable lesions    LABS:                        13.6   13.52 )-----------( 272      ( 20 May 2023 09:54 )             40.8     05-20    132<L>  |  97  |  21  ----------------------------<  190<H>  4.2   |  19<L>  |  0.74    Ca    9.1      20 May 2023 09:54  Phos  2.5     05-20  Mg     1.8     05-20    TPro  6.6  /  Alb  3.6  /  TBili  0.5  /  DBili  <0.1  /  AST  44<H>  /  ALT  278<H>  /  AlkPhos  114  05-20    PT/INR - ( 19 May 2023 03:03 )   PT: 14.6 sec;   INR: 1.27 ratio         PTT - ( 19 May 2023 03:03 )  PTT:24.8 sec  CARDIAC MARKERS ( 19 May 2023 03:03 )  x     / x     / 186 U/L / x     / x              RADIOLOGY & ADDITIONAL TESTS:    Imaging Personally Reviewed:    Consultant(s) Notes Reviewed:      Care Discussed with Consultants/Other Providers: Nsx

## 2023-05-20 NOTE — PROGRESS NOTE ADULT - PROVIDER SPECIALTY LIST ADULT
NSICU
Neurology
Neurosurgery
Hospitalist
NSICU
Neurosurgery
NSICU
Neurosurgery
Internal Medicine
Neurology
Hospitalist

## 2023-05-20 NOTE — PROGRESS NOTE ADULT - PROBLEM SELECTOR PLAN 6
DVT ppx    Med rec confirmed with patient. Though it appears he has been filling prescriptions, he states he has not been taking any medications other than tylenol PM for his insomnia      PCP: Dr. Darnell Crowder

## 2023-05-20 NOTE — PROGRESS NOTE ADULT - PROBLEM SELECTOR PLAN 1
3 weeks of word finding difficulty  - outpatient MRI demonstrating 3x3x4.5 cm ring enhancing mass in L anterior temporal lobe with extension to BG and frontal lobe and associated vasogenic edema concerning for GBM  - CT chest/abd/pelv negative for occult malignancy  - pending MRI brain 5/11  - c/w dexamethasone for cerebral edema  - c/w keppra for seizure ppx  - OR planned tentatively for Mon 5/15
3 weeks of word finding difficulty  - outpatient MRI demonstrating 3x3x4.5 cm ring enhancing mass in L anterior temporal lobe with extension to BG and frontal lobe and associated vasogenic edema concerning for GBM  - CT chest/abd/pelv negative for occult malignancy  - MRI brain done 5/11 for surgical planning  - c/w dexamethasone for cerebral edema  - c/w keppra for seizure ppx  - OR planned tentatively for 5/15
3 weeks of word finding difficulty  - outpatient MRI demonstrating 3x3x4.5 cm ring enhancing mass in L anterior temporal lobe with extension to BG and frontal lobe and associated vasogenic edema concerning for GBM  - CT chest/abd/pelv negative for occult malignancy  - pending MRI brain 5/11  - c/w dexamethasone for cerebral edema  - c/w keppra for seizure ppx  - OR planned tentatively for Mon 5/15
Course above    CT chest/abd/pelv negative for occult malignancy    Decadron taper for cerebral edema, Keppra for seizure prophylaxis    Path w high grade glioma    Elevated LFTs due to 5ALA trending down    Patient to follow outpatient with Dr. Galvez neuro-onc for "plan to add RT/ Temodar after discharge and complete pathologic review" as outpt.
3 weeks of word finding difficulty  - outpatient MRI demonstrating 3x3x4.5 cm ring enhancing mass in L anterior temporal lobe with extension to BG and frontal lobe and associated vasogenic edema concerning for GBM  - CT chest/abd/pelv negative for occult malignancy  - MRI brain done 5/11 for surgical planning  - c/w dexamethasone for cerebral edema  - c/w keppra for seizure ppx  - OR planned tentatively for Mon 5/15

## 2023-05-20 NOTE — PROGRESS NOTE ADULT - PROBLEM SELECTOR PLAN 2
- dexamethasone  taper  - pantoprazole for GI ppx while on high dose steroids      Hyponatremia- 132 today. Start fluid restriction  ml/d and salt tabs 1g tid.    HTN- continue Enalapril - dexamethasone  taper  - pantoprazole for GI ppx while on high dose steroids      Hyponatremia- 132 today. Start fluid restriction  ml/d and salt tabs 1g tid. Can be monitored outpatient.     HTN- continue Enalapril

## 2023-05-20 NOTE — DISCHARGE NOTE PROVIDER - CARE PROVIDER_API CALL
Melvin Bush)  Neurosurgery  [address] [place] [address]  Phone: [phone]  Fax: [phone]  Follow Up Time:     Megan Galvez)  Neurology  [address] [place] [address]  Phone: [phone]  Fax: [phone]  Follow Up Time:    Melvin Bush)  Neurosurgery  [address] [place] [address]  Phone: [phone]  Fax: [phone]  Follow Up Time:     Megan Galvez)  Neurology  [address] [place] [address]  Phone: [phone]  Fax: [phone]  Follow Up Time:     Darnell Crowder (DO)  Internal Medicine  [address] [place] [address]  Phone: [phone]  Fax: [phone]  Follow Up Time:

## 2023-05-20 NOTE — DISCHARGE NOTE PROVIDER - CARE PROVIDERS DIRECT ADDRESSES
,[email or web address],DirectAddress_Unknown

## 2023-05-20 NOTE — PROGRESS NOTE ADULT - SUBJECTIVE AND OBJECTIVE BOX
SUBJECTIVE: 67M retired , R handed, no pmh, p/w MRI demonstrating 3x3x4.5 cm ring enhancing mass in L ant. temporal lobe w/extension to BG and frontal lobe and associated vasogenic edema c/f GBM. He c/o word finding difficulty for past 3 wks. Exam on admission: AO3, PERRL, EOMI, BROWN 5/5, sensation intact, difficulty with repetition, 1/3 w/ naming objects.    Patient seen and examined at bedside the AM of 5/20 with wife present. Patient is pleasant, and appears comfortable in no acute distress.    Vital Signs Last 24 Hrs  T(C): 36.5 (05-20-23 @ 05:13), Max: 37.2 (05-19-23 @ 16:00)  T(F): 97.7 (05-20-23 @ 05:13), Max: 99 (05-19-23 @ 16:00)  HR: 59 (05-20-23 @ 05:13) (51 - 67)  BP: 135/65 (05-20-23 @ 05:13) (103/63 - 138/63)  BP(mean): 82 (05-19-23 @ 16:30) (78 - 91)  RR: 18 (05-20-23 @ 05:13) (16 - 18)  SpO2: 97% (05-20-23 @ 05:13) (95% - 100%)    PHYSICAL EXAM:  Constitutional: No Acute Distress   Neurological: Awake, alert and oriented x 2-3 (year correct after 2 attempts), EOMI, pupils 3mm reactive b/l L pupil irregular in shape, follows commands, no facial, minimal-no word finding difficulty, BROWN 5/5 no drift  Incision: craniotomy incision is clean, dry and intact. Abdominal incisions are clean, dry and intact.   Pulmonary: Clear to Auscultation, No rales, No rhonchi, No wheezes   Cardiovascular: S1, S2, Regular rate and rhythm   Gastrointestinal: Soft, Non-tender, Non-distended, +bowel sounds x 4, incisions   Extremities: No calf tenderness bilaterally, no cyanosis, clubbing or edema    LABS:             13.6   13.52 )-----------( 272      ( 20 May 2023 09:54 )             40.8    05-20    132<L>  |  97  |  21  ----------------------------<  190<H>  4.2   |  19<L>  |  0.74    Ca    9.1      20 May 2023 09:54  Phos  2.5     05-20  Mg     1.8     05-20  TPro  6.8  /  Alb  3.7  /  TBili  0.6  /  DBili  0.1  /  AST  71<H>  /  ALT  439<H>  /  AlkPhos  97  05-19  PT/INR - ( 19 May 2023 03:03 )   PT: 14.6 sec;   INR: 1.27 ratio    PTT - ( 19 May 2023 03:03 )  PTT:24.8 sec    05-19 @ 07:01 - 05-20 @ 07:00  --------------------------------------------------------  IN: 375 mL / OUT: 500 mL / NET: -125 mL    05-20 @ 07:01 - 05-20 @ 11:17  --------------------------------------------------------  IN: 240 mL / OUT: 0 mL / NET: 240 mL    IMAGING: reviewed     MEDICATIONS  (STANDING):  dexAMETHasone     Tablet 4 milliGRAM(s) Oral every 8 hours  enalapril 10 milliGRAM(s) Oral daily  levETIRAcetam 500 milliGRAM(s) Oral two times a day  melatonin 5 milliGRAM(s) Oral at bedtime  pantoprazole    Tablet 40 milliGRAM(s) Oral before breakfast  phytonadione   Solution 5 milliGRAM(s) Oral daily  polyethylene glycol 3350 17 Gram(s) Oral daily  senna 2 Tablet(s) Oral at bedtime  tamsulosin 0.4 milliGRAM(s) Oral at bedtime    MEDICATIONS  (PRN):  diazepam    Tablet 5 milliGRAM(s) Oral every 8 hours PRN Anxiety  oxyCODONE    IR 5 milliGRAM(s) Oral every 4 hours PRN Moderate Pain (4 - 6)  traMADol 25 milliGRAM(s) Oral every 6 hours PRN Mild Pain (1 - 3)    DIET: regular

## 2023-05-20 NOTE — DISCHARGE NOTE PROVIDER - HOSPITAL COURSE
67M retired , R handed, no pmh, p/w word finding difficulty for past 3 wks and MRI demonstrating 3x3x4.5 cm ring enhancing mass in L anterior temporal lobe w/extension to BG and frontal lobe and associated vasogenic edema concerning for GBM. Patient admitted to [address] on 5/9 by neurosurgery for pre-op clearance by hospitalist. MR Spect on 5/11 per report showing a "Reidentified heterogeneously enhancing necrotic mass lesion centered within the left temporal lobe and extending into the left periinsular region with a cystic portion along the inferior aspect of the lesion, with a large degree of surrounding vasogenic edema throughout the left parietal temporal lobes and left basal ganglia This lesion likely reflects a high-grade glioma. Functional imaging demonstrates no hyperactivity in the region of tumor or vasogenic edema."     Patient met criteria and was enrolled in IMVAX Trial. He was cleared and proceeded with neurosurgery; on 5/15 had a Left pterional craniotomy for brain tumor resection. Frozen path: high-grade glioma. Bilateral abdominal incisions made for later implantation of drug treatment. Biologics implanted into abdominal cavity via incisions during bedside procedure per IMVAX trial on 5/17. These biologic agents were removed via bedside procedure on 5/19. Patients post op course uncomplicated. It was noted on lab work patients hepatic function enzymes were elevated, this improved and downtrended on repeat labs. Hepatotoxic meds were avoided and patient remained asymptomatic. 67M retired , R handed, no pmh, p/w word finding difficulty for past 3 wks and MRI demonstrating 3x3x4.5 cm ring enhancing mass in L anterior temporal lobe w/extension to BG and frontal lobe and associated vasogenic edema concerning for Glioblastoma multiforme (GBM). Patient admitted to [address] on 5/9 by neurosurgery for pre-op clearance by hospitalist. MR Spect on 5/11 per report showing a "Reidentified heterogeneously enhancing necrotic mass lesion centered within the left temporal lobe and extending into the left periinsular region with a cystic portion along the inferior aspect of the lesion, with a large degree of surrounding vasogenic edema throughout the left parietal temporal lobes and left basal ganglia This lesion likely reflects a high-grade glioma. Functional imaging demonstrates no hyperactivity in the region of tumor or vasogenic edema."     Patient met criteria and was enrolled in IMVAX Trial. He was cleared and proceeded with neurosurgery; on 5/15 had a Left pterional craniotomy for brain tumor resection with gleolan. Frozen path: high-grade glioma. Bilateral abdominal incisions made for later implantation of drug treatment. IMVAX Biologics were implanted into abdomen via incisions during bedside procedure per IMVAX trial on 5/17 under conscious sedation. These biologic agents were removed during bedside procedure on 5/19 under conscious sedation. Patient was monitored in the NSCU,  his post op course remained uncomplicated and he was transferred to neurosurgical floor [address] [place] [address] for continued monitoring. Vitals remained stable, neurologically his aphasia improved. Post op MRI on 5/16 notable for edema and residual nodular enhancement within the left insular region and left anterior temporal lobe. CT Serial CT Head imaging post op showing stable resolving post operative changes.     It was noted on lab work patients hepatic function enzymes were elevated, this improved and downtrended on repeat labs. Hepatotoxic meds were avoided and patient remained asymptomatic. Has been tolerating diet well.   Patient was evaluated by Physical Therapy services who recommended disposition when appropriate of discharge to home with no skilled PT needs.   On the day of discharge the patient is medically and neurologically stable for discharge

## 2023-05-20 NOTE — OCCUPATIONAL THERAPY INITIAL EVALUATION ADULT - LIVES WITH, PROFILE
Pt lives in a private house w/ spouse and 3 children. 6 steps to enter 1 flight within. walk in shower/children/spouse

## 2023-05-20 NOTE — DISCHARGE NOTE NURSING/CASE MANAGEMENT/SOCIAL WORK - NSDCPEFALRISK_GEN_ALL_CORE
For information on Fall & Injury Prevention, visit: [email or web address] OR  [email or web address] OR  https://www.cdc.gov/steadi/patient.html

## 2023-05-20 NOTE — PROGRESS NOTE ADULT - PROBLEM SELECTOR PLAN 3
- patient with METS>4. no active chest pain nor dyspnea on exertion  - RCRI risk 0 pts, Class I Risk, 3.9% 30 day risk of death, MI, or cardiac arrest  - EKG sinus shelton HR 59, no ischemic changes  - no contraindication from medicine standpoint for OR
see previous note
asymptomatic. per patient, HR has always generally been in the 50s for most of his life  - TSH wnl  - no further work-up required
- patient with METS>4. no active chest pain nor dyspnea on exertion  - RCRI risk 0 pts, Class I Risk, 3.9% 30 day risk of death, MI, or cardiac arrest  - EKG sinus shelton HR 59, no ischemic changes  - no contraindication from medicine standpoint for OR
- patient with METS>4. no active chest pain nor dyspnea on exertion  - RCRI risk 0 pts, Class I Risk, 3.9% 30 day risk of death, MI, or cardiac arrest  - EKG sinus shelton HR 59, no ischemic changes  - no contraindication from medicine standpoint for OR

## 2023-05-20 NOTE — DISCHARGE NOTE PROVIDER - NSDCMRMEDTOKEN_GEN_ALL_CORE_FT
Tylenol Extra Strength  mg-25 mg oral tablet: 1 orally once a day (at bedtime) as needed for  insomnia   dexAMETHasone 2 mg oral tablet: 1 tab(s) orally Take 2 tablets every 8 hours for 2 days (ending AM 5/22) starting 5/22 take 2 tablets every 12 hours for 2 days (end AM 5/24), starting 5/24 AM take 1.5 tablets every 12 hours for 2 days (end AM 5/26), then take 1 tablet every 12 hours  enalapril 10 mg oral tablet: 1 tab(s) orally once a day  Flomax 0.4 mg oral capsule: 1 cap(s) orally once a day (at bedtime)  levETIRAcetam 500 mg oral tablet: 1 tab(s) orally every 12 hours  melatonin 5 mg oral tablet: 1 tab(s) orally once a day (at bedtime)  polyethylene glycol 3350 oral powder for reconstitution: 17 gram(s) orally once a day  Protonix 40 mg oral delayed release tablet: 1 tab(s) orally once a day (before a meal)  senna leaf extract oral tablet: 2 tab(s) orally once a day (at bedtime)  Sodium Chloride 1 g oral tablet: 1 tab(s) orally 2 times a day

## 2023-05-20 NOTE — PROGRESS NOTE ADULT - ASSESSMENT
66 yo male with PMH of enlarged prostate and insomnia who presents with 3 weeks of word finding difficulty with outpatient abnormal MRI demonstrating 3x3x4.5 cm ring enhancing mass in L anterior temporal lobe with extension to BG and frontal lobe and associated vasogenic edema concerning for GBM.     Patient enrolled in IMVAX Trial    s/p Craniotomy for tumor with stereotactic guidance on 5/15 with gleolan   s/p abdominal implantation of biologics per imvax trial 5/17 bedside procedure  s/p removal of implanted biologic devices per imvax trial on 5/19 bedside procedure    MRI brain wwo- nodular enhancement with surrounding vasogenic edema, otherwise stable post op changes

## 2023-05-20 NOTE — PROGRESS NOTE ADULT - THIS PATIENT HAS THE FOLLOWING CONDITION(S)/DIAGNOSES ON THIS ADMISSION:
Cerebral Edema
None
Cerebral Edema
None
Cerebral Edema
Cerebral Edema
None
None
Cerebral Edema
None
Cerebral Edema
None

## 2023-05-20 NOTE — DISCHARGE NOTE PROVIDER - NSDCCPCAREPLAN_GEN_ALL_CORE_FT
PRINCIPAL DISCHARGE DIAGNOSIS  Diagnosis: Brain mass  Assessment and Plan of Treatment:       SECONDARY DISCHARGE DIAGNOSES  Diagnosis: Brain mass  Assessment and Plan of Treatment:      PRINCIPAL DISCHARGE DIAGNOSIS  Diagnosis: Brain mass  Assessment and Plan of Treatment: You had a Left pterional craniotomy for brain tumor resection with gleolan. Frozen pathology: high-grade glioma. Bilateral abdominal incisions made for later implantation of drug treatment on 5/15  IMVAX Biologics were implanted into abdomen via previously made incisions during bedside procedure per IMVAX trial on 5/17 under conscious sedation and removed on 5/19  * Please follow up with Dr. Galvez within 1-2 weeks from discharge*  *Please follow up with Dr. Bush within 1-2 weeks of discharge - the office will call you with the appointment date and time   You have a cranial incision site with staples and 2 abdominal incisions with sutures/staples. These staples will be removed at your outpatient follow up appt with the office of Dr. Bush.   ** You may wash scalp but do not scrub incision site**   Please keep incision clean and dry, do not submerge wound in water for prolonged periods of time, pat dry after showering, and do not use any creams or ointments to incision.  Please make all necessary appointments and follow up. Please DO NOT take any Aspirin and NSAIDs (Advil, Aleve, Motrin, Ibuprofen) until cleared by your Neurosurgeon. Please DO NOT do any heavy lifting, bending, twisting and straining. You may shower on Tuesday NO SOAP/ NO SHAMPOO to incision site. DO NOT do any scrubbing. Pat dry only. Please come to the emergency room for any of the following: altered mental status, seizures, pain uncontrolled by pain medications, fevers, leaking / bleeding from surgical site, chest pain and shortness of breath.  MEDICATIONS:   You have been started on a new medication, keppra.  Keppra helps control and prevent seizures.  The most common side effects include diarrhea or constipation, excessive sleepiness, irritability and mood changes.  Rare and sometimes serious side effects include rash.  You have been prescribed Steroids Dxamethasone please take as directed on prescription label. Take protonix while taking this medication to prevent ulcers        SECONDARY DISCHARGE DIAGNOSES  Diagnosis: Hypertension  Assessment and Plan of Treatment: Continue taking enalapril 10 mg daily  Please follow up with your primary care physician within 1-2 weeks of discharge    Diagnosis: Elevated liver enzymes  Assessment and Plan of Treatment: On this admission you had elevated liver enzymes (AST/ALT) it is important to follow up with your gastroenterologist / primary care physician to repeat this lab work within 1-2 weeks of discharge    Diagnosis: Brain mass  Assessment and Plan of Treatment:     Diagnosis: BPH (benign prostatic hyperplasia)  Assessment and Plan of Treatment: continue taking tamsulosin at night   follow up with primary care dr in 1-2 weeks of discharge    Diagnosis: Hyponatremia  Assessment and Plan of Treatment: Your lab work was consistent with low sodium levels (hyponatremia)  You have been prescribed sodium chloride tablets, please take 1 g twice a day for 3 days only. It is important to follow up on these values and have your lab work repeated within 1 week with your primary care provider. Your fluidn intake should NOT exceed 1 Liter per day

## 2023-05-20 NOTE — OCCUPATIONAL THERAPY INITIAL EVALUATION ADULT - PERTINENT HX OF CURRENT PROBLEM, REHAB EVAL
Pt is a 68 y/o male with PMH of enlarged prostate and insomnia who presents with 3 weeks of word finding difficulty with outpatient abnormal MRI demonstrating 3x3x4.5 cm ring enhancing mass in L anterior temporal lobe with extension to BG and frontal lobe and associated vasogenic edema concerning for GBM. Pt s/p L craniotomy for tumor with stereotactic guidance, abdominoplasty on 5/15.   s/p L craniotomy for tumor with stereotactic guidance, abdominoplasty    5/9 CXR: Clear lungs.  5/9 CT Chest/Ab/Pelvis: No CT evidence of occult malignancy in the chest, abdomen, or pelvis.  5/11 MR Brain: Reidentified is a heterogeneously enhancing necrotic mass lesion centered within the left temporal lobe and extending into the left periinsular region with a cystic portion along the inferior aspect of the lesion, with a large degree of surrounding vasogenic edema throughout the left   parietal temporal lobes and left basal ganglia. This results in mass effect on the surrounding parenchyma and on the left ventricular system. There is mild mass effect in the left cerebral peduncle. This results in left to right midline shift up to 5.5 mm at the level of the septum pellucidum. 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 Functional imaging demonstrates no hyperactivity in the region of tumor or vasogenic edema.  5/12 Duplex BLE: No evidence of deep venous thrombosis in either lower extremity.

## 2023-05-24 ENCOUNTER — APPOINTMENT (OUTPATIENT)
Dept: NEUROSURGERY | Facility: CLINIC | Age: 68
End: 2023-05-24

## 2023-05-31 ENCOUNTER — LABORATORY RESULT (OUTPATIENT)
Age: 68
End: 2023-05-31

## 2023-05-31 ENCOUNTER — APPOINTMENT (OUTPATIENT)
Dept: NEUROSURGERY | Facility: CLINIC | Age: 68
End: 2023-05-31
Payer: MEDICARE

## 2023-05-31 ENCOUNTER — APPOINTMENT (OUTPATIENT)
Dept: NEUROLOGY | Facility: CLINIC | Age: 68
End: 2023-05-31
Payer: MEDICARE

## 2023-05-31 VITALS — BODY MASS INDEX: 27.09 KG/M2 | WEIGHT: 200 LBS | HEIGHT: 72 IN

## 2023-05-31 VITALS
RESPIRATION RATE: 16 BRPM | BODY MASS INDEX: 27.5 KG/M2 | WEIGHT: 203 LBS | OXYGEN SATURATION: 96 % | SYSTOLIC BLOOD PRESSURE: 116 MMHG | HEART RATE: 92 BPM | HEIGHT: 72 IN | TEMPERATURE: 98.1 F | DIASTOLIC BLOOD PRESSURE: 72 MMHG

## 2023-05-31 PROCEDURE — 99495 TRANSJ CARE MGMT MOD F2F 14D: CPT

## 2023-05-31 PROCEDURE — 99024 POSTOP FOLLOW-UP VISIT: CPT

## 2023-05-31 RX ORDER — POLYETHYLENE GLYCOL 3350 17 G/17G
17 POWDER, FOR SOLUTION ORAL DAILY
Qty: 30 | Refills: 0 | Status: DISCONTINUED | COMMUNITY
Start: 2023-05-31 | End: 2023-05-31

## 2023-05-31 RX ORDER — SENNOSIDES 8.6 MG/1
8.6 CAPSULE, GELATIN COATED ORAL
Qty: 30 | Refills: 0 | Status: DISCONTINUED | COMMUNITY
Start: 2023-05-31 | End: 2023-05-31

## 2023-05-31 RX ORDER — NORMAL SALT TABLETS 1 G/G
1 TABLET ORAL TWICE DAILY
Qty: 60 | Refills: 0 | Status: DISCONTINUED | COMMUNITY
Start: 2023-05-31 | End: 2023-05-31

## 2023-05-31 RX ORDER — DEXAMETHASONE 2 MG/1
2 TABLET ORAL
Qty: 60 | Refills: 0 | Status: DISCONTINUED | COMMUNITY
Start: 2023-05-31 | End: 2023-05-31

## 2023-05-31 NOTE — HISTORY OF PRESENT ILLNESS
[FreeTextEntry1] : Mr. Liu is a 68 yo male who is here for a follow up post craniotomy to discuss pathology and further treatment plans\par In brief \par PMHx notable for only orthopedic issues - NKDA\par \par 5/9/2023 - I have seen him today with a new MRI. patient and his wife have noted over the past 3 weeks some difficulty with expressive speech - word substitutions and change in personality - more irritable that usual. He denies headaches, focal weakness,numbness, seizures, or gait instability. He saw Dr. Garcia, neurology - had and MRI this am - I have personally reviewed this film - that shows a left temporal heterogeneously enhancing mass measuring 5.0 x 4.5 x 3.3 cm with extension into the left frontal lobe with surrounding edema and left to right midline shift. He was sent to ER for an expedited evaluation\par 5/9/2023- 5/20/2023- Admitted at [facility]. MR Spect on 5/11 revealed  "Reidentified heterogeneously enhancing necrotic mass lesion centered within the left temporal lobe and extending into the left periinsular region with a cystic portion along the inferior aspect of the lesion, with a large degree of surrounding vasogenic edema throughout the left parietal temporal lobes and left basal ganglia This lesion likely reflects a \par high-grade glioma. Functional imaging demonstrates no hyperactivity in the region of tumor or vasogenic edema." \par Patient met criteria and was enrolled in IMVAX Trial.\par  5/15-  had a Left pterional craniotomy for brain tumor resection with gleolan. Frozen path: high-grade glioma. Bilateral abdominal incisions made for later implantation of drug treatment. IMVAX  Biologics were implanted into abdomen via incisions during bedside procedure per IMVAX trial on 5/17 under conscious sedation. These biologic agents were removed during bedside procedure on 5/19 under conscious sedation. Patient was \par monitored in the NSCU,  his post op course remained uncomplicated\par 5/16/2023 -  Post op MRI notable for edema and residual nodular enhancement within the left insular \par region and left anterior temporal lobe. CT Serial CT Head imaging post op showing stable resolving post operative changes. Patient was discharged home on 5/20/2023. 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 Patient was discharged on steroid taper, since they didn’t had a refill he stopped taking steroids since Monday.\par Denies headaches, seizures

## 2023-05-31 NOTE — DISCUSSION/SUMMARY
[FreeTextEntry1] : Patient seen and examined\par GBM - Neurologically stable. Pathology and treatment plan discussed\par Recommended ChemoRT X 6 weeks \par Recommended follow up with Dr Villegas for RT treatments\par CEREBRAL EDEMA - Continue monitor off steroids\par SEIZURE PROPHYLAXIS - Continue Keppra 500 mg bid\par FOLLOW UP - Will d a clinical follow up for chemo teaching . In the interim, if any concerns patient knows to call our office

## 2023-05-31 NOTE — PHYSICAL EXAM
[General Appearance - Alert] : alert [General Appearance - In No Acute Distress] : in no acute distress [General Appearance - Well Nourished] : well nourished [Oriented To Time, Place, And Person] : oriented to person, place, and time [Impaired Insight] : insight and judgment were intact [Affect] : the affect was normal [] : no respiratory distress [Mood] : the mood was normal [Respiration, Rhythm And Depth] : normal respiratory rhythm and effort [Exaggerated Use Of Accessory Muscles For Inspiration] : no accessory muscle use [Edema] : there was no peripheral edema [Abnormal Walk] : normal gait [FreeTextEntry1] : Patient seen and examined\par Alert, awake , Oriented X 3. Speech clear and fluent\par Able to name objects quickly and follow commands\par Memory at 3 minutes 1/3 - Repeat test 3/3\par PERRLA, EOMI, VFF\par Face symmetrical. Tongue protrudes midline\par BROWN x 4 with good and equal strength\par FFM, coordination equal bilaterally\par Sensation intact bilaterally\par Gait steady. Ambulating independently\par

## 2023-06-02 LAB
ALBUMIN SERPL ELPH-MCNC: 3.9 G/DL
ALP BLD-CCNC: 93 U/L
ALT SERPL-CCNC: 74 U/L
AMYLASE/CREAT SERPL: 85 U/L
AST SERPL-CCNC: 28 U/L
BUN SERPL-MCNC: 29 MG/DL
LPL SERPL-CCNC: 46 U/L

## 2023-06-05 LAB
APTT BLD: 24 SEC
BASOPHILS # BLD AUTO: 0 K/UL
BASOPHILS NFR BLD AUTO: 0 %
BILIRUB DIRECT SERPL-MCNC: 0.1 MG/DL
BILIRUB SERPL-MCNC: 0.4 MG/DL
CALCIUM SERPL-MCNC: 9.2 MG/DL
CHLORIDE SERPL-SCNC: 102 MMOL/L
CK SERPL-CCNC: 31 U/L
CO2 SERPL-SCNC: 28 MMOL/L
CYSTATIN C SERPL-MCNC: 0.87 MG/L
EMA BIND BLD QL FC: NORMAL
EOSINOPHIL # BLD AUTO: 0.1 K/UL
EOSINOPHIL NFR BLD AUTO: 0.9 %
FIBRINOGEN AG PPP IA-MCNC: 296 MG/DL
GFR/BSA.PRED SERPLBLD CYS-BASED-ARV: 92 ML/MIN/1.73M2
GGT SERPL-CCNC: 68 U/L
GLUCOSE SERPL-MCNC: 94 MG/DL
HCT VFR BLD CALC: 44.7 %
HGB BLD-MCNC: 14 G/DL
INR PPP: 0.99 RATIO
LDH SERPL-CCNC: 179 U/L
LYMPHOCYTES # BLD AUTO: 1.98 K/UL
LYMPHOCYTES NFR BLD AUTO: 17.2 %
MAGNESIUM SERPL-MCNC: 2.1 MG/DL
MONOCYTES # BLD AUTO: 1.48 K/UL
MONOCYTES NFR BLD AUTO: 12.9 %
MSMEAR: NORMAL
MYELOCYTES NFR BLD: 2.6 %
NEUTROPHILS # BLD AUTO: 7.64 K/UL
NEUTROPHILS NFR BLD AUTO: 66.4 %
PHOSPHATE SERPL-MCNC: 4.9 MG/DL
PLAT MORPH BLD: NORMAL
PLATELET # BLD AUTO: 278 K/UL
POTASSIUM SERPL-SCNC: 4.5 MMOL/L
PROT SERPL-MCNC: 6 G/DL
PT BLD: 11.6 SEC
RBC BLD AUTO: NORMAL
URATE SERPL-MCNC: 4.1 MG/DL
WBC # FLD AUTO: 11.51 K/UL

## 2023-06-06 ENCOUNTER — OUTPATIENT (OUTPATIENT)
Dept: OUTPATIENT SERVICES | Facility: HOSPITAL | Age: 68
LOS: 1 days | Discharge: HOME | End: 2023-06-06
Payer: MEDICARE

## 2023-06-06 ENCOUNTER — APPOINTMENT (OUTPATIENT)
Dept: RADIATION ONCOLOGY | Facility: CLINIC | Age: 68
End: 2023-06-06
Payer: MEDICARE

## 2023-06-06 VITALS
TEMPERATURE: 97.16 F | RESPIRATION RATE: 17 BRPM | HEIGHT: 72 IN | OXYGEN SATURATION: 99 % | BODY MASS INDEX: 27.09 KG/M2 | SYSTOLIC BLOOD PRESSURE: 155 MMHG | WEIGHT: 200 LBS | DIASTOLIC BLOOD PRESSURE: 77 MMHG | HEART RATE: 56 BPM

## 2023-06-06 DIAGNOSIS — Z98.890 OTHER SPECIFIED POSTPROCEDURAL STATES: Chronic | ICD-10-CM

## 2023-06-06 PROCEDURE — 99205 OFFICE O/P NEW HI 60 MIN: CPT | Mod: 25

## 2023-06-06 PROCEDURE — 77263 THER RADIOLOGY TX PLNG CPLX: CPT

## 2023-06-06 NOTE — VITALS
[Maximal Pain Intensity: 0/10] : 0/10 [Least Pain Intensity: 0/10] : 0/10 [90: Able to carry normal activity; minor signs or symptoms of disease.] : 90: Able to carry normal activity; minor signs or symptoms of disease.  [Date: ____________] : Patient's last distress assessment performed on [unfilled]. [6 - Distress Level] : Distress Level: 6 Tissue Cultured Epidermal Autograft Text: The defect edges were debeveled with a #15 scalpel blade.  Given the location of the defect, shape of the defect and the proximity to free margins a tissue cultured epidermal autograft was deemed most appropriate.  The graft was then trimmed to fit the size of the defect.  The graft was then placed in the primary defect and oriented appropriately.

## 2023-06-06 NOTE — REVIEW OF SYSTEMS
[Fatigue] : fatigue [Confused] : confusion [Negative] : Heme/Lymph [de-identified] : +Expressive aphasia

## 2023-06-06 NOTE — PHYSICAL EXAM
[Sclera] : the sclera and conjunctiva were normal [Abdomen Soft] : soft [Normal] : normal spine exam without palpable tenderness, no kyphosis or scoliosis [Musculoskeletal - Swelling] : no joint swelling [Oriented To Time, Place, And Person] : oriented to person, place, and time [de-identified] : Healed left head surgical scar [de-identified] : Slow to verbal respond

## 2023-06-06 NOTE — REASON FOR VISIT
[Consideration for Non-Curative Therapy] : consideration for non-curative therapy for [Brain Tumor] : brain tumor [Spouse] : spouse

## 2023-06-09 ENCOUNTER — NON-APPOINTMENT (OUTPATIENT)
Age: 68
End: 2023-06-09

## 2023-06-09 PROCEDURE — 77334 RADIATION TREATMENT AID(S): CPT | Mod: 26

## 2023-06-13 LAB
ALBUMIN SERPL ELPH-MCNC: 4.1 G/DL
ALP BLD-CCNC: 94 U/L
ALT SERPL-CCNC: 24 U/L
AMYLASE/CREAT SERPL: 51 U/L
ANION GAP SERPL CALC-SCNC: 10 MMOL/L
ANION GAP SERPL CALC-SCNC: 11 MMOL/L
APTT BLD: 28.4 SEC
AST SERPL-CCNC: 15 U/L
BILIRUB SERPL-MCNC: 0.2 MG/DL
BUN SERPL-MCNC: 16 MG/DL
BUN SERPL-MCNC: 16 MG/DL
CALCIUM SERPL-MCNC: 10 MG/DL
CALCIUM SERPL-MCNC: 9.8 MG/DL
CHLORIDE SERPL-SCNC: 101 MMOL/L
CHLORIDE SERPL-SCNC: 102 MMOL/L
CK SERPL-CCNC: 36 U/L
CO2 SERPL-SCNC: 28 MMOL/L
CO2 SERPL-SCNC: 28 MMOL/L
CREAT SERPL-MCNC: 0.88 MG/DL
CREAT SERPL-MCNC: 0.96 MG/DL
EGFR: 88 ML/MIN/1.73M2
EGFR: 95 ML/MIN/1.73M2
GGT SERPL-CCNC: 36 U/L
GLUCOSE SERPL-MCNC: 90 MG/DL
GLUCOSE SERPL-MCNC: 91 MG/DL
INR PPP: 1.11 RATIO
LDH SERPL-CCNC: 152 U/L
LPL SERPL-CCNC: 36 U/L
PHOSPHATE SERPL-MCNC: 4.2 MG/DL
POTASSIUM SERPL-SCNC: 4.5 MMOL/L
POTASSIUM SERPL-SCNC: 4.9 MMOL/L
PROT SERPL-MCNC: 6.1 G/DL
PT BLD: 13.1 SEC
SODIUM SERPL-SCNC: 140 MMOL/L
SODIUM SERPL-SCNC: 141 MMOL/L
URATE SERPL-MCNC: 4.8 MG/DL

## 2023-06-14 ENCOUNTER — APPOINTMENT (OUTPATIENT)
Dept: NEUROSURGERY | Facility: CLINIC | Age: 68
End: 2023-06-14
Payer: MEDICARE

## 2023-06-14 VITALS
HEIGHT: 72 IN | DIASTOLIC BLOOD PRESSURE: 69 MMHG | HEART RATE: 60 BPM | SYSTOLIC BLOOD PRESSURE: 133 MMHG | TEMPERATURE: 97.9 F | RESPIRATION RATE: 16 BRPM | OXYGEN SATURATION: 97 % | BODY MASS INDEX: 27.77 KG/M2 | WEIGHT: 205 LBS

## 2023-06-14 DIAGNOSIS — H53.8 OTHER VISUAL DISTURBANCES: ICD-10-CM

## 2023-06-14 PROCEDURE — 99024 POSTOP FOLLOW-UP VISIT: CPT

## 2023-06-15 PROBLEM — H53.8 BLURRY VISION: Status: ACTIVE | Noted: 2023-06-15

## 2023-06-15 NOTE — ASSESSMENT
[FreeTextEntry1] : Mr Noe is a 67 year old male with 3 weeks of expressive aphasia. MRI revealed a left frontal mass. He enrolled in the IMVAX trial and on 5/15 he underwent a pterional craniotomy for brain tumor resection. Today he reports b/l eye blurriness but otherwise feels well, report improvement with cognition and strength and endurance. \par \par Discussion:\par - Continue steroid as per Dr. Galvez\par - RT and TMZ to start next week\par - Follow up OV after RT and MRI\par -Contact office with any questions or concerns in the interim.\par \par .IMelvin evaluated the patient with the nurse practitioner Joann Barthelemy and established the plan of care. I personally discuss this patient with the nurse practitioner at the time of the visit. I agree with the assessment and plan as written, unless noted below.

## 2023-06-15 NOTE — REASON FOR VISIT
[Follow-Up: _____] : a [unfilled] follow-up visit [FreeTextEntry1] : 67M w/ hx of 3 weeks some difficulty with expressive speech, MRI showed L FT mass, s/p 5/15 Left pterional craniotomy for brain tumor resection with gleolan. Frozen path: high-grade glioma. Bilateral abdominal incisions made for later implantation of drug treatment. IMVAX Biologics were implanted into abdomen via incisions during bedside procedure per IMVAX trial on 5/17 under conscious sedation. These biologic agents were removed during bedside procedure on 5/19 under conscious sedation. Patient was monitored in the NSCU, his post op course remained uncomplicated\par \par Day 28- Today he reports b/l eye blurriness (R>L) currently on dexamethasone 2 mg daily - unsure if vision has improved since starting steroids and occasional forgetfulness. Otherwise he reports feeling well. States his mental status has improved - last week reports mental status of a 6 year old this week reports mental status of a teenager. States he is sleeping better, WFD has improved, feels stronger with each day able to perform all ADLs independently, and driving short distances w/o difficulty. \par \par On Exam: A+O x 4, EOMI, PERRL, speech clear and fluent, face symmetrical, word recall 3/3, BROWN 5/5, gait steady, surgical incisions well healed.  \par KPS 90

## 2023-06-15 NOTE — PHYSICAL EXAM
[General Appearance - Alert] : alert [General Appearance - In No Acute Distress] : in no acute distress [General Appearance - Well Nourished] : well nourished [Oriented To Time, Place, And Person] : oriented to person, place, and time [Impaired Insight] : insight and judgment were intact [Affect] : the affect was normal [Person] : oriented to person [Place] : oriented to place [Time] : oriented to time [Short Term Intact] : short term memory intact [Remote Intact] : remote memory intact [Registration Intact] : recent registration memory intact [Motor Strength] : muscle strength was normal in all four extremities [No Muscle Atrophy] : normal bulk in all four extremities [Balance] : balance was intact [PERRL With Normal Accommodation] : pupils were equal in size, round, reactive to light, with normal accommodation [Extraocular Movements] : extraocular movements were intact [] : no respiratory distress [Abdomen Tenderness] : non-tender [Abnormal Walk] : normal gait [Involuntary Movements] : no involuntary movements were seen [Motor Tone] : muscle strength and tone were normal [Skin Color & Pigmentation] : normal skin color and pigmentation [Romberg's Sign] : Romberg's sign was negtive

## 2023-06-19 ENCOUNTER — INPATIENT (INPATIENT)
Facility: HOSPITAL | Age: 68
LOS: 3 days | Discharge: HOME | DRG: 100 | End: 2023-06-23
Attending: SPECIALIST | Admitting: SPECIALIST
Payer: MEDICARE

## 2023-06-19 VITALS
OXYGEN SATURATION: 94 % | TEMPERATURE: 98 F | HEART RATE: 69 BPM | SYSTOLIC BLOOD PRESSURE: 192 MMHG | HEIGHT: 72 IN | RESPIRATION RATE: 16 BRPM | WEIGHT: 199.96 LBS | DIASTOLIC BLOOD PRESSURE: 81 MMHG

## 2023-06-19 DIAGNOSIS — R56.9 UNSPECIFIED CONVULSIONS: ICD-10-CM

## 2023-06-19 DIAGNOSIS — Z98.890 OTHER SPECIFIED POSTPROCEDURAL STATES: Chronic | ICD-10-CM

## 2023-06-19 DIAGNOSIS — C71.9 MALIGNANT NEOPLASM OF BRAIN, UNSPECIFIED: Chronic | ICD-10-CM

## 2023-06-19 LAB
ALBUMIN SERPL ELPH-MCNC: 3.8 G/DL — SIGNIFICANT CHANGE UP (ref 3.3–5)
ALP SERPL-CCNC: 77 U/L — SIGNIFICANT CHANGE UP (ref 40–120)
ALT FLD-CCNC: 17 U/L — SIGNIFICANT CHANGE UP (ref 10–45)
ANION GAP SERPL CALC-SCNC: 13 MMOL/L — SIGNIFICANT CHANGE UP (ref 5–17)
APPEARANCE UR: CLEAR — SIGNIFICANT CHANGE UP
APTT BLD: 26.5 SEC — LOW (ref 27.5–35.5)
AST SERPL-CCNC: 12 U/L — SIGNIFICANT CHANGE UP (ref 10–40)
BACTERIA # UR AUTO: NEGATIVE — SIGNIFICANT CHANGE UP
BASOPHILS # BLD AUTO: 0.16 K/UL — SIGNIFICANT CHANGE UP (ref 0–0.2)
BASOPHILS NFR BLD AUTO: 1.7 % — SIGNIFICANT CHANGE UP (ref 0–2)
BILIRUB SERPL-MCNC: 0.4 MG/DL — SIGNIFICANT CHANGE UP (ref 0.2–1.2)
BILIRUB UR-MCNC: NEGATIVE — SIGNIFICANT CHANGE UP
BUN SERPL-MCNC: 17 MG/DL — SIGNIFICANT CHANGE UP (ref 7–23)
CALCIUM SERPL-MCNC: 9.2 MG/DL — SIGNIFICANT CHANGE UP (ref 8.4–10.5)
CHLORIDE SERPL-SCNC: 101 MMOL/L — SIGNIFICANT CHANGE UP (ref 96–108)
CO2 SERPL-SCNC: 23 MMOL/L — SIGNIFICANT CHANGE UP (ref 22–31)
COLOR SPEC: COLORLESS — SIGNIFICANT CHANGE UP
CREAT SERPL-MCNC: 0.88 MG/DL — SIGNIFICANT CHANGE UP (ref 0.5–1.3)
DIFF PNL FLD: ABNORMAL
EGFR: 95 ML/MIN/1.73M2 — SIGNIFICANT CHANGE UP
EOSINOPHIL # BLD AUTO: 0.09 K/UL — SIGNIFICANT CHANGE UP (ref 0–0.5)
EOSINOPHIL NFR BLD AUTO: 0.9 % — SIGNIFICANT CHANGE UP (ref 0–6)
EPI CELLS # UR: 0 /HPF — SIGNIFICANT CHANGE UP
GIANT PLATELETS BLD QL SMEAR: PRESENT — SIGNIFICANT CHANGE UP
GLUCOSE SERPL-MCNC: 109 MG/DL — HIGH (ref 70–99)
GLUCOSE UR QL: NEGATIVE — SIGNIFICANT CHANGE UP
HCT VFR BLD CALC: 41 % — SIGNIFICANT CHANGE UP (ref 39–50)
HGB BLD-MCNC: 13.6 G/DL — SIGNIFICANT CHANGE UP (ref 13–17)
HYALINE CASTS # UR AUTO: 0 /LPF — SIGNIFICANT CHANGE UP (ref 0–2)
INR BLD: 1.16 RATIO — SIGNIFICANT CHANGE UP (ref 0.88–1.16)
KETONES UR-MCNC: NEGATIVE — SIGNIFICANT CHANGE UP
LEUKOCYTE ESTERASE UR-ACNC: NEGATIVE — SIGNIFICANT CHANGE UP
LYMPHOCYTES # BLD AUTO: 1.07 K/UL — SIGNIFICANT CHANGE UP (ref 1–3.3)
LYMPHOCYTES # BLD AUTO: 11.3 % — LOW (ref 13–44)
MAGNESIUM SERPL-MCNC: 1.8 MG/DL — SIGNIFICANT CHANGE UP (ref 1.6–2.6)
MANUAL SMEAR VERIFICATION: SIGNIFICANT CHANGE UP
MCHC RBC-ENTMCNC: 30.6 PG — SIGNIFICANT CHANGE UP (ref 27–34)
MCHC RBC-ENTMCNC: 33.2 GM/DL — SIGNIFICANT CHANGE UP (ref 32–36)
MCV RBC AUTO: 92.1 FL — SIGNIFICANT CHANGE UP (ref 80–100)
MONOCYTES # BLD AUTO: 0.83 K/UL — SIGNIFICANT CHANGE UP (ref 0–0.9)
MONOCYTES NFR BLD AUTO: 8.7 % — SIGNIFICANT CHANGE UP (ref 2–14)
NEUTROPHILS # BLD AUTO: 7.27 K/UL — SIGNIFICANT CHANGE UP (ref 1.8–7.4)
NEUTROPHILS NFR BLD AUTO: 76.5 % — SIGNIFICANT CHANGE UP (ref 43–77)
NITRITE UR-MCNC: NEGATIVE — SIGNIFICANT CHANGE UP
PH UR: 7 — SIGNIFICANT CHANGE UP (ref 5–8)
PHOSPHATE SERPL-MCNC: 3.6 MG/DL — SIGNIFICANT CHANGE UP (ref 2.5–4.5)
PLAT MORPH BLD: ABNORMAL
PLATELET # BLD AUTO: 257 K/UL — SIGNIFICANT CHANGE UP (ref 150–400)
POTASSIUM SERPL-MCNC: 4.1 MMOL/L — SIGNIFICANT CHANGE UP (ref 3.5–5.3)
POTASSIUM SERPL-SCNC: 4.1 MMOL/L — SIGNIFICANT CHANGE UP (ref 3.5–5.3)
PROMYELOCYTES # FLD: 0.9 % — HIGH (ref 0–0)
PROT SERPL-MCNC: 6.3 G/DL — SIGNIFICANT CHANGE UP (ref 6–8.3)
PROT UR-MCNC: NEGATIVE — SIGNIFICANT CHANGE UP
PROTHROM AB SERPL-ACNC: 13.5 SEC — HIGH (ref 10.5–13.4)
RBC # BLD: 4.45 M/UL — SIGNIFICANT CHANGE UP (ref 4.2–5.8)
RBC # FLD: 13.3 % — SIGNIFICANT CHANGE UP (ref 10.3–14.5)
RBC BLD AUTO: SIGNIFICANT CHANGE UP
RBC CASTS # UR COMP ASSIST: 4 /HPF — SIGNIFICANT CHANGE UP (ref 0–4)
SODIUM SERPL-SCNC: 137 MMOL/L — SIGNIFICANT CHANGE UP (ref 135–145)
SP GR SPEC: >1.05 (ref 1.01–1.02)
UROBILINOGEN FLD QL: NEGATIVE — SIGNIFICANT CHANGE UP
WBC # BLD: 9.5 K/UL — SIGNIFICANT CHANGE UP (ref 3.8–10.5)
WBC # FLD AUTO: 9.5 K/UL — SIGNIFICANT CHANGE UP (ref 3.8–10.5)
WBC UR QL: 1 /HPF — SIGNIFICANT CHANGE UP (ref 0–5)

## 2023-06-19 PROCEDURE — 77300 RADIATION THERAPY DOSE PLAN: CPT | Mod: 26

## 2023-06-19 PROCEDURE — 99285 EMERGENCY DEPT VISIT HI MDM: CPT | Mod: FS

## 2023-06-19 PROCEDURE — 77338 DESIGN MLC DEVICE FOR IMRT: CPT | Mod: 26

## 2023-06-19 PROCEDURE — 71045 X-RAY EXAM CHEST 1 VIEW: CPT | Mod: 26

## 2023-06-19 PROCEDURE — 70470 CT HEAD/BRAIN W/O & W/DYE: CPT | Mod: 26,MC

## 2023-06-19 PROCEDURE — 77301 RADIOTHERAPY DOSE PLAN IMRT: CPT | Mod: 26

## 2023-06-19 RX ORDER — POLYETHYLENE GLYCOL 3350 17 G/17G
17 POWDER, FOR SOLUTION ORAL DAILY
Refills: 0 | Status: DISCONTINUED | OUTPATIENT
Start: 2023-06-19 | End: 2023-06-23

## 2023-06-19 RX ORDER — LEVETIRACETAM 250 MG/1
1000 TABLET, FILM COATED ORAL ONCE
Refills: 0 | Status: COMPLETED | OUTPATIENT
Start: 2023-06-19 | End: 2023-06-19

## 2023-06-19 RX ORDER — ENOXAPARIN SODIUM 100 MG/ML
40 INJECTION SUBCUTANEOUS EVERY 24 HOURS
Refills: 0 | Status: DISCONTINUED | OUTPATIENT
Start: 2023-06-19 | End: 2023-06-23

## 2023-06-19 RX ORDER — PANTOPRAZOLE SODIUM 20 MG/1
40 TABLET, DELAYED RELEASE ORAL
Refills: 0 | Status: DISCONTINUED | OUTPATIENT
Start: 2023-06-19 | End: 2023-06-23

## 2023-06-19 RX ORDER — DEXAMETHASONE 0.5 MG/5ML
2 ELIXIR ORAL DAILY
Refills: 0 | Status: DISCONTINUED | OUTPATIENT
Start: 2023-06-20 | End: 2023-06-23

## 2023-06-19 RX ORDER — ACETAMINOPHEN 500 MG
650 TABLET ORAL ONCE
Refills: 0 | Status: COMPLETED | OUTPATIENT
Start: 2023-06-19 | End: 2023-06-19

## 2023-06-19 RX ORDER — SODIUM CHLORIDE 9 MG/ML
1000 INJECTION INTRAMUSCULAR; INTRAVENOUS; SUBCUTANEOUS
Refills: 0 | Status: DISCONTINUED | OUTPATIENT
Start: 2023-06-19 | End: 2023-06-20

## 2023-06-19 RX ORDER — DEXAMETHASONE 0.5 MG/5ML
10 ELIXIR ORAL ONCE
Refills: 0 | Status: COMPLETED | OUTPATIENT
Start: 2023-06-19 | End: 2023-06-19

## 2023-06-19 RX ORDER — LEVETIRACETAM 250 MG/1
500 TABLET, FILM COATED ORAL
Refills: 0 | Status: DISCONTINUED | OUTPATIENT
Start: 2023-06-19 | End: 2023-06-20

## 2023-06-19 RX ADMIN — Medication 102 MILLIGRAM(S): at 14:37

## 2023-06-19 RX ADMIN — Medication 650 MILLIGRAM(S): at 16:50

## 2023-06-19 RX ADMIN — LEVETIRACETAM 400 MILLIGRAM(S): 250 TABLET, FILM COATED ORAL at 09:09

## 2023-06-19 RX ADMIN — Medication 650 MILLIGRAM(S): at 17:45

## 2023-06-19 RX ADMIN — LEVETIRACETAM 500 MILLIGRAM(S): 250 TABLET, FILM COATED ORAL at 18:43

## 2023-06-19 RX ADMIN — SODIUM CHLORIDE 100 MILLILITER(S): 9 INJECTION INTRAMUSCULAR; INTRAVENOUS; SUBCUTANEOUS at 16:51

## 2023-06-19 NOTE — ED ADULT NURSE NOTE - NSFALLUNIVINTERV_ED_ALL_ED
Bed/Stretcher in lowest position, wheels locked, appropriate side rails in place/Call bell, personal items and telephone in reach/Instruct patient to call for assistance before getting out of bed/chair/stretcher/Non-slip footwear applied when patient is off stretcher/[place] to call system/Physically safe environment - no spills, clutter or unnecessary equipment/Purposeful proactive rounding/Room/bathroom lighting operational, light cord in reach

## 2023-06-19 NOTE — CONSULT NOTE ADULT - SUBJECTIVE AND OBJECTIVE BOX
p ([id number])     HPI: Reymundo Liu  67M Hx GBM resected 5/15/23 w/IMVAX trial sent in by Dr. Galvez for episodes of emma-erection a/w unease and hyperventilation. CTH ww/o w/relatively stable compared to postop MR, with modestly reduced edema and stable residual. No episodes since receiving keppra load in ED.  Exam: AOx3, aphasia significantly improved to moderate expressive aphasia, PERRL, EOMI, no facial, no drift, BROWN 5/5. Cranial and abdominal wounds c/d/i w/o erythema        =====================  PAST MEDICAL HISTORY   H/O insomnia    Elbow pain, right      PAST SURGICAL HISTORY   S/P foot surgery    S/P eye surgery    S/P arthroscopy of right shoulder    History of arthroscopy of left shoulder    H/O arthroscopy of knee      No Known Allergies      MEDICATIONS:  Antibiotics:    Neuro:    Other:      SOCIAL HISTORY:   Occupation:   Marital Status:     FAMILY HISTORY:  Family history of breast cancer (Mother)    Family history of myotonic dystrophy (Sibling, Sibling, Sibling, Sibling)        ROS: Negative except per HPI    LABS:  PT/INR - ( 19 Jun 2023 09:14 )   PT: 13.5 sec;   INR: 1.16 ratio         PTT - ( 19 Jun 2023 09:14 )  PTT:26.5 sec                        13.6   9.50  )-----------( 257      ( 19 Jun 2023 09:14 )             41.0     06-19    137  |  101  |  17  ----------------------------<  109<H>  4.1   |  23  |  0.88    Ca    9.2      19 Jun 2023 09:14  Phos  3.6     06-19  Mg     1.8     06-19    TPro  6.3  /  Alb  3.8  /  TBili  0.4  /  DBili  x   /  AST  12  /  ALT  17  /  AlkPhos  77  06-19

## 2023-06-19 NOTE — ED PROVIDER NOTE - ATTENDING APP SHARED VISIT CONTRIBUTION OF CARE
MD Torres:  patient seen and evaluated with the PA.  I was present for key portions of the History and Physical, and I agree with the Impression and Plan.      Patient is a 67-year-old male brought in by wife for evaluation of shaking episodes.  Context: Patient is 1 month postop left parietal GBM resection by Dr. Pak  Duration 3 days .  Quantity: Patient states he feels this sensation approximately 20 does  Quality: Patient is not exhibiting shaking episodes, rather, he describes them as an "internal sensation of unease."     Associated symptoms: + Bilateral upper extremity piloerection.  no slurred speech, no weakness, no numbness, no headache, no chest pain no shortness of breath,     Vital signs: Hypertension appreciated  Gen: Well appearing M in NAD.  Head: Left temporoparietal surgical wound well-healed.     PERRL, EOMI.  Neck: trachea midline, supple.  Resp:  No distress, CTA B.  Cardiac RRR, no RMG.    Abdomen:  soft, nondistended, nontender; no R/G.  Ext: no deformities, no edema.  Neuro:  A&Ox4 appears non focal. Skin:  Warm and dry as visualized, no rash.   Psych:  Normal affect and mood.      Medical decision making: History physical concerning for possible absence seizures, versus other systemic pathology (infection electrolyte abnormality, etc.).  We will perform CT head with and without IV contrast as there is a possibility patient could be having intraparenchymal hemorrhage versus progression of his disease.    ECG directly visualized by me and shows normal sinus rhythm, rate 63, , QRS 94, QTc 419, no ST elevations or depressions.

## 2023-06-19 NOTE — H&P ADULT - ATTENDING COMMENTS
67-year-old right-handed man, hx of GBM (resected 5/15/23 w/ IMVAX trial, followed by Dr. Galvez & Dr. Bush) sent in by neuro-oncologist Dr. Galvez for frequent episodes of intense fear / uneasiness, feeling "stressed out of my mind," chills, piloerection to all extremities, palpitations, and increased respiratory rate, all of which lasts for less than 1 minute, and completely resolve. Patient's spouse at bedside confirming the above. Patient and spouse reporting that symptoms started about 5-6 days ago w/ about 10 episodes throughout the entire day. The frequency progressively increased over the course of the week. Patient having these episodes about every 10-15 minutes, with the same duration of less than 1 minute. Patient seen and examined with neurology team this morning. He hd 15 events within 35 minutes that were recorded by wife. He has no hx of anxiety or recent stressors. Pt had 1-2 events while team was present. Pt also seen by Dr. Bush and reported that there was a 6-8 break in events after he received Keppra last night.   Pt given 1mg IV ativan this morning and reported improvement in symptoms.   Suspect insular/temporal lobe focal seizures without impaired awareness due to underlying disease process. Patient also noted to have hyponatremia since admission to [facility].      Seizures:  - Increase Keppra to 1g BID  - Ativan 1mg IV for seizure clusters  - Routine EEG/ VEEG    GBM  - Patient given 10mg decadron load on admission  - Continue decadron 2mg qd    Hyponatremia:  - patient seen by nephrology fellow, suspecting SIADH  - send urina osm, urine Na, serum osm, TSH, cortisol  - S/p infusionof 3% saline  - rpt BMP this evening    SQ hep for DVT PPx

## 2023-06-19 NOTE — ED ADULT NURSE REASSESSMENT NOTE - BEFAST ARM NUMBNESS
Admitted from ED via stretcher. Pt is alert and oriented x 4. Primarily Polish speaking, wife at bedside to translate. VS stable, afebrile. Up x 1 assist and supervision. Presents with left lower back and bilateral hip pain which is at 3-4/10 at rest and increases with movement. PRN Flexeril administered x 1. No complaints of nausea, vomiting or diarrhea. Right arm PIVs patent and intact, easily flushed - saline locked. Respiratory pathogen panel sent - pt on contact and droplet precautions. All medications administered as per MAR, tolerated well. All needs attended to. Will continue to monitor and update MD of any changes in pt's condition.       
No

## 2023-06-19 NOTE — ED PROVIDER NOTE - PHYSICAL EXAMINATION
GEN: Pt in NAD, A&O x3. GCS 15  EYES: Sclera white w/o injection, PERRLA.  ENT: Head NCAT. Surgical incision left parietal region. Nose without deformity, turbinates without edema or erythema, no DC. No auricular TTP. Mouth and pharynx without erythema or exudates, uvula midline, no tonsillar enlargement. Neck supple FROM.   RESP: No chest wall tenderness, CTA b/l, no wheezes, rales, or rhonchi.   CARDIAC: RRR, clear distinct S1, S2, no murmurs, gallops, or rubs.   ABD:  B/L surgical incisions LLQ and RLQ, healing satisfactorilly. Abdomen non-distended, soft, non-tender, no rebound or guarding. No CVAT b/l.  VASC: 2+ radial and dorsalis pedis pulses b/l. No edema or tenderness of the lower extremities.  NEURO: CN 3-12 grossly intact. Normal and equal sensation UE, LE and face b/l. 5/5 strength UE and LE b/l.   Normal gait and gross cerebellar functioning. GEN: Pt in NAD, A&O x3. GCS 15  EYES: Sclera white w/o injection, PERRLA.  ENT: Head NCAT. Surgical incision left parietal region. Nose without deformity, turbinates without edema or erythema, no DC. No auricular TTP. Mouth and pharynx without erythema or exudates, uvula midline, no tonsillar enlargement. Neck supple FROM. No lymphadenopathy  RESP: No chest wall tenderness, CTA b/l, no wheezes, rales, or rhonchi.   CARDIAC: RRR, clear distinct S1, S2, no murmurs, gallops, or rubs.   ABD:  B/L surgical incisions LLQ and RLQ, healing satisfactorilly. Abdomen non-distended, soft, non-tender, no rebound or guarding. No CVAT b/l.  VASC: 2+ radial and dorsalis pedis pulses b/l. No edema or tenderness of the lower extremities.  NEURO: CN 3-12 grossly intact. Normal and equal sensation UE, LE and face b/l. 5/5 strength UE and LE b/l.   Normal gait and gross cerebellar functioning.

## 2023-06-19 NOTE — ED PROVIDER NOTE - CLINICAL SUMMARY MEDICAL DECISION MAKING FREE TEXT BOX
Medical decision making: History physical concerning for possible absence seizures, versus other systemic pathology (infection electrolyte abnormality, etc.).  We will perform CT head with and without IV contrast as there is a possibility patient could be having intraparenchymal hemorrhage versus progression of his disease.    ECG directly visualized by me and shows normal sinus rhythm, rate 63, , QRS 94, QTc 419, no ST elevations or depressions.

## 2023-06-19 NOTE — H&P ADULT - NSHPPHYSICALEXAM_GEN_ALL_CORE
Constitutional: well-developed, well-nourished, well-groomed  Eyes: ophthalmoscopic exam deferred secondary to COVID-19 pandemic  Cardiovascular: no swelling, warm-to-touch    Neurological Examination:    - Mental Status: Eyes open, attends to examiner, oriented to person, age, place, and time; speech is fluent with intact naming (names all items in NIHSS card, names examiner's zipper, ID badge, pen, thumb, elbow), however cannot provide names of most famous figures that he recognizes even w/ pictures, intact repetition, and follows 1-step and 3-step mid-line crossing commands; good overall fund of knowledge (aware of current events, relevant past history, and vocabulary appropriate for level of education), however, on asking names of POTUS, he cannot name any independently, other than [place] Hendrix, cannot name capitol of [place] or [place]; immediate recall is 3/3 words and delayed recall is 3/3 words at 5 minutes; able to spell WORLD backwards and recite the days of the week in reverse; able to read a sentence.    - Cranial Nerves: visual fields are full to confrontation; pupils are equal, round, and reactive to light; extraocular movements are intact without nystagmus; mild ?R ptosis; facial sensation is intact in the V1-V3 distribution bilaterally; face is grossly symmetric with normal eye closure and smile; hearing is intact to conversation; uvula is midline and soft palate rises symmetrically; shoulder shrug intact; tongue protrudes in the midline.    - Motor/Strength Testing:                                 Right           Left  Deltoid                     5                 5  Biceps                      5                 5  Triceps                     5                 5  Hand                  5                 5  Hip Flex                   5                  5  Knee Ext	      5                  5  Dorsiflex                  5                  5  Plantarflex               5                  5    - Normal muscle bulk and tone throughout.    - Reflexes:   Bicep (C5/C6):                  R 2+ --- L 2+   Brachioradialis (C5/C6) :   R 2+ --- L 2+   Patella (L3/L4) :                 R 2+ --- L 2+   Ankle (S1) :                       R 2+ --- L 2+     - Plant responses neutral bilaterally.    - Sensory: Intact throughout to light touch.   - Coordination: Finger-nose-finger intact without dysmetria.

## 2023-06-19 NOTE — ED PROVIDER NOTE - PROGRESS NOTE DETAILS
Spoke with Neurosurgery resident. Awaiting CT scan results for further considerations. Spoke w/ Neurology resident for guidance on appropriate workup. Added CK check. Spoke w/ Neurology about CT scan results. All clear from a neurosurgery perspective. Still waiting on lab work. Spoke w/ Neurology about CT scan results. All clear from a neurosurgery perspective. Still waiting on lab work. neurology to see for consideration of focal seizure. Spoke with Neurosurgery resident. Awaiting CT scan results for further considerations.  Vicki Rivero PA-C Spoke w/ Neurology resident for guidance on appropriate workup. Added CK check.  Vicki Rivero PA-C Spoke w/ Neurology about CT scan results. All clear from a neurosurgery perspective. Still waiting on lab work. neurology to see for consideration of focal seizure.  Vicki Rivero PA-C Updated pt about CT Scan results. Understood that at this point, from a neurosurgery perspective. results are as expected. Waiting for neurology consult. Patient states that he feels improved and has not had an episode since being here.  Vicki Rivero PA-C Spoke w/ Neuro. Recommended admission to their service. Also recommended Dexamethasone bolus 10mg stat.  Vicki Rivero PA-C Spoke w/ Neurology. Recommended admission to their service. Also recommended Dexamethasone bolus 10mg stat.  Vicki Rivero PA-C

## 2023-06-19 NOTE — ED ADULT NURSE NOTE - OBJECTIVE STATEMENT
66 y/o M with PMH of glioblastoma s/p brain surgery in may, HTN presents to ED with multiple complaints. Pt reports since wednesday, pt has been experiencing multiple ine minute episodes of a "weird feeling" and "feels scared" associated with chills and shortness of breath. Pt reports it happens about 50 times a day. Pt spoke with PCP and was told to starting taking kepra three times a day instead of twice. Upon arrival, pt is A&Ox4, has full strength in all extremities. Pt denies any bowel or urinary incontinence, no vision changes. Pupils are equal and reactive. Denies headache, dizziness, vision changes, chest pain,  abdominal pain, nausea, vomiting, diarrhea, fevers, chills, dysuria, hematuria, recent travel or fall. 68 y/o M with PMH of GBM s/p tumor resection in may, HTN presents to ED with multiple complaints. Pt reports since wednesday, pt has been experiencing multiple ine minute episodes of a "weird feeling" and "feels scared" associated with chills and shortness of breath. Pt reports it happens about 50 times a day. Pt spoke with PCP and was told to starting taking kepra three times a day instead of twice. Upon arrival, pt is A&Ox4, has full strength in all extremities. Pt denies any bowel or urinary incontinence, no vision changes. Pupils are equal and reactive. Pt has been nauseas all morning. Denies headache, dizziness, vision changes, chest pain,  abdominal pain, vomiting, diarrhea, fevers, chills, dysuria, hematuria, recent travel or fall.

## 2023-06-19 NOTE — H&P ADULT - NSHPLABSRESULTS_GEN_ALL_CORE
06-19    137  |  101  |  17  ----------------------------<  109<H>  4.1   |  23  |  0.88    Ca    9.2      19 Jun 2023 09:14  Phos  3.6     06-19  Mg     1.8     06-19    TPro  6.3  /  Alb  3.8  /  TBili  0.4  /  DBili  x   /  AST  12  /  ALT  17  /  AlkPhos  77  06-19                            13.6   9.50  )-----------( 257      ( 19 Jun 2023 09:14 )             41.0       PT/INR - ( 19 Jun 2023 09:14 )   PT: 13.5 sec;   INR: 1.16 ratio         PTT - ( 19 Jun 2023 09:14 )  PTT:26.5 sec    LIVER FUNCTIONS - ( 19 Jun 2023 09:14 )  Alb: 3.8 g/dL / Pro: 6.3 g/dL / ALK PHOS: 77 U/L / ALT: 17 U/L / AST: 12 U/L / GGT: x             Urinalysis Basic - ( 19 Jun 2023 11:13 )    Color: Colorless / Appearance: Clear / SG: >1.050 / pH: x  Gluc: x / Ketone: Negative  / Bili: Negative / Urobili: Negative   Blood: x / Protein: Negative / Nitrite: Negative   Leuk Esterase: Negative / RBC: 4 /hpf / WBC 1 /HPF   Sq Epi: x / Non Sq Epi: x / Bacteria: Negative    ----  CT Head w/wo IV Cont (06.19.23 @ 09:44)     This exams compare prior head CT performed on May 16, 2023    Postoperative changes compatible left pterional craniotomy is seen.   Previously noted extra-axial air hemorrhage and fluid are no longer seen.   This is compatible with expected postoperative changes.    There is evidence of abnormal low-attenuation identified involving the   posterior left insular region. This is best seen on series 2 image 15 and   measures approximately 2.5 x 1.0 cm. This could be compatible postop   changes though the possibility of residual or recurrent tumor must be   considered. Contrast enhanced MRI is recommended to better characterize   this finding. Surrounding vasogenic edema is identified. Vasogenic edema   was identified in this region on the prior study. There is localized mass   effect seen consisting sulcal effacement. No significant shift or   herniation is seen    Abnormal low-attenuation involving the left temporal region is again seen   which could be related to postop changes    Right maxillary polyp versus retention cyst is seen    Left ethmoid sinus and coastal thickening is seen.    IMPRESSION: Abnormal low-attenuation involving the left posterior insular   region is seen with surrounding vasogenic edema.

## 2023-06-19 NOTE — ED CLERICAL - DIVISION
· Non MI troponin elevation, suspect elevated troponin in the setting of acute kidney injury, atrial fibrillation with RVR, tachycardia, hypertrophic cardiomyopathy, cocaine found in UDS (patient denying use) [facility]...

## 2023-06-19 NOTE — H&P ADULT - NSICDXPASTMEDICALHX_GEN_ALL_CORE_FT
PAST MEDICAL HISTORY:  Elbow pain, right     GBM (glioblastoma multiforme)     H/O insomnia     History of claustrophobia

## 2023-06-19 NOTE — ED PROVIDER NOTE - OBJECTIVE STATEMENT
67 yr old  presents to the ED s/p GBM resection on 5/15, no other PMHx, presenting w/ 4 days of multiple 1-minute episodes described as a feeling of unease associated with hyperventilation and b/l UE piloerection. Episodes are not associated with impaired awareness. Patient's oncologist asked patient to increase his Keppra dose to 3 times a day instead of 2 and sent him to the ED today. Patient endorsed feeling nauseous today. Denied fevers, sick contacts, HA, blurry vision, Chest Pain, SOB, numbness, tingling sensation, abdominal pain, and urinary changes.

## 2023-06-19 NOTE — H&P ADULT - HISTORY OF PRESENT ILLNESS
67-year-old right-handed male, retired , w/ PMHx GBM (resected 5/15/23 w/ IMVAX trial, followed by Dr. Galvez & Dr. Bush) sent in by neuro-oncologist Dr. Galvez for frequent episodes of intense fear / uneasiness, feeling "stressed out of my mind," chills, piloerection to all extremities, palpitations, and increased respiratory rate, all of which lasts for less than 1 minute, and completely resolve. Patient's spouse at bedside confirming the above. Patient and spouse reporting that symptoms started about 5-6 days ago w/ about 10 episodes throughout the entire day. The frequency progressively increased over the course of the week. Yesterday, patient had these episodes about every 10-15 minutes, with the same duration of less than 1 minute. No other symptoms at this time, with the exception of varying degrees of word-finding difficulty.    Per outpatient neuro-oncology Dr. Galvez note from 5/31/23:    5/9/2023 - I have seen him today with a new MRI. patient and his wife have noted over the past 3 weeks some difficulty with expressive speech - word substitutions and change in personality - more irritable that usual. He denies headaches, focal weakness,numbness, seizures, or gait instability. He saw Dr. Garcia, neurology - had and MRI this am - I have personally reviewed this film - that shows a left temporal heterogeneously enhancing mass measuring 5.0 x 4.5 x 3.3 cm with extension into the left frontal lobe with surrounding edema and left to right midline shift. He was sent to ER for an expedited evaluation  5/9/2023- 5/20/2023- Admitted at [facility]. MR Spect on 5/11 revealed "Reidentified heterogeneously enhancing necrotic mass lesion centered within the left temporal lobe and extending into the left periinsular region with a cystic portion along the inferior aspect of the lesion, with a large degree of surrounding vasogenic edema throughout the left parietal temporal lobes and left basal ganglia This lesion likely reflects a   high-grade glioma. Functional imaging demonstrates no hyperactivity in the region of tumor or vasogenic edema."   Patient met criteria and was enrolled in IMVAX Trial.   5/15- had a Left pterional craniotomy for brain tumor resection with gleolan. Frozen path: high-grade glioma. Bilateral abdominal incisions made for later implantation of drug treatment. IMVAX Biologics were implanted into abdomen via incisions during bedside procedure per IMVAX trial on 5/17 under conscious sedation. These biologic agents were removed during bedside procedure on 5/19 under conscious sedation. Patient was   monitored in the NSCU, his post op course remained uncomplicated  5/16/2023 - Post op MRI notable for edema and residual nodular enhancement within the left insular   region and left anterior temporal lobe. CT Serial CT Head imaging post op showing stable resolving post operative changes. Patient was discharged home on 5/20/2023.   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 Patient was discharged on steroid taper, since they didnâ€™t had a refill he stopped taking steroids since Monday.  Denies headaches, seizures

## 2023-06-19 NOTE — H&P ADULT - ASSESSMENT
Assessment: 67-year-old right-handed male, retired , w/ PMHx GBM (resected 5/15/23 w/ IMVAX trial, followed by Dr. Galvez & Dr. Bush) sent in by neuro-oncologist Dr. Galvez for frequent episodes of intense fear / uneasiness, feeling "stressed out of my mind," chills, piloerection to all extremities, palpitations, and increased respiratory rate, all of which lasts for less than 1 minute, and completely resolve. Patient's spouse at bedside confirming the above. Patient and spouse reporting that symptoms started about 5-6 days ago w/ about 10 episodes throughout the entire day. The frequency progressively increased over the course of the week. Yesterday, patient had these episodes about every 10-15 minutes, with the same duration of less than 1 minute. No other symptoms at this time, with the exception of varying degrees of word-finding difficulty.    Impression: Increasing frequency of intermittent episodes of sensation of intense fear / uneasiness a/w autonomic features including palpitations, tachypnea, chills, and piloerections, all w/ retained awareness. Suspect most symptoms localize to L temporal lobe i/s/o L GBM s/p resection w/ residual vasogenic edema. Most likely etiology is focal seizures with retained awareness.     Plan:     Diagnostics:  [] Video EEG  [] f/u LEV levels (collected in ED prior to LEV load), CBC, CMP, magnesium, and phosphorous  [] Urinalysis     Medications:  [] s/p LEV load in ED   [] c/w  mg PO BID  [] s/p dexamethasone 10 mg load in ED (per outpatient neuro-oncology)  [] c/w dexamethasone 2 mg QD  [] Lorazepam 1 mg IV PRN for seizure activity lasting >3-5mins, >2x/hr, >3x/day, or if GTC , repeat after 1 minute (max dose 0.1 mg/kg)      Management:   [] Telemetry  [] Seizure, fall and aspiration precautions. Avoid sleep deprivation.   [] Please note that numerous antibiotics may trigger epileptic seizures or status epilepticus by decreasing inhibitory transmission in the brain, thus lowering the seizure threshold. The most potent seizurogenic effect is exerted by penicillins, cephalosporins (fourth generation), fluoroquinolones, and carbapenems.    [] Given concern for seizure, advise the patient with regards to risks and driving privileges associated with the [place] Guidelines. Advise patient regarding the risk of seizures and general seizure safety recommendations including not to be bathing alone, climbing to high places and operating heavy machinery, until cleared by follow-up outpatient Neurology. Reinforce the importance of compliance with medications. Discuss sleep hygiene and the risks of sleep disruption. Discuss the risk of death associated with seizures / SUDEP.      * Plan above discussed w/ outpatient neuro-oncologist Dr. Galvez *  * Case and plan to be discussed with Dr. José Antonio Valverde *  * Case and plan not complete until attending attestation *

## 2023-06-20 DIAGNOSIS — E87.1 HYPO-OSMOLALITY AND HYPONATREMIA: ICD-10-CM

## 2023-06-20 LAB
ANION GAP SERPL CALC-SCNC: 12 MMOL/L — SIGNIFICANT CHANGE UP (ref 5–17)
ANION GAP SERPL CALC-SCNC: 15 MMOL/L — SIGNIFICANT CHANGE UP (ref 5–17)
BUN SERPL-MCNC: 14 MG/DL — SIGNIFICANT CHANGE UP (ref 7–23)
BUN SERPL-MCNC: 16 MG/DL — SIGNIFICANT CHANGE UP (ref 7–23)
BUN SERPL-MCNC: 16 MG/DL — SIGNIFICANT CHANGE UP (ref 7–23)
BUN SERPL-MCNC: 17 MG/DL — SIGNIFICANT CHANGE UP (ref 7–23)
CALCIUM SERPL-MCNC: 8.7 MG/DL — SIGNIFICANT CHANGE UP (ref 8.4–10.5)
CALCIUM SERPL-MCNC: 8.8 MG/DL — SIGNIFICANT CHANGE UP (ref 8.4–10.5)
CALCIUM SERPL-MCNC: 9.1 MG/DL — SIGNIFICANT CHANGE UP (ref 8.4–10.5)
CALCIUM SERPL-MCNC: 9.4 MG/DL — SIGNIFICANT CHANGE UP (ref 8.4–10.5)
CHLORIDE SERPL-SCNC: 90 MMOL/L — LOW (ref 96–108)
CHLORIDE SERPL-SCNC: 90 MMOL/L — LOW (ref 96–108)
CHLORIDE SERPL-SCNC: 91 MMOL/L — LOW (ref 96–108)
CHLORIDE SERPL-SCNC: 92 MMOL/L — LOW (ref 96–108)
CO2 SERPL-SCNC: 20 MMOL/L — LOW (ref 22–31)
CO2 SERPL-SCNC: 21 MMOL/L — LOW (ref 22–31)
CO2 SERPL-SCNC: 21 MMOL/L — LOW (ref 22–31)
CO2 SERPL-SCNC: 22 MMOL/L — SIGNIFICANT CHANGE UP (ref 22–31)
CREAT ?TM UR-MCNC: 147 MG/DL — SIGNIFICANT CHANGE UP
CREAT SERPL-MCNC: 0.71 MG/DL — SIGNIFICANT CHANGE UP (ref 0.5–1.3)
CREAT SERPL-MCNC: 0.73 MG/DL — SIGNIFICANT CHANGE UP (ref 0.5–1.3)
CREAT SERPL-MCNC: 0.81 MG/DL — SIGNIFICANT CHANGE UP (ref 0.5–1.3)
CREAT SERPL-MCNC: 0.9 MG/DL — SIGNIFICANT CHANGE UP (ref 0.5–1.3)
CULTURE RESULTS: SIGNIFICANT CHANGE UP
EGFR: 101 ML/MIN/1.73M2 — SIGNIFICANT CHANGE UP
EGFR: 102 ML/MIN/1.73M2 — SIGNIFICANT CHANGE UP
EGFR: 95 ML/MIN/1.73M2 — SIGNIFICANT CHANGE UP
EGFR: 98 ML/MIN/1.73M2 — SIGNIFICANT CHANGE UP
GLUCOSE SERPL-MCNC: 108 MG/DL — HIGH (ref 70–99)
GLUCOSE SERPL-MCNC: 109 MG/DL — HIGH (ref 70–99)
GLUCOSE SERPL-MCNC: 121 MG/DL — HIGH (ref 70–99)
GLUCOSE SERPL-MCNC: 99 MG/DL — SIGNIFICANT CHANGE UP (ref 70–99)
HCT VFR BLD CALC: 38.1 % — LOW (ref 39–50)
HCV AB S/CO SERPL IA: 0.07 S/CO — SIGNIFICANT CHANGE UP (ref 0–0.99)
HCV AB SERPL-IMP: SIGNIFICANT CHANGE UP
HGB BLD-MCNC: 13.5 G/DL — SIGNIFICANT CHANGE UP (ref 13–17)
MCHC RBC-ENTMCNC: 31.1 PG — SIGNIFICANT CHANGE UP (ref 27–34)
MCHC RBC-ENTMCNC: 35.4 GM/DL — SIGNIFICANT CHANGE UP (ref 32–36)
MCV RBC AUTO: 87.8 FL — SIGNIFICANT CHANGE UP (ref 80–100)
NRBC # BLD: 0 /100 WBCS — SIGNIFICANT CHANGE UP (ref 0–0)
OSMOLALITY SERPL: 253 MOSMOL/KG — LOW (ref 280–301)
OSMOLALITY SERPL: 262 MOSMOL/KG — LOW (ref 280–301)
OSMOLALITY UR: 782 MOS/KG — SIGNIFICANT CHANGE UP (ref 300–900)
PLATELET # BLD AUTO: 283 K/UL — SIGNIFICANT CHANGE UP (ref 150–400)
POTASSIUM SERPL-MCNC: 3.7 MMOL/L — SIGNIFICANT CHANGE UP (ref 3.5–5.3)
POTASSIUM SERPL-MCNC: 3.7 MMOL/L — SIGNIFICANT CHANGE UP (ref 3.5–5.3)
POTASSIUM SERPL-MCNC: 3.9 MMOL/L — SIGNIFICANT CHANGE UP (ref 3.5–5.3)
POTASSIUM SERPL-MCNC: 4.3 MMOL/L — SIGNIFICANT CHANGE UP (ref 3.5–5.3)
POTASSIUM SERPL-SCNC: 3.7 MMOL/L — SIGNIFICANT CHANGE UP (ref 3.5–5.3)
POTASSIUM SERPL-SCNC: 3.7 MMOL/L — SIGNIFICANT CHANGE UP (ref 3.5–5.3)
POTASSIUM SERPL-SCNC: 3.9 MMOL/L — SIGNIFICANT CHANGE UP (ref 3.5–5.3)
POTASSIUM SERPL-SCNC: 4.3 MMOL/L — SIGNIFICANT CHANGE UP (ref 3.5–5.3)
POTASSIUM UR-SCNC: 55 MMOL/L — SIGNIFICANT CHANGE UP
PROT ?TM UR-MCNC: 9 MG/DL — SIGNIFICANT CHANGE UP (ref 0–12)
PROT/CREAT UR-RTO: 0.1 RATIO — SIGNIFICANT CHANGE UP (ref 0–0.2)
RBC # BLD: 4.34 M/UL — SIGNIFICANT CHANGE UP (ref 4.2–5.8)
RBC # FLD: 12.7 % — SIGNIFICANT CHANGE UP (ref 10.3–14.5)
SODIUM SERPL-SCNC: 125 MMOL/L — LOW (ref 135–145)
SODIUM SERPL-SCNC: 125 MMOL/L — LOW (ref 135–145)
SODIUM SERPL-SCNC: 126 MMOL/L — LOW (ref 135–145)
SODIUM SERPL-SCNC: 128 MMOL/L — LOW (ref 135–145)
SODIUM UR-SCNC: 101 MMOL/L — SIGNIFICANT CHANGE UP
SODIUM UR-SCNC: 74 MMOL/L — SIGNIFICANT CHANGE UP
SPECIMEN SOURCE: SIGNIFICANT CHANGE UP
WBC # BLD: 11.06 K/UL — HIGH (ref 3.8–10.5)
WBC # FLD AUTO: 11.06 K/UL — HIGH (ref 3.8–10.5)

## 2023-06-20 PROCEDURE — 99223 1ST HOSP IP/OBS HIGH 75: CPT | Mod: GC

## 2023-06-20 PROCEDURE — 99223 1ST HOSP IP/OBS HIGH 75: CPT

## 2023-06-20 PROCEDURE — 95720 EEG PHY/QHP EA INCR W/VEEG: CPT

## 2023-06-20 RX ORDER — LEVETIRACETAM 250 MG/1
1000 TABLET, FILM COATED ORAL
Refills: 0 | Status: DISCONTINUED | OUTPATIENT
Start: 2023-06-20 | End: 2023-06-21

## 2023-06-20 RX ORDER — SODIUM CHLORIDE 5 G/100ML
500 INJECTION, SOLUTION INTRAVENOUS
Refills: 0 | Status: DISCONTINUED | OUTPATIENT
Start: 2023-06-20 | End: 2023-06-20

## 2023-06-20 RX ORDER — SODIUM CHLORIDE 5 G/100ML
90 INJECTION, SOLUTION INTRAVENOUS
Refills: 0 | Status: DISCONTINUED | OUTPATIENT
Start: 2023-06-20 | End: 2023-06-20

## 2023-06-20 RX ORDER — FUROSEMIDE 40 MG
20 TABLET ORAL ONCE
Refills: 0 | Status: COMPLETED | OUTPATIENT
Start: 2023-06-20 | End: 2023-06-20

## 2023-06-20 RX ORDER — SODIUM CHLORIDE 5 G/100ML
90 INJECTION, SOLUTION INTRAVENOUS ONCE
Refills: 0 | Status: COMPLETED | OUTPATIENT
Start: 2023-06-20 | End: 2023-06-20

## 2023-06-20 RX ORDER — LANOLIN ALCOHOL/MO/W.PET/CERES
3 CREAM (GRAM) TOPICAL AT BEDTIME
Refills: 0 | Status: DISCONTINUED | OUTPATIENT
Start: 2023-06-20 | End: 2023-06-23

## 2023-06-20 RX ORDER — SODIUM CHLORIDE 5 G/100ML
500 INJECTION, SOLUTION INTRAVENOUS
Refills: 0 | Status: DISCONTINUED | OUTPATIENT
Start: 2023-06-20 | End: 2023-06-21

## 2023-06-20 RX ORDER — QUETIAPINE FUMARATE 200 MG/1
12.5 TABLET, FILM COATED ORAL ONCE
Refills: 0 | Status: COMPLETED | OUTPATIENT
Start: 2023-06-20 | End: 2023-06-20

## 2023-06-20 RX ORDER — LEVETIRACETAM 250 MG/1
750 TABLET, FILM COATED ORAL
Refills: 0 | Status: DISCONTINUED | OUTPATIENT
Start: 2023-06-20 | End: 2023-06-20

## 2023-06-20 RX ADMIN — SODIUM CHLORIDE 30 MILLILITER(S): 5 INJECTION, SOLUTION INTRAVENOUS at 16:05

## 2023-06-20 RX ADMIN — QUETIAPINE FUMARATE 12.5 MILLIGRAM(S): 200 TABLET, FILM COATED ORAL at 01:51

## 2023-06-20 RX ADMIN — LEVETIRACETAM 1000 MILLIGRAM(S): 250 TABLET, FILM COATED ORAL at 17:13

## 2023-06-20 RX ADMIN — Medication 2 MILLIGRAM(S): at 05:04

## 2023-06-20 RX ADMIN — Medication 0.5 MILLIGRAM(S): at 02:23

## 2023-06-20 RX ADMIN — Medication 1 MILLIGRAM(S): at 10:46

## 2023-06-20 RX ADMIN — ENOXAPARIN SODIUM 40 MILLIGRAM(S): 100 INJECTION SUBCUTANEOUS at 05:05

## 2023-06-20 RX ADMIN — SODIUM CHLORIDE 30 MILLILITER(S): 5 INJECTION, SOLUTION INTRAVENOUS at 21:16

## 2023-06-20 RX ADMIN — Medication 10 MILLIGRAM(S): at 05:04

## 2023-06-20 RX ADMIN — PANTOPRAZOLE SODIUM 40 MILLIGRAM(S): 20 TABLET, DELAYED RELEASE ORAL at 05:04

## 2023-06-20 RX ADMIN — LEVETIRACETAM 500 MILLIGRAM(S): 250 TABLET, FILM COATED ORAL at 05:03

## 2023-06-20 RX ADMIN — Medication 20 MILLIGRAM(S): at 20:51

## 2023-06-20 RX ADMIN — Medication 1 MILLIGRAM(S): at 16:05

## 2023-06-20 RX ADMIN — Medication 3 MILLIGRAM(S): at 21:16

## 2023-06-20 NOTE — CONSULT NOTE ADULT - ATTENDING COMMENTS
hyponatremia- siadh made worse with saline   3% as above     rosa acuña  nephrology attending   please contact me on TEAMS   Office- [phone]

## 2023-06-20 NOTE — OCCUPATIONAL THERAPY INITIAL EVALUATION ADULT - IADL RETRAINING, OT EVAL
Pt will be A+O x 4 within 4 weeks. Pt will follow 100% directions within 4 weeks. Pt will attend to 10 min paper-pencil IADL task without cues for redirection within 4 weeks.  Pt will perform bill paying task (I), using compensatory tasks as needed, within 4 weeks

## 2023-06-20 NOTE — PHYSICAL THERAPY INITIAL EVALUATION ADULT - WEIGHT-BEARING RESTRICTIONS: GAIT, REHAB EVAL
Patient scheduled for procedure today  Left hip hemiarthroplasty revision    NPO  Bed rest  Pain medication as needed    Sally FLORES   full weight-bearing

## 2023-06-20 NOTE — CONSULT NOTE ADULT - SUBJECTIVE AND OBJECTIVE BOX
[facility] DIVISION OF KIDNEY DISEASES AND HYPERTENSION -- [phone]  -- INITIAL CONSULT NOTE  --------------------------------------------------------------------------------  HPI:  67 y.o M w/ PMHx of GBM (resected 5/15/23 w/ IMVAX trial), sent in by neuro-oncologist  Dr. Galvez for frequent episodes of intense fear/uneasiness, feeling "stressed out of my mind" chills, increased respiratory rate, for 1 minute and then resolve. Patient's symptoms started about 5-6 days ago w/ 10 episodes per day. Patient sent to the hospital for further workup, also with some degrees of word-finding difficulty. Per chart review, noted patient with post op MRI notable for edema and residual nodular enhancement within the left insular region and left anterior temporal lobe. Patient was discharged home on 23.     Nephrology consulted for acute hyponatremia. SNa 137 on , decreased to 128 earlier today and repeat was 125. Patient given 1L of NS yesterday.           PAST HISTORY  --------------------------------------------------------------------------------  PAST MEDICAL & SURGICAL HISTORY:  H/O insomnia      Elbow pain, right      History of claustrophobia      GBM (glioblastoma multiforme)      S/P foot surgery  plantar fasciitis left  and  and right 2017      S/P eye surgery  right eye retina  and cataract , left cataract       S/P arthroscopy of right shoulder        History of arthroscopy of left shoulder  2020      H/O arthroscopy of knee  bilateral;        FAMILY HISTORY:  Family history of breast cancer (Mother)  mother     Family history of myotonic dystrophy (Sibling, Sibling, Sibling, Sibling)      PAST SOCIAL HISTORY:    ALLERGIES & MEDICATIONS  --------------------------------------------------------------------------------  Allergies    No Known Allergies    Intolerances      Standing Inpatient Medications  dexAMETHasone     Tablet 2 milliGRAM(s) Oral daily  enalapril 10 milliGRAM(s) Oral daily  enoxaparin Injectable 40 milliGRAM(s) SubCutaneous every 24 hours  levETIRAcetam 1000 milliGRAM(s) Oral two times a day  pantoprazole    Tablet 40 milliGRAM(s) Oral before breakfast  polyethylene glycol 3350 17 Gram(s) Oral daily    PRN Inpatient Medications  LORazepam     Tablet 1 milliGRAM(s) Oral daily PRN  melatonin 3 milliGRAM(s) Oral at bedtime PRN      REVIEW OF SYSTEMS  --------------------------------------------------------------------------------  Gen: No fevers/chills  Skin: No rashes  Head/Eyes/Ears: Normal hearing,   Respiratory: No dyspnea, cough  CV: No chest pain  GI: No abdominal pain, diarrhea  : No dysuria, hematuria  MSK: No  edema  Heme: No easy bruising or bleeding  Psych: No significant depression    All other systems were reviewed and are negative, except as noted.    VITALS/PHYSICAL EXAM  --------------------------------------------------------------------------------  T(C): 36.6 (23 @ 13:13), Max: 38.3 (23 @ 17:16)  HR: 88 (23 @ 13:13) (65 - 90)  BP: 129/77 (23 13:13) (129/77 - 177/73)  RR: 19 (23 13:13) (16 - 19)  SpO2: 95% (23 13:13) (92% - 97%)  Wt(kg): --  Height (cm): 182.9 (23 @ 08:11)  Weight (kg): 90.7 (23 08:11)  BMI (kg/m2): 27.1 (23 @ 08:11)  BSA (m2): 2.13 (23 @ 08:11)      23 @ 07:01  -  23 @ 13:32  --------------------------------------------------------  IN: 360 mL / OUT: 0 mL / NET: 360 mL      Physical Exam:  	Gen: NAD  	HEENT: MMM  	Pulm: CTA B/L  	CV: S1S2  	Abd: Soft, +BS   	Ext: No LE edema B/L  	Neuro: Awake  	Skin: Warm and dry  	Vascular access:    LABS/STUDIES  --------------------------------------------------------------------------------              13.5   11.06 >-----------<  283      [23 @ 07:29]              38.1     125  |  90  |  16  ----------------------------<  121      [23 @ 10:32]  4.3   |  20  |  0.73        Ca     9.4     [23 @ 10:32]      Mg     1.8     [23 @ 09:14]      Phos  3.6     [23 @ 09:14]    TPro  6.3  /  Alb  3.8  /  TBili  0.4  /  DBili  x   /  AST  12  /  ALT  17  /  AlkPhos  77  [23 @ 09:14]    PT/INR: PT 13.5 , INR 1.16       [23 @ 09:14]  PTT: 26.5       [23 @ 09:14]    CK 43      [23 09:14]    Creatinine Trend:  SCr 0.73 [ 10:32]  SCr 0.71 [ 07:24]  SCr 0.88 [ 09:14]    Urinalysis - [23 @ 11:13]      Color Colorless / Appearance Clear / SG >1.050 / pH 7.0      Gluc Negative / Ketone Negative  / Bili Negative / Urobili Negative       Blood Trace / Protein Negative / Leuk Est Negative / Nitrite Negative      RBC 4 / WBC 1 / Hyaline 0 / Gran  / Sq Epi  / Non Sq Epi  / Bacteria Negative      TSH 0.52      [23 @ 14:30]    HBsAb Nonreact      [23 @ 14:29]  HBsAg Nonreact      [23 @ 14:29]  HCV 0.07, Nonreact      [23 @ 07:33]  HIV Nonreact      [23 @ 06:11]     [facility] DIVISION OF KIDNEY DISEASES AND HYPERTENSION -- [phone]  -- INITIAL CONSULT NOTE  --------------------------------------------------------------------------------  HPI:  67 y.o M w/ PMHx of GBM (resected 5/15/23 w/ IMVAX trial), sent in by neuro-oncologist  Dr. Galvez for frequent episodes of intense fear/uneasiness, feeling "stressed out of my mind" chills, increased respiratory rate, for 1 minute and then resolve. Patient's symptoms started about 5-6 days ago w/ 10 episodes per day. Patient sent to the hospital for further workup, also with some degrees of word-finding difficulty. Per chart review, noted patient with post op MRI notable for edema and residual nodular enhancement within the left insular region and left anterior temporal lobe. Patient was discharged home on 23. Nephrology consulted for acute hyponatremia. SNa 137 on , decreased to 128 earlier today and repeat was 125. Patient given 1L of NS yesterday.     Patient seen at bedside.       PAST HISTORY  --------------------------------------------------------------------------------  PAST MEDICAL & SURGICAL HISTORY:  H/O insomnia      Elbow pain, right      History of claustrophobia      GBM (glioblastoma multiforme)      S/P foot surgery  plantar fasciitis left  and  and right 2017      S/P eye surgery  right eye retina  and cataract , left cataract       S/P arthroscopy of right shoulder        History of arthroscopy of left shoulder  2020      H/O arthroscopy of knee  bilateral;        FAMILY HISTORY:  Family history of breast cancer (Mother)  mother     Family history of myotonic dystrophy (Sibling, Sibling, Sibling, Sibling)      PAST SOCIAL HISTORY:    ALLERGIES & MEDICATIONS  --------------------------------------------------------------------------------  Allergies    No Known Allergies    Intolerances      Standing Inpatient Medications  dexAMETHasone     Tablet 2 milliGRAM(s) Oral daily  enalapril 10 milliGRAM(s) Oral daily  enoxaparin Injectable 40 milliGRAM(s) SubCutaneous every 24 hours  levETIRAcetam 1000 milliGRAM(s) Oral two times a day  pantoprazole    Tablet 40 milliGRAM(s) Oral before breakfast  polyethylene glycol 3350 17 Gram(s) Oral daily    PRN Inpatient Medications  LORazepam     Tablet 1 milliGRAM(s) Oral daily PRN  melatonin 3 milliGRAM(s) Oral at bedtime PRN      REVIEW OF SYSTEMS  --------------------------------------------------------------------------------  Gen: No fevers/chills  Skin: No rashes  Head/Eyes/Ears: Normal hearing,   Respiratory: No dyspnea, cough  CV: No chest pain  GI: No abdominal pain, diarrhea  : No dysuria, hematuria  MSK: No  edema  Heme: No easy bruising or bleeding  Psych: No significant depression    All other systems were reviewed and are negative, except as noted.    VITALS/PHYSICAL EXAM  --------------------------------------------------------------------------------  T(C): 36.6 (23 @ 13:13), Max: 38.3 (23 @ 17:16)  HR: 88 (23 @ 13:13) (65 - 90)  BP: 129/77 (23 @ 13:13) (129/77 - 177/73)  RR: 19 (23 13:13) (16 - 19)  SpO2: 95% (23 13:13) (92% - 97%)  Wt(kg): --  Height (cm): 182.9 (23 @ 08:11)  Weight (kg): 90.7 (23 08:11)  BMI (kg/m2): 27.1 (23 08:11)  BSA (m2): 2.13 (23 @ 08:11)      23 @ 07:01  -  23 @ 13:32  --------------------------------------------------------  IN: 360 mL / OUT: 0 mL / NET: 360 mL      Physical Exam:  	Gen: NAD  	HEENT: MMM  	Pulm: CTA B/L  	CV: S1S2  	Abd: Soft, +BS   	Ext: No LE edema B/L  	Neuro: Awake  	Skin: Warm and dry  	Vascular access:    LABS/STUDIES  --------------------------------------------------------------------------------              13.5   11.06 >-----------<  283      [23 @ 07:29]              38.1     125  |  90  |  16  ----------------------------<  121      [23 @ 10:32]  4.3   |  20  |  0.73        Ca     9.4     [23 @ 10:32]      Mg     1.8     [23 @ 09:14]      Phos  3.6     [06-19-23 @ 09:14]    TPro  6.3  /  Alb  3.8  /  TBili  0.4  /  DBili  x   /  AST  12  /  ALT  17  /  AlkPhos  77  [23 @ 09:14]    PT/INR: PT 13.5 , INR 1.16       [23 @ 09:14]  PTT: 26.5       [23 09:14]    CK 43      [23 09:14]    Creatinine Trend:  SCr 0.73 [ 10:32]  SCr 0.71 [ 07:24]  SCr 0.88 [ 09:14]    Urinalysis - [23 @ 11:13]      Color Colorless / Appearance Clear / SG >1.050 / pH 7.0      Gluc Negative / Ketone Negative  / Bili Negative / Urobili Negative       Blood Trace / Protein Negative / Leuk Est Negative / Nitrite Negative      RBC 4 / WBC 1 / Hyaline 0 / Gran  / Sq Epi  / Non Sq Epi  / Bacteria Negative      TSH 0.52      [23 @ 14:30]    HBsAb Nonreact      [23 @ 14:29]  HBsAg Nonreact      [23 @ 14:29]  HCV 0.07, Nonreact      [23 @ 07:33]  HIV Nonreact      [23 @ 06:11]     [facility] DIVISION OF KIDNEY DISEASES AND HYPERTENSION -- [phone]  -- INITIAL CONSULT NOTE  --------------------------------------------------------------------------------  HPI:  67 y.o M w/ PMHx of GBM (resected 5/15/23 w/ IMVAX trial), sent in by neuro-oncologist  Dr. Galvez for frequent episodes of intense fear/uneasiness, feeling "stressed out of my mind" chills, increased respiratory rate, for 1 minute and then resolve. Patient's symptoms started about 5-6 days ago w/ 10 episodes per day. Patient sent to the hospital for further workup, also with some degrees of word-finding difficulty. Per chart review, noted patient with post op MRI notable for edema and residual nodular enhancement within the left insular region and left anterior temporal lobe. Patient was discharged home on 23. Was discharged on 3 days of salt tabs for hyponatremia of 140.  Nephrology consulted for acute hyponatremia. SNa 137 on , decreased to 128 earlier today and repeat was 125. Patient given 1L of NS yesterday.     Patient seen at bedside with his family at bedside. Patient reports some improvement in his symptoms since coming to the hospital. He reports no new medication changes at home, no HCTZ use. Patient reports decreased free water intake recently.     PAST HISTORY  --------------------------------------------------------------------------------  PAST MEDICAL & SURGICAL HISTORY:  H/O insomnia      Elbow pain, right      History of claustrophobia      GBM (glioblastoma multiforme)      S/P foot surgery  plantar fasciitis left  and  and right 2017      S/P eye surgery  right eye retina  and cataract , left cataract       S/P arthroscopy of right shoulder        History of arthroscopy of left shoulder  2020      H/O arthroscopy of knee  bilateral;        FAMILY HISTORY:  Family history of breast cancer (Mother)  mother     Family history of myotonic dystrophy (Sibling, Sibling, Sibling, Sibling)      PAST SOCIAL HISTORY:    ALLERGIES & MEDICATIONS  --------------------------------------------------------------------------------  Allergies    No Known Allergies    Intolerances      Standing Inpatient Medications  dexAMETHasone     Tablet 2 milliGRAM(s) Oral daily  enalapril 10 milliGRAM(s) Oral daily  enoxaparin Injectable 40 milliGRAM(s) SubCutaneous every 24 hours  levETIRAcetam 1000 milliGRAM(s) Oral two times a day  pantoprazole    Tablet 40 milliGRAM(s) Oral before breakfast  polyethylene glycol 3350 17 Gram(s) Oral daily    PRN Inpatient Medications  LORazepam     Tablet 1 milliGRAM(s) Oral daily PRN  melatonin 3 milliGRAM(s) Oral at bedtime PRN      REVIEW OF SYSTEMS  --------------------------------------------------------------------------------  Gen: No fevers/chills  Skin: No rashes  Head/Eyes/Ears: Normal hearing,   Respiratory: No dyspnea, cough  CV: No chest pain  GI: No abdominal pain, diarrhea  : No dysuria, hematuria  MSK: No  edema  Heme: No easy bruising or bleeding  Psych: No significant depression    All other systems were reviewed and are negative, except as noted.    VITALS/PHYSICAL EXAM  --------------------------------------------------------------------------------  T(C): 36.6 (23 @ 13:13), Max: 38.3 (23 @ 17:16)  HR: 88 (23 13:13) (65 - 90)  BP: 129/77 (23 @ 13:13) (129/77 - 177/73)  RR: 19 (23 13:13) (16 - 19)  SpO2: 95% (23 13:13) (92% - 97%)  Wt(kg): --  Height (cm): 182.9 (23 @ 08:11)  Weight (kg): 90.7 (23 08:11)  BMI (kg/m2): 27.1 (23 08:11)  BSA (m2): 2.13 (23 @ 08:11)      23 @ 07:01  -  23 @ 13:32  --------------------------------------------------------  IN: 360 mL / OUT: 0 mL / NET: 360 mL      Physical Exam:  	Gen: NAD  	HEENT: MMM  	Pulm: CTA B/L  	CV: S1S2  	Abd: Soft, +BS   	Ext: No LE edema B/L  	Neuro: Awake  	Skin: Warm and dry  	Vascular access:    LABS/STUDIES  --------------------------------------------------------------------------------              13.5   11.06 >-----------<  283      [23 @ 07:29]              38.1     125  |  90  |  16  ----------------------------<  121      [23 @ 10:32]  4.3   |  20  |  0.73        Ca     9.4     [23 10:32]      Mg     1.8     [23 @ 09:14]      Phos  3.6     [23 09:14]    TPro  6.3  /  Alb  3.8  /  TBili  0.4  /  DBili  x   /  AST  12  /  ALT  17  /  AlkPhos  77  [23 09:14]    PT/INR: PT 13.5 , INR 1.16       [23 @ 09:14]  PTT: 26.5       [23 09:14]    CK 43      [23 09:14]    Creatinine Trend:  SCr 0.73 [06-20 @ 10:32]  SCr 0.71 [ 07:24]  SCr 0.88 [ 09:14]    Urinalysis - [23 @ 11:13]      Color Colorless / Appearance Clear / SG >1.050 / pH 7.0      Gluc Negative / Ketone Negative  / Bili Negative / Urobili Negative       Blood Trace / Protein Negative / Leuk Est Negative / Nitrite Negative      RBC 4 / WBC 1 / Hyaline 0 / Gran  / Sq Epi  / Non Sq Epi  / Bacteria Negative      TSH 0.52      [23 @ 14:30]    HBsAb Nonreact      [23 @ 14:29]  HBsAg Nonreact      [23 @ 14:29]  HCV 0.07, Nonreact      [23 @ 07:33]  HIV Nonreact      [23 @ 06:11]

## 2023-06-20 NOTE — PATIENT PROFILE ADULT - FALL HARM RISK - HARM RISK INTERVENTIONS
Assistance with ambulation/Assistance OOB with selected safe patient handling equipment/Communicate Risk of Fall with Harm to all staff/Discuss with provider need for PT consult/Monitor for mental status changes/Monitor gait and stability/Provide patient with walking aids - walker, cane, crutches/Reinforce activity limits and safety measures with patient and family/Reorient to person, place and time as needed/Tailored Fall Risk Interventions/Use of alarms - bed, chair and/or voice tab/Visual Cue: Yellow wristband and red socks/Bed in lowest position, wheels locked, appropriate side rails in place/Call bell, personal items and telephone in reach/Instruct patient to call for assistance before getting out of bed or chair/Non-slip footwear when patient is out of bed/[place] to call system/Physically safe environment - no spills, clutter or unnecessary equipment/Purposeful Proactive Rounding/Room/bathroom lighting operational, light cord in reach

## 2023-06-20 NOTE — OCCUPATIONAL THERAPY INITIAL EVALUATION ADULT - PERTINENT HX OF CURRENT PROBLEM, REHAB EVAL
67-year-old right-handed male, retired , w/ PMHx GBM (resected 5/15/23 w/ IMVAX trial, followed by Dr. Galvez & Dr. Bush) sent in by neuro-oncologist Dr. Galvez for frequent episodes of intense fear / uneasiness, feeling "stressed out of my mind," chills, piloerection to all extremities, palpitations, and increased respiratory rate, all of which lasts for less than 1 minute, and completely resolve. Patient's spouse at bedside confirming the above. Patient and spouse reporting that symptoms started about 5-6 days ago w/ about 10 episodes throughout the entire day. The frequency progressively increased over the course of the week. Yesterday, patient had these episodes about every 10-15 minutes, with the same duration of less than 1 minute. No other symptoms at this time, with the exception of varying degrees of word-finding difficulty. Per outpatient neuro-oncology Dr. Galvez note from 5/31/23: 5/9/2023 - I have seen him today with a new MRI. patient and his wife have noted over the past 3 weeks some difficulty with expressive speech - word substitutions and change in personality - more irritable that usual. He denies headaches, focal weakness, numbness, seizures, or gait instability. He saw Dr. Garcia, neurology - had and MRI this am - I have personally reviewed this film - that shows a left temporal heterogeneously enhancing mass measuring 5.0 x 4.5 x 3.3 cm with extension into the left frontal lobe with surrounding edema and left to right midline shift. He was sent to ER for an expedited evaluation. 5/9/2023- 5/20/2023- Admitted at [facility]. MR Spect on 5/11 revealed "Reidentified heterogeneously enhancing necrotic mass lesion centered within the left temporal lobe and extending into the left periinsular region with a cystic portion along the inferior aspect of the lesion, with a large degree of surrounding vasogenic edema throughout the left parietal temporal lobes and left basal ganglia This lesion likely reflects a high-grade glioma. Functional imaging demonstrates no hyperactivity in the region of tumor or vasogenic edema." Patient met criteria and was enrolled in IMVAX Trial. 5/15- had a Left pterional craniotomy for brain tumor resection with gleolan. Frozen path: high-grade glioma. Bilateral abdominal incisions made for later implantation of drug treatment. IMVAX Biologics were implanted into abdomen via incisions during bedside procedure per IMVAX trial on 5/17 under conscious sedation. These biologic agents were removed during bedside procedure on 5/19 under conscious sedation. Patient was monitored in the NSCU, his post op course remained uncomplicated. 5/16/2023 - Post op MRI notable for edema and residual nodular enhancement within the left insular region and left anterior temporal lobe. CT Serial CT Head imaging post op showing stable resolving post operative changes. Patient was discharged home on 5/20/2023. 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 5/31/2023 - Patient here for a follow up . Patient was discharged on steroid taper, since they didn't had a refill he stopped taking steroids since Monday. Denies headaches, seizures

## 2023-06-20 NOTE — PHYSICAL THERAPY INITIAL EVALUATION ADULT - ADDITIONAL COMMENTS
Pt lives in a split level house with 6 steps to enter. Pt lives with wife and 2 children. Pt was independent prior to admission.

## 2023-06-20 NOTE — OCCUPATIONAL THERAPY INITIAL EVALUATION ADULT - LEVEL OF INDEPENDENCE: CHAIR TO BED, REHAB EVAL
The pt is scheduled for 2 appointments with Dr. Cox on 7/21/21. Message left on voicemail to call back to let us know what appointment would like to keep and which one to cancel.    independent

## 2023-06-20 NOTE — PATIENT PROFILE ADULT - FUNCTIONAL ASSESSMENT - DAILY ACTIVITY 3.
Testing   Echo**call office 3-5 days after testing is completed for results** 4 = No assist / stand by assistance

## 2023-06-20 NOTE — OCCUPATIONAL THERAPY INITIAL EVALUATION ADULT - VISUAL ACUITY
AS EXPECTED      not tested

## 2023-06-20 NOTE — CONSULT NOTE ADULT - PROBLEM SELECTOR RECOMMENDATION 9
Patient with acute hyponatremia. SNa on admission was 137 and decreased to 128 this am. Repeat SNa of 125. [facility], [facility]a noted to be 132 during last month's admission. UA with very high specific gravity of >1.05 with trace blood.     Obtain a PVR bladder scan. Pending Uosm, urine sodium. Order serum osm, TSH, Cortisol. Avoid overcorrection of serum sodium 6-8 meq/24 hours. Monitor serum sodium.     INCOMPLETE NOTE    If you have any questions, please feel free to contact me  Wally Lora  Nephrology Fellow  [phone]; Prefer [employer] TEAMS  (After 5pm or on weekends please page the on-call fellow) Patient with acute hyponatremia likely SIADH induced. Given stable, chronic vasogenic edema will try to avoid prolonged hyponatremia. SNa on admission was 137 and decreased to 128 this am. Repeat SNa of 125. Per review of [facility], SNa noted to be 132 during last month's admission with urine osm of 897. UA with very high specific gravity of >1.05 with trace blood. Patient received 1L NS IVF yesterday.     Obtain a PVR bladder scan. Pending Uosm, Urine Sodium. Order serum osm, TSH, Cortisol. Would recommend 3% HTS 30/hour for 3 hours. Repeat labs after the HTS fluid is done ~7pm today.  Avoid overcorrection of serum sodium 6-8 meq/24 hours. Monitor serum sodium.     If you have any questions, please feel free to contact me  Wally Lora  Nephrology Fellow  [phone]; Prefer [employer] TEAMS  (After 5pm or on weekends please page the on-call fellow)

## 2023-06-20 NOTE — EEG REPORT - NS EEG TEXT BOX
COURT JENKINS [id number] 67y (1955)M  Admitting MD: Dr. Rob Ferguson    Study Date: 06-20-23    --------------------------------------------------------------------------------------------------  History:  CC/ HPI Patient is a 67y old  Male who presents with a chief complaint of Concern for focal seizures (20 Jun 2023 13:31)    dexAMETHasone     Tablet 2 milliGRAM(s) Oral daily  enalapril 10 milliGRAM(s) Oral daily  enoxaparin Injectable 40 milliGRAM(s) SubCutaneous every 24 hours  levETIRAcetam 1000 milliGRAM(s) Oral two times a day  pantoprazole    Tablet 40 milliGRAM(s) Oral before breakfast  polyethylene glycol 3350 17 Gram(s) Oral daily  sodium chloride 3%. 500 milliLiter(s) IV Continuous <Continuous>    --------------------------------------------------------------------------------------------------  Study Interpretation:    [[[Abbreviation Key:  PDR=alpha rhythm/posterior dominant rhythm. A-P=anterior posterior gradient.  Amplitude: ‘very low’:<20; ‘low’:20-50; ‘medium’:; ‘high’:>200uV.  Persistence for periodic/rhythmic patterns (% of epoch) ‘rare’:<1%; ‘occasional’:1-10%; ‘frequent’:10-50%; ‘abundant’:50-90%; ‘continuous’:>90%.  Persistence for sporadic discharges: ‘rare’:<1/hr; ‘occasional’:1/min-1/hr; ‘frequent’:>1/min; ‘abundant’:>1/10 sec.  GRDA=generalized rhythmic delta activity; FIRDA=frontal intermittent GRDA; LRDA=lateralized rhythmic delta activity; TIRDA=temporal intermittent rhythmic delta activity;  LPD=PLED=lateralized periodic discharges; GPD=generalized periodic discharges; BiPDs=BiPLEDs=bilateral independent periodic epileptiform discharges; SIRPID=stimulus induced rhythmic, periodic, or ictal appearing discharges; BIRDs=brief potentially ictal rhythmic discharges >4 Hz, lasting .5-10s; PFA (paroxysmal bursts >13 Hz or =8 Hz).  Modifiers: +F=with fast component; +S=with spike component; +R=with rhythmic component.  S-B=burst suppression pattern.  Max=maximal. N1-drowsy; N2-stage II sleep; N3-slow wave sleep. SSS/BETS=small sharp spikes/benign epileptiform transients of sleep. HV=hyperventilation; PS=photic stimulation]]]    FINDINGS:  The background was continuous, spontaneously variable and reactive.  During wakefulness, the posteriorly dominant rhythm consisted of symmetric, well modulated 9 Hz activity, with an amplitude to 40 uV, that attenuated to eye opening.  Low amplitude central beta was noted in wakefulness.  Breach: Persistently higher amplitude and sharper morphology over left parietal region, likely representing breach    Background Slowing:  Generalized slowing: none was present.  Focal slowing: Continuous left hemispheric delta and theta, max over left parietal region    State Changes:   -Drowsiness noted with increased slowing, attenuation of fast activity, vertex transients.  -Absent N2 transients    Sporadic Epileptiform Discharges:    None    Rhythmic and Periodic Patterns (RPPs):  None     Electrographic and Electroclinical seizures:  None    Other Clinical Events:  None    Activation Procedures:   -Hyperventilation was not performed.    -Photic stimulation was performed, no abnormalities seen    Artifacts:  Intermittent myogenic and movement artifacts were noted.    ECG:  The heart rate on single channel ECG was predominantly between 70-80 BPM.    EEG Classification / Summary:  Abnormal  EEG in the awake     Continuous left hemispheric slowing, max in left parietal  Breach rhythm over left parietal region    Clinical Impression:    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      This is a prelim report only, pending review with attending prior to finalization.     Alan Bragg MD     Fellow, [facility]     ------------------------------------     EEG Reading Room: [phone]     On Call Service After Hours: [phone]    COURT JENKINS [id number] 67y (1955)M  Admitting MD: Dr. Rob Ferguson    Study Date: 06-20-23    --------------------------------------------------------------------------------------------------  History:  CC/ HPI Patient is a 67y old  Male who presents with a chief complaint of Concern for focal seizures (20 Jun 2023 13:31)    dexAMETHasone     Tablet 2 milliGRAM(s) Oral daily  enalapril 10 milliGRAM(s) Oral daily  enoxaparin Injectable 40 milliGRAM(s) SubCutaneous every 24 hours  levETIRAcetam 1000 milliGRAM(s) Oral two times a day  pantoprazole    Tablet 40 milliGRAM(s) Oral before breakfast  polyethylene glycol 3350 17 Gram(s) Oral daily  sodium chloride 3%. 500 milliLiter(s) IV Continuous <Continuous>    --------------------------------------------------------------------------------------------------  Study Interpretation:    [[[Abbreviation Key:  PDR=alpha rhythm/posterior dominant rhythm. A-P=anterior posterior gradient.  Amplitude: ‘very low’:<20; ‘low’:20-50; ‘medium’:; ‘high’:>200uV.  Persistence for periodic/rhythmic patterns (% of epoch) ‘rare’:<1%; ‘occasional’:1-10%; ‘frequent’:10-50%; ‘abundant’:50-90%; ‘continuous’:>90%.  Persistence for sporadic discharges: ‘rare’:<1/hr; ‘occasional’:1/min-1/hr; ‘frequent’:>1/min; ‘abundant’:>1/10 sec.  GRDA=generalized rhythmic delta activity; FIRDA=frontal intermittent GRDA; LRDA=lateralized rhythmic delta activity; TIRDA=temporal intermittent rhythmic delta activity;  LPD=PLED=lateralized periodic discharges; GPD=generalized periodic discharges; BiPDs=BiPLEDs=bilateral independent periodic epileptiform discharges; SIRPID=stimulus induced rhythmic, periodic, or ictal appearing discharges; BIRDs=brief potentially ictal rhythmic discharges >4 Hz, lasting .5-10s; PFA (paroxysmal bursts >13 Hz or =8 Hz).  Modifiers: +F=with fast component; +S=with spike component; +R=with rhythmic component.  S-B=burst suppression pattern.  Max=maximal. N1-drowsy; N2-stage II sleep; N3-slow wave sleep. SSS/BETS=small sharp spikes/benign epileptiform transients of sleep. HV=hyperventilation; PS=photic stimulation]]]    FINDINGS:  The background was continuous, spontaneously variable and reactive.  During wakefulness, the posteriorly dominant rhythm consisted of symmetric, well modulated 9 Hz activity, with an amplitude to 40 uV, that attenuated to eye opening.  Low amplitude central beta was noted in wakefulness.  Breach: Persistently higher amplitude and sharper morphology over left parietal region, likely representing breach    Background Slowing:  Generalized slowing: none was present.  Focal slowing: Continuous left hemispheric delta and theta, max over left parietal region    State Changes:   -Drowsiness noted with increased slowing, attenuation of fast activity, vertex transients.  -Absent N2 transients    Sporadic Epileptiform Discharges:    None    Rhythmic and Periodic Patterns (RPPs):  None     Electrographic and Electroclinical seizures:  None    Other Clinical Events:  None    Activation Procedures:   -Hyperventilation was not performed.    -Photic stimulation was performed, no abnormalities seen    Artifacts:  Intermittent myogenic and movement artifacts were noted.    ECG:  The heart rate on single channel ECG was predominantly between 70-80 BPM.    EEG Classification / Summary:  Abnormal  EEG in the awake     Continuous left hemispheric slowing, max in left parietal  Breach rhythm over left parietal region    Clinical Impression:  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      Alan Bragg MD   Fellow, [facility]     ------------------------------------     EEG Reading Room: [phone]     On Call Service After Hours: [phone]

## 2023-06-20 NOTE — PATIENT PROFILE ADULT - VISION (WITH CORRECTIVE LENSES IF THE PATIENT USUALLY WEARS THEM):
decreased vision since surgery 5/2023/Partially impaired: cannot see medication labels or newsprint, but can see obstacles in path, and the surrounding layout; can count fingers at arm's length

## 2023-06-20 NOTE — PHYSICAL THERAPY INITIAL EVALUATION ADULT - PERTINENT HX OF CURRENT PROBLEM, REHAB EVAL
67-year-old right-handed male, retired , w/ PMHx GBM (resected 5/15/23 w/ IMVAX trial). Pt sent in by neuro-oncologist Dr. Galvez for frequent episodes of intense fear / uneasiness, feeling "stressed out of my mind," chills, piloerection to all extremities, palpitations, and increased respiratory rate, all of which lasts for less than 1 minute, and completely resolve. Patient's spouse at bedside confirming the above. Patient and spouse reporting that symptoms started about 5-6 days ago w/ about 10 episodes throughout the entire day. The frequency progressively increased over the course of the week. Yesterday, patient had these episodes about every 10-15 minutes, with the same duration of less than 1 minute. No other symptoms at this time, with the exception of varying degrees of word-finding difficulty.

## 2023-06-21 ENCOUNTER — APPOINTMENT (OUTPATIENT)
Dept: NEUROLOGY | Facility: CLINIC | Age: 68
End: 2023-06-21

## 2023-06-21 LAB
ANION GAP SERPL CALC-SCNC: 12 MMOL/L — SIGNIFICANT CHANGE UP (ref 5–17)
ANION GAP SERPL CALC-SCNC: 13 MMOL/L — SIGNIFICANT CHANGE UP (ref 5–17)
ANION GAP SERPL CALC-SCNC: 13 MMOL/L — SIGNIFICANT CHANGE UP (ref 5–17)
APPEARANCE UR: ABNORMAL
BILIRUB UR-MCNC: NEGATIVE — SIGNIFICANT CHANGE UP
BUN SERPL-MCNC: 16 MG/DL — SIGNIFICANT CHANGE UP (ref 7–23)
BUN SERPL-MCNC: 16 MG/DL — SIGNIFICANT CHANGE UP (ref 7–23)
BUN SERPL-MCNC: 18 MG/DL — SIGNIFICANT CHANGE UP (ref 7–23)
CALCIUM SERPL-MCNC: 9 MG/DL — SIGNIFICANT CHANGE UP (ref 8.4–10.5)
CALCIUM SERPL-MCNC: 9 MG/DL — SIGNIFICANT CHANGE UP (ref 8.4–10.5)
CALCIUM SERPL-MCNC: 9.2 MG/DL — SIGNIFICANT CHANGE UP (ref 8.4–10.5)
CHLORIDE SERPL-SCNC: 91 MMOL/L — LOW (ref 96–108)
CHLORIDE SERPL-SCNC: 91 MMOL/L — LOW (ref 96–108)
CHLORIDE SERPL-SCNC: 94 MMOL/L — LOW (ref 96–108)
CO2 SERPL-SCNC: 22 MMOL/L — SIGNIFICANT CHANGE UP (ref 22–31)
CO2 SERPL-SCNC: 22 MMOL/L — SIGNIFICANT CHANGE UP (ref 22–31)
CO2 SERPL-SCNC: 23 MMOL/L — SIGNIFICANT CHANGE UP (ref 22–31)
COLOR SPEC: YELLOW — SIGNIFICANT CHANGE UP
CORTIS AM PEAK SERPL-MCNC: 5 UG/DL — LOW (ref 6–18.4)
CREAT SERPL-MCNC: 0.77 MG/DL — SIGNIFICANT CHANGE UP (ref 0.5–1.3)
CREAT SERPL-MCNC: 0.78 MG/DL — SIGNIFICANT CHANGE UP (ref 0.5–1.3)
CREAT SERPL-MCNC: 0.82 MG/DL — SIGNIFICANT CHANGE UP (ref 0.5–1.3)
DIFF PNL FLD: ABNORMAL
EGFR: 97 ML/MIN/1.73M2 — SIGNIFICANT CHANGE UP
EGFR: 99 ML/MIN/1.73M2 — SIGNIFICANT CHANGE UP
EGFR: 99 ML/MIN/1.73M2 — SIGNIFICANT CHANGE UP
GLUCOSE SERPL-MCNC: 105 MG/DL — HIGH (ref 70–99)
GLUCOSE SERPL-MCNC: 119 MG/DL — HIGH (ref 70–99)
GLUCOSE SERPL-MCNC: 129 MG/DL — HIGH (ref 70–99)
GLUCOSE UR QL: NEGATIVE — SIGNIFICANT CHANGE UP
HCT VFR BLD CALC: 39.9 % — SIGNIFICANT CHANGE UP (ref 39–50)
HGB BLD-MCNC: 13.9 G/DL — SIGNIFICANT CHANGE UP (ref 13–17)
KETONES UR-MCNC: NEGATIVE — SIGNIFICANT CHANGE UP
LEUKOCYTE ESTERASE UR-ACNC: NEGATIVE — SIGNIFICANT CHANGE UP
LEVETIRACETAM SERPL-MCNC: 10.5 UG/ML — SIGNIFICANT CHANGE UP (ref 10–40)
MCHC RBC-ENTMCNC: 30.8 PG — SIGNIFICANT CHANGE UP (ref 27–34)
MCHC RBC-ENTMCNC: 34.8 GM/DL — SIGNIFICANT CHANGE UP (ref 32–36)
MCV RBC AUTO: 88.3 FL — SIGNIFICANT CHANGE UP (ref 80–100)
NITRITE UR-MCNC: NEGATIVE — SIGNIFICANT CHANGE UP
NRBC # BLD: 0 /100 WBCS — SIGNIFICANT CHANGE UP (ref 0–0)
OSMOLALITY UR: 502 MOS/KG — SIGNIFICANT CHANGE UP (ref 300–900)
PH UR: 6.5 — SIGNIFICANT CHANGE UP (ref 5–8)
PLATELET # BLD AUTO: 296 K/UL — SIGNIFICANT CHANGE UP (ref 150–400)
POTASSIUM SERPL-MCNC: 3.7 MMOL/L — SIGNIFICANT CHANGE UP (ref 3.5–5.3)
POTASSIUM SERPL-MCNC: 3.8 MMOL/L — SIGNIFICANT CHANGE UP (ref 3.5–5.3)
POTASSIUM SERPL-MCNC: 3.8 MMOL/L — SIGNIFICANT CHANGE UP (ref 3.5–5.3)
POTASSIUM SERPL-SCNC: 3.7 MMOL/L — SIGNIFICANT CHANGE UP (ref 3.5–5.3)
POTASSIUM SERPL-SCNC: 3.8 MMOL/L — SIGNIFICANT CHANGE UP (ref 3.5–5.3)
POTASSIUM SERPL-SCNC: 3.8 MMOL/L — SIGNIFICANT CHANGE UP (ref 3.5–5.3)
PROT UR-MCNC: NEGATIVE — SIGNIFICANT CHANGE UP
RBC # BLD: 4.52 M/UL — SIGNIFICANT CHANGE UP (ref 4.2–5.8)
RBC # FLD: 12.9 % — SIGNIFICANT CHANGE UP (ref 10.3–14.5)
SODIUM SERPL-SCNC: 126 MMOL/L — LOW (ref 135–145)
SODIUM SERPL-SCNC: 126 MMOL/L — LOW (ref 135–145)
SODIUM SERPL-SCNC: 129 MMOL/L — LOW (ref 135–145)
SP GR SPEC: 1.02 — SIGNIFICANT CHANGE UP (ref 1.01–1.02)
T4 AB SER-ACNC: 9.5 UG/DL — SIGNIFICANT CHANGE UP (ref 4.6–12)
TSH SERPL-MCNC: 1.69 UIU/ML — SIGNIFICANT CHANGE UP (ref 0.27–4.2)
UROBILINOGEN FLD QL: NEGATIVE — SIGNIFICANT CHANGE UP
UUN UR-MCNC: 1314 MG/DL — SIGNIFICANT CHANGE UP
WBC # BLD: 10.69 K/UL — HIGH (ref 3.8–10.5)
WBC # FLD AUTO: 10.69 K/UL — HIGH (ref 3.8–10.5)

## 2023-06-21 PROCEDURE — 99233 SBSQ HOSP IP/OBS HIGH 50: CPT | Mod: GC

## 2023-06-21 PROCEDURE — 99232 SBSQ HOSP IP/OBS MODERATE 35: CPT

## 2023-06-21 PROCEDURE — 95720 EEG PHY/QHP EA INCR W/VEEG: CPT

## 2023-06-21 RX ORDER — LEVETIRACETAM 250 MG/1
1500 TABLET, FILM COATED ORAL
Refills: 0 | Status: DISCONTINUED | OUTPATIENT
Start: 2023-06-21 | End: 2023-06-23

## 2023-06-21 RX ORDER — FUROSEMIDE 40 MG
20 TABLET ORAL ONCE
Refills: 0 | Status: COMPLETED | OUTPATIENT
Start: 2023-06-21 | End: 2023-06-21

## 2023-06-21 RX ORDER — SODIUM CHLORIDE 5 G/100ML
180 INJECTION, SOLUTION INTRAVENOUS
Refills: 0 | Status: DISCONTINUED | OUTPATIENT
Start: 2023-06-21 | End: 2023-06-21

## 2023-06-21 RX ORDER — SODIUM CHLORIDE 5 G/100ML
180 INJECTION, SOLUTION INTRAVENOUS
Refills: 0 | Status: DISCONTINUED | OUTPATIENT
Start: 2023-06-21 | End: 2023-06-22

## 2023-06-21 RX ADMIN — SODIUM CHLORIDE 30 MILLILITER(S): 5 INJECTION, SOLUTION INTRAVENOUS at 10:37

## 2023-06-21 RX ADMIN — SODIUM CHLORIDE 30 MILLILITER(S): 5 INJECTION, SOLUTION INTRAVENOUS at 17:57

## 2023-06-21 RX ADMIN — LEVETIRACETAM 1000 MILLIGRAM(S): 250 TABLET, FILM COATED ORAL at 05:54

## 2023-06-21 RX ADMIN — ENOXAPARIN SODIUM 40 MILLIGRAM(S): 100 INJECTION SUBCUTANEOUS at 05:54

## 2023-06-21 RX ADMIN — PANTOPRAZOLE SODIUM 40 MILLIGRAM(S): 20 TABLET, DELAYED RELEASE ORAL at 05:54

## 2023-06-21 RX ADMIN — Medication 20 MILLIGRAM(S): at 17:56

## 2023-06-21 RX ADMIN — Medication 20 MILLIGRAM(S): at 11:14

## 2023-06-21 RX ADMIN — LEVETIRACETAM 1500 MILLIGRAM(S): 250 TABLET, FILM COATED ORAL at 17:03

## 2023-06-21 RX ADMIN — Medication 10 MILLIGRAM(S): at 05:54

## 2023-06-21 RX ADMIN — Medication 2 MILLIGRAM(S): at 05:54

## 2023-06-21 RX ADMIN — Medication 3 MILLIGRAM(S): at 23:30

## 2023-06-21 RX ADMIN — Medication 1 MILLIGRAM(S): at 20:41

## 2023-06-21 NOTE — PROGRESS NOTE ADULT - SUBJECTIVE AND OBJECTIVE BOX
[facility] Division of Kidney Diseases & Hypertension  FOLLOW UP NOTE  [phone]--------------------------------------------------------------------------------    HPI:  67 y.o M w/ PMHx of GBM (resected 5/15/23 w/ IMVAX trial), sent in by neuro-oncologist  Dr. Galvez for frequent episodes of intense fear/uneasiness, feeling "stressed out of my mind" chills, increased respiratory rate, for 1 minute and then resolve. Patient's symptoms started about 5-6 days ago w/ 10 episodes per day. Patient sent to the hospital for further workup, also with some degrees of word-finding difficulty. Per chart review, noted patient with post op MRI notable for edema and residual nodular enhancement within the left insular region and left anterior temporal lobe. Patient was discharged home on 5/20/23. Was discharged on 3 days of salt tabs for hyponatremia of 140.  Nephrology consulted for acute hyponatremia. SNa 137 on 5/19, decreased to 128 and repeat was 125 on 6/20.    Interval Hx: Patient seen at bedside today, EEG on. Patient received 3% HTS with lasix 20 IV overnight.     PAST HISTORY  --------------------------------------------------------------------------------  No significant changes to PMH, PSH, FHx, SHx, unless otherwise noted    ALLERGIES & MEDICATIONS  --------------------------------------------------------------------------------  Allergies    No Known Allergies    Intolerances      Standing Inpatient Medications  dexAMETHasone     Tablet 2 milliGRAM(s) Oral daily  enalapril 10 milliGRAM(s) Oral daily  enoxaparin Injectable 40 milliGRAM(s) SubCutaneous every 24 hours  levETIRAcetam 1000 milliGRAM(s) Oral two times a day  pantoprazole    Tablet 40 milliGRAM(s) Oral before breakfast  polyethylene glycol 3350 17 Gram(s) Oral daily  sodium chloride 3%. 180 milliLiter(s) IV Continuous <Continuous>    PRN Inpatient Medications  LORazepam   Injectable 1 milliGRAM(s) IV Push every 4 hours PRN  melatonin 3 milliGRAM(s) Oral at bedtime PRN      REVIEW OF SYSTEMS  --------------------------------------------------------------------------------  Gen: No  fevers/chills  Skin: No rashes  Head/Eyes/Ears/Mouth: No headache; Normal hearing; Normal vision w/o blurriness  Respiratory: No dyspnea, cough, wheezing, hemoptysis  CV: No chest pain, PND, orthopnea  GI: No abdominal pain, diarrhea, constipation, nausea, vomiting  : No increased frequency, dysuria, hematuria, nocturia  MSK: No joint pain/swelling; no back pain; no edema  Neuro: No dizziness/lightheadedness, weakness, seizures, numbness, tingling      All other systems were reviewed and are negative, except as noted.    VITALS/PHYSICAL EXAM  --------------------------------------------------------------------------------  T(C): 36.9 (06-21-23 @ 09:16), Max: 37.1 (06-21-23 @ 04:50)  HR: 70 (06-21-23 @ 09:16) (62 - 88)  BP: 145/73 (06-21-23 @ 09:16) (129/77 - 152/75)  RR: 18 (06-21-23 @ 09:16) (18 - 20)  SpO2: 94% (06-21-23 @ 09:16) (93% - 95%)  Wt(kg): --        06-20-23 @ 07:01  -  06-21-23 @ 07:00  --------------------------------------------------------  IN: 480 mL / OUT: 200 mL / NET: 280 mL      Physical Exam:  	Gen: NAD  	HEENT: MMM  	Pulm: CTA B/L  	CV: S1S2  	Abd: Soft, +BS   	Ext: No LE edema B/L  	Neuro: Awake  	Skin: Warm and dry  	Vascular access:    LABS/STUDIES  --------------------------------------------------------------------------------              13.9   10.69 >-----------<  296      [06-21-23 @ 08:57]              39.9     126  |  91  |  16  ----------------------------<  129      [06-21-23 @ 08:57]  3.7   |  22  |  0.78        Ca     9.2     [06-21-23 @ 08:57]          Serum Osmolality 262      [06-20-23 @ 20:17]    Creatinine Trend:  SCr 0.78 [06-21 @ 08:57]  SCr 0.82 [06-21 @ 01:32]  SCr 0.90 [06-20 @ 19:31]  SCr 0.81 [06-20 @ 15:30]  SCr 0.73 [06-20 @ 10:32]    Urinalysis - [06-21-23 @ 10:02]      Color Yellow / Appearance Slightly Turbid / SG 1.021 / pH 6.5      Gluc Negative / Ketone Negative  / Bili Negative / Urobili Negative       Blood Trace / Protein Negative / Leuk Est Negative / Nitrite Negative      RBC 5 / WBC 0 / Hyaline 0 / Gran  / Sq Epi  / Non Sq Epi  / Bacteria Negative    Urine Creatinine 147      [06-20-23 @ 13:46]  Urine Protein 9      [06-20-23 @ 13:46]  Urine Sodium 74      [06-20-23 @ 23:01]  Urine Urea Nitrogen 1314      [06-20-23 @ 13:45]  Urine Potassium 55      [06-20-23 @ 13:46]  Urine Osmolality 502      [06-20-23 @ 23:01]    TSH 1.69      [06-21-23 @ 08:57]    HCV 0.07, Nonreact      [06-20-23 @ 07:33]

## 2023-06-21 NOTE — PROGRESS NOTE ADULT - ASSESSMENT
Assessment: 67-year-old right-handed male, retired , w/ PMHx GBM (resected 5/15/23 w/ IMVAX trial, followed by Dr. Galvez & Dr. Bush) sent in by neuro-oncologist Dr. Galvez for frequent episodes of intense fear / uneasiness, feeling "stressed out of my mind," chills, piloerection to all extremities, palpitations, and increased respiratory rate, all of which lasts for less than 1 minute, and completely resolve. Patient's spouse at bedside confirming the above. Patient and spouse reporting that symptoms started about 5-6 days ago w/ about 10 episodes throughout the entire day. The frequency progressively increased over the course of the week. Yesterday, patient had these episodes about every 10-15 minutes, with the same duration of less than 1 minute. No other symptoms at this time, with the exception of varying degrees of word-finding difficulty.    Impression: Increasing frequency of intermittent episodes of sensation of intense fear / uneasiness a/w autonomic features including palpitations, tachypnea, chills, and piloerections, all w/ retained awareness. Suspect most symptoms localize to L temporal lobe i/s/o L GBM s/p resection w/ residual vasogenic edema. Most likely etiology is focal seizures with retained awareness.     Plan:     [] c/w vEEG - no seizures per 6/21 report  [x] f/u LEV levels (collected in ED prior to LEV load), CBC, CMP, magnesium, and phosphorous --> LEV 10.5, initial CBC / CMP / Mg / Phos wnl  [x] initial Urinalysis 6/19 w/ trace blood, otherwise unremarkable  [x] s/p LEV load in ED   [x] c/w  mg PO BID --> increased to 1000mg BID  [x] s/p dexamethasone 10 mg load in ED (per outpatient neuro-oncology)  [x] c/w dexamethasone 2 mg QD  [x] Lorazepam 1 mg IV PRN for seizure activity lasting >3-5mins, >2x/hr, >3x/day, or if GTC , repeat after 1 minute (max dose 0.1 mg/kg)    [] monitor BMP, UA, urine lytes, serum/urine osm for hyponatremia (likely due to SIADH)  [] s/p 3% saline 2x and Lasix 20mg 1x on 6/20  [] on 3% saline for 6h and s/p Lasix 20mg 1x on 6/21, will follow-up repeat BMP once saline completed  [] nephrology following, appreciate recs    Management:   [] Telemetry  [] Seizure, fall and aspiration precautions. Avoid sleep deprivation.   [] Please note that numerous antibiotics may trigger epileptic seizures or status epilepticus by decreasing inhibitory transmission in the brain, thus lowering the seizure threshold. The most potent seizurogenic effect is exerted by penicillins, cephalosporins (fourth generation), fluoroquinolones, and carbapenems.    [] Given concern for seizure, advise the patient with regards to risks and driving privileges associated with the [place] Guidelines. Advise patient regarding the risk of seizures and general seizure safety recommendations including not to be bathing alone, climbing to high places and operating heavy machinery, until cleared by follow-up outpatient Neurology. Reinforce the importance of compliance with medications. Discuss sleep hygiene and the risks of sleep disruption. Discuss the risk of death associated with seizures / SUDEP.      * Plan above discussed w/ neuro-oncologist Dr. Galvez *  * Case and plan not complete until attending attestation * Assessment: 67-year-old right-handed male, retired , w/ PMHx GBM (resected 5/15/23 w/ IMVAX trial, followed by Dr. Galvez & Dr. Bush) sent in by neuro-oncologist Dr. Galvez for frequent episodes of intense fear / uneasiness, feeling "stressed out of my mind," chills, piloerection to all extremities, palpitations, and increased respiratory rate, all of which lasts for less than 1 minute, and completely resolve. Patient's spouse at bedside confirming the above. Patient and spouse reporting that symptoms started about 5-6 days ago w/ about 10 episodes throughout the entire day. The frequency progressively increased over the course of the week. Yesterday, patient had these episodes about every 10-15 minutes, with the same duration of less than 1 minute. No other symptoms at this time, with the exception of varying degrees of word-finding difficulty.    Impression: Increasing frequency of intermittent episodes of sensation of intense fear / uneasiness a/w autonomic features including palpitations, tachypnea, chills, and piloerections, all w/ retained awareness. Suspect most symptoms localize to L temporal lobe i/s/o L GBM s/p resection w/ residual vasogenic edema. Most likely etiology is focal seizures with retained awareness.     Plan:     [] c/w vEEG - no seizures per 6/21 report  [x] f/u LEV levels (collected in ED prior to LEV load), CBC, CMP, magnesium, and phosphorous --> LEV 10.5, initial CBC / CMP / Mg / Phos wnl  [x] initial Urinalysis 6/19 w/ trace blood, otherwise unremarkable  [x] s/p LEV load in ED   [x] c/w  mg PO BID --> increased to 1000mg BID  [x] s/p dexamethasone 10 mg load in ED (per outpatient neuro-oncology)  [x] c/w dexamethasone 2 mg QD  [x] c/w Protonix 40mg daily  [x] Lorazepam 1 mg IV PRN for seizure activity lasting >3-5mins, >2x/hr, >3x/day, or if GTC , repeat after 1 minute (max dose 0.1 mg/kg)    [] monitor BMP, UA, urine lytes, serum/urine osm for hyponatremia (likely due to SIADH)  [] s/p 3% saline 2x and Lasix 20mg 1x on 6/20  [] on 3% saline for 6h and s/p Lasix 20mg 1x on 6/21, will follow-up repeat BMP once saline completed  [] nephrology following, appreciate recs  [] c/w home Miralax 17g daily for bowel regimen  [] c/w home enalapril 10mg daily (goal for normotension)  [] c/w home melatonin 3mg qHS PRN for insomnia  [] DVT ppx: Lovenox 40mg SQ daily  [] diet: regular w/ 1L fluid restriction  [] Telemetry  [] Seizure, fall and aspiration precautions. Avoid sleep deprivation.   [] Please note that numerous antibiotics may trigger epileptic seizures or status epilepticus by decreasing inhibitory transmission in the brain, thus lowering the seizure threshold. The most potent seizurogenic effect is exerted by penicillins, cephalosporins (fourth generation), fluoroquinolones, and carbapenems.  [] Given concern for seizure, advise the patient with regards to risks and driving privileges associated with the [place] Guidelines. Advise patient regarding the risk of seizures and general seizure safety recommendations including not to be bathing alone, climbing to high places and operating heavy machinery, until cleared by follow-up outpatient Neurology. Reinforce the importance of compliance with medications. Discuss sleep hygiene and the risks of sleep disruption. Discuss the risk of death associated with seizures / SUDEP.      * Plan above discussed w/ neuro-oncologist Dr. Galvez *  * Case and plan not complete until attending attestation *

## 2023-06-21 NOTE — EEG REPORT - NS EEG TEXT BOX
COURT JENKINS [id number]     Study Date: 		06-20-23 20:40 to 06-21-23 08:00  Duration x Hours: 13:51  --------------------------------------------------------------------------------------------------  History:  CC/ HPI Patient is a 67y old  Male who presents with a chief complaint of Concern for focal seizures (20 Jun 2023 13:31)    MEDICATIONS  (STANDING):  dexAMETHasone     Tablet 2 milliGRAM(s) Oral daily  enalapril 10 milliGRAM(s) Oral daily  enoxaparin Injectable 40 milliGRAM(s) SubCutaneous every 24 hours  levETIRAcetam 1000 milliGRAM(s) Oral two times a day  pantoprazole    Tablet 40 milliGRAM(s) Oral before breakfast  polyethylene glycol 3350 17 Gram(s) Oral daily  sodium chloride 3%. 500 milliLiter(s) (30 mL/Hr) IV Continuous <Continuous>    --------------------------------------------------------------------------------------------------  Study Interpretation:    [[[Abbreviation Key:  PDR=alpha rhythm/posterior dominant rhythm. A-P=anterior posterior.  Amplitude: ‘very low’:<20; ‘low’:20-49; ‘medium’:; ‘high’:>150uV.  Persistence for periodic/rhythmic patterns (% of epoch) ‘rare’:<1%; ‘occasional’:1-10%; ‘frequent’:10-50%; ‘abundant’:50-90%; ‘continuous’:>90%.  Persistence for sporadic discharges: ‘rare’:<1/hr; ‘occasional’:1/min-1/hr; ‘frequent’:>1/min; ‘abundant’:>1/10 sec.  RPP=rhythmic and periodic patterns; GRDA=generalized rhythmic delta activity; FIRDA=frontal intermittent GRDA; LRDA=lateralized rhythmic delta activity; TIRDA=temporal intermittent rhythmic delta activity;  LPD=PLED=lateralized periodic discharges; GPD=generalized periodic discharges; BIPDs =bilateral independent periodic discharges; Mf=multifocal; SIRPDs=stimulus induced rhythmic, periodic, or ictal appearing discharges; BIRDs=brief potentially ictal rhythmic discharges >4 Hz, lasting .5-10s; PFA (paroxysmal bursts >13 Hz or =8 Hz <10s).  Modifiers: +F=with fast component; +S=with spike component; +R=with rhythmic component.  S-B=burst suppression pattern.  Max=maximal. N1-drowsy; N2-stage II sleep; N3-slow wave sleep. SSS/BETS=small sharp spikes/benign epileptiform transients of sleep. HV=hyperventilation; PS=photic stimulation]]]    Daily EEG Visual Analysis    FINDINGS:      Background:  Continuous: continuous  Symmetry: symmetric  PDR: 9 Hz activity, with amplitude to 40 uV, that attenuated to eye opening.  Low amplitude frontal beta noted in wakefulness.  Reactivity: present  Voltage: normal, mostly 20-150uV  Anterior Posterior Gradient: present  Other background findings: none  Breach: Persistently higher amplitude and sharper morphology over left parietal region, likely representing breach    Background Slowing:  Generalized slowing: none was present.  Focal slowing: Continuous left hemispheric delta and theta, max over left parietal region    State Changes:   -Drowsiness noted with increased slowing, attenuation of fast activity, vertex transients.  -Present with N2 sleep transients with symmetric spindles and K-complexes.    Sporadic Epileptiform Discharges:    None    Rhythmic and Periodic Patterns (RPPs):  None     Electrographic and Electroclinical seizures:  None    Other Clinical Events:  None    Activation Procedures:   -Hyperventilation was not performed.    -Photic stimulation was not performed.    Artifacts:  Intermittent myogenic and movement artifacts were noted.    ECG:  The heart rate on single channel ECG was predominantly between 70-80 BPM.    EEG Classification / Summary:  Abnormal  EEG in the awake / drowsy / asleep state(s).    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      This is a prelim report only, pending review with attending prior to finalization.          -------------------------------------------------------------------------------------------------------  [facility] EEG Reading Room Ph#: [phone]  Epilepsy Answering Service after 5PM and before 8:30AM: Ph#: [phone]    Alan Bragg M.D.   Epilepsy fellow COURT JENKINS [id number]     Study Date: 		06-20-23 20:40 to 06-21-23 08:00  Duration x Hours: 13:51  --------------------------------------------------------------------------------------------------  History:  CC/ HPI Patient is a 67y old  Male who presents with a chief complaint of Concern for focal seizures (20 Jun 2023 13:31)    MEDICATIONS  (STANDING):  dexAMETHasone     Tablet 2 milliGRAM(s) Oral daily  enalapril 10 milliGRAM(s) Oral daily  enoxaparin Injectable 40 milliGRAM(s) SubCutaneous every 24 hours  levETIRAcetam 1000 milliGRAM(s) Oral two times a day  pantoprazole    Tablet 40 milliGRAM(s) Oral before breakfast  polyethylene glycol 3350 17 Gram(s) Oral daily  sodium chloride 3%. 500 milliLiter(s) (30 mL/Hr) IV Continuous <Continuous>    --------------------------------------------------------------------------------------------------  Study Interpretation:    [[[Abbreviation Key:  PDR=alpha rhythm/posterior dominant rhythm. A-P=anterior posterior.  Amplitude: ‘very low’:<20; ‘low’:20-49; ‘medium’:; ‘high’:>150uV.  Persistence for periodic/rhythmic patterns (% of epoch) ‘rare’:<1%; ‘occasional’:1-10%; ‘frequent’:10-50%; ‘abundant’:50-90%; ‘continuous’:>90%.  Persistence for sporadic discharges: ‘rare’:<1/hr; ‘occasional’:1/min-1/hr; ‘frequent’:>1/min; ‘abundant’:>1/10 sec.  RPP=rhythmic and periodic patterns; GRDA=generalized rhythmic delta activity; FIRDA=frontal intermittent GRDA; LRDA=lateralized rhythmic delta activity; TIRDA=temporal intermittent rhythmic delta activity;  LPD=PLED=lateralized periodic discharges; GPD=generalized periodic discharges; BIPDs =bilateral independent periodic discharges; Mf=multifocal; SIRPDs=stimulus induced rhythmic, periodic, or ictal appearing discharges; BIRDs=brief potentially ictal rhythmic discharges >4 Hz, lasting .5-10s; PFA (paroxysmal bursts >13 Hz or =8 Hz <10s).  Modifiers: +F=with fast component; +S=with spike component; +R=with rhythmic component.  S-B=burst suppression pattern.  Max=maximal. N1-drowsy; N2-stage II sleep; N3-slow wave sleep. SSS/BETS=small sharp spikes/benign epileptiform transients of sleep. HV=hyperventilation; PS=photic stimulation]]]    Daily EEG Visual Analysis    FINDINGS:      Background:  Continuous: continuous  Symmetry: symmetric  PDR: 9 Hz activity, with amplitude to 40 uV, that attenuated to eye opening.  Low amplitude frontal beta noted in wakefulness.  Reactivity: present  Voltage: normal, mostly 20-150uV  Anterior Posterior Gradient: present  Other background findings: none  Breach: Persistently higher amplitude and sharper morphology over left parietal region, likely representing breach    Background Slowing:  Generalized slowing: none was present.  Focal slowing: Continuous left hemispheric delta and theta, max over left parietal region    State Changes:   -Drowsiness noted with increased slowing, attenuation of fast activity, vertex transients.  -Present with N2 sleep transients with symmetric spindles and K-complexes.    Sporadic Epileptiform Discharges:    None    Rhythmic and Periodic Patterns (RPPs):  None     Electrographic and Electroclinical seizures:  None    Other Clinical Events:  None    Activation Procedures:   -Hyperventilation was not performed.    -Photic stimulation was not performed.    Artifacts:  Intermittent myogenic and movement artifacts were noted.    ECG:  The heart rate on single channel ECG was predominantly between 70-80 BPM.    EEG Classification / Summary:  Abnormal  EEG in the awake / drowsy / asleep state(s).    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      -------------------------------------------------------------------------------------------------------  [facility] EEG Reading Room Ph#: [phone]  Epilepsy Answering Service after 5PM and before 8:30AM: Ph#: [phone]    Alan Bragg M.D.   Epilepsy fellow

## 2023-06-21 NOTE — PROVIDER CONTACT NOTE (OTHER) - SITUATION
Patient exhibited 2 brief episodes of confusion, vitals WDL, with quick return to baseline and with no recollection of confusion. Witnessed by spouse at bedside.

## 2023-06-21 NOTE — PROGRESS NOTE ADULT - SUBJECTIVE AND OBJECTIVE BOX
Neurology Progress Note    SUBJECTIVE/OBJECTIVE/INTERVAL EVENTS: Patient seen and examined at bedside w/ neuro attending and team. INCOMPLETE    INTERVAL HISTORY:    REVIEW OF SYSTEMS: Otherwise denies fever, chills, headaches, vision changes, blurry vision, double vision, nausea, vomiting, hearing change, focal weakness, focal numbness, parasthesias, bowel/ bladder incontinence.  Few questions of a 10-system ROS was performed and is negative except for those items noted above and/or in the HPI.    VITALS & EXAMINATION:  Vital Signs Last 24 Hrs  T(C): 36.9 (2023 09:16), Max: 37.1 (2023 04:50)  T(F): 98.5 (2023 09:16), Max: 98.8 (2023 04:50)  HR: 70 (2023 09:16) (62 - 88)  BP: 145/73 (2023 09:16) (129/77 - 157/73)  BP(mean): --  RR: 18 (2023 09:16) (18 - 20)  SpO2: 94% (2023 09:16) (93% - 95%)    Parameters below as of 2023 09:16  Patient On (Oxygen Delivery Method): room air        General:  Constitutional: Male, appears stated age, nontoxic, not in distress,    Head: Normocephalic;   Eyes: clear sclera;   Extremities: No cyanosis;   Resp: breathing comfortably     Neurological (>12):  MS: Awake, alert.  Oriented person place situation. Follows commands. Attends to examiner  Language: Speech is hypophonic, clear, fluent, good repetition,  comprehension, registration of words.  CNs: PERRL (R 3mm, L 3mm). VFF. EOMI. No disconjugate gaze, skew. V1-3 intact LT, No facial asymmetry b/l. Hearing grossly normal b/l. Tongue midline.     Motor - Normal bulk and tone throughout. No pronator drift.    L/R         Deltoid  5/5    Biceps   5/5      Triceps  5/5         Wrist Extension 5/5   Wrist Flexion  5/5      5/5   L/R         Hip Flexion  5/5    Hip Extension  5/5  Knee Extension  5/5  Dorsiflexion  5/5      Plantar Flexion 5/5     Sensation: Intact to LT b/l. Cortical: Extinction on DSS (neglect): none  Reflexes L/R:  Biceps(C5) 2/2  BR(C6) 2/2   Triceps(C7)  2/2 Patellar(L4)   2/2   Toes: mute  Coordination: No dysmetria to FTN b/l UE  Gait: Normal Romberg. No postural instability. Normal stance.    LABORATORY:  CBC                       13.9   10.69 )-----------( 296      ( 2023 08:57 )             39.9     Chem 06-21    126<L>  |  91<L>  |  16  ----------------------------<  129<H>  3.7   |  22  |  0.78    Ca    9.2      2023 08:57      LFTs   Coagulopathy   Lipid Panel   A1c   Cardiac enzymes     U/A Urinalysis Basic - ( 2023 10:02 )    Color: Yellow / Appearance: Slightly Turbid / S.021 / pH: x  Gluc: x / Ketone: Negative  / Bili: Negative / Urobili: Negative   Blood: x / Protein: Negative / Nitrite: Negative   Leuk Esterase: Negative / RBC: 5 /hpf / WBC 0 /HPF   Sq Epi: x / Non Sq Epi: x / Bacteria: Negative      CSF  Immunological  Other    STUDIES & IMAGING: (EEG, CT, MR, U/S, TTE/MERCY): Neurology Progress Note    SUBJECTIVE/OBJECTIVE/INTERVAL EVENTS: Patient seen and examined at bedside w/ neuro attending and team. Patient reports 15x episodes of fear/palpitations this AM, improved compared to admission. Appears more calm. Also endorsing hallucinations overnight, states he got confused and thought that he needed to go home. Wife at bedside during evaluation. Plan of care discussed and both expressed good understanding.    INTERVAL HISTORY: prelim EEG report with no seizures    REVIEW OF SYSTEMS: Otherwise denies fever, chills, headaches, vision changes, blurry vision, double vision, nausea, vomiting, hearing change, focal weakness, focal numbness, parasthesias, bowel/ bladder incontinence.  Few questions of a 10-system ROS was performed and is negative except for those items noted above and/or in the HPI.    VITALS & EXAMINATION:  Vital Signs Last 24 Hrs  T(C): 36.9 (2023 09:16), Max: 37.1 (2023 04:50)  T(F): 98.5 (2023 09:16), Max: 98.8 (2023 04:50)  HR: 70 (2023 09:16) (62 - 88)  BP: 145/73 (2023 09:16) (129/77 - 157/73)  BP(mean): --  RR: 18 (2023 09:16) (18 - 20)  SpO2: 94% (2023 09:16) (93% - 95%)    Parameters below as of 2023 09:16  Patient On (Oxygen Delivery Method): room air        General:  Constitutional: Male, appears stated age, nontoxic, not in distress,    Head: Normocephalic;   Eyes: clear sclera;   Extremities: No cyanosis;   Resp: breathing comfortably     Neurological (>12):  MS: Awake, alert.  Oriented person place situation. Follows commands. Attends to examiner  Language: Speech is clear, fluent, good comprehension, registration of words.  CNs: PERRL (R 3mm, L 3mm). VFF. EOMI. No disconjugate gaze, skew. V1-3 intact LT, No facial asymmetry b/l. Hearing grossly normal b/l. Tongue midline.   Motor - Normal bulk and tone throughout. No pronator drift.  L/R         Deltoid  5/5    Biceps   5/5      Triceps  5/5         Wrist Extension 5/5   Wrist Flexion  5/5      5/5   L/R         Hip Flexion  5/5    Hip Extension  5/5  Knee Extension  5/5  Dorsiflexion  5/5      Plantar Flexion 5/5   Sensation: Intact to LT b/l. Cortical: Extinction on DSS (neglect): none  Reflexes L/R:  deferred  Coordination: No dysmetria to FTN b/l UE  Gait: deferred due to fall risk    LABORATORY:  CBC                       13.9   10.69 )-----------( 296      ( 2023 08:57 )             39.9     Chem 06-    126<L>  |  91<L>  |  16  ----------------------------<  129<H>  3.7   |  22  |  0.78    Ca    9.2      2023 08:57      U/A Urinalysis Basic - ( 2023 10:02 )    Color: Yellow / Appearance: Slightly Turbid / S.021 / pH: x  Gluc: x / Ketone: Negative  / Bili: Negative / Urobili: Negative   Blood: x / Protein: Negative / Nitrite: Negative   Leuk Esterase: Negative / RBC: 5 /hpf / WBC 0 /HPF   Sq Epi: x / Non Sq Epi: x / Bacteria: Negative      STUDIES & IMAGING: (EEG, CT, MR, U/S, TTE/MERCY):  EEG report 23:  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

## 2023-06-22 LAB
ANION GAP SERPL CALC-SCNC: 12 MMOL/L — SIGNIFICANT CHANGE UP (ref 5–17)
ANION GAP SERPL CALC-SCNC: 13 MMOL/L — SIGNIFICANT CHANGE UP (ref 5–17)
ANION GAP SERPL CALC-SCNC: 15 MMOL/L — SIGNIFICANT CHANGE UP (ref 5–17)
BUN SERPL-MCNC: 13 MG/DL — SIGNIFICANT CHANGE UP (ref 7–23)
BUN SERPL-MCNC: 13 MG/DL — SIGNIFICANT CHANGE UP (ref 7–23)
BUN SERPL-MCNC: 15 MG/DL — SIGNIFICANT CHANGE UP (ref 7–23)
CALCIUM SERPL-MCNC: 8.8 MG/DL — SIGNIFICANT CHANGE UP (ref 8.4–10.5)
CALCIUM SERPL-MCNC: 8.9 MG/DL — SIGNIFICANT CHANGE UP (ref 8.4–10.5)
CALCIUM SERPL-MCNC: 9.2 MG/DL — SIGNIFICANT CHANGE UP (ref 8.4–10.5)
CHLORIDE SERPL-SCNC: 93 MMOL/L — LOW (ref 96–108)
CHLORIDE SERPL-SCNC: 93 MMOL/L — LOW (ref 96–108)
CHLORIDE SERPL-SCNC: 96 MMOL/L — SIGNIFICANT CHANGE UP (ref 96–108)
CO2 SERPL-SCNC: 21 MMOL/L — LOW (ref 22–31)
CO2 SERPL-SCNC: 23 MMOL/L — SIGNIFICANT CHANGE UP (ref 22–31)
CO2 SERPL-SCNC: 23 MMOL/L — SIGNIFICANT CHANGE UP (ref 22–31)
CREAT 24H UR-MCNC: 1.3 G/24 H
CREAT ?TM UR-MCNC: 71 MG/DL
CREAT SERPL-MCNC: 0.77 MG/DL — SIGNIFICANT CHANGE UP (ref 0.5–1.3)
CREAT SERPL-MCNC: 0.81 MG/DL — SIGNIFICANT CHANGE UP (ref 0.5–1.3)
CREAT SERPL-MCNC: 0.87 MG/DL — SIGNIFICANT CHANGE UP (ref 0.5–1.3)
EGFR: 96 ML/MIN/1.73M2 — SIGNIFICANT CHANGE UP
EGFR: 98 ML/MIN/1.73M2 — SIGNIFICANT CHANGE UP
EGFR: 99 ML/MIN/1.73M2 — SIGNIFICANT CHANGE UP
FIBRINOGEN AG PPP IA-MCNC: 467 MG/DL
GLUCOSE SERPL-MCNC: 110 MG/DL — HIGH (ref 70–99)
GLUCOSE SERPL-MCNC: 206 MG/DL — HIGH (ref 70–99)
GLUCOSE SERPL-MCNC: 97 MG/DL — SIGNIFICANT CHANGE UP (ref 70–99)
HCT VFR BLD CALC: 39.1 % — SIGNIFICANT CHANGE UP (ref 39–50)
HGB BLD-MCNC: 13.6 G/DL — SIGNIFICANT CHANGE UP (ref 13–17)
MAGNESIUM 24H UR-MRATE: 114 MG/24H
MCHC RBC-ENTMCNC: 30.2 PG — SIGNIFICANT CHANGE UP (ref 27–34)
MCHC RBC-ENTMCNC: 34.8 GM/DL — SIGNIFICANT CHANGE UP (ref 32–36)
MCV RBC AUTO: 86.9 FL — SIGNIFICANT CHANGE UP (ref 80–100)
NRBC # BLD: 0 /100 WBCS — SIGNIFICANT CHANGE UP (ref 0–0)
PLATELET # BLD AUTO: 278 K/UL — SIGNIFICANT CHANGE UP (ref 150–400)
POTASSIUM SERPL-MCNC: 3.5 MMOL/L — SIGNIFICANT CHANGE UP (ref 3.5–5.3)
POTASSIUM SERPL-MCNC: 3.6 MMOL/L — SIGNIFICANT CHANGE UP (ref 3.5–5.3)
POTASSIUM SERPL-MCNC: 3.6 MMOL/L — SIGNIFICANT CHANGE UP (ref 3.5–5.3)
POTASSIUM SERPL-SCNC: 3.5 MMOL/L — SIGNIFICANT CHANGE UP (ref 3.5–5.3)
POTASSIUM SERPL-SCNC: 3.6 MMOL/L — SIGNIFICANT CHANGE UP (ref 3.5–5.3)
POTASSIUM SERPL-SCNC: 3.6 MMOL/L — SIGNIFICANT CHANGE UP (ref 3.5–5.3)
PROT ?TM UR-MCNC: 24 HR
RBC # BLD: 4.5 M/UL — SIGNIFICANT CHANGE UP (ref 4.2–5.8)
RBC # FLD: 12.9 % — SIGNIFICANT CHANGE UP (ref 10.3–14.5)
SODIUM SERPL-SCNC: 127 MMOL/L — LOW (ref 135–145)
SODIUM SERPL-SCNC: 131 MMOL/L — LOW (ref 135–145)
SODIUM SERPL-SCNC: 131 MMOL/L — LOW (ref 135–145)
SPECIMEN VOL 24H UR: 1900 ML
U MG: 6 MG/DL
WBC # BLD: 10.13 K/UL — SIGNIFICANT CHANGE UP (ref 3.8–10.5)
WBC # FLD AUTO: 10.13 K/UL — SIGNIFICANT CHANGE UP (ref 3.8–10.5)

## 2023-06-22 PROCEDURE — 99233 SBSQ HOSP IP/OBS HIGH 50: CPT | Mod: GC

## 2023-06-22 PROCEDURE — 95718 EEG PHYS/QHP 2-12 HR W/VEEG: CPT

## 2023-06-22 PROCEDURE — 99232 SBSQ HOSP IP/OBS MODERATE 35: CPT

## 2023-06-22 RX ORDER — CLONAZEPAM 1 MG
0.5 TABLET ORAL
Refills: 0 | Status: DISCONTINUED | OUTPATIENT
Start: 2023-06-22 | End: 2023-06-23

## 2023-06-22 RX ORDER — SODIUM CHLORIDE 9 MG/ML
2 INJECTION INTRAMUSCULAR; INTRAVENOUS; SUBCUTANEOUS THREE TIMES A DAY
Refills: 0 | Status: DISCONTINUED | OUTPATIENT
Start: 2023-06-22 | End: 2023-06-23

## 2023-06-22 RX ORDER — SODIUM CHLORIDE 5 G/100ML
180 INJECTION, SOLUTION INTRAVENOUS
Refills: 0 | Status: DISCONTINUED | OUTPATIENT
Start: 2023-06-22 | End: 2023-06-23

## 2023-06-22 RX ORDER — SODIUM CHLORIDE 5 G/100ML
180 INJECTION, SOLUTION INTRAVENOUS
Refills: 0 | Status: DISCONTINUED | OUTPATIENT
Start: 2023-06-22 | End: 2023-06-22

## 2023-06-22 RX ORDER — FUROSEMIDE 40 MG
20 TABLET ORAL ONCE
Refills: 0 | Status: COMPLETED | OUTPATIENT
Start: 2023-06-22 | End: 2023-06-22

## 2023-06-22 RX ADMIN — SODIUM CHLORIDE 2 GRAM(S): 9 INJECTION INTRAMUSCULAR; INTRAVENOUS; SUBCUTANEOUS at 21:39

## 2023-06-22 RX ADMIN — Medication 0.5 MILLIGRAM(S): at 11:07

## 2023-06-22 RX ADMIN — ENOXAPARIN SODIUM 40 MILLIGRAM(S): 100 INJECTION SUBCUTANEOUS at 05:18

## 2023-06-22 RX ADMIN — Medication 10 MILLIGRAM(S): at 05:18

## 2023-06-22 RX ADMIN — SODIUM CHLORIDE 30 MILLILITER(S): 5 INJECTION, SOLUTION INTRAVENOUS at 10:32

## 2023-06-22 RX ADMIN — LEVETIRACETAM 1500 MILLIGRAM(S): 250 TABLET, FILM COATED ORAL at 17:11

## 2023-06-22 RX ADMIN — Medication 3 MILLIGRAM(S): at 21:39

## 2023-06-22 RX ADMIN — Medication 0.5 MILLIGRAM(S): at 21:39

## 2023-06-22 RX ADMIN — PANTOPRAZOLE SODIUM 40 MILLIGRAM(S): 20 TABLET, DELAYED RELEASE ORAL at 05:18

## 2023-06-22 RX ADMIN — Medication 2 MILLIGRAM(S): at 05:18

## 2023-06-22 RX ADMIN — LEVETIRACETAM 1500 MILLIGRAM(S): 250 TABLET, FILM COATED ORAL at 05:19

## 2023-06-22 RX ADMIN — SODIUM CHLORIDE 2 GRAM(S): 9 INJECTION INTRAMUSCULAR; INTRAVENOUS; SUBCUTANEOUS at 13:50

## 2023-06-22 RX ADMIN — Medication 20 MILLIGRAM(S): at 10:32

## 2023-06-22 NOTE — PROVIDER CONTACT NOTE (OTHER) - SITUATION
Pt previously on 3% and 3% stopped when sodium resulted 131 (see prior provider contact note). BMP drawn in AM as ordered and sodium resulted 127.

## 2023-06-22 NOTE — EEG REPORT - NS EEG TEXT BOX
COURT JENKINS [id number]     Study Date: 		06-21-23 08:00 to 06-22-23 08:00  Duration x Hours: 24  --------------------------------------------------------------------------------------------------  History:  CC/ HPI Patient is a 67y old  Male who presents with a chief complaint of Concern for focal seizures (20 Jun 2023 13:31)    MEDICATIONS  (STANDING):  dexAMETHasone     Tablet 2 milliGRAM(s) Oral daily  enalapril 10 milliGRAM(s) Oral daily  enoxaparin Injectable 40 milliGRAM(s) SubCutaneous every 24 hours  levETIRAcetam 1000 milliGRAM(s) Oral two times a day  pantoprazole    Tablet 40 milliGRAM(s) Oral before breakfast  polyethylene glycol 3350 17 Gram(s) Oral daily  sodium chloride 3%. 500 milliLiter(s) (30 mL/Hr) IV Continuous <Continuous>    --------------------------------------------------------------------------------------------------  Study Interpretation:    [[[Abbreviation Key:  PDR=alpha rhythm/posterior dominant rhythm. A-P=anterior posterior.  Amplitude: ‘very low’:<20; ‘low’:20-49; ‘medium’:; ‘high’:>150uV.  Persistence for periodic/rhythmic patterns (% of epoch) ‘rare’:<1%; ‘occasional’:1-10%; ‘frequent’:10-50%; ‘abundant’:50-90%; ‘continuous’:>90%.  Persistence for sporadic discharges: ‘rare’:<1/hr; ‘occasional’:1/min-1/hr; ‘frequent’:>1/min; ‘abundant’:>1/10 sec.  RPP=rhythmic and periodic patterns; GRDA=generalized rhythmic delta activity; FIRDA=frontal intermittent GRDA; LRDA=lateralized rhythmic delta activity; TIRDA=temporal intermittent rhythmic delta activity;  LPD=PLED=lateralized periodic discharges; GPD=generalized periodic discharges; BIPDs =bilateral independent periodic discharges; Mf=multifocal; SIRPDs=stimulus induced rhythmic, periodic, or ictal appearing discharges; BIRDs=brief potentially ictal rhythmic discharges >4 Hz, lasting .5-10s; PFA (paroxysmal bursts >13 Hz or =8 Hz <10s).  Modifiers: +F=with fast component; +S=with spike component; +R=with rhythmic component.  S-B=burst suppression pattern.  Max=maximal. N1-drowsy; N2-stage II sleep; N3-slow wave sleep. SSS/BETS=small sharp spikes/benign epileptiform transients of sleep. HV=hyperventilation; PS=photic stimulation]]]    Daily EEG Visual Analysis    FINDINGS:      Background:  Continuous: continuous  Symmetry: symmetric  PDR: 9 Hz activity, with amplitude to 40 uV, that attenuated to eye opening.  Low amplitude frontal beta noted in wakefulness.  Reactivity: present  Voltage: normal, mostly 20-150uV  Anterior Posterior Gradient: present  Other background findings: none  Breach: Persistently higher amplitude and sharper morphology over left parietal region, likely representing breach    Background Slowing:  Generalized slowing: none was present.  Focal slowing: Continuous left hemispheric delta and theta, max over left temporoparietal region    State Changes:   -Drowsiness noted with increased slowing, attenuation of fast activity, vertex transients.  -Present with N2 sleep transients with symmetric spindles and K-complexes.    Sporadic Epileptiform Discharges:    None    Rhythmic and Periodic Patterns (RPPs):  None     Electrographic and Electroclinical seizures:  None    Other Clinical Events:  Multiple push button events without clear EEG ictal change. There is no sound on the video and no clear clinical change. Per primary team, patient is experiencing auras of fear, piloerection and uneasiness.     Activation Procedures:   -Hyperventilation was not performed.    -Photic stimulation was not performed.    Artifacts:  Intermittent myogenic and movement artifacts were noted.    ECG:  The heart rate on single channel ECG was predominantly between 70-80 BPM.    EEG Classification / Summary:  Abnormal  EEG in the awake / drowsy / asleep state(s).    Continuous left hemispheric slowing, max in left temporoparietal  Breach rhythm over left parietal region  Multiple push button events for episodes of fear/piloerection/uneasiness without clear EEG ictal change.     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 Clinical correlation recommended.   There were no epileptiform abnormalities recorded.      This is a prelim report only, pending review with attending prior to finalization.  -------------------------------------------------------------------------------------------------------  [facility] EEG Reading Room Ph#: [phone]  Epilepsy Answering Service after 5PM and before 8:30AM: Ph#: [phone]    Alan Bragg M.D.   Epilepsy fellow [facility] JENKINS [id number]     Study Date: 		06-21-23 08:00 to 06-22-23 08:00  Duration x Hours: 24  --------------------------------------------------------------------------------------------------  History:  CC/ HPI Patient is a 67y old  Male who presents with a chief complaint of Concern for focal seizures (20 Jun 2023 13:31)    MEDICATIONS  (STANDING):  dexAMETHasone     Tablet 2 milliGRAM(s) Oral daily  enalapril 10 milliGRAM(s) Oral daily  enoxaparin Injectable 40 milliGRAM(s) SubCutaneous every 24 hours  levETIRAcetam 1000 milliGRAM(s) Oral two times a day  pantoprazole    Tablet 40 milliGRAM(s) Oral before breakfast  polyethylene glycol 3350 17 Gram(s) Oral daily  sodium chloride 3%. 500 milliLiter(s) (30 mL/Hr) IV Continuous <Continuous>    --------------------------------------------------------------------------------------------------  Study Interpretation:    [[[Abbreviation Key:  PDR=alpha rhythm/posterior dominant rhythm. A-P=anterior posterior.  Amplitude: ‘very low’:<20; ‘low’:20-49; ‘medium’:; ‘high’:>150uV.  Persistence for periodic/rhythmic patterns (% of epoch) ‘rare’:<1%; ‘occasional’:1-10%; ‘frequent’:10-50%; ‘abundant’:50-90%; ‘continuous’:>90%.  Persistence for sporadic discharges: ‘rare’:<1/hr; ‘occasional’:1/min-1/hr; ‘frequent’:>1/min; ‘abundant’:>1/10 sec.  RPP=rhythmic and periodic patterns; GRDA=generalized rhythmic delta activity; FIRDA=frontal intermittent GRDA; LRDA=lateralized rhythmic delta activity; TIRDA=temporal intermittent rhythmic delta activity;  LPD=PLED=lateralized periodic discharges; GPD=generalized periodic discharges; BIPDs =bilateral independent periodic discharges; Mf=multifocal; SIRPDs=stimulus induced rhythmic, periodic, or ictal appearing discharges; BIRDs=brief potentially ictal rhythmic discharges >4 Hz, lasting .5-10s; PFA (paroxysmal bursts >13 Hz or =8 Hz <10s).  Modifiers: +F=with fast component; +S=with spike component; +R=with rhythmic component.  S-B=burst suppression pattern.  Max=maximal. N1-drowsy; N2-stage II sleep; N3-slow wave sleep. SSS/BETS=small sharp spikes/benign epileptiform transients of sleep. HV=hyperventilation; PS=photic stimulation]]]    Daily EEG Visual Analysis    FINDINGS:      Background:  Continuous: continuous  Symmetry: symmetric  PDR: 9 Hz activity, with amplitude to 40 uV, that attenuated to eye opening.  Low amplitude frontal beta noted in wakefulness.  Reactivity: present  Voltage: normal, mostly 20-150uV  Anterior Posterior Gradient: present  Other background findings: none  Breach: Persistently higher amplitude and sharper morphology over left parietal region, likely representing breach    Background Slowing:  Generalized slowing: none was present.  Focal slowing: Continuous left hemispheric delta and theta, max over left temporoparietal region    State Changes:   -Drowsiness noted with increased slowing, attenuation of fast activity, vertex transients.  -Present with N2 sleep transients with symmetric spindles and K-complexes.    Sporadic Epileptiform Discharges:    None    Rhythmic and Periodic Patterns (RPPs):  Intermittent left frontotemporal max LRDA to 1.5-2 hz    Electrographic and Electroclinical seizures:  None    Other Clinical Events:  Multiple push button events without clear EEG ictal change. There is no sound on the video and no clear clinical change. Per primary team, patient is experiencing auras of fear, piloerection and uneasiness.     Activation Procedures:   -Hyperventilation was not performed.    -Photic stimulation was not performed.    Artifacts:  Intermittent myogenic and movement artifacts were noted.    ECG:  The heart rate on single channel ECG was predominantly between 70-80 BPM.    EEG Classification / Summary:  Abnormal  EEG in the awake / drowsy / asleep state(s).  Continuous left hemispheric slowing, max in left temporoparietal region.  Intermittent left frontotemporal LRDA  Breach rhythm over left parietal region  Multiple push button events for episodes of fear/piloerection/uneasiness without clear EEG ictal change.     Clinical Impression:  Evidence for focal cerebral dysfunction and skull defect in the left temporoparietal and left frontotemporal regions.  Overt electrographic seizures were not observed.  Multiple push button events for patient complaints that may still represent surface negative focal seizures. Clinical correlation required (only ~<30% of focal aware seizures with EEG ictal correlate).      -------------------------------------------------------------------------------------------------------  [facility] EEG Reading Room Ph#: [phone]  Epilepsy Answering Service after 5PM and before 8:30AM: Ph#: [phone]    Alan Bragg M.D.   Epilepsy fellow [address] JENKINS [id number]     Study Date: 		06-21-23 08:00 to 06-22-23 13:11  Duration x Hours: 28:47  --------------------------------------------------------------------------------------------------  History:  CC/ HPI Patient is a 67y old  Male who presents with a chief complaint of Concern for focal seizures (20 Jun 2023 13:31)    MEDICATIONS  (STANDING):  dexAMETHasone     Tablet 2 milliGRAM(s) Oral daily  enalapril 10 milliGRAM(s) Oral daily  enoxaparin Injectable 40 milliGRAM(s) SubCutaneous every 24 hours  levETIRAcetam 1000 milliGRAM(s) Oral two times a day  pantoprazole    Tablet 40 milliGRAM(s) Oral before breakfast  polyethylene glycol 3350 17 Gram(s) Oral daily  sodium chloride 3%. 500 milliLiter(s) (30 mL/Hr) IV Continuous <Continuous>    --------------------------------------------------------------------------------------------------  Study Interpretation:    [[[Abbreviation Key:  PDR=alpha rhythm/posterior dominant rhythm. A-P=anterior posterior.  Amplitude: ‘very low’:<20; ‘low’:20-49; ‘medium’:; ‘high’:>150uV.  Persistence for periodic/rhythmic patterns (% of epoch) ‘rare’:<1%; ‘occasional’:1-10%; ‘frequent’:10-50%; ‘abundant’:50-90%; ‘continuous’:>90%.  Persistence for sporadic discharges: ‘rare’:<1/hr; ‘occasional’:1/min-1/hr; ‘frequent’:>1/min; ‘abundant’:>1/10 sec.  RPP=rhythmic and periodic patterns; GRDA=generalized rhythmic delta activity; FIRDA=frontal intermittent GRDA; LRDA=lateralized rhythmic delta activity; TIRDA=temporal intermittent rhythmic delta activity;  LPD=PLED=lateralized periodic discharges; GPD=generalized periodic discharges; BIPDs =bilateral independent periodic discharges; Mf=multifocal; SIRPDs=stimulus induced rhythmic, periodic, or ictal appearing discharges; BIRDs=brief potentially ictal rhythmic discharges >4 Hz, lasting .5-10s; PFA (paroxysmal bursts >13 Hz or =8 Hz <10s).  Modifiers: +F=with fast component; +S=with spike component; +R=with rhythmic component.  S-B=burst suppression pattern.  Max=maximal. N1-drowsy; N2-stage II sleep; N3-slow wave sleep. SSS/BETS=small sharp spikes/benign epileptiform transients of sleep. HV=hyperventilation; PS=photic stimulation]]]    Daily EEG Visual Analysis    FINDINGS:      Background:  Continuous: continuous  Symmetry: symmetric  PDR: 9 Hz activity, with amplitude to 40 uV, that attenuated to eye opening.  Low amplitude frontal beta noted in wakefulness.  Reactivity: present  Voltage: normal, mostly 20-150uV  Anterior Posterior Gradient: present  Other background findings: none  Breach: Persistently higher amplitude and sharper morphology over left parietal region, likely representing breach    Background Slowing:  Generalized slowing: none was present.  Focal slowing: Continuous left hemispheric delta and theta, max over left temporoparietal region    State Changes:   -Drowsiness noted with increased slowing, attenuation of fast activity, vertex transients.  -Present with N2 sleep transients with symmetric spindles and K-complexes.    Sporadic Epileptiform Discharges:    None    Rhythmic and Periodic Patterns (RPPs):  Intermittent left frontotemporal max LRDA to 1.5-2 hz    Electrographic and Electroclinical seizures:  None    Other Clinical Events:  Multiple push button events without clear EEG ictal change. There is no sound on the video and no clear clinical change. Per primary team, patient is experiencing auras of fear, piloerection and uneasiness.     Activation Procedures:   -Hyperventilation was not performed.    -Photic stimulation was not performed.    Artifacts:  Intermittent myogenic and movement artifacts were noted.    ECG:  The heart rate on single channel ECG was predominantly between 70-80 BPM.    EEG Classification / Summary:  Abnormal  EEG in the awake / drowsy / asleep state(s).  Continuous left hemispheric slowing, max in left temporoparietal region.  Intermittent left frontotemporal LRDA  Breach rhythm over left parietal region  Multiple push button events for episodes of fear/piloerection/uneasiness without clear EEG ictal change.     Clinical Impression:  Evidence for focal cerebral dysfunction and skull defect in the left temporoparietal and left frontotemporal regions.  Overt electrographic seizures were not observed.  Multiple push button events for patient complaints that may still represent surface negative focal seizures. Clinical correlation required (only ~<30% of focal aware seizures with EEG ictal correlate).      -------------------------------------------------------------------------------------------------------  [facility] EEG Reading Room Ph#: [phone]  Epilepsy Answering Service after 5PM and before 8:30AM: Ph#: [phone]    Alan Bragg M.D.   Epilepsy fellow

## 2023-06-22 NOTE — PROGRESS NOTE ADULT - SUBJECTIVE AND OBJECTIVE BOX
[facility] Division of Kidney Diseases & Hypertension  FOLLOW UP NOTE  [phone]--------------------------------------------------------------------------------    HPI:  67 y.o M w/ PMHx of GBM (resected 5/15/23 w/ IMVAX trial), sent in by neuro-oncologist  Dr. Galvez for frequent episodes of intense fear/uneasiness, feeling "stressed out of my mind" chills, increased respiratory rate, for 1 minute and then resolve. Patient's symptoms started about 5-6 days ago w/ 10 episodes per day. Patient sent to the hospital for further workup, also with some degrees of word-finding difficulty. Per chart review, noted patient with post op MRI notable for edema and residual nodular enhancement within the left insular region and left anterior temporal lobe. Patient was discharged home on 5/20/23. Was discharged on 3 days of salt tabs for hyponatremia of 140.  Nephrology consulted for acute hyponatremia. SNa 137 on 5/19, decreased to 128 and repeat was 125 on 6/20.    Interval Hx: Patient seen at bedside today, EEG on. Patient received 3% HTS with lasix 20 IV overnight, SNa improved but then decreased again to 127 when off HTS.     PAST HISTORY  --------------------------------------------------------------------------------  No significant changes to PMH, PSH, FHx, SHx, unless otherwise noted    ALLERGIES & MEDICATIONS  --------------------------------------------------------------------------------  Allergies    No Known Allergies    Intolerances      Standing Inpatient Medications  dexAMETHasone     Tablet 2 milliGRAM(s) Oral daily  enalapril 10 milliGRAM(s) Oral daily  enoxaparin Injectable 40 milliGRAM(s) SubCutaneous every 24 hours  furosemide   Injectable 20 milliGRAM(s) IV Push once  levETIRAcetam 1500 milliGRAM(s) Oral two times a day  pantoprazole    Tablet 40 milliGRAM(s) Oral before breakfast  polyethylene glycol 3350 17 Gram(s) Oral daily  sodium chloride 2 Gram(s) Oral three times a day  sodium chloride 3%. 180 milliLiter(s) IV Continuous <Continuous>    PRN Inpatient Medications  LORazepam   Injectable 1 milliGRAM(s) IV Push every 4 hours PRN  melatonin 3 milliGRAM(s) Oral at bedtime PRN      REVIEW OF SYSTEMS  --------------------------------------------------------------------------------  Gen: No  fevers/chills  Skin: No rashes  Head/Eyes/Ears/Mouth: No headache; Normal hearing; Normal vision w/o blurriness  Respiratory: No dyspnea, cough, wheezing, hemoptysis  CV: No chest pain, PND, orthopnea  GI: No abdominal pain, diarrhea, constipation, nausea, vomiting  : No increased frequency, dysuria, hematuria, nocturia  MSK: No joint pain/swelling; no back pain; no edema  Neuro: No dizziness/lightheadedness, weakness, seizures, numbness, tingling      All other systems were reviewed and are negative, except as noted.    VITALS/PHYSICAL EXAM  --------------------------------------------------------------------------------  T(C): 36.7 (06-22-23 @ 09:26), Max: 37.1 (06-21-23 @ 23:57)  HR: 76 (06-22-23 @ 09:26) (66 - 86)  BP: 151/74 (06-22-23 @ 09:26) (123/61 - 151/80)  RR: 18 (06-22-23 @ 09:26) (18 - 20)  SpO2: 94% (06-22-23 @ 09:26) (93% - 96%)  Wt(kg): --        06-21-23 @ 07:01  -  06-22-23 @ 07:00  --------------------------------------------------------  IN: 1140 mL / OUT: 1200 mL / NET: -60 mL      Physical Exam:  	Gen: NAD  	HEENT: MMM  	Pulm: CTA B/L  	CV: S1S2  	Abd: Soft, +BS   	Ext: No LE edema B/L  	Neuro: Awake  	Skin: Warm and dry  	Vascular access:    LABS/STUDIES  --------------------------------------------------------------------------------              13.6   10.13 >-----------<  278      [06-22-23 @ 05:19]              39.1     127  |  93  |  13  ----------------------------<  97      [06-22-23 @ 05:19]  3.6   |  21  |  0.81        Ca     9.2     [06-22-23 @ 05:19]          Serum Osmolality 262      [06-20-23 @ 20:17]    Creatinine Trend:  SCr 0.81 [06-22 @ 05:19]  SCr 0.77 [06-21 @ 23:40]  SCr 0.77 [06-21 @ 16:55]  SCr 0.78 [06-21 @ 08:57]  SCr 0.82 [06-21 @ 01:32]    Urinalysis - [06-22-23 @ 05:19]      Color  / Appearance  / SG  / pH       Gluc 97 / Ketone   / Bili  / Urobili        Blood  / Protein  / Leuk Est  / Nitrite       RBC  / WBC  / Hyaline  / Gran  / Sq Epi  / Non Sq Epi  / Bacteria     Urine Creatinine 147      [06-20-23 @ 13:46]  Urine Protein 9      [06-20-23 @ 13:46]  Urine Sodium 74      [06-20-23 @ 23:01]  Urine Urea Nitrogen 1314      [06-20-23 @ 13:45]  Urine Potassium 55      [06-20-23 @ 13:46]  Urine Osmolality 502      [06-20-23 @ 23:01]    TSH 1.69      [06-21-23 @ 08:57]    HCV 0.07, Nonreact      [06-20-23 @ 07:33]

## 2023-06-22 NOTE — PROVIDER CONTACT NOTE (OTHER) - BACKGROUND
[facility] resected GBM 5/15/23, readmission 6/19 for possible seizure activity.
Pt s/p GBM resection in 05/2023. On VEEG for possible seizures
s/p GBM resection 5/2023, pt being worked up for possible seizures after experiencing increased incidence of feeling tense, anxious, panicked, and flustered. On EEG. Pt sodium level 125 at this time
s/p GBM resection, p/w frequent feelings of uneasiness, palpitations
s/p GBM resection in May 2023, p/w new uneasiness feelings, palpitationa
Pt s/p GBM resection 5/2023. Admitted for VEEG for anxiety like attacks and confusion

## 2023-06-22 NOTE — PROGRESS NOTE ADULT - SUBJECTIVE AND OBJECTIVE BOX
Neurology Progress Note    SUBJECTIVE/OBJECTIVE/INTERVAL EVENTS: Patient seen and examined at bedside w/ neuro attending and team. INCOMPLETE    INTERVAL HISTORY:    REVIEW OF SYSTEMS: Otherwise denies fever, chills, headaches, vision changes, blurry vision, double vision, nausea, vomiting, hearing change, focal weakness, focal numbness, parasthesias, bowel/ bladder incontinence.  Few questions of a 10-system ROS was performed and is negative except for those items noted above and/or in the HPI.    VITALS & EXAMINATION:  Vital Signs Last 24 Hrs  T(C): 36.7 (22 Jun 2023 09:26), Max: 37.1 (21 Jun 2023 23:57)  T(F): 98.1 (22 Jun 2023 09:26), Max: 98.8 (21 Jun 2023 23:57)  HR: 76 (22 Jun 2023 09:26) (66 - 86)  BP: 151/74 (22 Jun 2023 09:26) (123/61 - 151/80)  BP(mean): --  RR: 18 (22 Jun 2023 09:26) (18 - 20)  SpO2: 94% (22 Jun 2023 09:26) (93% - 96%)    Parameters below as of 22 Jun 2023 09:26  Patient On (Oxygen Delivery Method): room air        General: INCOMPLETE  Constitutional: Male, appears stated age, nontoxic, not in distress,    Head: Normocephalic;   Eyes: clear sclera;   Extremities: No cyanosis;   Resp: breathing comfortably     Neurological (>12):  MS: Awake, alert.  Oriented person place situation. Follows commands. Attends to examiner  Language: Speech is hypophonic, clear, fluent, good repetition,  comprehension, registration of words.  CNs: PERRL (R 3mm, L 3mm). VFF. EOMI. No disconjugate gaze, skew. V1-3 intact LT, No facial asymmetry b/l. Hearing grossly normal b/l. Tongue midline.     Motor - Normal bulk and tone throughout. No pronator drift.    L/R         Deltoid  5/5    Biceps   5/5      Triceps  5/5         Wrist Extension 5/5   Wrist Flexion  5/5      5/5   L/R         Hip Flexion  5/5    Hip Extension  5/5  Knee Extension  5/5  Dorsiflexion  5/5      Plantar Flexion 5/5     Sensation: Intact to LT b/l. Cortical: Extinction on DSS (neglect): none  Reflexes L/R:  Biceps(C5) 2/2  BR(C6) 2/2   Triceps(C7)  2/2 Patellar(L4)   2/2   Toes: mute  Coordination: No dysmetria to FTN b/l UE  Gait: Normal Romberg. No postural instability. Normal stance.    LABORATORY:  CBC                       13.6   10.13 )-----------( 278      ( 22 Jun 2023 05:19 )             39.1     Chem 06-22    127<L>  |  93<L>  |  13  ----------------------------<  97  3.6   |  21<L>  |  0.81    Ca    9.2      22 Jun 2023 05:19      LFTs   Coagulopathy   Lipid Panel   A1c   Cardiac enzymes     U/A Urinalysis Basic - ( 22 Jun 2023 05:19 )    Color: x / Appearance: x / SG: x / pH: x  Gluc: 97 mg/dL / Ketone: x  / Bili: x / Urobili: x   Blood: x / Protein: x / Nitrite: x   Leuk Esterase: x / RBC: x / WBC x   Sq Epi: x / Non Sq Epi: x / Bacteria: x      CSF  Immunological  Other    STUDIES & IMAGING: (EEG, CT, MR, U/S, TTE/MERCY): Neurology Progress Note    SUBJECTIVE/OBJECTIVE/INTERVAL EVENTS: Patient seen and examined at bedside. Wife also present during evaluation. He reports getting resolution of symptoms for first 11h following each Keppra dose. However, he will still have multiple fear episodes during the hour prior to the next dose. He otherwise has no new symptoms/complaints to report    INTERVAL HISTORY: Na 127 this AM, giving 3% saline and 20 Lasix 1x IV. Will repeat BMP and follow-up once 3% completed.    REVIEW OF SYSTEMS: Otherwise denies fever, chills, headaches, vision changes, blurry vision, double vision, nausea, vomiting, hearing change, focal weakness, focal numbness, parasthesias, bowel/ bladder incontinence.  Few questions of a 10-system ROS was performed and is negative except for those items noted above and/or in the HPI.    VITALS & EXAMINATION:  Vital Signs Last 24 Hrs  T(C): 36.7 (22 Jun 2023 09:26), Max: 37.1 (21 Jun 2023 23:57)  T(F): 98.1 (22 Jun 2023 09:26), Max: 98.8 (21 Jun 2023 23:57)  HR: 76 (22 Jun 2023 09:26) (66 - 86)  BP: 151/74 (22 Jun 2023 09:26) (123/61 - 151/80)  BP(mean): --  RR: 18 (22 Jun 2023 09:26) (18 - 20)  SpO2: 94% (22 Jun 2023 09:26) (93% - 96%)    Parameters below as of 22 Jun 2023 09:26  Patient On (Oxygen Delivery Method): room air        General:  Constitutional: Male, appears stated age, nontoxic, not in distress,    Head: Normocephalic;   Eyes: clear sclera;   Extremities: No cyanosis;   Resp: breathing comfortably     Neurological (>12):  MS: Awake, alert.  Oriented person place situation. Follows commands. Attends to examiner  Language: Speech is clear, fluent, good comprehension, registration of words.  CNs: PERRL (R 3mm, L 3mm). EOMI. No disconjugate gaze, skew. V1-3 intact LT, No facial asymmetry b/l. Hearing grossly normal b/l. Tongue midline.   Motor - Normal bulk and tone throughout. No pronator drift. Moving all extremities anti-gravity  Sensation: Intact to LT b/l  Reflexes L/R:  deferred  Coordination: No dysmetria to FTN b/l UE  Gait: deferred due to active EEG study    LABORATORY:  CBC                       13.6   10.13 )-----------( 278      ( 22 Jun 2023 05:19 )             39.1     Chem 06-22    127<L>  |  93<L>  |  13  ----------------------------<  97  3.6   |  21<L>  |  0.81    Ca    9.2      22 Jun 2023 05:19      U/A Urinalysis Basic - ( 22 Jun 2023 05:19 )    Color: x / Appearance: x / SG: x / pH: x  Gluc: 97 mg/dL / Ketone: x  / Bili: x / Urobili: x   Blood: x / Protein: x / Nitrite: x   Leuk Esterase: x / RBC: x / WBC x   Sq Epi: x / Non Sq Epi: x / Bacteria: x      STUDIES & IMAGING: (EEG, CT, MR, U/S, TTE/MERCY):  Evidence for focal cerebral dysfunction and skull defect in the left temporoparietal and left frontotemporal regions.  Overt electrographic seizures were not observed.  Multiple push button events for patient complaints that may still represent surface negative focal seizures. Clinical correlation required (only ~<30% of focal aware seizures with EEG ictal correlate).

## 2023-06-22 NOTE — PROGRESS NOTE ADULT - ASSESSMENT
Assessment: 67-year-old right-handed male, retired , w/ PMHx GBM (resected 5/15/23 w/ IMVAX trial, followed by Dr. Galvez & Dr. Bush) sent in by neuro-oncologist Dr. Galvez for frequent episodes of intense fear / uneasiness, feeling "stressed out of my mind," chills, piloerection to all extremities, palpitations, and increased respiratory rate, all of which lasts for less than 1 minute, and completely resolve. Patient's spouse at bedside confirming the above. Patient and spouse reporting that symptoms started about 5-6 days ago w/ about 10 episodes throughout the entire day. The frequency progressively increased over the course of the week. Yesterday, patient had these episodes about every 10-15 minutes, with the same duration of less than 1 minute. No other symptoms at this time, with the exception of varying degrees of word-finding difficulty.    Impression: Increasing frequency of intermittent episodes of sensation of intense fear / uneasiness a/w autonomic features including palpitations, tachypnea, chills, and piloerections, all w/ retained awareness. Suspect most symptoms localize to L temporal lobe i/s/o L GBM s/p resection w/ residual vasogenic edema. Most likely etiology is focal seizures with retained awareness. EEG negative despite push-button for events, like due to epileptic focus having deep originating focus. Treating patient clinically based on symptoms    Plan:   [x] s/p EEG monitoring - no seizures  [x] f/u LEV levels (collected in ED prior to LEV load), CBC, CMP, magnesium, and phosphorous --> LEV 10.5, initial CBC / CMP / Mg / Phos wnl  [x] initial Urinalysis 6/19 w/ trace blood, otherwise unremarkable  [x] s/p LEV load in ED   [x] c/w  mg PO BID --> increased to 1000mg BID --> 1500 BID (6/21)  [x] s/p dexamethasone 10 mg load in ED (per outpatient neuro-oncology)  [x] c/w dexamethasone 2 mg QD  [x] c/w Protonix 40mg daily  [x] Lorazepam 1 mg IV PRN for seizure activity lasting >3-5mins, >2x/hr, >3x/day, or if GTC , repeat after 1 minute (max dose 0.1 mg/kg)    [] monitor BMP, UA, urine lytes, serum/urine osm for hyponatremia (likely due to SIADH)  [x] s/p 3% saline 2x and Lasix 20mg 1x on 6/20  [x] on 3% saline for 6h and s/p Lasix 20mg 1x on 6/21, will follow-up repeat BMP once saline completed. Same protocol followed on 6/22  [] nephrology following, appreciate recs  [x] c/w home Miralax 17g daily for bowel regimen  [x] c/w home enalapril 10mg daily (goal for normotension)  [x] c/w home melatonin 3mg qHS PRN for insomnia  [x] DVT ppx: Lovenox 40mg SQ daily  [x] diet: regular w/ 1L fluid restriction  [] Telemetry  [] Seizure, fall and aspiration precautions. Avoid sleep deprivation.   [] Please note that numerous antibiotics may trigger epileptic seizures or status epilepticus by decreasing inhibitory transmission in the brain, thus lowering the seizure threshold. The most potent seizurogenic effect is exerted by penicillins, cephalosporins (fourth generation), fluoroquinolones, and carbapenems.  [] Given concern for seizure, advise the patient with regards to risks and driving privileges associated with the [place] Guidelines. Advise patient regarding the risk of seizures and general seizure safety recommendations including not to be bathing alone, climbing to high places and operating heavy machinery, until cleared by follow-up outpatient Neurology. Reinforce the importance of compliance with medications. Discuss sleep hygiene and the risks of sleep disruption. Discuss the risk of death associated with seizures / SUDEP.      * Plan above discussed w/ neuro-oncologist Dr. Galvez *  * Case and plan not complete until attending attestation * Assessment: 67-year-old right-handed male, retired , w/ PMHx GBM (resected 5/15/23 w/ IMVAX trial, followed by Dr. Galvez & Dr. Bush) sent in by neuro-oncologist Dr. Galvez for frequent episodes of intense fear / uneasiness, feeling "stressed out of my mind," chills, piloerection to all extremities, palpitations, and increased respiratory rate, all of which lasts for less than 1 minute, and completely resolve. Patient's spouse at bedside confirming the above. Patient and spouse reporting that symptoms started about 5-6 days ago w/ about 10 episodes throughout the entire day. The frequency progressively increased over the course of the week. Yesterday, patient had these episodes about every 10-15 minutes, with the same duration of less than 1 minute. No other symptoms at this time, with the exception of varying degrees of word-finding difficulty.    Impression: Increasing frequency of intermittent episodes of sensation of intense fear / uneasiness a/w autonomic features including palpitations, tachypnea, chills, and piloerections, all w/ retained awareness. Suspect most symptoms localize to L temporal lobe i/s/o L GBM s/p resection w/ residual vasogenic edema. Most likely etiology is focal seizures with retained awareness. EEG negative despite push-button for events, like due to epileptic focus having deep originating focus. Treating patient clinically based on symptoms    Plan:   [] start Klonopin 0.5mg BID  [x] s/p EEG monitoring - no seizures  [x] f/u LEV levels (collected in ED prior to LEV load), CBC, CMP, magnesium, and phosphorous --> LEV 10.5, initial CBC / CMP / Mg / Phos wnl  [x] initial Urinalysis 6/19 w/ trace blood, otherwise unremarkable  [x] s/p LEV load in ED   [x] c/w  mg PO BID --> increased to 1000mg BID --> 1500 BID (6/21)  [x] s/p dexamethasone 10 mg load in ED (per outpatient neuro-oncology)  [x] c/w dexamethasone 2 mg QD  [x] c/w Protonix 40mg daily  [x] Lorazepam 1 mg IV PRN for seizure activity lasting >3-5mins, >2x/hr, >3x/day, or if GTC , repeat after 1 minute (max dose 0.1 mg/kg)    [] monitor BMP, UA, urine lytes, serum/urine osm for hyponatremia (likely due to SIADH)  [x] s/p 3% saline 2x and Lasix 20mg 1x on 6/20  [x] on 3% saline for 6h and s/p Lasix 20mg 1x on 6/21, will follow-up repeat BMP once saline completed. Same protocol followed on 6/22  [] nephrology following, appreciate recs  [x] c/w home Miralax 17g daily for bowel regimen  [x] c/w home enalapril 10mg daily (goal for normotension)  [x] c/w home melatonin 3mg qHS PRN for insomnia  [x] DVT ppx: Lovenox 40mg SQ daily  [x] diet: regular w/ 1L fluid restriction  [] Telemetry  [] Seizure, fall and aspiration precautions. Avoid sleep deprivation.   [] Please note that numerous antibiotics may trigger epileptic seizures or status epilepticus by decreasing inhibitory transmission in the brain, thus lowering the seizure threshold. The most potent seizurogenic effect is exerted by penicillins, cephalosporins (fourth generation), fluoroquinolones, and carbapenems.  [] Given concern for seizure, advise the patient with regards to risks and driving privileges associated with the [place] Guidelines. Advise patient regarding the risk of seizures and general seizure safety recommendations including not to be bathing alone, climbing to high places and operating heavy machinery, until cleared by follow-up outpatient Neurology. Reinforce the importance of compliance with medications. Discuss sleep hygiene and the risks of sleep disruption. Discuss the risk of death associated with seizures / SUDEP.      * Plan above discussed w/ neuro-oncologist Dr. Galvez *  * Case and plan not complete until attending attestation * Assessment: 67-year-old right-handed male, retired , w/ PMHx GBM (resected 5/15/23 w/ IMVAX trial, followed by Dr. Galvez & Dr. Bush) sent in by neuro-oncologist Dr. Galvez for frequent episodes of intense fear / uneasiness, feeling "stressed out of my mind," chills, piloerection to all extremities, palpitations, and increased respiratory rate, all of which lasts for less than 1 minute, and completely resolve. Patient's spouse at bedside confirming the above. Patient and spouse reporting that symptoms started about 5-6 days ago w/ about 10 episodes throughout the entire day. The frequency progressively increased over the course of the week. Yesterday, patient had these episodes about every 10-15 minutes, with the same duration of less than 1 minute. No other symptoms at this time, with the exception of varying degrees of word-finding difficulty.    Impression: Increasing frequency of intermittent episodes of sensation of intense fear / uneasiness a/w autonomic features including palpitations, tachypnea, chills, and piloerections, all w/ retained awareness. Suspect most symptoms localize to L temporal lobe i/s/o L GBM s/p resection w/ residual vasogenic edema. Most likely etiology is focal seizures with retained awareness. EEG negative despite push-button for events, like due to epileptic focus having deep originating focus. Treating patient clinically based on symptoms    6/22: CBC nl, VSS. nephro- started 3% for 6 hours and salt tabs, repeat BMP around 4 pm. EEG dc'ed    Plan:   [] start Klonopin 0.5mg BID  [x] s/p EEG monitoring - no seizures  [x] f/u LEV levels (collected in ED prior to LEV load), CBC, CMP, magnesium, and phosphorous --> LEV 10.5, initial CBC / CMP / Mg / Phos wnl  [x] initial Urinalysis 6/19 w/ trace blood, otherwise unremarkable  [x] s/p LEV load in ED   [x] c/w  mg PO BID --> increased to 1000mg BID --> 1500 BID (6/21)  [x] s/p dexamethasone 10 mg load in ED (per outpatient neuro-oncology)  [x] c/w dexamethasone 2 mg QD  [x] c/w Protonix 40mg daily  [x] Lorazepam 1 mg IV PRN for seizure activity lasting >3-5mins, >2x/hr, >3x/day, or if GTC , repeat after 1 minute (max dose 0.1 mg/kg)    [] monitor BMP, UA, urine lytes, serum/urine osm for hyponatremia (likely due to SIADH)  [x] s/p 3% saline 2x and Lasix 20mg 1x on 6/20  [x] on 3% saline for 6h and s/p Lasix 20mg 1x on 6/21, will follow-up repeat BMP once saline completed. Same protocol followed on 6/22  [] nephrology following, appreciate recs  [x] c/w home Miralax 17g daily for bowel regimen  [x] c/w home enalapril 10mg daily (goal for normotension)  [x] c/w home melatonin 3mg qHS PRN for insomnia  [x] DVT ppx: Lovenox 40mg SQ daily  [x] diet: regular w/ 1L fluid restriction  [] Telemetry  [] Seizure, fall and aspiration precautions. Avoid sleep deprivation.   [] Please note that numerous antibiotics may trigger epileptic seizures or status epilepticus by decreasing inhibitory transmission in the brain, thus lowering the seizure threshold. The most potent seizurogenic effect is exerted by penicillins, cephalosporins (fourth generation), fluoroquinolones, and carbapenems.  [] Given concern for seizure, advise the patient with regards to risks and driving privileges associated with the [place] Guidelines. Advise patient regarding the risk of seizures and general seizure safety recommendations including not to be bathing alone, climbing to high places and operating heavy machinery, until cleared by follow-up outpatient Neurology. Reinforce the importance of compliance with medications. Discuss sleep hygiene and the risks of sleep disruption. Discuss the risk of death associated with seizures / SUDEP.      * Plan above discussed w/ neuro-oncologist Dr. Galvez *  * Case and plan not complete until attending attestation *

## 2023-06-23 ENCOUNTER — TRANSCRIPTION ENCOUNTER (OUTPATIENT)
Age: 68
End: 2023-06-23

## 2023-06-23 VITALS
OXYGEN SATURATION: 98 % | DIASTOLIC BLOOD PRESSURE: 71 MMHG | SYSTOLIC BLOOD PRESSURE: 115 MMHG | HEART RATE: 98 BPM | RESPIRATION RATE: 18 BRPM | TEMPERATURE: 98 F

## 2023-06-23 PROBLEM — C71.9 MALIGNANT NEOPLASM OF BRAIN, UNSPECIFIED: Chronic | Status: ACTIVE | Noted: 2023-06-19

## 2023-06-23 PROBLEM — Z86.59 PERSONAL HISTORY OF OTHER MENTAL AND BEHAVIORAL DISORDERS: Chronic | Status: ACTIVE | Noted: 2023-06-19

## 2023-06-23 LAB
ANION GAP SERPL CALC-SCNC: 7 MMOL/L — SIGNIFICANT CHANGE UP (ref 5–17)
ANION GAP SERPL CALC-SCNC: 9 MMOL/L — SIGNIFICANT CHANGE UP (ref 5–17)
BUN SERPL-MCNC: 12 MG/DL — SIGNIFICANT CHANGE UP (ref 7–23)
BUN SERPL-MCNC: 14 MG/DL — SIGNIFICANT CHANGE UP (ref 7–23)
CALCIUM SERPL-MCNC: 8.9 MG/DL — SIGNIFICANT CHANGE UP (ref 8.4–10.5)
CALCIUM SERPL-MCNC: 8.9 MG/DL — SIGNIFICANT CHANGE UP (ref 8.4–10.5)
CHLORIDE SERPL-SCNC: 100 MMOL/L — SIGNIFICANT CHANGE UP (ref 96–108)
CHLORIDE SERPL-SCNC: 101 MMOL/L — SIGNIFICANT CHANGE UP (ref 96–108)
CO2 SERPL-SCNC: 24 MMOL/L — SIGNIFICANT CHANGE UP (ref 22–31)
CO2 SERPL-SCNC: 25 MMOL/L — SIGNIFICANT CHANGE UP (ref 22–31)
CREAT SERPL-MCNC: 0.82 MG/DL — SIGNIFICANT CHANGE UP (ref 0.5–1.3)
CREAT SERPL-MCNC: 0.88 MG/DL — SIGNIFICANT CHANGE UP (ref 0.5–1.3)
EGFR: 95 ML/MIN/1.73M2 — SIGNIFICANT CHANGE UP
EGFR: 97 ML/MIN/1.73M2 — SIGNIFICANT CHANGE UP
GLUCOSE SERPL-MCNC: 110 MG/DL — HIGH (ref 70–99)
GLUCOSE SERPL-MCNC: 98 MG/DL — SIGNIFICANT CHANGE UP (ref 70–99)
HCT VFR BLD CALC: 37.4 % — LOW (ref 39–50)
HGB BLD-MCNC: 13.4 G/DL — SIGNIFICANT CHANGE UP (ref 13–17)
MAGNESIUM SERPL-MCNC: 2 MG/DL — SIGNIFICANT CHANGE UP (ref 1.6–2.6)
MCHC RBC-ENTMCNC: 31.4 PG — SIGNIFICANT CHANGE UP (ref 27–34)
MCHC RBC-ENTMCNC: 35.8 GM/DL — SIGNIFICANT CHANGE UP (ref 32–36)
MCV RBC AUTO: 87.6 FL — SIGNIFICANT CHANGE UP (ref 80–100)
NRBC # BLD: 0 /100 WBCS — SIGNIFICANT CHANGE UP (ref 0–0)
OSMOLALITY UR: 431 MOS/KG — SIGNIFICANT CHANGE UP (ref 300–900)
PHOSPHATE SERPL-MCNC: 3.2 MG/DL — SIGNIFICANT CHANGE UP (ref 2.5–4.5)
PLATELET # BLD AUTO: 260 K/UL — SIGNIFICANT CHANGE UP (ref 150–400)
POTASSIUM SERPL-MCNC: 3.4 MMOL/L — LOW (ref 3.5–5.3)
POTASSIUM SERPL-MCNC: 3.7 MMOL/L — SIGNIFICANT CHANGE UP (ref 3.5–5.3)
POTASSIUM SERPL-SCNC: 3.4 MMOL/L — LOW (ref 3.5–5.3)
POTASSIUM SERPL-SCNC: 3.7 MMOL/L — SIGNIFICANT CHANGE UP (ref 3.5–5.3)
RBC # BLD: 4.27 M/UL — SIGNIFICANT CHANGE UP (ref 4.2–5.8)
RBC # FLD: 13.1 % — SIGNIFICANT CHANGE UP (ref 10.3–14.5)
SODIUM SERPL-SCNC: 132 MMOL/L — LOW (ref 135–145)
SODIUM SERPL-SCNC: 134 MMOL/L — LOW (ref 135–145)
WBC # BLD: 8.98 K/UL — SIGNIFICANT CHANGE UP (ref 3.8–10.5)
WBC # FLD AUTO: 8.98 K/UL — SIGNIFICANT CHANGE UP (ref 3.8–10.5)

## 2023-06-23 PROCEDURE — 95711 VEEG 2-12 HR UNMONITORED: CPT

## 2023-06-23 PROCEDURE — 81001 URINALYSIS AUTO W/SCOPE: CPT

## 2023-06-23 PROCEDURE — 84100 ASSAY OF PHOSPHORUS: CPT

## 2023-06-23 PROCEDURE — 84436 ASSAY OF TOTAL THYROXINE: CPT

## 2023-06-23 PROCEDURE — 99285 EMERGENCY DEPT VISIT HI MDM: CPT

## 2023-06-23 PROCEDURE — 84156 ASSAY OF PROTEIN URINE: CPT

## 2023-06-23 PROCEDURE — 80048 BASIC METABOLIC PNL TOTAL CA: CPT

## 2023-06-23 PROCEDURE — 95714 VEEG EA 12-26 HR UNMNTR: CPT

## 2023-06-23 PROCEDURE — 99239 HOSP IP/OBS DSCHRG MGMT >30: CPT

## 2023-06-23 PROCEDURE — 97161 PT EVAL LOW COMPLEX 20 MIN: CPT

## 2023-06-23 PROCEDURE — 86803 HEPATITIS C AB TEST: CPT

## 2023-06-23 PROCEDURE — 85025 COMPLETE CBC W/AUTO DIFF WBC: CPT

## 2023-06-23 PROCEDURE — 95700 EEG CONT REC W/VID EEG TECH: CPT

## 2023-06-23 PROCEDURE — 80053 COMPREHEN METABOLIC PANEL: CPT

## 2023-06-23 PROCEDURE — 84443 ASSAY THYROID STIM HORMONE: CPT

## 2023-06-23 PROCEDURE — 87040 BLOOD CULTURE FOR BACTERIA: CPT

## 2023-06-23 PROCEDURE — 85610 PROTHROMBIN TIME: CPT

## 2023-06-23 PROCEDURE — 85730 THROMBOPLASTIN TIME PARTIAL: CPT

## 2023-06-23 PROCEDURE — 97129 THER IVNTJ 1ST 15 MIN: CPT

## 2023-06-23 PROCEDURE — 84540 ASSAY OF URINE/UREA-N: CPT

## 2023-06-23 PROCEDURE — 96374 THER/PROPH/DIAG INJ IV PUSH: CPT

## 2023-06-23 PROCEDURE — 97166 OT EVAL MOD COMPLEX 45 MIN: CPT

## 2023-06-23 PROCEDURE — 84133 ASSAY OF URINE POTASSIUM: CPT

## 2023-06-23 PROCEDURE — 36415 COLL VENOUS BLD VENIPUNCTURE: CPT

## 2023-06-23 PROCEDURE — 83935 ASSAY OF URINE OSMOLALITY: CPT

## 2023-06-23 PROCEDURE — 82570 ASSAY OF URINE CREATININE: CPT

## 2023-06-23 PROCEDURE — 71045 X-RAY EXAM CHEST 1 VIEW: CPT

## 2023-06-23 PROCEDURE — 87086 URINE CULTURE/COLONY COUNT: CPT

## 2023-06-23 PROCEDURE — 84300 ASSAY OF URINE SODIUM: CPT

## 2023-06-23 PROCEDURE — 82533 TOTAL CORTISOL: CPT

## 2023-06-23 PROCEDURE — 99232 SBSQ HOSP IP/OBS MODERATE 35: CPT | Mod: GC

## 2023-06-23 PROCEDURE — 83735 ASSAY OF MAGNESIUM: CPT

## 2023-06-23 PROCEDURE — 85027 COMPLETE CBC AUTOMATED: CPT

## 2023-06-23 PROCEDURE — 97130 THER IVNTJ EA ADDL 15 MIN: CPT

## 2023-06-23 PROCEDURE — 83930 ASSAY OF BLOOD OSMOLALITY: CPT

## 2023-06-23 PROCEDURE — 82550 ASSAY OF CK (CPK): CPT

## 2023-06-23 PROCEDURE — 80177 DRUG SCRN QUAN LEVETIRACETAM: CPT

## 2023-06-23 PROCEDURE — 70470 CT HEAD/BRAIN W/O & W/DYE: CPT | Mod: MC

## 2023-06-23 PROCEDURE — 93005 ELECTROCARDIOGRAM TRACING: CPT

## 2023-06-23 RX ORDER — PANTOPRAZOLE SODIUM 20 MG/1
1 TABLET, DELAYED RELEASE ORAL
Qty: 30 | Refills: 0
Start: 2023-06-23 | End: 2023-07-22

## 2023-06-23 RX ORDER — SODIUM CHLORIDE 9 MG/ML
2 INJECTION INTRAMUSCULAR; INTRAVENOUS; SUBCUTANEOUS
Qty: 180 | Refills: 0
Start: 2023-06-23 | End: 2023-07-22

## 2023-06-23 RX ORDER — DEXAMETHASONE 0.5 MG/5ML
1 ELIXIR ORAL
Qty: 0 | Refills: 0 | DISCHARGE
Start: 2023-06-23

## 2023-06-23 RX ORDER — LEVETIRACETAM 250 MG/1
2 TABLET, FILM COATED ORAL
Qty: 120 | Refills: 0
Start: 2023-06-23 | End: 2023-07-22

## 2023-06-23 RX ORDER — LANOLIN ALCOHOL/MO/W.PET/CERES
1 CREAM (GRAM) TOPICAL
Qty: 30 | Refills: 2
Start: 2023-06-23 | End: 2023-09-20

## 2023-06-23 RX ORDER — LEVETIRACETAM 250 MG/1
2 TABLET, FILM COATED ORAL
Qty: 0 | Refills: 0 | DISCHARGE
Start: 2023-06-23

## 2023-06-23 RX ORDER — CLONAZEPAM 1 MG
1 TABLET ORAL
Qty: 60 | Refills: 0
Start: 2023-06-23 | End: 2023-07-22

## 2023-06-23 RX ORDER — SODIUM CHLORIDE 9 MG/ML
2 INJECTION INTRAMUSCULAR; INTRAVENOUS; SUBCUTANEOUS
Qty: 0 | Refills: 0 | DISCHARGE
Start: 2023-06-23

## 2023-06-23 RX ORDER — POLYETHYLENE GLYCOL 3350 17 G/17G
17 POWDER, FOR SOLUTION ORAL
Qty: 510 | Refills: 0
Start: 2023-06-23 | End: 2023-07-22

## 2023-06-23 RX ORDER — CLONAZEPAM 1 MG
1 TABLET ORAL
Qty: 0 | Refills: 0 | DISCHARGE
Start: 2023-06-23

## 2023-06-23 RX ADMIN — Medication 2 MILLIGRAM(S): at 05:06

## 2023-06-23 RX ADMIN — Medication 10 MILLIGRAM(S): at 05:09

## 2023-06-23 RX ADMIN — LEVETIRACETAM 1500 MILLIGRAM(S): 250 TABLET, FILM COATED ORAL at 05:06

## 2023-06-23 RX ADMIN — Medication 0.5 MILLIGRAM(S): at 09:50

## 2023-06-23 RX ADMIN — PANTOPRAZOLE SODIUM 40 MILLIGRAM(S): 20 TABLET, DELAYED RELEASE ORAL at 05:06

## 2023-06-23 RX ADMIN — SODIUM CHLORIDE 2 GRAM(S): 9 INJECTION INTRAMUSCULAR; INTRAVENOUS; SUBCUTANEOUS at 05:06

## 2023-06-23 RX ADMIN — ENOXAPARIN SODIUM 40 MILLIGRAM(S): 100 INJECTION SUBCUTANEOUS at 05:06

## 2023-06-23 NOTE — PROGRESS NOTE ADULT - SUBJECTIVE AND OBJECTIVE BOX
Neurology - Consult Note    -  Spectra: [phone] ([facility]), [phone] ([facility])  -    HPI: Patient COURT JENKINS is a 67y (1955) wo/man with a PMHx significant for ***      Review of Systems:  INCOMPLETE   CONSTITUTIONAL: No fevers or chills  EYES AND ENT: No visual changes or no throat pain   NECK: No pain or stiffness  RESPIRATORY: No hemoptysis or shortness of breath  CARDIOVASCULAR: No chest pain or palpitations  GASTROINTESTINAL: No melena or hematochezia  GENITOURINARY: No dysuria or hematuria  NEUROLOGICAL: +As stated in HPI above  SKIN: No itching, burning, rashes, or lesions   All other review of systems is negative unless indicated above.    Allergies:  No Known Allergies      PMHx/PSHx/Family Hx: As above, otherwise see below   Left shoulder pain    Bilateral knee pain    H/O insomnia    Elbow pain, right    History of claustrophobia    GBM (glioblastoma multiforme)        Social Hx:  No current use of tobacco, alcohol, or illicit drugs  Lives with ***    Medications:  MEDICATIONS  (STANDING):  clonazePAM  Tablet 0.5 milliGRAM(s) Oral two times a day  dexAMETHasone     Tablet 2 milliGRAM(s) Oral daily  enalapril 10 milliGRAM(s) Oral daily  enoxaparin Injectable 40 milliGRAM(s) SubCutaneous every 24 hours  levETIRAcetam 1500 milliGRAM(s) Oral two times a day  pantoprazole    Tablet 40 milliGRAM(s) Oral before breakfast  polyethylene glycol 3350 17 Gram(s) Oral daily  sodium chloride 2 Gram(s) Oral three times a day  sodium chloride 3%. 180 milliLiter(s) (30 mL/Hr) IV Continuous <Continuous>    MEDICATIONS  (PRN):  LORazepam   Injectable 1 milliGRAM(s) IV Push every 4 hours PRN seizure  melatonin 3 milliGRAM(s) Oral at bedtime PRN Insomnia      Vitals:  T(C): 36.7 (06-23-23 @ 04:51), Max: 36.8 (06-23-23 @ 01:22)  HR: 68 (06-23-23 @ 04:51) (63 - 90)  BP: 152/77 (06-23-23 @ 04:51) (113/61 - 152/77)  RR: 18 (06-23-23 @ 04:51) (18 - 18)  SpO2: 96% (06-23-23 @ 04:51) (93% - 97%)    Physical Examination: INCOMPLETE  General - NAD  Cardiovascular - Peripheral pulses palpable, no edema  Eyes - Fundoscopy with flat, sharp optic discs and no hemorrhage or exudates; Fundoscopy not well visualized; Fundoscopy not performed due to safety precautions in the setting of the COVID-19 pandemic    Neurologic Exam:  Mental status - Awake, Alert, Oriented to person, place, and time. Speech fluent, repetition and naming intact. Follows simple and complex commands. Attention/concentration, recent and remote memory (including registration and recall), and fund of knowledge intact    Cranial nerves - PERRLA, VFF, EOMI, face sensation (V1-V3) intact b/l, facial strength intact without asymmetry b/l, hearing intact b/l, palate with symmetric elevation, trapezius OR sternocleidomastiod 5/5 strength b/l, tongue midline on protrusion with full lateral movement    Motor - Normal bulk and tone throughout. No pronator drift.  Strength testing            Deltoid      Biceps      Triceps     Wrist Extension    Wrist Flexion     Interossei         R            5                 5               5                     5                              5                        5                 5  L             5                 5               5                     5                              5                        5                 5              Hip Flexion    Hip Extension    Knee Flexion    Knee Extension    Dorsiflexion    Plantar Flexion  R              5                           5                       5                           5                            5                          5  L              5                           5                        5                           5                            5                          5    Sensation - Light touch/temperature OR pain/vibration intact throughout    DTR's -             Biceps      Triceps     Brachioradialis      Patellar    Ankle    Toes/plantar response  R             2+             2+                  2+                       2+            2+                 Down  L              2+             2+                 2+                        2+           2+                 Down    Coordination - Finger to Nose intact b/l. No tremors appreciated    Gait and station - Normal casual gait. Romberg (-)    Labs:                        13.4   8.98  )-----------( 260      ( 23 Jun 2023 05:38 )             37.4     06-23    134<L>  |  101  |  12  ----------------------------<  98  3.4<L>   |  24  |  0.82    Ca    8.9      23 Jun 2023 05:37  Phos  3.2     06-23  Mg     2.0     06-23      CAPILLARY BLOOD GLUCOSE              CSF:                  Radiology:     Neurology Progress Note    SUBJECTIVE/OBJECTIVE/INTERVAL EVENTS: Patient seen and examined at bedside. Wife also present during evaluation. He reports getting resolution of symptoms following each Keppra dose.  fear episodes resolved after starting clonazepam. He otherwise has no new symptoms/complaints to report    INTERVAL HISTORY: No acute overnight events.     REVIEW OF SYSTEMS: Otherwise denies fever, chills, headaches, vision changes, blurry vision, double vision, nausea, vomiting, hearing change, focal weakness, focal numbness, parasthesias, bowel/ bladder incontinence.  Few questions of a 10-system ROS was performed and is negative except for those items noted above and/or in the HPI.    Allergies:  No Known Allergies      PMHx/PSHx/Family Hx: As above, otherwise see below   Left shoulder pain    Bilateral knee pain    H/O insomnia    Elbow pain, right    History of claustrophobia    GBM (glioblastoma multiforme)        Social Hx:  No current use of tobacco, alcohol, or illicit drugs    Medications:  MEDICATIONS  (STANDING):  clonazePAM  Tablet 0.5 milliGRAM(s) Oral two times a day  dexAMETHasone     Tablet 2 milliGRAM(s) Oral daily  enalapril 10 milliGRAM(s) Oral daily  enoxaparin Injectable 40 milliGRAM(s) SubCutaneous every 24 hours  levETIRAcetam 1500 milliGRAM(s) Oral two times a day  pantoprazole    Tablet 40 milliGRAM(s) Oral before breakfast  polyethylene glycol 3350 17 Gram(s) Oral daily  sodium chloride 2 Gram(s) Oral three times a day  sodium chloride 3%. 180 milliLiter(s) (30 mL/Hr) IV Continuous <Continuous>    MEDICATIONS  (PRN):  LORazepam   Injectable 1 milliGRAM(s) IV Push every 4 hours PRN seizure  melatonin 3 milliGRAM(s) Oral at bedtime PRN Insomnia      Vitals:  T(C): 36.7 (06-23-23 @ 04:51), Max: 36.8 (06-23-23 @ 01:22)  HR: 68 (06-23-23 @ 04:51) (63 - 90)  BP: 152/77 (06-23-23 @ 04:51) (113/61 - 152/77)  RR: 18 (06-23-23 @ 04:51) (18 - 18)  SpO2: 96% (06-23-23 @ 04:51) (93% - 97%)      General:  Constitutional: Male, appears stated age, nontoxic, not in distress,    Head: Normocephalic;   Eyes: clear sclera;   Extremities: No cyanosis;   Resp: breathing comfortably     Neurological (>12):  MS: Awake, alert.  Oriented person place situation. Follows commands. Attends to examiner  Language: Speech is clear, fluent, good comprehension, registration of words.  CNs: PERRL (R 3mm, L 3mm). EOMI. No disconjugate gaze, skew. V1-3 intact LT, No facial asymmetry b/l. Hearing grossly normal b/l. Tongue midline.   Motor - Normal bulk and tone throughout. No pronator drift. Moving all extremities anti-gravity  Sensation: Intact to LT b/l  Reflexes L/R:  deferred  Coordination: No dysmetria to FTN b/l UE  Gait: deferred  Labs:                        13.4   8.98  )-----------( 260      ( 23 Jun 2023 05:38 )             37.4     06-23    134<L>  |  101  |  12  ----------------------------<  98  3.4<L>   |  24  |  0.82    Ca    8.9      23 Jun 2023 05:37  Phos  3.2     06-23  Mg     2.0     06-23      CAPILLARY BLOOD GLUCOSE              CSF:                  Radiology:

## 2023-06-23 NOTE — DISCHARGE NOTE PROVIDER - HOSPITAL COURSE
67-year-old right-handed male, retired , w/ PMHx GBM (resected 5/15/23 w/ IMVAX trial, followed by Dr. Galvez & Dr. Bush) sent in by neuro-oncologist Dr. Galvez for frequent episodes of intense fear / uneasiness, feeling "stressed out of my mind," chills, piloerection to all extremities, palpitations, and increased respiratory rate, all of which lasts for less than 1 minute, and completely resolve. Patient's spouse at bedside confirming the above. Patient and spouse reporting that symptoms started about 5-6 days ago w/ about 10 episodes throughout the entire day. The frequency progressively increased over the course of the week. Yesterday, patient had these episodes about every 10-15 minutes, with the same duration of less than 1 minute. No other symptoms at this time, with the exception of varying degrees of word-finding difficulty.    Per outpatient neuro-oncology Dr. Galvez note from 5/31/23:    5/9/2023 - I have seen him today with a new MRI. patient and his wife have noted over the past 3 weeks some difficulty with expressive speech - word substitutions and change in personality - more irritable that usual. He denies headaches, focal weakness,numbness, seizures, or gait instability. He saw Dr. Garcia, neurology - had and MRI this am - I have personally reviewed this film - that shows a left temporal heterogeneously enhancing mass measuring 5.0 x 4.5 x 3.3 cm with extension into the left frontal lobe with surrounding edema and left to right midline shift. He was sent to ER for an expedited evaluation  5/9/2023- 5/20/2023- Admitted at [facility]. MR Spect on 5/11 revealed "Reidentified heterogeneously enhancing necrotic mass lesion centered within the left temporal lobe and extending into the left periinsular region with a cystic portion along the inferior aspect of the lesion, with a large degree of surrounding vasogenic edema throughout the left parietal temporal lobes and left basal ganglia This lesion likely reflects a   high-grade glioma. Functional imaging demonstrates no hyperactivity in the region of tumor or vasogenic edema."   Patient met criteria and was enrolled in IMVAX Trial.   5/15- had a Left pterional craniotomy for brain tumor resection with gleolan. Frozen path: high-grade glioma. Bilateral abdominal incisions made for later implantation of drug treatment. IMVAX Biologics were implanted into abdomen via incisions during bedside procedure per IMVAX trial on 5/17 under conscious sedation. These biologic agents were removed during bedside procedure on 5/19 under conscious sedation. Patient was   monitored in the NSCU, his post op course remained uncomplicated  5/16/2023 - Post op MRI notable for edema and residual nodular enhancement within the left insular   region and left anterior temporal lobe. CT Serial CT Head imaging post op showing stable resolving post operative changes. Patient was discharged home on 5/20/2023.   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 Patient was discharged on steroid taper, since they didnâ€™t had a refill he stopped taking steroids since Monday.  Denies headaches, seizures     Hospital course: Pt was admitted to the floor with monitored for seizure like activity, placed on video EEG x 3 days. No actual seizures were captured on EEG but likely scalp negative insular seizures given brain findings. She has been placed on keppra 1500 mg BID and clonazem .5 mg BID. Nephrology was consulted for hyponatremia to 125 (presumed to be related to brain findings) and recommended 3% NaCl and salt tabs while inpatient. Hyponatremia improved. Outpatient OT was recommended. Pt has been stabilized for discharge home with outpatient follow up. Pt has an outpatient procedure scheduled today.    CT head:     There is evidence of abnormal low-attenuation identified involving the posterior left insular region. This is best seen on series 2 image 15 and measures approximately 2.5 x 1.0 cm. This could be compatible postop changes though the possibility of residual or recurrent tumor must be considered. Contrast enhanced MRI is recommended to better characterize   this finding. Surrounding vasogenic edema is identified. Vasogenic edema was identified in this region on the prior study. There is localized masseffect seen consisting sulcal effacement. No significant shift or herniation is seen  Abnormal low-attenuation involving the left temporal region is again seen which could be related to postop changes    Right maxillary polyp versus retention cyst is seenLeft ethmoid sinus and coastal thickening is seen.    IMPRESSION: Abnormal low-attenuation involving the left posterior insular region is seen with surrounding vasogenic edema.    ACC: [id number] EXAM:  XR CHEST AP OR PA 1V   ORDERED BY:  LAKE R SOLORIO   ROSEMARIE     COMPARISON: Chest x-ray 5/9/2023    FINDINGS:  The heart size is normal in size.  The lungs are clear.  There is no pleural effusion There is no pneumothorax.  No acute bony abnormality.    IMPRESSION:  Clear lungs.    EEG 6/20/23:    EEG Classification / Summary:    EEG Classification / Summary:  Abnormal  EEG in the awake / drowsy / asleep state(s).  Continuous left hemispheric slowing, max in left temporoparietal region.  Intermittent left frontotemporal LRDA  Breach rhythm over left parietal region  Multiple push button events for episodes of fear/piloerection/uneasiness without clear EEG ictal change.     Clinical Impression:  Evidence for focal cerebral dysfunction and skull defect in the left temporoparietal and left frontotemporal regions.  Overt electrographic seizures were not observed.  Multiple push button events for patient complaints that may still represent surface negative focal seizures. Clinical correlation required (only ~<30% of focal aware seizures with EEG ictal correlate).     67-year-old right-handed male, retired , w/ PMHx GBM (resected 5/15/23 w/ IMVAX trial, followed by Dr. Galvez & Dr. Bush) sent in by neuro-oncologist Dr. Galvez for frequent episodes of intense fear / uneasiness, feeling "stressed out of my mind," chills, piloerection to all extremities, palpitations, and increased respiratory rate, all of which lasts for less than 1 minute, and completely resolve. Patient's spouse at bedside confirming the above. Patient and spouse reporting that symptoms started about 5-6 days ago w/ about 10 episodes throughout the entire day. The frequency progressively increased over the course of the week. Yesterday, patient had these episodes about every 10-15 minutes, with the same duration of less than 1 minute. No other symptoms at this time, with the exception of varying degrees of word-finding difficulty.    Per outpatient neuro-oncology Dr. Galvez note from 5/31/23:    5/9/2023 - I have seen him today with a new MRI. patient and his wife have noted over the past 3 weeks some difficulty with expressive speech - word substitutions and change in personality - more irritable that usual. He denies headaches, focal weakness,numbness, seizures, or gait instability. He saw Dr. Garcia, neurology - had and MRI this am - I have personally reviewed this film - that shows a left temporal heterogeneously enhancing mass measuring 5.0 x 4.5 x 3.3 cm with extension into the left frontal lobe with surrounding edema and left to right midline shift. He was sent to ER for an expedited evaluation  5/9/2023- 5/20/2023- Admitted at [facility]. MR Spect on 5/11 revealed "Reidentified heterogeneously enhancing necrotic mass lesion centered within the left temporal lobe and extending into the left periinsular region with a cystic portion along the inferior aspect of the lesion, with a large degree of surrounding vasogenic edema throughout the left parietal temporal lobes and left basal ganglia This lesion likely reflects a   high-grade glioma. Functional imaging demonstrates no hyperactivity in the region of tumor or vasogenic edema."   Patient met criteria and was enrolled in IMVAX Trial.   5/15- had a Left pterional craniotomy for brain tumor resection with gleolan. Frozen path: high-grade glioma. Bilateral abdominal incisions made for later implantation of drug treatment. IMVAX Biologics were implanted into abdomen via incisions during bedside procedure per IMVAX trial on 5/17 under conscious sedation. These biologic agents were removed during bedside procedure on 5/19 under conscious sedation. Patient was   monitored in the NSCU, his post op course remained uncomplicated  5/16/2023 - Post op MRI notable for edema and residual nodular enhancement within the left insular   region and left anterior temporal lobe. CT Serial CT Head imaging post op showing stable resolving post operative changes. Patient was discharged home on 5/20/2023.   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 Patient was discharged on steroid taper, since they didnâ€™t had a refill he stopped taking steroids since Monday.  Denies headaches, seizures     Hospital course: Pt was admitted to the floor with monitored for seizure like activity, placed on video EEG x 3 days. No actual seizures were captured on EEG but likely scalp negative insular seizures given brain findings. She has been placed on keppra 1500 mg BID and clonazem .5 mg BID. Nephrology was consulted for hyponatremia to 125 (presumed to be related to brain findings) and recommended 3% NaCl and salt tabs while inpatient. Hyponatremia improved. Nephrology recommends continuing with the salt tabs at home and repeating serum sodium next week with his PCP (and possibly uptitration of salt tabs) and also continuing with fluid restriction. Outpatient OT was recommended. Pt has been stabilized for discharge home with outpatient follow up. Pt has an outpatient procedure scheduled today.    CT head:     There is evidence of abnormal low-attenuation identified involving the posterior left insular region. This is best seen on series 2 image 15 and measures approximately 2.5 x 1.0 cm. This could be compatible postop changes though the possibility of residual or recurrent tumor must be considered. Contrast enhanced MRI is recommended to better characterize   this finding. Surrounding vasogenic edema is identified. Vasogenic edema was identified in this region on the prior study. There is localized masseffect seen consisting sulcal effacement. No significant shift or herniation is seen  Abnormal low-attenuation involving the left temporal region is again seen which could be related to postop changes    Right maxillary polyp versus retention cyst is seenLeft ethmoid sinus and coastal thickening is seen.    IMPRESSION: Abnormal low-attenuation involving the left posterior insular region is seen with surrounding vasogenic edema.    ACC: [id number] EXAM:  XR CHEST AP OR PA 1V   ORDERED BY:  LAKE HOUSTON     COMPARISON: Chest x-ray 5/9/2023    FINDINGS:  The heart size is normal in size.  The lungs are clear.  There is no pleural effusion There is no pneumothorax.  No acute bony abnormality.    IMPRESSION:  Clear lungs.    EEG 6/20/23:    EEG Classification / Summary:    EEG Classification / Summary:  Abnormal  EEG in the awake / drowsy / asleep state(s).  Continuous left hemispheric slowing, max in left temporoparietal region.  Intermittent left frontotemporal LRDA  Breach rhythm over left parietal region  Multiple push button events for episodes of fear/piloerection/uneasiness without clear EEG ictal change.     Clinical Impression:  Evidence for focal cerebral dysfunction and skull defect in the left temporoparietal and left frontotemporal regions.  Overt electrographic seizures were not observed.  Multiple push button events for patient complaints that may still represent surface negative focal seizures. Clinical correlation required (only ~<30% of focal aware seizures with EEG ictal correlate).

## 2023-06-23 NOTE — DISCHARGE NOTE PROVIDER - PROVIDER TOKENS
PROVIDER:[TOKEN:[[id number]],FOLLOWUP:[Routine]],PROVIDER:[TOKEN:[[id number]],FOLLOWUP:[Routine]]

## 2023-06-23 NOTE — DISCHARGE NOTE NURSING/CASE MANAGEMENT/SOCIAL WORK - NSDCPEFALRISK_GEN_ALL_CORE
For information on Fall & Injury Prevention, visit: [email or web address] OR  [email or web address] OR  https://www.cdc.gov/steadi/patient.html

## 2023-06-23 NOTE — PROVIDER CONTACT NOTE (OTHER) - ACTION/TREATMENT ORDERED:
JORDEN Rizvi aware. PA consulted with nephrology. No interventions at this time.
as per provider, no intervention at this time
Provider made aware. Nephrology consulted. Pending orders
Continue to monitor for additional episodes. Notify and hermann EEG study as needed. Treating sodium levels with 3% at this time.
JORDEN Rizvi aware. 3% not restarted as per PA. Next BMP with AM labs
JORDEN Willoughby aware. Recheck BMP in AM
provider made aware. nephrology to be consulted.  Urine labs ordered - potassium random, protein creatinine ratio, sodium random, urea nitrogen random and 24 hr urine collection to be started in AM
Provider aware. Nephrology consulted. Pending orders.

## 2023-06-23 NOTE — PROVIDER CONTACT NOTE (OTHER) - DATE AND TIME:
20-Jun-2023 21:00
20-Jun-2023 08:30
21-Jun-2023 17:30
22-Jun-2023 00:11
22-Jun-2023 06:15
23-Jun-2023 00:22
21-Jun-2023 09:30
20-Jun-2023 10:45

## 2023-06-23 NOTE — DISCHARGE NOTE PROVIDER - NSDCFUADDAPPT_GEN_ALL_CORE_FT
Please follow up with your PCP or a nephrologist next week for further monitoring of your serum sodium.

## 2023-06-23 NOTE — PROGRESS NOTE ADULT - ASSESSMENT
Assessment: 67-year-old right-handed male, retired , w/ PMHx GBM (resected 5/15/23 w/ IMVAX trial, followed by Dr. Galvez & Dr. Bush) sent in by neuro-oncologist Dr. Galvez for frequent episodes of intense fear / uneasiness, feeling "stressed out of my mind," chills, piloerection to all extremities, palpitations, and increased respiratory rate, all of which lasts for less than 1 minute, and completely resolve. Patient's spouse at bedside confirming the above. Patient and spouse reporting that symptoms started about 5-6 days ago w/ about 10 episodes throughout the entire day. The frequency progressively increased over the course of the week. Yesterday, patient had these episodes about every 10-15 minutes, with the same duration of less than 1 minute. No other symptoms at this time, with the exception of varying degrees of word-finding difficulty.    Impression: Increasing frequency of intermittent episodes of sensation of intense fear / uneasiness a/w autonomic features including palpitations, tachypnea, chills, and piloerections, all w/ retained awareness. Suspect most symptoms localize to L temporal lobe i/s/o L GBM s/p resection w/ residual vasogenic edema. Most likely etiology is focal seizures with retained awareness. EEG negative despite push-button for events, like due to epileptic focus having deep originating focus. Treating patient clinically based on symptoms    6/22: CBC nl, VSS. nephro- started 3% for 6 hours and salt tabs, repeat BMP around 4 pm. EEG dc'ed    Plan:   [] start Klonopin 0.5mg BID  [x] s/p EEG monitoring - no seizures  [x] f/u LEV levels (collected in ED prior to LEV load), CBC, CMP, magnesium, and phosphorous --> LEV 10.5, initial CBC / CMP / Mg / Phos wnl  [x] initial Urinalysis 6/19 w/ trace blood, otherwise unremarkable  [x] s/p LEV load in ED   [x] c/w  mg PO BID --> increased to 1000mg BID --> 1500 BID (6/21)  [x] s/p dexamethasone 10 mg load in ED (per outpatient neuro-oncology)  [x] c/w dexamethasone 2 mg QD  [x] c/w Protonix 40mg daily  [x] Lorazepam 1 mg IV PRN for seizure activity lasting >3-5mins, >2x/hr, >3x/day, or if GTC , repeat after 1 minute (max dose 0.1 mg/kg)    [] monitor BMP, UA, urine lytes, serum/urine osm for hyponatremia (likely due to SIADH)  [x] s/p 3% saline 2x and Lasix 20mg 1x on 6/20  [x] on 3% saline for 6h and s/p Lasix 20mg 1x on 6/21, will follow-up repeat BMP once saline completed. Same protocol followed on 6/22  [] nephrology following, appreciate recs  [x] c/w home Miralax 17g daily for bowel regimen  [x] c/w home enalapril 10mg daily (goal for normotension)  [x] c/w home melatonin 3mg qHS PRN for insomnia  [x] DVT ppx: Lovenox 40mg SQ daily  [x] diet: regular w/ 1L fluid restriction  [] Telemetry  [] Seizure, fall and aspiration precautions. Avoid sleep deprivation.   [] Please note that numerous antibiotics may trigger epileptic seizures or status epilepticus by decreasing inhibitory transmission in the brain, thus lowering the seizure threshold. The most potent seizurogenic effect is exerted by penicillins, cephalosporins (fourth generation), fluoroquinolones, and carbapenems.  [] Given concern for seizure, advise the patient with regards to risks and driving privileges associated with the [place] Guidelines. Advise patient regarding the risk of seizures and general seizure safety recommendations including not to be bathing alone, climbing to high places and operating heavy machinery, until cleared by follow-up outpatient Neurology. Reinforce the importance of compliance with medications. Discuss sleep hygiene and the risks of sleep disruption. Discuss the risk of death associated with seizures / SUDEP.      * Plan above discussed w/ neuro-oncologist Dr. Galvez *  * Case and plan not complete until attending attestation *   Assessment: 67-year-old right-handed male, retired , w/ PMHx GBM (resected 5/15/23 w/ IMVAX trial, followed by Dr. Galvez & Dr. Bush) sent in by neuro-oncologist Dr. Galvez for frequent episodes of intense fear / uneasiness, feeling "stressed out of my mind," chills, piloerection to all extremities, palpitations, and increased respiratory rate, all of which lasts for less than 1 minute, and completely resolve. Patient's spouse at bedside confirming the above. Patient and spouse reporting that symptoms started about 5-6 days ago w/ about 10 episodes throughout the entire day. The frequency progressively increased over the course of the week. Yesterday, patient had these episodes about every 10-15 minutes, with the same duration of less than 1 minute. No other symptoms at this time, with the exception of varying degrees of word-finding difficulty.    Impression: Increasing frequency of intermittent episodes of sensation of intense fear / uneasiness a/w autonomic features including palpitations, tachypnea, chills, and piloerections, all w/ retained awareness. Suspect most symptoms localize to L temporal lobe i/s/o L GBM s/p resection w/ residual vasogenic edema. Most likely etiology is focal seizures with retained awareness. EEG negative despite push-button for events, like due to epileptic focus having deep originating focus. Treating patient clinically based on symptoms    Plan:   [x] Pt discharged  [x] start Klonopin 0.5mg BID  [x] c/w  mg PO BID --> increased to 1000mg BID --> 1500 BID (6/21)  [x] s/p EEG monitoring - no seizures  [x] initial Urinalysis 6/19 w/ trace blood, otherwise unremarkable  [x] s/p LEV load in ED   [x] s/p dexamethasone 10 mg load in ED (per outpatient neuro-oncology)  [x] c/w dexamethasone 2 mg QD  [x] c/w Protonix 40mg daily  [x] Lorazepam 1 mg IV PRN for seizure activity lasting >3-5mins, >2x/hr, >3x/day, or if GTC , repeat after 1 minute (max dose 0.1 mg/kg)    [] monitor BMP, UA, urine lytes, serum/urine osm for hyponatremia (likely due to SIADH)  [x] s/p 3% saline 2x and Lasix 20mg 1x on 6/20  [x] on 3% saline for 6h and s/p Lasix 20mg 1x on 6/21, will follow-up repeat BMP once saline completed. Same protocol followed on 6/22  [] nephrology following, appreciate recs  [x] c/w home Miralax 17g daily for bowel regimen  [x] c/w home enalapril 10mg daily (goal for normotension)  [x] c/w home melatonin 3mg qHS PRN for insomnia  [x] DVT ppx: Lovenox 40mg SQ daily  [x] diet: regular w/ 1L fluid restriction  [] Telemetry  [] Seizure, fall and aspiration precautions. Avoid sleep deprivation.   [] Please note that numerous antibiotics may trigger epileptic seizures or status epilepticus by decreasing inhibitory transmission in the brain, thus lowering the seizure threshold. The most potent seizurogenic effect is exerted by penicillins, cephalosporins (fourth generation), fluoroquinolones, and carbapenems.  [] Given concern for seizure, advise the patient with regards to risks and driving privileges associated with the [place] Guidelines. Advise patient regarding the risk of seizures and general seizure safety recommendations including not to be bathing alone, climbing to high places and operating heavy machinery, until cleared by follow-up outpatient Neurology. Reinforce the importance of compliance with medications. Discuss sleep hygiene and the risks of sleep disruption. Discuss the risk of death associated with seizures / SUDEP.      * Plan above discussed w/ neuro-oncologist Dr. Valverde *  * Case and plan not complete until attending attestation *

## 2023-06-23 NOTE — PROGRESS NOTE ADULT - PROBLEM SELECTOR PLAN 1
Patient with acute hypoosmolar hyponatremia SIADH induced. Given stable, chronic vasogenic edema will try to avoid prolonged hyponatremia. SNa on admission was 137 and decreased to 125 on 6/21. Per review of Let[facility] PALOMO, SNa noted to be 132 during last month's admission with urine osm of 897. UA with very high specific gravity of >1.05 with trace blood.  Serum osm of 262, urine osm of 783, decreased to 502 and Jessica of 74.  TSH normal, cortisol low in setting of recent steroids. Patient requiring intermittent 3% HTS with lasix dosing. Most recent SNa 131 while off of HTS.     Recommend continuing with salt tabs 2g TID with the importance of <1L fluid restriction. Patient needs to get repeat BMP next week and follow up with a PCP for titration of salt tabs. Ok for discharge from nephrology stand point.     If you have any questions, please feel free to contact me  Wally Lora  Nephrology Fellow  [phone]; Prefer [employer] TEAMS  (After 5pm or on weekends please page the on-call fellow).
Patient with acute hypoosmolar hyponatremia SIADH induced. Given stable, chronic vasogenic edema will try to avoid prolonged hyponatremia. SNa on admission was 137 and decreased to 125 on 6/21. Per review of Letty PALOMO, SNa noted to be 132 during last month's admission with urine osm of 897. UA with very high specific gravity of >1.05 with trace blood.  Serum osm of 262, urine osm of 783, decreased to 502 and Jessica of 74.  TSH normal, cortisol low in setting of recent steroids. Improved with 3% HTS with lasix 20 IV x1 to 129 but then decreased again to 126 this am off HTS.     Would recommend 3% HTS 30/hour for 6 hours with alsix 20 IV. Repeat labs after the HTS fluid is done. Avoid overcorrection of serum sodium 6-8 meq/24 hours. Monitor serum sodium.     If you have any questions, please feel free to contact me  Wally Lora  Nephrology Fellow  [phone]; Prefer [employer] TEAMS  (After 5pm or on weekends please page the on-call fellow).
Patient with acute hypoosmolar hyponatremia SIADH induced. Given stable, chronic vasogenic edema will try to avoid prolonged hyponatremia. SNa on admission was 137 and decreased to 125 on 6/21. Per review of Letty PALOMO, SNa noted to be 132 during last month's admission with urine osm of 897. UA with very high specific gravity of >1.05 with trace blood.  Serum osm of 262, urine osm of 783, decreased to 502 and Jessica of 74.  TSH normal, cortisol low in setting of recent steroids. Patient requiring intermittent 3% HTS with lasix dosing. Most recent SNa 127 while off of HTS.     Would recommend 3% HTS 30/hour for 6 hours with alsix 20 IV. Repeat labs after the HTS fluid is done. Avoid overcorrection of serum sodium 6-8 meq/24 hours. Must emphasize the importance of <1L fluid restriction. Monitor serum sodium.     If you have any questions, please feel free to contact me  Wally Lora  Nephrology Fellow  [phone]; Prefer [employer] TEAMS  (After 5pm or on weekends please page the on-call fellow).

## 2023-06-23 NOTE — DISCHARGE NOTE PROVIDER - NSDCFUSCHEDAPPT_GEN_ALL_CORE_FT
Ramirez Rizvi  [facility] Physician [facility]  NEUROLOGY [address] [facility]  Scheduled Appointment: 06/28/2023

## 2023-06-23 NOTE — PROVIDER CONTACT NOTE (OTHER) - REASON
Pt sodium came back 132
Pt BMP sodium 126
Repeat BMP sodium was 125, down from 127 this morning
Pt Sodium 126 after 6hrs of 3% running at 30ml/hr
Sodium dropped from 137 6/19 AM to 128 6/20 AM
Sodium result 131
Pt sodium resulted 127
Pt experienced 2 episodes of brief confusion with no recollection

## 2023-06-23 NOTE — PROVIDER CONTACT NOTE (OTHER) - ASSESSMENT
Pt on 3% for low sodium. Q6 BMP drawn and sodium resulted 131. JORDEN Rizvi notified.
Pt AOx4. Vitals stable. No neurological change
Pt remains neurologically unchanged, VSS
Pt remains neurologically unchanged. VSS.
Spouse at bedside reports patient looking at phone (son's location) and telling spouse son was not home. Spouse redirected pt to say son was home, to which patient responded with "he is here and did not come to say hi to me?". Per spouse, patient then stood up, spouse asked if pt was going to the bathroom, to which he responded with needing to grab something from refrigerator. Pt redirected and in bed, RN at bedside to reassess.  Next episode 30min later, patient experienced a very brief episode of confusion where he tried to get up out of bed, when spouse asked where he was going, he responded with needing to go to the car to go to [employer]. Patient redirected once more. On reassessment after being notified by spouse, pt noted to have returned back to baseline, AOx4, following all commands but no recollection of either episode.
Pt has had hyponatremia since 6/20. Pt has been on and off 3%. Pt received 3% for 6 hours today and started on salt tabs. BMP q6 drawn as per order. Sodium resulted 132.
Pt previously on 3% and 3% stopped when sodium resulted 131 (see prior provider contact note). BMP drawn in AM as ordered and sodium resulted 127. Pt neurologically intact and vital signs stable.
Pt AOx4. Vital signs stable. Denies pain.

## 2023-06-23 NOTE — DISCHARGE NOTE NURSING/CASE MANAGEMENT/SOCIAL WORK - NSDCVIVACCINE_GEN_ALL_CORE_FT
No Vaccines Administered.
As you are being released by the court,  is providing you with a walk in appointment. Kindly arrive to the walk in at 11:30AM on 11/29/21 with your insurance and identification card.

## 2023-06-23 NOTE — PROGRESS NOTE ADULT - SUBJECTIVE AND OBJECTIVE BOX
[facility] Division of Kidney Diseases & Hypertension  FOLLOW UP NOTE  [phone]--------------------------------------------------------------------------------    HPI:  67 y.o M w/ PMHx of GBM (resected 5/15/23 w/ IMVAX trial), sent in by neuro-oncologist  Dr. Galvez for frequent episodes of intense fear/uneasiness, feeling "stressed out of my mind" chills, increased respiratory rate, for 1 minute and then resolve. Patient's symptoms started about 5-6 days ago w/ 10 episodes per day. Patient sent to the hospital for further workup, also with some degrees of word-finding difficulty. Per chart review, noted patient with post op MRI notable for edema and residual nodular enhancement within the left insular region and left anterior temporal lobe. Patient was discharged home on 5/20/23. Was discharged on 3 days of salt tabs for hyponatremia of 140.  Nephrology consulted for acute hyponatremia. SNa 137 on 5/19, decreased to 128 and repeat was 125 on 6/20.    Interval Hx: Patient seen at bedside today. SNa stable while off of 3% since 5 pm yesterday. Patient is monitor his oral intake of fluids.        PAST HISTORY  --------------------------------------------------------------------------------  No significant changes to PMH, PSH, FHx, SHx, unless otherwise noted    ALLERGIES & MEDICATIONS  --------------------------------------------------------------------------------  Allergies    No Known Allergies    Intolerances      Standing Inpatient Medications  clonazePAM  Tablet 0.5 milliGRAM(s) Oral two times a day  dexAMETHasone     Tablet 2 milliGRAM(s) Oral daily  enalapril 10 milliGRAM(s) Oral daily  enoxaparin Injectable 40 milliGRAM(s) SubCutaneous every 24 hours  levETIRAcetam 1500 milliGRAM(s) Oral two times a day  pantoprazole    Tablet 40 milliGRAM(s) Oral before breakfast  polyethylene glycol 3350 17 Gram(s) Oral daily  sodium chloride 2 Gram(s) Oral three times a day  sodium chloride 3%. 180 milliLiter(s) IV Continuous <Continuous>    PRN Inpatient Medications  LORazepam   Injectable 1 milliGRAM(s) IV Push every 4 hours PRN  melatonin 3 milliGRAM(s) Oral at bedtime PRN      REVIEW OF SYSTEMS  --------------------------------------------------------------------------------  Gen: No  fevers/chills  Skin: No rashes  Head/Eyes/Ears/Mouth: No headache; Normal hearing; Normal vision w/o blurriness  Respiratory: No dyspnea, cough, wheezing, hemoptysis  CV: No chest pain, PND, orthopnea  GI: No abdominal pain, diarrhea, constipation, nausea, vomiting  : No increased frequency, dysuria, hematuria, nocturia  MSK: No joint pain/swelling; no back pain; no edema  Neuro: No dizziness/lightheadedness, weakness, seizures, numbness, tingling    All other systems were reviewed and are negative, except as noted.    VITALS/PHYSICAL EXAM  --------------------------------------------------------------------------------  T(C): 36.7 (06-23-23 @ 09:11), Max: 36.8 (06-23-23 @ 01:22)  HR: 98 (06-23-23 @ 09:11) (63 - 98)  BP: 115/71 (06-23-23 @ 09:11) (113/61 - 152/77)  RR: 18 (06-23-23 @ 09:11) (18 - 18)  SpO2: 98% (06-23-23 @ 09:11) (93% - 98%)  Wt(kg): --        Physical Exam:  	Gen: NAD  	HEENT: MMM  	Pulm: CTA B/L  	CV: S1S2  	Abd: Soft, +BS   	Ext: No LE edema B/L  	Neuro: Awake  	Skin: Warm and dry  	Vascular access:    LABS/STUDIES  --------------------------------------------------------------------------------              13.4   8.98  >-----------<  260      [06-23-23 @ 05:38]              37.4     134  |  101  |  12  ----------------------------<  98      [06-23-23 @ 05:37]  3.4   |  24  |  0.82        Ca     8.9     [06-23-23 @ 05:37]      Mg     2.0     [06-23-23 @ 05:37]      Phos  3.2     [06-23-23 @ 05:37]            Creatinine Trend:  SCr 0.82 [06-23 @ 05:37]  SCr 0.88 [06-22 @ 23:39]  SCr 0.87 [06-22 @ 17:08]  SCr 0.81 [06-22 @ 05:19]  SCr 0.77 [06-21 @ 23:40]    Urinalysis - [06-23-23 @ 05:37]      Color  / Appearance  / SG  / pH       Gluc 98 / Ketone   / Bili  / Urobili        Blood  / Protein  / Leuk Est  / Nitrite       RBC  / WBC  / Hyaline  / Gran  / Sq Epi  / Non Sq Epi  / Bacteria     Urine Creatinine 147      [06-20-23 @ 13:46]  Urine Protein 9      [06-20-23 @ 13:46]  Urine Sodium 74      [06-20-23 @ 23:01]  Urine Urea Nitrogen 1314      [06-20-23 @ 13:45]  Urine Potassium 55      [06-20-23 @ 13:46]  Urine Osmolality 502      [06-20-23 @ 23:01]    TSH 1.69      [06-21-23 @ 08:57]    HCV 0.07, Nonreact      [06-20-23 @ 07:33]

## 2023-06-23 NOTE — DISCHARGE NOTE NURSING/CASE MANAGEMENT/SOCIAL WORK - PATIENT PORTAL LINK FT
You can access the FollowMyHealth Patient Portal offered by [facility] by registering at the following website: [email or web address]. By joining [employer]’s FollowMyHealth portal, you will also be able to view your health information using other applications (apps) compatible with our system.

## 2023-06-23 NOTE — DISCHARGE NOTE PROVIDER - NSDCCPCAREPLAN_GEN_ALL_CORE_FT
PRINCIPAL DISCHARGE DIAGNOSIS  Diagnosis: Seizure  Assessment and Plan of Treatment: Please continue seizure medications as prescribed.  Please continue decadron as prescribed.   Please follow up with your neurooncologist in the outpatient setting.      SECONDARY DISCHARGE DIAGNOSES  Diagnosis: Hyponatremia  Assessment and Plan of Treatment: Nephrology recommends continuing with the salt tabs at home and repeating serum sodium next week with his PCP (and possibly uptitration of salt tabs) and also continuing with fluid restriction.

## 2023-06-23 NOTE — PROGRESS NOTE ADULT - REASON FOR ADMISSION
Concern for focal seizures

## 2023-06-23 NOTE — DISCHARGE NOTE PROVIDER - CARE PROVIDER_API CALL
Megan Galvez  Neurology  [address] [place] [address]  Phone: [phone]  Fax: [phone]  Follow Up Time: Routine    Bhargavi Lee  Nephrology  [address] [place] [address], [address]  [place] [address]  Phone: [phone]  Fax: [phone]  Follow Up Time: Routine

## 2023-06-23 NOTE — PROVIDER CONTACT NOTE (OTHER) - SITUATION
Pt has had hyponatremia since 6/20. Pt has been on and off 3%. Pt received 3% for 6 hours today and started on salt tabs.

## 2023-06-23 NOTE — PROGRESS NOTE ADULT - ATTENDING COMMENTS
Patient seen and examined and discussed with housestaff - yesterday @5 pm began to experience multiple "terror' episodes - stereotyped - EEG unrevealing but likely surface negative seizures - none this am.  Neurologic exam is unchanged as above.  Na remains low 127 - appreciate Renal recommendations.  Suspect temporal lobe seizures - Keppra increased to 1500 mg bid and Klonopin 0.5 mg bid.  Follow sodium this afternoon post fluids recommendations from Renal.  Hope to optimize for discharge in am as he has radiation simulation tomorrow afternoon.
Patient seen and examined - wife and he report significant reduction in episodes of unprovoked terror sensations (was happening more than 30 times on Sunday). Brief and without alteration in awareness. Now on Keppra 1000 mg bid.  Preliminary EEG without seizures - would monitor additional 24 hours.  Last evening, wife reports he was confused around 7:30 pm - offered to go to refrigerator and get ice cream - not agitated - apparently EEG was not recording at that time.  Today he is awake and alert - oriented to name, place, month, and year.  He is fluent with normal reception.  EOMI and face symmetric.  Tongue midline.  VFF  No drift and no focal weakness.    1. Seizures - continue current medications - would continue EEG for another 24 hours and wife knows to push red button in event of any episode.  2. Hyponatremia - Appreciate Renal input and recommendations - will follow.
SIADH made worse with normal saline   high urine osm   3% at 30cc/hr - repeat labs in pm   add salt tabs 2 gms tid     rosa acuña  nephrology attending   please contact me on TEAMS   Office- [phone]
SIADH made worse with normal saline   high urine osm   now improving since he is adhering to fluid restriction   IF Neurology team feels that his sodium should be normalized more quickly, 3% saline is reasonable to give today. Otherwise sodium chloride 2 gms tid should be good enough along with fluid restriction.     rosa acuña  nephrology attending   please contact me on TEAMS   Office- [phone]
rosa acuña  nephrology attending   please contact me on TEAMS   Office- [phone]
seen and examined on rounds    GBM, s/p resection on IMVAX study  a/w hyponatremia and partial complex seizures (feeling of doom)    INTERVAL HISTORY:  na 134 on salt tabs.  much improved - no events on clonazepam    plan to dc today on KPN 0.5 bid, lvt 1500 bid and salt tabs  chem early next week to f/u sodium  has simulation today per protocol  d/w dr reilly his outpatient neuro onc

## 2023-06-23 NOTE — DISCHARGE NOTE PROVIDER - ATTENDING DISCHARGE PHYSICAL EXAMINATION:
incisions intact  language, mental status intact  CN, motor intact  ambulating well  neurologically without finding

## 2023-06-23 NOTE — DISCHARGE NOTE PROVIDER - NSDCMRMEDTOKEN_GEN_ALL_CORE_FT
dexAMETHasone 2 mg oral tablet: 1 tab(s) orally once a day Take 2 tablets every 8 hours for 2 days (ending AM 5/22) starting 5/22 take 2 tablets every 12 hours for 2 days (end AM 5/24), starting 5/24 AM take 1.5 tablets every 12 hours for 2 days (end AM 5/26), then take 1 tablet every 12 hours  enalapril 10 mg oral tablet: 1 tab(s) orally once a day  levETIRAcetam 500 mg oral tablet: 1 tab(s) orally every 12 hours  melatonin 5 mg oral tablet: 1 tab(s) orally once a day (at bedtime)  polyethylene glycol 3350 oral powder for reconstitution: 17 gram(s) orally once a day  Protonix 40 mg oral delayed release tablet: 1 tab(s) orally once a day (before a meal)  senna leaf extract oral tablet: 2 tab(s) orally once a day (at bedtime)   clonazePAM 0.5 mg oral tablet: 1 tab(s) orally 2 times a day  dexAMETHasone 2 mg oral tablet: 1 tab(s) orally once a day  enalapril 10 mg oral tablet: 1 tab(s) orally once a day  levETIRAcetam 750 mg oral tablet: 2 tab(s) orally 2 times a day  melatonin 5 mg oral tablet: 1 tab(s) orally once a day (at bedtime)  polyethylene glycol 3350 oral powder for reconstitution: 17 gram(s) orally once a day  Protonix 40 mg oral delayed release tablet: 1 tab(s) orally once a day (before a meal)  sodium chloride 1 g oral tablet: 2 tab(s) orally 3 times a day   clonazePAM 0.5 mg oral tablet: 1 tab(s) orally 2 times a day  clonazePAM 0.5 mg oral tablet: 1 tab(s) orally 2 times a day MDD: 1 mg  dexAMETHasone 2 mg oral tablet: 1 tab(s) orally once a day  enalapril 10 mg oral tablet: 1 tab(s) orally once a day  levETIRAcetam 750 mg oral tablet: 2 tab(s) orally 2 times a day MDD: 3000 mg  melatonin 5 mg oral tablet: 1 tab(s) orally once a day (at bedtime) MDD: 1 mg  polyethylene glycol 3350 oral powder for reconstitution: 17 gram(s) orally once a day MDD: 17 gm  Protonix 40 mg oral delayed release tablet: 1 tab(s) orally once a day (before a meal) MDD: 40 mg  sodium chloride 1 g oral tablet: 2 tab(s) orally 3 times a day MDD: 6 gm   clonazePAM 0.5 mg oral tablet: 1 tab(s) orally 2 times a day MDD: 1 mg  dexAMETHasone 2 mg oral tablet: 1 tab(s) orally once a day  enalapril 10 mg oral tablet: 1 tab(s) orally once a day  levETIRAcetam 750 mg oral tablet: 2 tab(s) orally 2 times a day MDD: 3000 mg  melatonin 5 mg oral tablet: 1 tab(s) orally once a day (at bedtime) MDD: 1 mg  Outpatient OT: Outpatient OT MDD: 1  polyethylene glycol 3350 oral powder for reconstitution: 17 gram(s) orally once a day MDD: 17 gm  Protonix 40 mg oral delayed release tablet: 1 tab(s) orally once a day (before a meal) MDD: 40 mg  sodium chloride 1 g oral tablet: 2 tab(s) orally 3 times a day MDD: 6 gm

## 2023-06-24 LAB
CULTURE RESULTS: SIGNIFICANT CHANGE UP
CULTURE RESULTS: SIGNIFICANT CHANGE UP
SPECIMEN SOURCE: SIGNIFICANT CHANGE UP
SPECIMEN SOURCE: SIGNIFICANT CHANGE UP

## 2023-06-24 NOTE — ED ADULT NURSE NOTE - NSSUHOSCREENINGYN_ED_ALL_ED
MEDICINE HISTORY AND PHYSICAL    Patient ID:  Felice Gibbons  [id number]  89 year old  6/16/1928     Primary Care Physician:   Jodi Cassidy CNP    Chief Complaint:   Dizziness, near-syncope, generalized weakness    History of Present Illness:   Patient is a 89 year old  female history of essential hypertension, hearing loss, anxiety and depression, chronic atrial fibrillation, history of left leg DVT in 2017, chronic anticoagulation with Xarelto, history of acute embolic stroke posterior left parietal lobe and left occipital lobe, history of blood-loss anemia needing hospitalization recently underwent colonoscopy on 06/19 after holding Xarelto.  Found to have hemorrhoids, diverticulosis.  Patient was suggested to resume Xarelto if no more evidence of bleeding.  She lives in an assisted living facility.  Since the colonoscopy, she has been feeling weak with no energy.  She is not sure if she has started back her Xarelto on not.  This morning patient went to [place] with her son, was feeling very weak.  While walking she felt dizzy, near syncope with no loss of consciousness.  She sat down on the bench and son continued the shopping.  Even after resting, she did not feel better, continued to have near-syncope episode and feeling.  Decided to bring her to hospital.  Three days ago she felt left ankle swelling, was worried about blood clot not being on Xarelto.  Today she is feeling slightly better with the left ankle.  No chest pain, palpitation.    In the emergency room patient was hypotensive with high map.  Lab work showed sodium 134, creatinine 0.99, BUN 14, proBNP 2704, troponin 1003 in 27, , hemoglobin 9.7, hematocrit 29.7.  UA normal.    Chest x-ray showed stable enlargement of cardiac/pericardial silhouette, no evidence of pneumonia, pneumothorax, pulmonary edema or pleural effusion   CT chest PE protocol showed interstitial and early alveolar pulmonary edema, moderate to marked cardiomegaly, small  bilateral pleural effusions, mediastinal lymphadenopathy, left renal cyst, no evidence of pulmonary embolism   Pending left lower extremity venous Doppler result     Past Medical History: Reviewed with the patient     tick bite fever                         1955      Comment: [place]    Seizure disorder (CMD)                                        Depression                                                    Essential (primary) hypertension                              Anxiety and depression                                        Insomnia                                                      Hearing loss                                                  UTI (urinary tract infection)                                 Past Surgical History:    APPENDECTOMY                                    1961      [place]: in [place]    EYE SURGERY                                                   UPPER GI EN*                                    2021    COLONOSCOPY W BIOPSY                            2021    Family History:   Family History   Problem Relation Age of Onset   • Thyroid Mother    • Psychiatric Mother    • Heart Father    • Cancer Daughter         breast   • Heart Son          at 51   • Other Son         malignant neoplastic disease   • Diabetes Son        Social History:   Reviewed in EPIC  Alcohol / Drug Hx: No  Smoking Hx:  No    Medications:   Current Facility-Administered Medications   Medication   • sodium chloride (PF) 0.9 % injection 2 mL     Current Outpatient Medications   Medication Sig Dispense Refill   • pantoprazole (PROTONIX) 40 MG tablet Take 40 mg by mouth daily.     • losartan (COZAAR) 50 MG tablet Take 1 tablet by mouth daily. 90 tablet 1   • rivaroxaban (Xarelto) 15 MG Tab Take 1 tablet by mouth daily (with dinner). 90 tablet 0   • metoPROLOL succinate (TOPROL-XL) 25 MG 24 hr tablet Take 1 tablet by mouth daily. 90 tablet 1   • atorvastatin (LIPITOR) 40 MG tablet  Take 1 tablet by mouth nightly. 90 tablet 1   • Magnesium 400 MG Cap Take 1 capsule by mouth daily. 90 capsule 0   • Cholecalciferol (Vitamin D3) 50 mcg (2,000 units) capsule Take 1 capsule by mouth daily. 90 capsule 0   • Calcium 600 MG tablet Take 1 tablet by mouth daily. 90 tablet 0   • Biotin 88228 MCG Tab Take 10,000 mcg by mouth daily. 90 tablet 0   • Ascorbic Acid (vitamin C) 500 MG tablet Take 1 tablet by mouth daily. 90 tablet 0       Allergies:   Allergies as of 06/24/2023   • (No Known Allergies)         REVIEW OF SYSTEM:     Constitutional:  Positive for generalized weakness  HEENT:  Positive for hard of hearing.  Denies headache  Cardiovascular:  See HPI  Pulmonary: Denies cough or wheezing  Gastrointestinal:  Denies frequent heartburn, dysphagia, odynophagia, abdominal pain, nausea, vomiting, constipation, diarrhea, or blood per rectum  Genitourinary:  Denies dysuria, hematuria, frequency, urinary incontinence, hesitancy, weak stream  Endocrine:  Denies heat or cold intolerance  Musculoskeletal:  See HPI  Neurological:  Denies diplopia, speech difficulties, extremity weakness, or sensation changes  Skin:  Denies rashes, suspicious moles or lesions, non-healing wounds or ulcers  Psychiatric:  Denies depression, anxiety, memory decline or insomnia    Physical Exam:     Vital Signs:    Visit Vitals  BP (!) 171/75   Pulse 78   Temp 98.4 °F (36.9 °C) (Oral)   Resp 20   Ht 5' 3\" (1.6 m)   Wt 56.4 kg (124 lb 5.4 oz)   SpO2 92%   BMI 22.03 kg/m²     General: This is a 89 year old female in no acute distress  Psych: She is awake alert and oriented to time, place and person. Mood and affect are appropriate  Head: Appears to be atraumatic and normocephalic   Eyes: Reveal no icterus, no scleral injection, no conjunctival pallor. Pupils   equal, round and reactive to light and accommodation.  Extraocular movements appear to be intact  Throat: Oropharyngeal exam reveals moist oral mucosa. There is no erythema,  discharge or thrush. Dentition is good  Neck: Supple and symmetric. There is no JVD or thyromegaly     Lungs: Reveals good effort and lungs are clear to auscultation   bilaterally. There are no wheezes or rhonchi  Heart: Reveals presence of first and second heart sounds. Regular rate and rhythm. No murmurs, rubs or gallops  Abdomen:  Normoactive bowel sounds. Soft and nontender. There is no rebound tenderness.  There is no hepatosplenomegaly   Extremities: No clubbing, cyanosis.  Bilateral lower extremity trace edema  Neurologic: Cranial nerves II through XII appear grossly intact. Sensation is intact  Skin: Appears unremarkable and no rashes are present      Labs:  Lab Results   Component Value Date    SODIUM 134 (L) 06/24/2023    POTASSIUM 3.9 06/24/2023    CHLORIDE 103 06/24/2023    CO2 28 06/24/2023    GLUCOSE 102 (H) 06/24/2023    BUN 14 06/24/2023    CREATININE 0.99 (H) 06/24/2023    ALBUMIN 3.2 (L) 06/24/2023    BILIRUBIN 1.0 06/24/2023    AST 40 (H) 06/24/2023    INR 1.6 01/18/2021     Lab Results   Component Value Date    PTT 32 (H) 01/18/2021    WBC 6.1 06/24/2023    HGB 9.7 (L) 06/24/2023    HCT 29.7 (L) 06/24/2023     06/24/2023    MMB 0.6 01/16/2015    RAPDTR 0.02 01/18/2021     06/24/2023    CRP 0.88 08/26/2020    TSH 2.935 12/01/2020       Diagnostics:   CTA CHEST PULMONARY EMBOLISM   Final Result   IMPRESSION:      1.  No pulmonary embolism identified.   2.  Suggest interstitial and early alveolar pulmonary edema. Moderate to   marked cardiomegaly. Small bilateral pleural effusions.   3. Mediastinal lymphadenopathy, etiology uncertain.   4. Left renal cysts.   5. Additional findings, as above.         Electronically Signed by: Km Guerra MD    Signed on: 6/24/2023 3:19 PM    Workstation ID: [id number]      XR CHEST PA OR AP 1 VIEW   Final Result   FINDINGS and IMPRESSION:      1. There is no evidence for pneumonia, pneumothorax, pulmonary edema or   pleural effusion.             2.  There is stable enlargement of the cardiac/pericardial silhouette.      3. No evidence for mass effect on the trachea.      Electronically Signed by: Delroy Jerome MD    Signed on: 6/24/2023 10:50 AM    Workstation ID: [id number]      [facility] LOWER EXTREMITY VENOUS DUPLEX LEFT    (Results Pending)         DIAGNOSTIC STUDIES    CTA CHEST PULMONARY EMBOLISM   Final Result   IMPRESSION:      1.  No pulmonary embolism identified.   2.  Suggest interstitial and early alveolar pulmonary edema. Moderate to   marked cardiomegaly. Small bilateral pleural effusions.   3. Mediastinal lymphadenopathy, etiology uncertain.   4. Left renal cysts.   5. Additional findings, as above.         Electronically Signed by: Km Guerra MD    Signed on: 6/24/2023 3:19 PM    Workstation ID: [id number]      XR CHEST PA OR AP 1 VIEW   Final Result   FINDINGS and IMPRESSION:      1. There is no evidence for pneumonia, pneumothorax, pulmonary edema or   pleural effusion.             2. There is stable enlargement of the cardiac/pericardial silhouette.      3. No evidence for mass effect on the trachea.      Electronically Signed by: Delroy Jerome MD    Signed on: 6/24/2023 10:50 AM    Workstation ID: [id number]      [facility] LOWER EXTREMITY VENOUS DUPLEX LEFT    (Results Pending)       EKG:  Results for orders placed or performed during the hospital encounter of 06/24/23   ECG   Result Value Ref Range    Systolic Blood Pressure 174     Diastolic Blood Pressure 81     Ventricular Rate EKG/Min (BPM) 72     Atrial Rate (BPM) 153     QRS-Interval (MSEC) 100     QT-Interval (MSEC) 392     QTc 429     R Axis (Degrees) 53     T Axis (Degrees) 46     REPORT TEXT       Atrial fibrillation  with premature ventricular or aberrantly conducted complexes  Septal infarct  (cited on or before  07-FEB-2020)  Marked ST abnormality, possible anterior subendocardial injury  Abnormal ECG  When compared with ECG of  18-JAN-2021 20:57,  Questionable change in initial  forces of  Septal leads  ST now depressed in  Anterior  leads  Confirmed by SID LARA MD ([id number]) on 6/24/2023 12:11:08 PM         ASSESSMENT     Non ST elevation MI  Near-syncope   Generalized weakness, most probably due to non ST elevation MI and/anemia  Recent colonoscopy showed diverticulosis, hemorrhoids  History of blood-loss anemia  Chronic anemia  Essential hypertension   Chronic atrial fibrillation   History of left leg DVT in 2017   History of embolic stroke   Chronic anticoagulation of Xarelto, but has not restarted after colonoscopy    PLAN     Admit patient as inpatient on telemetry   With her left ankle swelling, pain and now with near-syncope sent Tums, CT chest obtained to rule out PE.  It did not show any PE but positive for interstitial and early alveolar pulmonary edema, moderate to marked cardiomegaly, small bilateral pleural effusion   Elevated initial troponin.  Will monitor q.6 hours   Patient was given Plavix 75 mg in ER and continued on aspirin   It is unsure if she has started back her Xarelto after colonoscopy.  Will hold off until evaluated by cardiologist   Echocardiogram  Continue metoprolol, losartan, atorvastatin  Monitor and supplement electrolyte  Pending left lower extremity venous Doppler.  If negative will get x-ray left ankle    GI prophylaxis:  Pantoprazole    DVT Prophylaxis:  Held Xarelto pending cardiology evaluation    Is the patient expected to require at least a two midnight stay in the hospital? Yes -  I certify that I expect inpatient services for greater than two midnights are medically necessary for this patient.  Please see H&P and MD Progress Notes for additional information about the patient's course of treatment.      Total time spent more than 75 minutes, the majority of which was spent face-to-face in coordinating care and counseling patient regarding diagnosis, treatment plan and answering questions      Code status:  Full code    Korey Stone MD  FACP  6/24/2023     Yes - the patient is able to be screened

## 2023-06-26 PROCEDURE — 77387B: CUSTOM | Mod: 26

## 2023-06-26 PROCEDURE — 77427 RADIATION TX MANAGEMENT X5: CPT

## 2023-06-27 ENCOUNTER — OUTPATIENT (OUTPATIENT)
Dept: OUTPATIENT SERVICES | Facility: HOSPITAL | Age: 68
LOS: 1 days | Discharge: HOME | End: 2023-06-27

## 2023-06-27 ENCOUNTER — RESULT REVIEW (OUTPATIENT)
Age: 68
End: 2023-06-27

## 2023-06-27 ENCOUNTER — NON-APPOINTMENT (OUTPATIENT)
Age: 68
End: 2023-06-27

## 2023-06-27 ENCOUNTER — APPOINTMENT (OUTPATIENT)
Dept: HEMATOLOGY ONCOLOGY | Facility: CLINIC | Age: 68
End: 2023-06-27

## 2023-06-27 VITALS
TEMPERATURE: 36.5 F | HEART RATE: 53 BPM | BODY MASS INDEX: 28.4 KG/M2 | WEIGHT: 209.44 LBS | OXYGEN SATURATION: 96 % | SYSTOLIC BLOOD PRESSURE: 153 MMHG | DIASTOLIC BLOOD PRESSURE: 78 MMHG

## 2023-06-27 DIAGNOSIS — Z98.890 OTHER SPECIFIED POSTPROCEDURAL STATES: Chronic | ICD-10-CM

## 2023-06-27 DIAGNOSIS — D64.9 ANEMIA, UNSPECIFIED: ICD-10-CM

## 2023-06-27 LAB
ALBUMIN SERPL ELPH-MCNC: 3.8 G/DL — SIGNIFICANT CHANGE UP (ref 3.3–5)
ALP SERPL-CCNC: 80 U/L — SIGNIFICANT CHANGE UP (ref 40–120)
ALT FLD-CCNC: 21 U/L — SIGNIFICANT CHANGE UP (ref 10–45)
ANION GAP SERPL CALC-SCNC: 9 MMOL/L — SIGNIFICANT CHANGE UP (ref 5–17)
AST SERPL-CCNC: 19 U/L — SIGNIFICANT CHANGE UP (ref 10–40)
BASOPHILS # BLD AUTO: 0.04 K/UL — SIGNIFICANT CHANGE UP (ref 0–0.2)
BASOPHILS NFR BLD AUTO: 0.4 % — SIGNIFICANT CHANGE UP (ref 0–2)
BILIRUB SERPL-MCNC: 0.2 MG/DL — SIGNIFICANT CHANGE UP (ref 0.2–1.2)
BUN SERPL-MCNC: 11 MG/DL — SIGNIFICANT CHANGE UP (ref 7–23)
CALCIUM SERPL-MCNC: 9.3 MG/DL — SIGNIFICANT CHANGE UP (ref 8.4–10.5)
CHLORIDE SERPL-SCNC: 104 MMOL/L — SIGNIFICANT CHANGE UP (ref 96–108)
CO2 SERPL-SCNC: 25 MMOL/L — SIGNIFICANT CHANGE UP (ref 22–31)
CREAT SERPL-MCNC: 0.77 MG/DL — SIGNIFICANT CHANGE UP (ref 0.5–1.3)
EGFR: 99 ML/MIN/1.73M2 — SIGNIFICANT CHANGE UP
EOSINOPHIL # BLD AUTO: 0.06 K/UL — SIGNIFICANT CHANGE UP (ref 0–0.5)
EOSINOPHIL NFR BLD AUTO: 0.6 % — SIGNIFICANT CHANGE UP (ref 0–6)
GLUCOSE SERPL-MCNC: 104 MG/DL — HIGH (ref 70–99)
HCT VFR BLD CALC: 39.7 % — SIGNIFICANT CHANGE UP (ref 39–50)
HGB BLD-MCNC: 13.2 G/DL — SIGNIFICANT CHANGE UP (ref 13–17)
IMM GRANULOCYTES NFR BLD AUTO: 0.8 % — SIGNIFICANT CHANGE UP (ref 0–0.9)
LYMPHOCYTES # BLD AUTO: 0.74 K/UL — LOW (ref 1–3.3)
LYMPHOCYTES # BLD AUTO: 6.9 % — LOW (ref 13–44)
MCHC RBC-ENTMCNC: 30.8 PG — SIGNIFICANT CHANGE UP (ref 27–34)
MCHC RBC-ENTMCNC: 33.2 G/DL — SIGNIFICANT CHANGE UP (ref 32–36)
MCV RBC AUTO: 92.5 FL — SIGNIFICANT CHANGE UP (ref 80–100)
MONOCYTES # BLD AUTO: 0.84 K/UL — SIGNIFICANT CHANGE UP (ref 0–0.9)
MONOCYTES NFR BLD AUTO: 7.9 % — SIGNIFICANT CHANGE UP (ref 2–14)
NEUTROPHILS # BLD AUTO: 8.89 K/UL — HIGH (ref 1.8–7.4)
NEUTROPHILS NFR BLD AUTO: 83.4 % — HIGH (ref 43–77)
NRBC # BLD: 0 /100 WBCS — SIGNIFICANT CHANGE UP (ref 0–0)
PLATELET # BLD AUTO: 261 K/UL — SIGNIFICANT CHANGE UP (ref 150–400)
POTASSIUM SERPL-MCNC: 4.2 MMOL/L — SIGNIFICANT CHANGE UP (ref 3.5–5.3)
POTASSIUM SERPL-SCNC: 4.2 MMOL/L — SIGNIFICANT CHANGE UP (ref 3.5–5.3)
PROT SERPL-MCNC: 5.8 G/DL — LOW (ref 6–8.3)
RBC # BLD: 4.29 M/UL — SIGNIFICANT CHANGE UP (ref 4.2–5.8)
RBC # FLD: 13.2 % — SIGNIFICANT CHANGE UP (ref 10.3–14.5)
SODIUM SERPL-SCNC: 139 MMOL/L — SIGNIFICANT CHANGE UP (ref 135–145)
WBC # BLD: 10.66 K/UL — HIGH (ref 3.8–10.5)
WBC # FLD AUTO: 10.66 K/UL — HIGH (ref 3.8–10.5)

## 2023-06-27 PROCEDURE — 77387B: CUSTOM | Mod: 26

## 2023-06-27 NOTE — HISTORY OF PRESENT ILLNESS
[FreeTextEntry1] : 68 yo male with left frontal glioblastoma IDH-WT - molecular pending, s/p maximal safe resection with residual disease on IMVAX study presents for evaluation of adjuvant radiation treatment with concurrent Temodar.\par \par Of note, he has claustrophobia. We will try to use open mask for his treatment\par \par 6/27/23 Presents for OTV.  2/30 Fractions completed \par Recently admitted for ? seizures described a "feeling fearful"  \par Now on increased AED medications w/o recent episodes but seems to be more fatigue  (Keppra 1500mg 2 x daily)\par Continues Dex 2 mg daily\par Denies any new concerns or deficits

## 2023-06-27 NOTE — REVIEW OF SYSTEMS
[Constipation: Grade 0] : Constipation: Grade 0 [Nausea: Grade 0] : Nausea: Grade 0 [Fatigue: Grade 1 - Fatigue relieved by rest] : Fatigue: Grade 1 - Fatigue relieved by rest [Blurred Vision: Grade 0] : Blurred Vision: Grade 0 [Cognitive Disturbance: Grade 0] : Cognitive Disturbance: Grade 0 [Dizziness: Grade 0] : Dizziness: Grade 0  [Headache: Grade 0] : Headache: Grade 0 [Lethargy: Grade 0] : Lethargy: Grade 0 [Insomnia: Grade 0] : Insomnia: Grade 0 [FreeTextEntry6] : bilateral abdominal incisions healed CDI

## 2023-06-28 ENCOUNTER — APPOINTMENT (OUTPATIENT)
Dept: NEUROSURGERY | Facility: CLINIC | Age: 68
End: 2023-06-28

## 2023-06-28 ENCOUNTER — APPOINTMENT (OUTPATIENT)
Dept: NEUROLOGY | Facility: CLINIC | Age: 68
End: 2023-06-28
Payer: MEDICARE

## 2023-06-28 ENCOUNTER — LABORATORY RESULT (OUTPATIENT)
Age: 68
End: 2023-06-28

## 2023-06-28 VITALS
OXYGEN SATURATION: 95 % | TEMPERATURE: 98.8 F | HEIGHT: 72 IN | HEART RATE: 95 BPM | SYSTOLIC BLOOD PRESSURE: 131 MMHG | DIASTOLIC BLOOD PRESSURE: 77 MMHG | WEIGHT: 209 LBS | RESPIRATION RATE: 16 BRPM | BODY MASS INDEX: 28.31 KG/M2

## 2023-06-28 PROCEDURE — 77387B: CUSTOM | Mod: 26

## 2023-06-28 PROCEDURE — 99215 OFFICE O/P EST HI 40 MIN: CPT

## 2023-06-28 RX ORDER — TAMSULOSIN HYDROCHLORIDE 0.4 MG/1
0.4 CAPSULE ORAL
Qty: 30 | Refills: 0 | Status: DISCONTINUED | COMMUNITY
Start: 2023-05-31 | End: 2023-06-28

## 2023-06-28 NOTE — PHYSICAL EXAM
[FreeTextEntry1] : Patient seen and examined\par Alert, awake , Oriented X 3. Speech clear and fluent\par Able to name objects quickly and follows commands\par Able to do simple calculations\par Memory at 3 minutes 1/2, with cues 3/3\par Repeat test 3/3\par PERRLA, EOMI, VFF\par Face symmetrical. Tongue protrudes midline\par BROWN x 4 with good and equal strength\par FFM, coordination equal bilaterally\par Sensation intact bilaterally\par Gait steady. Ambulating independently\par  [General Appearance - Alert] : alert [General Appearance - In No Acute Distress] : in no acute distress [General Appearance - Well Nourished] : well nourished [General Appearance - Well Developed] : well developed [Oriented To Time, Place, And Person] : oriented to person, place, and time [Impaired Insight] : insight and judgment were intact [Affect] : the affect was normal [Mood] : the mood was normal [] : no respiratory distress [Respiration, Rhythm And Depth] : normal respiratory rhythm and effort [Exaggerated Use Of Accessory Muscles For Inspiration] : no accessory muscle use [Auscultation Breath Sounds / Voice Sounds] : lungs were clear to auscultation bilaterally [Heart Rate And Rhythm] : heart rate was normal and rhythm regular [Heart Sounds] : normal S1 and S2 [Edema] : there was no peripheral edema [Abdomen Soft] : soft [Abnormal Walk] : normal gait [Skin Color & Pigmentation] : normal skin color and pigmentation

## 2023-06-28 NOTE — HISTORY OF PRESENT ILLNESS
[FreeTextEntry1] : Mr. Liu is a 68 yo male who is here for a follow up post craniotomy to discuss pathology and further treatment plans\par In brief \par PMHx notable for only orthopedic issues - NKDA\par \par 5/9/2023 - I have seen him today with a new MRI. patient and his wife have noted over the past 3 weeks some difficulty with expressive speech - word substitutions and change in personality - more irritable that usual. He denies headaches, focal weakness,numbness, seizures, or gait instability. He saw Dr. Garcia, neurology - had and MRI this am - I have personally reviewed this film - that shows a left temporal heterogeneously enhancing mass measuring 5.0 x 4.5 x 3.3 cm with extension into the left frontal lobe with surrounding edema and left to right midline shift. He was sent to ER for an expedited evaluation\par 5/9/2023- 5/20/2023- Admitted at [facility]. MR Spect on 5/11 revealed "Reidentified heterogeneously enhancing necrotic mass lesion centered within the left temporal lobe and extending into the left periinsular region with a cystic portion along the inferior aspect of the lesion, with a large degree of surrounding vasogenic edema throughout the left parietal temporal lobes and left basal ganglia This lesion likely reflects a \par high-grade glioma. Functional imaging demonstrates no hyperactivity in the region of tumor or vasogenic edema." \par Patient met criteria and was enrolled in IMVAX Trial.\par  5/15- had a Left pterional craniotomy for brain tumor resection with gleolan. Frozen path: high-grade glioma. Bilateral abdominal incisions made for later implantation of drug treatment. IMVAX Biologics were implanted into abdomen via incisions during bedside procedure per IMVAX trial on 5/17 under conscious sedation. These biologic agents were removed during bedside procedure on 5/19 under conscious sedation. Patient was \par monitored in the NSCU, his post op course remained uncomplicated\par 5/16/2023 - Post op MRI notable for edema and residual nodular enhancement within the left insular \par region and left anterior temporal lobe. CT Serial CT Head imaging post op showing stable resolving post operative changes. Patient was discharged home on 5/20/2023. \par PATHOLOGY - HIGH GRADE GLIOMA CONSISTENT WITH GBM GRADE IV, EGFR neg, IDH WT, GFAP positive\par 5/31/2023 - Patient was discharged on steroid taper, since they didn’t had a refill he stopped taking steroids since Monday.\par 6/9/2023- Reached out to patient this am. Patient c/o blurred vision Right worse than left for past 2 days. Discussed with Dr Galvez. Will start him on Dexamethasone 2 mg daily.\par 6/19/2023 - 6/23/2023 - Patient called reporting seizure like symptoms and was admitted to [facility]. He reported  frequent episodes of intense fear / uneasiness, feeling "stressed out of my mind," chills, piloerection to all extremities, palpitations, and increased respiratory rate, all of which lasts for less than 1 minute, and completely resolve. Patient's spouse at bedside confirming the above. Patient and spouse reporting that symptoms started about 5-6 days ago w/ about 10 episodes throughout the entire day. The frequency progressively increased over the course of the week. Yesterday, patient had these episodes about every 10-15 minutes, with the same duration of less than 1 minute. No other symptoms at this time, with the exception of varying degrees of word-finding difficulty. EEG was negative, however episodes stopped once Keppra was raised to 1500 mg bid and added clonazepam 0.5 mg bid. During this admission he was noted to be hyponatremic and \par 6/26/2023- ChemoRT begin\par 6/28/2023- Patient here for a follow up. Offers no new complaints. No more episodes since anti epilepticus was adjusted. Reports constipation\par Denies headaches, seizures, N/V \par

## 2023-06-28 NOTE — DISCUSSION/SUMMARY
[FreeTextEntry1] : Patient seen and examined\par GBM - Neurologically stable. \par CEREBRAL EDEMA - Continue Dexamethasone 2 mg daily . GI prophylaxis with Pantoprazole \par SEIZURES - Continue Keppra 1500 mg bid and Clonazepam 0.5 mg bid \par HYPONATREMIA - Na level 139 on 6/27/2023. Will continue FR 1 L for now and continue Sodium tab 2 gm tid for now . Will repeat CBC, CMP on 7/5 and then will start tapering Na tabs\par HYPERTENSION- Continue Enalapril . Follow up with PCP for BP management\par FOLLOW UP - Will d a clinical follow up on 7/18/2023.   In the interim, if any concerns patient knows to call our office. \par

## 2023-06-29 LAB
ALBUMIN SERPL ELPH-MCNC: 4 G/DL
ALP BLD-CCNC: 80 U/L
ALT SERPL-CCNC: 20 U/L
ANION GAP SERPL CALC-SCNC: 12 MMOL/L
AST SERPL-CCNC: 15 U/L
BILIRUB SERPL-MCNC: 0.3 MG/DL
BUN SERPL-MCNC: 15 MG/DL
CALCIUM SERPL-MCNC: 10 MG/DL
CHLORIDE SERPL-SCNC: 105 MMOL/L
CO2 SERPL-SCNC: 28 MMOL/L
CREAT SERPL-MCNC: 0.97 MG/DL
EGFR: 86 ML/MIN/1.73M2
GLUCOSE SERPL-MCNC: 102 MG/DL
POTASSIUM SERPL-SCNC: 4.9 MMOL/L
PROT SERPL-MCNC: 6.2 G/DL
SODIUM SERPL-SCNC: 144 MMOL/L

## 2023-06-29 PROCEDURE — 77387B: CUSTOM | Mod: 26

## 2023-06-30 PROCEDURE — 77014: CPT | Mod: 26

## 2023-07-03 PROCEDURE — 77387B: CUSTOM | Mod: 26

## 2023-07-03 PROCEDURE — 77427 RADIATION TX MANAGEMENT X5: CPT

## 2023-07-05 ENCOUNTER — NON-APPOINTMENT (OUTPATIENT)
Age: 68
End: 2023-07-05

## 2023-07-05 ENCOUNTER — APPOINTMENT (OUTPATIENT)
Dept: HEMATOLOGY ONCOLOGY | Facility: CLINIC | Age: 68
End: 2023-07-05

## 2023-07-05 ENCOUNTER — LABORATORY RESULT (OUTPATIENT)
Age: 68
End: 2023-07-05

## 2023-07-05 ENCOUNTER — RESULT REVIEW (OUTPATIENT)
Age: 68
End: 2023-07-05

## 2023-07-05 VITALS
SYSTOLIC BLOOD PRESSURE: 124 MMHG | HEART RATE: 67 BPM | OXYGEN SATURATION: 96 % | DIASTOLIC BLOOD PRESSURE: 68 MMHG | HEIGHT: 72 IN | BODY MASS INDEX: 28.29 KG/M2 | WEIGHT: 208.88 LBS | TEMPERATURE: 95.54 F | RESPIRATION RATE: 17 BRPM

## 2023-07-05 DIAGNOSIS — R47.89 OTHER SPEECH DISTURBANCES: ICD-10-CM

## 2023-07-05 LAB
BASOPHILS # BLD AUTO: 0.04 K/UL — SIGNIFICANT CHANGE UP (ref 0–0.2)
BASOPHILS NFR BLD AUTO: 0.5 % — SIGNIFICANT CHANGE UP (ref 0–2)
EOSINOPHIL # BLD AUTO: 0.11 K/UL — SIGNIFICANT CHANGE UP (ref 0–0.5)
EOSINOPHIL NFR BLD AUTO: 1.2 % — SIGNIFICANT CHANGE UP (ref 0–6)
HCT VFR BLD CALC: 38.8 % — LOW (ref 39–50)
HGB BLD-MCNC: 13 G/DL — SIGNIFICANT CHANGE UP (ref 13–17)
IMM GRANULOCYTES NFR BLD AUTO: 1.5 % — HIGH (ref 0–0.9)
LYMPHOCYTES # BLD AUTO: 0.88 K/UL — LOW (ref 1–3.3)
LYMPHOCYTES # BLD AUTO: 10 % — LOW (ref 13–44)
MCHC RBC-ENTMCNC: 31.3 PG — SIGNIFICANT CHANGE UP (ref 27–34)
MCHC RBC-ENTMCNC: 33.5 G/DL — SIGNIFICANT CHANGE UP (ref 32–36)
MCV RBC AUTO: 93.5 FL — SIGNIFICANT CHANGE UP (ref 80–100)
MONOCYTES # BLD AUTO: 0.8 K/UL — SIGNIFICANT CHANGE UP (ref 0–0.9)
MONOCYTES NFR BLD AUTO: 9.1 % — SIGNIFICANT CHANGE UP (ref 2–14)
NEUTROPHILS # BLD AUTO: 6.85 K/UL — SIGNIFICANT CHANGE UP (ref 1.8–7.4)
NEUTROPHILS NFR BLD AUTO: 77.7 % — HIGH (ref 43–77)
NRBC # BLD: 0 /100 WBCS — SIGNIFICANT CHANGE UP (ref 0–0)
PLATELET # BLD AUTO: 226 K/UL — SIGNIFICANT CHANGE UP (ref 150–400)
RBC # BLD: 4.15 M/UL — LOW (ref 4.2–5.8)
RBC # FLD: 13.6 % — SIGNIFICANT CHANGE UP (ref 10.3–14.5)
WBC # BLD: 8.81 K/UL — SIGNIFICANT CHANGE UP (ref 3.8–10.5)
WBC # FLD AUTO: 8.81 K/UL — SIGNIFICANT CHANGE UP (ref 3.8–10.5)

## 2023-07-05 PROCEDURE — 77387B: CUSTOM | Mod: 26

## 2023-07-06 ENCOUNTER — LABORATORY RESULT (OUTPATIENT)
Age: 68
End: 2023-07-06

## 2023-07-06 ENCOUNTER — APPOINTMENT (OUTPATIENT)
Dept: HEMATOLOGY ONCOLOGY | Facility: CLINIC | Age: 68
End: 2023-07-06

## 2023-07-06 ENCOUNTER — NON-APPOINTMENT (OUTPATIENT)
Age: 68
End: 2023-07-06

## 2023-07-06 PROCEDURE — 77387B: CUSTOM | Mod: 26

## 2023-07-07 PROCEDURE — 77014: CPT | Mod: 26

## 2023-07-08 LAB
AMYLASE/CREAT SERPL: 69 U/L
APTT BLD: 27 SEC
CK SERPL-CCNC: 74 U/L
GGT SERPL-CCNC: 20 U/L
INR PPP: 1.22 RATIO
LDH SERPL-CCNC: 203 U/L
LPL SERPL-CCNC: 36 U/L
MAGNESIUM SERPL-MCNC: 1.8 MG/DL
PT BLD: 14.2 SEC

## 2023-07-10 ENCOUNTER — APPOINTMENT (OUTPATIENT)
Dept: OTOLARYNGOLOGY | Facility: CLINIC | Age: 68
End: 2023-07-10
Payer: MEDICARE

## 2023-07-10 PROCEDURE — 77387B: CUSTOM | Mod: 26

## 2023-07-10 PROCEDURE — 92523 SPEECH SOUND LANG COMPREHEN: CPT | Mod: GN

## 2023-07-11 ENCOUNTER — RESULT REVIEW (OUTPATIENT)
Age: 68
End: 2023-07-11

## 2023-07-11 ENCOUNTER — NON-APPOINTMENT (OUTPATIENT)
Age: 68
End: 2023-07-11

## 2023-07-11 ENCOUNTER — APPOINTMENT (OUTPATIENT)
Dept: HEMATOLOGY ONCOLOGY | Facility: CLINIC | Age: 68
End: 2023-07-11

## 2023-07-11 VITALS
SYSTOLIC BLOOD PRESSURE: 119 MMHG | WEIGHT: 211.09 LBS | TEMPERATURE: 98.2 F | RESPIRATION RATE: 16 BRPM | BODY MASS INDEX: 28.63 KG/M2 | DIASTOLIC BLOOD PRESSURE: 73 MMHG | HEART RATE: 68 BPM | OXYGEN SATURATION: 94 %

## 2023-07-11 LAB
ALBUMIN SERPL ELPH-MCNC: 4.5 G/DL
ALP BLD-CCNC: 72 U/L
ALT SERPL-CCNC: 26 U/L
ANION GAP SERPL CALC-SCNC: 14 MMOL/L
AST SERPL-CCNC: 19 U/L
BASOPHILS # BLD AUTO: 0.04 K/UL — SIGNIFICANT CHANGE UP (ref 0–0.2)
BASOPHILS NFR BLD AUTO: 0.3 % — SIGNIFICANT CHANGE UP (ref 0–2)
BILIRUB SERPL-MCNC: 0.5 MG/DL
BUN SERPL-MCNC: 13 MG/DL
CALCIUM SERPL-MCNC: 9.8 MG/DL
CHLORIDE SERPL-SCNC: 102 MMOL/L
CO2 SERPL-SCNC: 26 MMOL/L
CREAT SERPL-MCNC: 0.93 MG/DL
EGFR: 91 ML/MIN/1.73M2
EOSINOPHIL # BLD AUTO: 0.05 K/UL — SIGNIFICANT CHANGE UP (ref 0–0.5)
EOSINOPHIL NFR BLD AUTO: 0.4 % — SIGNIFICANT CHANGE UP (ref 0–6)
GLUCOSE SERPL-MCNC: 108 MG/DL
HCT VFR BLD CALC: 42.2 % — SIGNIFICANT CHANGE UP (ref 39–50)
HGB BLD-MCNC: 13.9 G/DL — SIGNIFICANT CHANGE UP (ref 13–17)
IMM GRANULOCYTES NFR BLD AUTO: 1.3 % — HIGH (ref 0–0.9)
LYMPHOCYTES # BLD AUTO: 0.97 K/UL — LOW (ref 1–3.3)
LYMPHOCYTES # BLD AUTO: 8.5 % — LOW (ref 13–44)
MCHC RBC-ENTMCNC: 30.8 PG — SIGNIFICANT CHANGE UP (ref 27–34)
MCHC RBC-ENTMCNC: 32.9 G/DL — SIGNIFICANT CHANGE UP (ref 32–36)
MCV RBC AUTO: 93.6 FL — SIGNIFICANT CHANGE UP (ref 80–100)
MONOCYTES # BLD AUTO: 0.94 K/UL — HIGH (ref 0–0.9)
MONOCYTES NFR BLD AUTO: 8.2 % — SIGNIFICANT CHANGE UP (ref 2–14)
NEUTROPHILS # BLD AUTO: 9.32 K/UL — HIGH (ref 1.8–7.4)
NEUTROPHILS NFR BLD AUTO: 81.3 % — HIGH (ref 43–77)
NRBC # BLD: 0 /100 WBCS — SIGNIFICANT CHANGE UP (ref 0–0)
PLATELET # BLD AUTO: 257 K/UL — SIGNIFICANT CHANGE UP (ref 150–400)
POTASSIUM SERPL-SCNC: 4.4 MMOL/L
PROT SERPL-MCNC: 6.5 G/DL
RBC # BLD: 4.51 M/UL — SIGNIFICANT CHANGE UP (ref 4.2–5.8)
RBC # FLD: 14 % — SIGNIFICANT CHANGE UP (ref 10.3–14.5)
SODIUM SERPL-SCNC: 141 MMOL/L
WBC # BLD: 11.47 K/UL — HIGH (ref 3.8–10.5)
WBC # FLD AUTO: 11.47 K/UL — HIGH (ref 3.8–10.5)

## 2023-07-11 PROCEDURE — 77427 RADIATION TX MANAGEMENT X5: CPT

## 2023-07-11 PROCEDURE — 77387B: CUSTOM | Mod: 26

## 2023-07-11 NOTE — PHYSICAL EXAM
[] : no respiratory distress [Normal] : normal heart rate and rhythm, normal S1 and S2, and no murmurs present [Heart Rate And Rhythm] : heart rate and rhythm were normal [de-identified] : healed incisional site

## 2023-07-11 NOTE — REVIEW OF SYSTEMS
[Constipation: Grade 0] : Constipation: Grade 0 [Nausea: Grade 0] : Nausea: Grade 0 [Fatigue: Grade 1 - Fatigue relieved by rest] : Fatigue: Grade 1 - Fatigue relieved by rest [Cognitive Disturbance: Grade 0] : Cognitive Disturbance: Grade 0 [Dizziness: Grade 0] : Dizziness: Grade 0  [Headache: Grade 0] : Headache: Grade 0 [Lethargy: Grade 0] : Lethargy: Grade 0 [Insomnia: Grade 0] : Insomnia: Grade 0 [Blurred Vision: Grade 1 - Intervention not indicated] : Blurred Vision: Grade 1 - Intervention not indicated [de-identified] : difficulty seeing the TV [FreeTextEntry6] : bilateral abdominal incisions healed CDI

## 2023-07-11 NOTE — DISEASE MANAGEMENT
[Pathological] : TNM Stage: p [N/A] : Currently not applicable [TTNM] : - [NTNM] : - [MTNM] : - [de-identified] : 4502cGy [de-identified] : 6000cGy [de-identified] : Left Brain

## 2023-07-11 NOTE — HISTORY OF PRESENT ILLNESS
[FreeTextEntry1] : 68 yo male with left frontal glioblastoma IDH-WT - molecular pending, s/p maximal safe resection with residual disease on IMVAX study presents for evaluation of adjuvant radiation treatment with concurrent Temodar.\par \par Of note, he has claustrophobia. We will try to use open mask for his treatment\par \par 6/27/23 Presents for OTV.  2/30 Fractions completed \par Recently admitted for ? seizures described as "feeling fearful"  \par Now on increased AED medications w/o recent episodes but seems to be more fatigue  (Keppra 1500mg 2 x daily)\par Continues Dex 2 mg daily\par Denies any new concerns or deficits\par \par Visit dated 7/5/2023\par Presents for OTV 7/23 Fxs completed Continues with Dr. Guzmán. Enrolled in IMVAX trial.\par Concurrent chemo(6/26/2023) labs 6/29/23 Plts 282\par Denies recurrence of "feeling fearful"\par Continues AED and Dex 2 mg in AM\par \par Reports doing well infact he has been able to do more activities over the past week He has returned to the gym and mowed the lawn.\par \par Wife verbalized concerns despite being able to engage in longer periods of conversation but noticing discrepancies in word finding eg: Sabianist and thinking its thanksgiving. pool/ocean, coffee/tea  and at times being repetitive.\par Noticed blurry vision over the past few days equal on bilaterally\par \par Denies HA/unilateral extremity weakness/slurred speech\par

## 2023-07-12 PROCEDURE — 77387B: CUSTOM | Mod: 26

## 2023-07-13 PROCEDURE — 77387B: CUSTOM | Mod: 26

## 2023-07-14 PROCEDURE — 77014: CPT | Mod: 26

## 2023-07-17 ENCOUNTER — APPOINTMENT (OUTPATIENT)
Dept: OTOLARYNGOLOGY | Facility: CLINIC | Age: 68
End: 2023-07-17
Payer: MEDICARE

## 2023-07-17 PROCEDURE — 77387B: CUSTOM | Mod: 26

## 2023-07-17 PROCEDURE — 92507 TX SP LANG VOICE COMM INDIV: CPT | Mod: GN

## 2023-07-18 ENCOUNTER — LABORATORY RESULT (OUTPATIENT)
Age: 68
End: 2023-07-18

## 2023-07-18 ENCOUNTER — APPOINTMENT (OUTPATIENT)
Dept: NEUROLOGY | Facility: CLINIC | Age: 68
End: 2023-07-18
Payer: MEDICARE

## 2023-07-18 ENCOUNTER — NON-APPOINTMENT (OUTPATIENT)
Age: 68
End: 2023-07-18

## 2023-07-18 ENCOUNTER — RESULT REVIEW (OUTPATIENT)
Age: 68
End: 2023-07-18

## 2023-07-18 ENCOUNTER — APPOINTMENT (OUTPATIENT)
Dept: HEMATOLOGY ONCOLOGY | Facility: CLINIC | Age: 68
End: 2023-07-18

## 2023-07-18 VITALS
TEMPERATURE: 98.6 F | OXYGEN SATURATION: 95 % | SYSTOLIC BLOOD PRESSURE: 133 MMHG | HEART RATE: 64 BPM | WEIGHT: 209.44 LBS | BODY MASS INDEX: 28.41 KG/M2 | DIASTOLIC BLOOD PRESSURE: 74 MMHG

## 2023-07-18 LAB
BASOPHILS # BLD AUTO: 0.03 K/UL — SIGNIFICANT CHANGE UP (ref 0–0.2)
BASOPHILS NFR BLD AUTO: 0.2 % — SIGNIFICANT CHANGE UP (ref 0–2)
EOSINOPHIL # BLD AUTO: 0.04 K/UL — SIGNIFICANT CHANGE UP (ref 0–0.5)
EOSINOPHIL NFR BLD AUTO: 0.3 % — SIGNIFICANT CHANGE UP (ref 0–6)
HCT VFR BLD CALC: 41.9 % — SIGNIFICANT CHANGE UP (ref 39–50)
HGB BLD-MCNC: 13.9 G/DL — SIGNIFICANT CHANGE UP (ref 13–17)
IMM GRANULOCYTES NFR BLD AUTO: 1.1 % — HIGH (ref 0–0.9)
LYMPHOCYTES # BLD AUTO: 0.59 K/UL — LOW (ref 1–3.3)
LYMPHOCYTES # BLD AUTO: 4.4 % — LOW (ref 13–44)
MCHC RBC-ENTMCNC: 31.1 PG — SIGNIFICANT CHANGE UP (ref 27–34)
MCHC RBC-ENTMCNC: 33.2 G/DL — SIGNIFICANT CHANGE UP (ref 32–36)
MCV RBC AUTO: 93.7 FL — SIGNIFICANT CHANGE UP (ref 80–100)
MONOCYTES # BLD AUTO: 1.07 K/UL — HIGH (ref 0–0.9)
MONOCYTES NFR BLD AUTO: 8.1 % — SIGNIFICANT CHANGE UP (ref 2–14)
NEUTROPHILS # BLD AUTO: 11.38 K/UL — HIGH (ref 1.8–7.4)
NEUTROPHILS NFR BLD AUTO: 85.9 % — HIGH (ref 43–77)
NRBC # BLD: 0 /100 WBCS — SIGNIFICANT CHANGE UP (ref 0–0)
PLATELET # BLD AUTO: 289 K/UL — SIGNIFICANT CHANGE UP (ref 150–400)
RBC # BLD: 4.47 M/UL — SIGNIFICANT CHANGE UP (ref 4.2–5.8)
RBC # FLD: 13.9 % — SIGNIFICANT CHANGE UP (ref 10.3–14.5)
WBC # BLD: 13.26 K/UL — HIGH (ref 3.8–10.5)
WBC # FLD AUTO: 13.26 K/UL — HIGH (ref 3.8–10.5)

## 2023-07-18 PROCEDURE — 99215 OFFICE O/P EST HI 40 MIN: CPT

## 2023-07-18 PROCEDURE — 77387B: CUSTOM | Mod: 26

## 2023-07-18 NOTE — PHYSICAL EXAM
[] : no respiratory distress [Heart Rate And Rhythm] : heart rate and rhythm were normal [Oriented To Time, Place, And Person] : oriented to person, place, and time [General Appearance - Well Developed] : well developed [Sclera] : the sclera and conjunctiva were normal [Heart Sounds] : normal S1 and S2 [No Focal Deficits] : no focal deficits [de-identified] : healed incisional site

## 2023-07-18 NOTE — REVIEW OF SYSTEMS
[Constipation: Grade 0] : Constipation: Grade 0 [Nausea: Grade 0] : Nausea: Grade 0 [Blurred Vision: Grade 1 - Intervention not indicated] : Blurred Vision: Grade 1 - Intervention not indicated [Cognitive Disturbance: Grade 0] : Cognitive Disturbance: Grade 0 [Dizziness: Grade 0] : Dizziness: Grade 0  [Headache: Grade 0] : Headache: Grade 0 [Lethargy: Grade 0] : Lethargy: Grade 0 [Insomnia: Grade 0] : Insomnia: Grade 0 [Fatigue: Grade 2 - Fatigue not relieved by rest; limiting instrumental ADL] : Fatigue: Grade 2 - Fatigue not relieved by rest; limiting instrumental ADL [de-identified] : difficulty seeing the TV [FreeTextEntry6] : bilateral abdominal incisions healed CDI

## 2023-07-18 NOTE — PHYSICAL EXAM
[] : no respiratory distress [Normal] : normal heart rate and rhythm, normal S1 and S2, and no murmurs present [Heart Rate And Rhythm] : heart rate and rhythm were normal [Oriented To Time, Place, And Person] : oriented to person, place, and time [de-identified] : healed incisional site [de-identified] : word retrieval/finding difficulty

## 2023-07-18 NOTE — DISEASE MANAGEMENT
[Pathological] : TNM Stage: p [N/A] : Currently not applicable [TTNM] : - [NTNM] : - [MTNM] : - [de-identified] : 0050cGy [de-identified] : 6000cGy [de-identified] : Left Brain

## 2023-07-18 NOTE — DISEASE MANAGEMENT
[Pathological] : TNM Stage: p [N/A] : Currently not applicable [TTNM] : - [NTNM] : - [MTNM] : - [de-identified] : 9008cGy [de-identified] : 6000cGy [de-identified] : Left Brain

## 2023-07-18 NOTE — HISTORY OF PRESENT ILLNESS
[FreeTextEntry1] : 68 yo male with left frontal glioblastoma IDH-WT - molecular pending, s/p maximal safe resection with residual disease on IMVAX study presents for evaluation of adjuvant radiation treatment with concurrent Temodar.\par \par Of note, he has claustrophobia. We will try to use open mask for his treatment\par \par 6/27/23 Presents for OTV.  2/30 Fractions completed \par Recently admitted for ? seizures described as "feeling fearful"  \par Now on increased AED medications w/o recent episodes but seems to be more fatigue  (Keppra 1500mg 2 x daily)\par Continues Dex 2 mg daily\par Denies any new concerns or deficits\par \par Visit dated 7/5/2023\par Presents for OTV 7/23 Fxs completed Continues with Dr. Guzmán. Enrolled in IMVAX trial.\par Concurrent chemo(6/26/2023) labs 6/29/23 Plts 282\par Denies recurrence of "feeling fearful"\par Continues AED and Dex 2 mg in AM\par \par Reports doing well infact he has been able to do more activities over the past week He has returned to the gym and mowed the lawn.\par \par Wife verbalized concerns despite being able to engage in longer periods of conversation but noticing discrepancies in word finding eg: Nondenominational and thinking its thanksgiving. pool/ocean, coffee/tea  and at times being repetitive.\par Noticed blurry vision over the past few days equal on bilaterally\par \par Denies HA/unilateral extremity weakness/slurred speech\par \par Visit dated 7/11/2023 OTV\par Completed 11/30 Fxs\par Referred for speech therapy d/t word finding difficulty/name retrieval (sessions began yesterday). Notices this worsens over the course of the day.\par Continues to follow with Dr. Galvez Labs 7/5/2023 Plts 226\par Reports noticing his vision is with changes "things seem wavy" while trying to see the ball playing softball over the weekend\par Denies HAs/unilateral extremity weakness\par Continues DEX 2mg PO\par Resumed gym activity \par \par \par Visit dated 7/18/2023 OTV\par Completed 16/30 Fxs Continues to follow with Dr. Leonor on TMZ (resolved constipation)\par Reports increased fatigue noted over the past 2-3 days\par Noticing hair loss at treatment site\par Saw neuro optha 7/17/ 2023 (with plans to f/u in 3 months) LEFT > RIGHT blurry

## 2023-07-18 NOTE — HISTORY OF PRESENT ILLNESS
[FreeTextEntry1] : 66 yo male with left frontal glioblastoma IDH-WT - molecular pending, s/p maximal safe resection with residual disease on IMVAX study presents for evaluation of adjuvant radiation treatment with concurrent Temodar.\par \par Of note, he has claustrophobia. We will try to use open mask for his treatment\par \par 6/27/23 Presents for OTV.  2/30 Fractions completed \par Recently admitted for ? seizures described as "feeling fearful"  \par Now on increased AED medications w/o recent episodes but seems to be more fatigue  (Keppra 1500mg 2 x daily)\par Continues Dex 2 mg daily\par Denies any new concerns or deficits\par \par Visit dated 7/5/2023\par Presents for OTV 7/23 Fxs completed Continues with Dr. Guzmán. Enrolled in IMVAX trial.\par Concurrent chemo(6/26/2023) labs 6/29/23 Plts 282\par Denies recurrence of "feeling fearful"\par Continues AED and Dex 2 mg in AM\par \par Reports doing well infact he has been able to do more activities over the past week He has returned to the gym and mowed the lawn.\par \par Wife verbalized concerns despite being able to engage in longer periods of conversation but noticing discrepancies in word finding eg: Orthodoxy and thinking its thanksgiving. pool/ocean, coffee/tea  and at times being repetitive.\par Noticed blurry vision over the past few days equal on bilaterally\par \par Denies HA/unilateral extremity weakness/slurred speech\par \par Visit dated 7/11/2023 OTV\par Completed 11/30 Fxs\par Referred for speech therapy d/t word finding difficulty/name retrieval (sessions began yesterday). Notices this worsens over the course of the day.\par Continues to follow with Dr. Guzmán Labs 7/5/2023 Plts 226\par Reports noticing his vision is with changes "things seem wavy" while trying to see the ball playing softball over the weekend\par Denies HAs/unilateral extremity weakness\par Continues DEX 2mg PO\par Resumed gym activity \par

## 2023-07-18 NOTE — DISCUSSION/SUMMARY
[FreeTextEntry1] : Patient seen and examined\par GBM - Neurologically stable. \par CEREBRAL EDEMA - Continue Dexamethasone 2 mg bid.  GI prophylaxis with Pantoprazole \par SEIZURES - Continue Keppra 1500 mg bid and Clonazepam 0.5 mg bid \par HYPONATREMIA - Will continue FR 1 L for now and continue Sodium tab 3 tabs a day for now Will follow up on CMP results from today and if stable will taper to two tab a day\par HYPERTENSION- Continue Enalapril. Follow up with PCP for BP management\par FOLLOW UP - Will d a clinical follow up on 8/3/2023 at 10:30.  In the interim, if any concerns patient knows to call our office.

## 2023-07-18 NOTE — HISTORY OF PRESENT ILLNESS
[FreeTextEntry1] : Mr. Liu is a 66 yo male who is here for a follow up post craniotomy to discuss pathology and further treatment plans\par In brief \par PMHx notable for only orthopedic issues - NKDA\par \par 5/9/2023 - I have seen him today with a new MRI. patient and his wife have noted over the past 3 weeks some difficulty with expressive speech - word substitutions and change in personality - more irritable that usual. He denies headaches, focal weakness,numbness, seizures, or gait instability. He saw Dr. Garcia, neurology - had and MRI this am - I have personally reviewed this film - that shows a left temporal heterogeneously enhancing mass measuring 5.0 x 4.5 x 3.3 cm with extension into the left frontal lobe with surrounding edema and left to right midline shift. He was sent to ER for an expedited evaluation\par 5/9/2023- 5/20/2023- Admitted at [facility]. MR Spect on 5/11 revealed "Reidentified heterogeneously enhancing necrotic mass lesion centered within the left temporal lobe and extending into the left periinsular region with a cystic portion along the inferior aspect of the lesion, with a large degree of surrounding vasogenic edema throughout the left parietal temporal lobes and left basal ganglia This lesion likely reflects a \par high-grade glioma. Functional imaging demonstrates no hyperactivity in the region of tumor or vasogenic edema." \par Patient met criteria and was enrolled in IMVAX Trial.\par  5/15- had a Left pterional craniotomy for brain tumor resection with gleolan. Frozen path: high-grade glioma. Bilateral abdominal incisions made for later implantation of drug treatment. IMVAX Biologics were implanted into abdomen via incisions during bedside procedure per IMVAX trial on 5/17 under conscious sedation. These biologic agents were removed during bedside procedure on 5/19 under conscious sedation. Patient was \par monitored in the NSCU, his post op course remained uncomplicated\par 5/16/2023 - Post op MRI notable for edema and residual nodular enhancement within the left insular \par region and left anterior temporal lobe. CT Serial CT Head imaging post op showing stable resolving post operative changes. Patient was discharged home on 5/20/2023. \par PATHOLOGY - HIGH GRADE GLIOMA CONSISTENT WITH GBM GRADE IV, EGFR neg, IDH WT, GFAP positive\par 5/31/2023 - Patient was discharged on steroid taper, since they didn’t had a refill he stopped taking steroids since Monday.\par 6/9/2023- Reached out to patient this am. Patient c/o blurred vision Right worse than left for past 2 days. Discussed with Dr Galvez. Will start him on Dexamethasone 2 mg daily.\par 6/19/2023 - 6/23/2023 - Patient called reporting seizure like symptoms and was admitted [facility]. He reported frequent episodes of intense fear / uneasiness, feeling "stressed out of my mind," chills, piloerection to all extremities, palpitations, and increased respiratory rate, all of which lasts for less than 1 minute, and completely resolve. Patient's spouse at bedside confirming the above. Patient and spouse reporting that symptoms started about 5-6 days ago w/ about 10 episodes throughout the entire day. The frequency progressively increased over the course of the week. Yesterday, patient had these episodes about every 10-15 minutes, with the same duration of less than 1 minute. No other symptoms at this time, with the exception of varying degrees of word-finding difficulty. EEG was negative, however episodes stopped once Keppra was raised to 1500 mg bid and added clonazepam 0.5 mg bid. During this admission he was noted to be hyponatremic and \par 6/26/2023- ChemoRT begin\par 6/28/2023-  No more episodes since seizure meds were adjusted. \par 7/18/2023 - Here for a follow up. No more " feeling fearful" episodes.  He did have complaints of worsening word finding difficulty and bilateral blurry vision.His Dexamethasone was raised to 2 mg bid since 7/11/2023. Wife noted some improvement with word finding difficulty. His Last BM yesterday .\par Denies headaches, seizures, N/V \par \par

## 2023-07-18 NOTE — PHYSICAL EXAM
[General Appearance - Alert] : alert [General Appearance - In No Acute Distress] : in no acute distress [General Appearance - Well Nourished] : well nourished [Oriented To Time, Place, And Person] : oriented to person, place, and time [Impaired Insight] : insight and judgment were intact [Affect] : the affect was normal [Respiration, Rhythm And Depth] : normal respiratory rhythm and effort [] : no respiratory distress [Exaggerated Use Of Accessory Muscles For Inspiration] : no accessory muscle use [Abnormal Walk] : normal gait [Edema] : there was no peripheral edema [FreeTextEntry1] : \par Patient seen and examined\par Alert, awake , Oriented X 3. Speech clear with some hesitancy\par Able to name objects quickly and follows commands\par PERRLA, EOMI, VFF\par Face symmetrical. Tongue protrudes midline\par BROWN x 4 with good and equal strength\par FFM, coordination equal bilaterally\par Sensation intact bilaterally\par Gait steady. Ambulating independently

## 2023-07-18 NOTE — REVIEW OF SYSTEMS
[Constipation: Grade 0] : Constipation: Grade 0 [Nausea: Grade 0] : Nausea: Grade 0 [Fatigue: Grade 1 - Fatigue relieved by rest] : Fatigue: Grade 1 - Fatigue relieved by rest [Blurred Vision: Grade 1 - Intervention not indicated] : Blurred Vision: Grade 1 - Intervention not indicated [Cognitive Disturbance: Grade 0] : Cognitive Disturbance: Grade 0 [Dizziness: Grade 0] : Dizziness: Grade 0  [Headache: Grade 0] : Headache: Grade 0 [Lethargy: Grade 0] : Lethargy: Grade 0 [Insomnia: Grade 0] : Insomnia: Grade 0 [de-identified] : difficulty seeing the TV [FreeTextEntry6] : bilateral abdominal incisions healed CDI

## 2023-07-19 PROCEDURE — 77427 RADIATION TX MANAGEMENT X5: CPT

## 2023-07-19 PROCEDURE — 77387B: CUSTOM | Mod: 26

## 2023-07-20 ENCOUNTER — APPOINTMENT (OUTPATIENT)
Dept: HEMATOLOGY ONCOLOGY | Facility: CLINIC | Age: 68
End: 2023-07-20

## 2023-07-20 ENCOUNTER — LABORATORY RESULT (OUTPATIENT)
Age: 68
End: 2023-07-20

## 2023-07-20 PROCEDURE — 77387B: CUSTOM | Mod: 26

## 2023-07-21 PROCEDURE — 77014: CPT | Mod: 26

## 2023-07-24 ENCOUNTER — LABORATORY RESULT (OUTPATIENT)
Age: 68
End: 2023-07-24

## 2023-07-24 ENCOUNTER — RESULT REVIEW (OUTPATIENT)
Age: 68
End: 2023-07-24

## 2023-07-24 ENCOUNTER — APPOINTMENT (OUTPATIENT)
Dept: HEMATOLOGY ONCOLOGY | Facility: CLINIC | Age: 68
End: 2023-07-24

## 2023-07-24 ENCOUNTER — APPOINTMENT (OUTPATIENT)
Dept: OTOLARYNGOLOGY | Facility: CLINIC | Age: 68
End: 2023-07-24

## 2023-07-24 LAB
BASOPHILS # BLD AUTO: 0.03 K/UL — SIGNIFICANT CHANGE UP (ref 0–0.2)
BASOPHILS NFR BLD AUTO: 0.3 % — SIGNIFICANT CHANGE UP (ref 0–2)
EOSINOPHIL # BLD AUTO: 0.03 K/UL — SIGNIFICANT CHANGE UP (ref 0–0.5)
EOSINOPHIL NFR BLD AUTO: 0.3 % — SIGNIFICANT CHANGE UP (ref 0–6)
HCT VFR BLD CALC: 43.4 % — SIGNIFICANT CHANGE UP (ref 39–50)
HGB BLD-MCNC: 14.6 G/DL — SIGNIFICANT CHANGE UP (ref 13–17)
IMM GRANULOCYTES NFR BLD AUTO: 1.9 % — HIGH (ref 0–0.9)
LYMPHOCYTES # BLD AUTO: 0.59 K/UL — LOW (ref 1–3.3)
LYMPHOCYTES # BLD AUTO: 5 % — LOW (ref 13–44)
MCHC RBC-ENTMCNC: 31.3 PG — SIGNIFICANT CHANGE UP (ref 27–34)
MCHC RBC-ENTMCNC: 33.6 G/DL — SIGNIFICANT CHANGE UP (ref 32–36)
MCV RBC AUTO: 93.1 FL — SIGNIFICANT CHANGE UP (ref 80–100)
MONOCYTES # BLD AUTO: 1.26 K/UL — HIGH (ref 0–0.9)
MONOCYTES NFR BLD AUTO: 10.8 % — SIGNIFICANT CHANGE UP (ref 2–14)
NEUTROPHILS # BLD AUTO: 9.59 K/UL — HIGH (ref 1.8–7.4)
NEUTROPHILS NFR BLD AUTO: 81.7 % — HIGH (ref 43–77)
NRBC # BLD: 0 /100 WBCS — SIGNIFICANT CHANGE UP (ref 0–0)
PLATELET # BLD AUTO: 278 K/UL — SIGNIFICANT CHANGE UP (ref 150–400)
RBC # BLD: 4.66 M/UL — SIGNIFICANT CHANGE UP (ref 4.2–5.8)
RBC # FLD: 14 % — SIGNIFICANT CHANGE UP (ref 10.3–14.5)
WBC # BLD: 11.72 K/UL — HIGH (ref 3.8–10.5)
WBC # FLD AUTO: 11.72 K/UL — HIGH (ref 3.8–10.5)

## 2023-07-24 PROCEDURE — 77387B: CUSTOM | Mod: 26

## 2023-07-25 PROBLEM — Z92.3 S/P RADIATION THERAPY: Status: ACTIVE | Noted: 2023-07-25

## 2023-07-25 PROCEDURE — 77427 RADIATION TX MANAGEMENT X5: CPT

## 2023-07-25 PROCEDURE — 77387B: CUSTOM | Mod: 26

## 2023-07-26 PROCEDURE — 77387B: CUSTOM | Mod: 26

## 2023-07-27 ENCOUNTER — NON-APPOINTMENT (OUTPATIENT)
Age: 68
End: 2023-07-27

## 2023-07-27 VITALS — BODY MASS INDEX: 28.82 KG/M2 | HEIGHT: 72 IN | TEMPERATURE: 96.98 F | WEIGHT: 212.75 LBS

## 2023-07-27 VITALS — OXYGEN SATURATION: 96 % | HEART RATE: 79 BPM | DIASTOLIC BLOOD PRESSURE: 80 MMHG | SYSTOLIC BLOOD PRESSURE: 147 MMHG

## 2023-07-27 DIAGNOSIS — Z92.3 PERSONAL HISTORY OF IRRADIATION: ICD-10-CM

## 2023-07-27 PROCEDURE — 77387B: CUSTOM | Mod: 26

## 2023-07-27 NOTE — DISEASE MANAGEMENT
[Pathological] : TNM Stage: p [N/A] : Currently not applicable [TTNM] : - [NTNM] : - [MTNM] : - [de-identified] : 0070cGy [de-identified] : 6000cGy [de-identified] : Left Brain

## 2023-07-27 NOTE — PHYSICAL EXAM
[General Appearance - Well Developed] : well developed [Sclera] : the sclera and conjunctiva were normal [] : no respiratory distress [Heart Rate And Rhythm] : heart rate and rhythm were normal [Heart Sounds] : normal S1 and S2 [No Focal Deficits] : no focal deficits [Oriented To Time, Place, And Person] : oriented to person, place, and time [de-identified] : healed incisional site + alopecia at Tx xite

## 2023-07-27 NOTE — HISTORY OF PRESENT ILLNESS
[FreeTextEntry1] : 66 yo male with left frontal glioblastoma IDH-WT - molecular pending, s/p maximal safe resection with residual disease on IMVAX study presents for evaluation of adjuvant radiation treatment with concurrent Temodar.\par \par Of note, he has claustrophobia. We will try to use open mask for his treatment\par \par 6/27/23 Presents for OTV.  2/30 Fractions completed \par Recently admitted for ? seizures described as "feeling fearful"  \par Now on increased AED medications w/o recent episodes but seems to be more fatigue  (Keppra 1500mg 2 x daily)\par Continues Dex 2 mg daily\par Denies any new concerns or deficits\par \par Visit dated 7/5/2023\par Presents for OTV 7/23 Fxs completed Continues with Dr. Guzmán. Enrolled in IMVAX trial.\par Concurrent chemo(6/26/2023) labs 6/29/23 Plts 282\par Denies recurrence of "feeling fearful"\par Continues AED and Dex 2 mg in AM\par \par Reports doing well infact he has been able to do more activities over the past week He has returned to the gym and mowed the lawn.\par \par Wife verbalized concerns despite being able to engage in longer periods of conversation but noticing discrepancies in word finding eg: Anabaptism and thinking its thanksgiving. pool/ocean, coffee/tea  and at times being repetitive.\par Noticed blurry vision over the past few days equal on bilaterally\par \par Denies HA/unilateral extremity weakness/slurred speech\par \par Visit dated 7/11/2023 OTV\par Completed 11/30 Fxs\par Referred for speech therapy d/t word finding difficulty/name retrieval (sessions began yesterday). Notices this worsens over the course of the day.\par Continues to follow with Dr. Galvez Labs 7/5/2023 Plts 226\par Reports noticing his vision is with changes "things seem wavy" while trying to see the ball playing softball over the weekend\par Denies HAs/unilateral extremity weakness\par Continues DEX 2mg PO\par Resumed gym activity \par \par \par Visit dated 7/18/2023 OTV\par Completed 16/30 Fxs Continues to follow with Dr. Galvez on TMZ (resolved constipation)\par Reports increased fatigue noted over the past 2-3 days\par Noticing hair loss at treatment site\par Saw neuro optha 7/17/ 2023 (with plans to f/u in 3 months) LEFT > RIGHT blurry\par \par Visit dated 7/27/2023 OTV\par Patient presents for OTV Completed  23/30(Fxs) w/o incident thus far. On DEX 2mg BID\par Feeling well overall. \par Vision unchanged persistent blurriness stable. Notices a little improved fatigue no need for daily naps over the past week. Able to do gym workout within limits "but not as I am used to"\par Denies headaches/nausea/focal weakness/numbness/tingling\par Continues to follow with Dr. Galvez on TMZ  . Labs 7/24/2023 Plts 278\par

## 2023-07-27 NOTE — REVIEW OF SYSTEMS
[Constipation: Grade 0] : Constipation: Grade 0 [Nausea: Grade 0] : Nausea: Grade 0 [Blurred Vision: Grade 1 - Intervention not indicated] : Blurred Vision: Grade 1 - Intervention not indicated [Cognitive Disturbance: Grade 0] : Cognitive Disturbance: Grade 0 [Dizziness: Grade 0] : Dizziness: Grade 0  [Headache: Grade 0] : Headache: Grade 0 [Lethargy: Grade 0] : Lethargy: Grade 0 [Insomnia: Grade 0] : Insomnia: Grade 0 [Fatigue: Grade 1 - Fatigue relieved by rest] : Fatigue: Grade 1 - Fatigue relieved by rest [Alopecia: Grade 1 - Hair loss of <50% of normal for that individual that is not obvious from a distance but only on close inspection; a different hair style may be required to cover the hair loss but it does not require a wig or hair piece to camouflage] : Alopecia: Grade 1 - Hair loss of <50% of normal for that individual that is not obvious from a distance but only on close inspection; a different hair style may be required to cover the hair loss but it does not require a wig or hair piece to camouflage [de-identified] : difficulty seeing the TV stable [FreeTextEntry6] : bilateral abdominal incisions healed CDI

## 2023-07-28 PROCEDURE — 77014: CPT | Mod: 26

## 2023-07-31 PROCEDURE — 77387B: CUSTOM | Mod: 26

## 2023-08-01 ENCOUNTER — RESULT REVIEW (OUTPATIENT)
Age: 68
End: 2023-08-01

## 2023-08-01 ENCOUNTER — APPOINTMENT (OUTPATIENT)
Dept: OTOLARYNGOLOGY | Facility: CLINIC | Age: 68
End: 2023-08-01
Payer: MEDICARE

## 2023-08-01 ENCOUNTER — NON-APPOINTMENT (OUTPATIENT)
Age: 68
End: 2023-08-01

## 2023-08-01 ENCOUNTER — APPOINTMENT (OUTPATIENT)
Dept: HEMATOLOGY ONCOLOGY | Facility: CLINIC | Age: 68
End: 2023-08-01

## 2023-08-01 LAB
BASOPHILS # BLD AUTO: 0.07 K/UL — SIGNIFICANT CHANGE UP (ref 0–0.2)
BASOPHILS NFR BLD AUTO: 0.6 % — SIGNIFICANT CHANGE UP (ref 0–2)
EOSINOPHIL # BLD AUTO: 0.11 K/UL — SIGNIFICANT CHANGE UP (ref 0–0.5)
EOSINOPHIL NFR BLD AUTO: 1 % — SIGNIFICANT CHANGE UP (ref 0–6)
HCT VFR BLD CALC: 42.7 % — SIGNIFICANT CHANGE UP (ref 39–50)
HGB BLD-MCNC: 14.5 G/DL — SIGNIFICANT CHANGE UP (ref 13–17)
IMM GRANULOCYTES NFR BLD AUTO: 4.3 % — HIGH (ref 0–0.9)
LYMPHOCYTES # BLD AUTO: 1.04 K/UL — SIGNIFICANT CHANGE UP (ref 1–3.3)
LYMPHOCYTES # BLD AUTO: 9.4 % — LOW (ref 13–44)
MCHC RBC-ENTMCNC: 31.3 PG — SIGNIFICANT CHANGE UP (ref 27–34)
MCHC RBC-ENTMCNC: 34 G/DL — SIGNIFICANT CHANGE UP (ref 32–36)
MCV RBC AUTO: 92.2 FL — SIGNIFICANT CHANGE UP (ref 80–100)
MONOCYTES # BLD AUTO: 1.16 K/UL — HIGH (ref 0–0.9)
MONOCYTES NFR BLD AUTO: 10.5 % — SIGNIFICANT CHANGE UP (ref 2–14)
NEUTROPHILS # BLD AUTO: 8.19 K/UL — HIGH (ref 1.8–7.4)
NEUTROPHILS NFR BLD AUTO: 74.2 % — SIGNIFICANT CHANGE UP (ref 43–77)
NRBC # BLD: 0 /100 WBCS — SIGNIFICANT CHANGE UP (ref 0–0)
PLATELET # BLD AUTO: 202 K/UL — SIGNIFICANT CHANGE UP (ref 150–400)
RBC # BLD: 4.63 M/UL — SIGNIFICANT CHANGE UP (ref 4.2–5.8)
RBC # FLD: 14.1 % — SIGNIFICANT CHANGE UP (ref 10.3–14.5)
WBC # BLD: 11.05 K/UL — HIGH (ref 3.8–10.5)
WBC # FLD AUTO: 11.05 K/UL — HIGH (ref 3.8–10.5)

## 2023-08-01 PROCEDURE — 77427 RADIATION TX MANAGEMENT X5: CPT

## 2023-08-01 PROCEDURE — 77387B: CUSTOM | Mod: 26

## 2023-08-01 PROCEDURE — 92507 TX SP LANG VOICE COMM INDIV: CPT | Mod: GN

## 2023-08-02 PROCEDURE — 77387B: CUSTOM | Mod: 26

## 2023-08-03 ENCOUNTER — RESULT REVIEW (OUTPATIENT)
Age: 68
End: 2023-08-03

## 2023-08-03 ENCOUNTER — APPOINTMENT (OUTPATIENT)
Dept: HEMATOLOGY ONCOLOGY | Facility: CLINIC | Age: 68
End: 2023-08-03

## 2023-08-03 ENCOUNTER — APPOINTMENT (OUTPATIENT)
Dept: NEUROLOGY | Facility: CLINIC | Age: 68
End: 2023-08-03
Payer: MEDICARE

## 2023-08-03 ENCOUNTER — NON-APPOINTMENT (OUTPATIENT)
Age: 68
End: 2023-08-03

## 2023-08-03 VITALS
SYSTOLIC BLOOD PRESSURE: 139 MMHG | DIASTOLIC BLOOD PRESSURE: 72 MMHG | TEMPERATURE: 98 F | HEART RATE: 91 BPM | HEIGHT: 72 IN | BODY MASS INDEX: 28.71 KG/M2 | RESPIRATION RATE: 16 BRPM | WEIGHT: 212 LBS | OXYGEN SATURATION: 97 %

## 2023-08-03 LAB
ALBUMIN SERPL ELPH-MCNC: 4.3 G/DL
ALP BLD-CCNC: 80 U/L
ALT SERPL-CCNC: 34 U/L
ANION GAP SERPL CALC-SCNC: 12 MMOL/L
AST SERPL-CCNC: 20 U/L
BASOPHILS # BLD AUTO: 0.05 K/UL — SIGNIFICANT CHANGE UP (ref 0–0.2)
BASOPHILS NFR BLD AUTO: 0.4 % — SIGNIFICANT CHANGE UP (ref 0–2)
BILIRUB SERPL-MCNC: 0.4 MG/DL
BUN SERPL-MCNC: 17 MG/DL
CALCIUM SERPL-MCNC: 9.3 MG/DL
CHLORIDE SERPL-SCNC: 100 MMOL/L
CO2 SERPL-SCNC: 24 MMOL/L
CREAT SERPL-MCNC: 1.01 MG/DL
EGFR: 82 ML/MIN/1.73M2
EOSINOPHIL # BLD AUTO: 0.06 K/UL — SIGNIFICANT CHANGE UP (ref 0–0.5)
EOSINOPHIL NFR BLD AUTO: 0.5 % — SIGNIFICANT CHANGE UP (ref 0–6)
GLUCOSE SERPL-MCNC: 91 MG/DL
HCT VFR BLD CALC: 44.6 % — SIGNIFICANT CHANGE UP (ref 39–50)
HGB BLD-MCNC: 14.7 G/DL — SIGNIFICANT CHANGE UP (ref 13–17)
IMM GRANULOCYTES NFR BLD AUTO: 3.9 % — HIGH (ref 0–0.9)
LYMPHOCYTES # BLD AUTO: 0.9 K/UL — LOW (ref 1–3.3)
LYMPHOCYTES # BLD AUTO: 7.5 % — LOW (ref 13–44)
MCHC RBC-ENTMCNC: 30.9 PG — SIGNIFICANT CHANGE UP (ref 27–34)
MCHC RBC-ENTMCNC: 33 G/DL — SIGNIFICANT CHANGE UP (ref 32–36)
MCV RBC AUTO: 93.9 FL — SIGNIFICANT CHANGE UP (ref 80–100)
MONOCYTES # BLD AUTO: 1.27 K/UL — HIGH (ref 0–0.9)
MONOCYTES NFR BLD AUTO: 10.6 % — SIGNIFICANT CHANGE UP (ref 2–14)
NEUTROPHILS # BLD AUTO: 9.24 K/UL — HIGH (ref 1.8–7.4)
NEUTROPHILS NFR BLD AUTO: 77.1 % — HIGH (ref 43–77)
NRBC # BLD: 0 /100 WBCS — SIGNIFICANT CHANGE UP (ref 0–0)
PLATELET # BLD AUTO: 196 K/UL — SIGNIFICANT CHANGE UP (ref 150–400)
POTASSIUM SERPL-SCNC: 4.4 MMOL/L
PROT SERPL-MCNC: 6.2 G/DL
RBC # BLD: 4.75 M/UL — SIGNIFICANT CHANGE UP (ref 4.2–5.8)
RBC # FLD: 14 % — SIGNIFICANT CHANGE UP (ref 10.3–14.5)
SODIUM SERPL-SCNC: 136 MMOL/L
WBC # BLD: 11.99 K/UL — HIGH (ref 3.8–10.5)
WBC # FLD AUTO: 11.99 K/UL — HIGH (ref 3.8–10.5)

## 2023-08-03 PROCEDURE — 77387B: CUSTOM | Mod: 26

## 2023-08-03 PROCEDURE — 99215 OFFICE O/P EST HI 40 MIN: CPT

## 2023-08-03 NOTE — HISTORY OF PRESENT ILLNESS
[FreeTextEntry1] : Mr. Liu is a 68 yo male who is here for a follow up post craniotomy to discuss pathology and further treatment plans  In brief  PMHx notable for only orthopedic issues - NKDA    5/9/2023 - I have seen him today with a new MRI. patient and his wife have noted over the past 3 weeks some difficulty with expressive speech - word substitutions and change in personality - more irritable that usual. He denies headaches, focal weakness,numbness, seizures, or gait instability. He saw Dr. Garcia, neurology - had and MRI this am - I have personally reviewed this film - that shows a left temporal heterogeneously enhancing mass measuring 5.0 x 4.5 x 3.3 cm with extension into the left frontal lobe with surrounding edema and left to right midline shift. He was sent to ER for an expedited evaluation  5/9/2023- 5/20/2023- Admitted at [facility]. MR Spect on 5/11 revealed "Reidentified heterogeneously enhancing necrotic mass lesion centered within the left temporal lobe and extending into the left periinsular region with a cystic portion along the inferior aspect of the lesion, with a large degree of surrounding vasogenic edema throughout the left parietal temporal lobes and left basal ganglia This lesion likely reflects a  high-grade glioma. Functional imaging demonstrates no hyperactivity in the region of tumor or vasogenic edema."  Patient met criteria and was enrolled in IMVAX Trial.  5/15- had a Left pterional craniotomy for brain tumor resection with gleolan. Frozen path: high-grade glioma. Bilateral abdominal incisions made for later implantation of drug treatment. IMVAX Biologics were implanted into abdomen via incisions during bedside procedure per IMVAX trial on 5/17 under conscious sedation. These biologic agents were removed during bedside procedure on 5/19 under conscious sedation. Patient was  monitored in the NSCU, his post op course remained uncomplicated  5/16/2023 - Post op MRI notable for edema and residual nodular enhancement within the left insular  region and left anterior temporal lobe. CT Serial CT Head imaging post op showing stable resolving post operative changes. Patient was discharged home on 5/20/2023.  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  6/9/2023- Reached out to patient this am. Patient c/o blurred vision Right worse than left for past 2 days. Discussed with Dr Galvez. Will start him on Dexamethasone 2 mg daily.  6/19/2023 - 6/23/2023 - Patient called reporting seizure like symptoms and was admitted to [facility]. He reported frequent episodes of intense fear / uneasiness, feeling "stressed out of my mind," chills, piloerection to all extremities, palpitations, and increased respiratory rate, all of which lasts for less than 1 minute, and completely resolve. Patient's spouse at bedside confirming the above. Patient and spouse reporting that symptoms started about 5-6 days ago w/ about 10 episodes throughout the entire day. The frequency progressively increased over the course of the week. Yesterday, patient had these episodes about every 10-15 minutes, with the same duration of less than 1 minute. No other symptoms at this time, with the exception of varying degrees of word-finding difficulty. EEG was negative, however episodes stopped once Keppra was raised to 1500 mg bid and added clonazepam 0.5 mg bid. During this admission he was noted to be hyponatremic and  6/26/2023- ChemoRT begin 8/3/2023- Here for a follow up. Reports he feels fatigued. His dexamethasone was tapered to 1 mg daily which started yesterday.   Denies headaches, seizures, N/V.

## 2023-08-03 NOTE — PHYSICAL EXAM
[General Appearance - Alert] : alert [General Appearance - In No Acute Distress] : in no acute distress [General Appearance - Well Nourished] : well nourished [] : no respiratory distress [Respiration, Rhythm And Depth] : normal respiratory rhythm and effort [Exaggerated Use Of Accessory Muscles For Inspiration] : no accessory muscle use [Edema] : there was no peripheral edema [Abnormal Walk] : normal gait [FreeTextEntry1] : Patient seen and examined Alert, awake , Oriented X 3. Speech clear - hesitancy reduced. PERRLA, EOMI, VFF Face symmetrical. Tongue protrudes midline BROWN x 4 with good and equal strength FFM, coordination equal bilaterally Sensation intact bilaterally Gait steady. Ambulating independently

## 2023-08-03 NOTE — DISCUSSION/SUMMARY
[FreeTextEntry1] : Patient seen and examined  GBM - Neurologically stable.  CEREBRAL EDEMA - Continue Dexamethasone 1 mg daily X 10 days per Dr Villegas. GI prophylaxis with Pantoprazole  SEIZURES - Continue Keppra 1500 mg bid and Clonazepam 0.5 mg bid  HYPONATREMIA - Will continue FR 1 L for now and continue Sodium tab 2 tabs a day for now Will follow up on CMP results from today and if stable will taper to two tab a day  HYPERTENSION- Continue Enalapril. Follow up with PCP for BP management  FOLLOW UP - Will d a clinical follow up along with an MRI On 9/6/2023  In the interim, if any concerns patient knows to call our office.

## 2023-08-04 PROCEDURE — 77014: CPT | Mod: 26

## 2023-08-07 ENCOUNTER — RESULT REVIEW (OUTPATIENT)
Age: 68
End: 2023-08-07

## 2023-08-07 ENCOUNTER — LABORATORY RESULT (OUTPATIENT)
Age: 68
End: 2023-08-07

## 2023-08-07 ENCOUNTER — APPOINTMENT (OUTPATIENT)
Dept: HEMATOLOGY ONCOLOGY | Facility: CLINIC | Age: 68
End: 2023-08-07

## 2023-08-07 ENCOUNTER — APPOINTMENT (OUTPATIENT)
Dept: OTOLARYNGOLOGY | Facility: CLINIC | Age: 68
End: 2023-08-07

## 2023-08-07 LAB
BASOPHILS # BLD AUTO: 0.07 K/UL — SIGNIFICANT CHANGE UP (ref 0–0.2)
BASOPHILS NFR BLD AUTO: 0.7 % — SIGNIFICANT CHANGE UP (ref 0–2)
EOSINOPHIL # BLD AUTO: 0.14 K/UL — SIGNIFICANT CHANGE UP (ref 0–0.5)
EOSINOPHIL NFR BLD AUTO: 1.4 % — SIGNIFICANT CHANGE UP (ref 0–6)
HCT VFR BLD CALC: 43.6 % — SIGNIFICANT CHANGE UP (ref 39–50)
HGB BLD-MCNC: 14.5 G/DL — SIGNIFICANT CHANGE UP (ref 13–17)
IMM GRANULOCYTES NFR BLD AUTO: 2.7 % — HIGH (ref 0–0.9)
LYMPHOCYTES # BLD AUTO: 0.96 K/UL — LOW (ref 1–3.3)
LYMPHOCYTES # BLD AUTO: 9.7 % — LOW (ref 13–44)
MCHC RBC-ENTMCNC: 31.5 PG — SIGNIFICANT CHANGE UP (ref 27–34)
MCHC RBC-ENTMCNC: 33.3 G/DL — SIGNIFICANT CHANGE UP (ref 32–36)
MCV RBC AUTO: 94.6 FL — SIGNIFICANT CHANGE UP (ref 80–100)
MONOCYTES # BLD AUTO: 1.02 K/UL — HIGH (ref 0–0.9)
MONOCYTES NFR BLD AUTO: 10.3 % — SIGNIFICANT CHANGE UP (ref 2–14)
NEUTROPHILS # BLD AUTO: 7.45 K/UL — HIGH (ref 1.8–7.4)
NEUTROPHILS NFR BLD AUTO: 75.2 % — SIGNIFICANT CHANGE UP (ref 43–77)
NRBC # BLD: 0 /100 WBCS — SIGNIFICANT CHANGE UP (ref 0–0)
PLATELET # BLD AUTO: 199 K/UL — SIGNIFICANT CHANGE UP (ref 150–400)
RBC # BLD: 4.61 M/UL — SIGNIFICANT CHANGE UP (ref 4.2–5.8)
RBC # FLD: 14 % — SIGNIFICANT CHANGE UP (ref 10.3–14.5)
WBC # BLD: 9.91 K/UL — SIGNIFICANT CHANGE UP (ref 3.8–10.5)
WBC # FLD AUTO: 9.91 K/UL — SIGNIFICANT CHANGE UP (ref 3.8–10.5)

## 2023-08-07 PROCEDURE — 77387B: CUSTOM | Mod: 26

## 2023-08-08 ENCOUNTER — NON-APPOINTMENT (OUTPATIENT)
Age: 68
End: 2023-08-08

## 2023-08-08 NOTE — HISTORY OF PRESENT ILLNESS
[FreeTextEntry1] : 68 yo male with left frontal glioblastoma IDH-WT - molecular pending, s/p maximal safe resection with residual disease on IMVAX study presents for evaluation of adjuvant radiation treatment with concurrent Temodar.  Of note, he has claustrophobia. We will try to use open mask for his treatment  6/27/23 Presents for OTV.  2/30 Fractions completed  Recently admitted for ? seizures described as "feeling fearful"   Now on increased AED medications w/o recent episodes but seems to be more fatigue  (Keppra 1500mg 2 x daily) Continues Dex 2 mg daily Denies any new concerns or deficits  Visit dated 7/5/2023 Presents for OTV 7/23 Fxs completed Continues with Dr. Guzmán. Enrolled in IMVAX trial. Concurrent chemo(6/26/2023) labs 6/29/23 Plts 282 Denies recurrence of "feeling fearful" Continues AED and Dex 2 mg in AM  Reports doing well infact he has been able to do more activities over the past week He has returned to the gym and mowed the lawn.  Wife verbalized concerns despite being able to engage in longer periods of conversation but noticing discrepancies in word finding eg: Rast[employer]arian and thinking its thanksgiving. pool/ocean, coffee/tea  and at times being repetitive. Noticed blurry vision over the past few days equal on bilaterally  Denies HA/unilateral extremity weakness/slurred speech  Visit dated 7/11/2023 OTV Completed 11/30 Fxs Referred for speech therapy d/t word finding difficulty/name retrieval (sessions began yesterday). Notices this worsens over the course of the day. Continues to follow with Dr. Galvez Labs 7/5/2023 Plts 226 Reports noticing his vision is with changes "things seem wavy" while trying to see the ball playing softball over the weekend Denies HAs/unilateral extremity weakness Continues DEX 2mg PO Resumed gym activity    Visit dated 7/18/2023 OTV Completed 16/30 Fxs Continues to follow with Dr. Galvez on TMZ (resolved constipation) Reports increased fatigue noted over the past 2-3 days Noticing hair loss at treatment site Saw neuro optha 7/17/ 2023 (with plans to f/u in 3 months) LEFT > RIGHT blurry  Visit dated 7/27/2023 OTV Patient presents for OTV Completed  23/30(Fxs) w/o incident thus far. On DEX 2mg BID Feeling well overall.  Vision unchanged persistent blurriness stable. Notices a little improved fatigue no need for daily naps over the past week. Able to do gym workout within limits "but not as I am used to" Denies headaches/nausea/focal weakness/numbness/tingling Continues to follow with Dr. Galvez on TMZ  . Labs 7/24/2023 Plts 278  Visit dated 8/1/2023 Patient presents for OTV Completed  26/30 (Fxs) and continues to tolerate the Tx.  reduced dexamethasone to 2 mg daily last week.  No negative changes from that change.   Denies headaches/nausea/focal weakness/numbness/tingling Labs 7/24/23 Plts 278. Continues to follow with Dr. Galvez Reports feeling a lot more fatigued/ tired. and still with the feeling of "I feel drunk all the time and feel like a 13 y/o". He hopes this all ends soon.

## 2023-08-08 NOTE — PHYSICAL EXAM
[General Appearance - Well Developed] : well developed [Sclera] : the sclera and conjunctiva were normal [] : no respiratory distress [Heart Rate And Rhythm] : heart rate and rhythm were normal [Heart Sounds] : normal S1 and S2 [No Focal Deficits] : no focal deficits [Oriented To Time, Place, And Person] : oriented to person, place, and time [de-identified] : healed incisional site + alopecia at Tx xite

## 2023-08-08 NOTE — REVIEW OF SYSTEMS
[Constipation: Grade 0] : Constipation: Grade 0 [Nausea: Grade 0] : Nausea: Grade 0 [Fatigue: Grade 1 - Fatigue relieved by rest] : Fatigue: Grade 1 - Fatigue relieved by rest [Blurred Vision: Grade 1 - Intervention not indicated] : Blurred Vision: Grade 1 - Intervention not indicated [Cognitive Disturbance: Grade 0] : Cognitive Disturbance: Grade 0 [Dizziness: Grade 0] : Dizziness: Grade 0  [Headache: Grade 0] : Headache: Grade 0 [Lethargy: Grade 0] : Lethargy: Grade 0 [Insomnia: Grade 0] : Insomnia: Grade 0 [Alopecia: Grade 1 - Hair loss of <50% of normal for that individual that is not obvious from a distance but only on close inspection; a different hair style may be required to cover the hair loss but it does not require a wig or hair piece to camouflage] : Alopecia: Grade 1 - Hair loss of <50% of normal for that individual that is not obvious from a distance but only on close inspection; a different hair style may be required to cover the hair loss but it does not require a wig or hair piece to camouflage [de-identified] : difficulty seeing the TV stable [FreeTextEntry6] : bilateral abdominal incisions healed CDI

## 2023-08-08 NOTE — DISEASE MANAGEMENT
[Pathological] : TNM Stage: p [N/A] : Currently not applicable [TTNM] : - [NTNM] : - [MTNM] : - [de-identified] : 7490cGy [de-identified] : 6000cGy [de-identified] : Left Brain

## 2023-08-16 ENCOUNTER — APPOINTMENT (OUTPATIENT)
Dept: HEMATOLOGY ONCOLOGY | Facility: CLINIC | Age: 68
End: 2023-08-16

## 2023-08-16 ENCOUNTER — RESULT REVIEW (OUTPATIENT)
Age: 68
End: 2023-08-16

## 2023-08-16 ENCOUNTER — APPOINTMENT (OUTPATIENT)
Dept: CT IMAGING | Facility: IMAGING CENTER | Age: 68
End: 2023-08-16
Payer: MEDICARE

## 2023-08-16 ENCOUNTER — APPOINTMENT (OUTPATIENT)
Dept: NEUROLOGY | Facility: CLINIC | Age: 68
End: 2023-08-16

## 2023-08-16 ENCOUNTER — OUTPATIENT (OUTPATIENT)
Dept: OUTPATIENT SERVICES | Facility: HOSPITAL | Age: 68
LOS: 1 days | End: 2023-08-16
Payer: MEDICARE

## 2023-08-16 ENCOUNTER — APPOINTMENT (OUTPATIENT)
Dept: NEUROLOGY | Facility: CLINIC | Age: 68
End: 2023-08-16
Payer: MEDICARE

## 2023-08-16 DIAGNOSIS — Z98.890 OTHER SPECIFIED POSTPROCEDURAL STATES: Chronic | ICD-10-CM

## 2023-08-16 DIAGNOSIS — Z00.00 ENCOUNTER FOR GENERAL ADULT MEDICAL EXAMINATION W/OUT ABNORMAL FINDINGS: ICD-10-CM

## 2023-08-16 DIAGNOSIS — R47.01 APHASIA: ICD-10-CM

## 2023-08-16 DIAGNOSIS — C71.9 MALIGNANT NEOPLASM OF BRAIN, UNSPECIFIED: ICD-10-CM

## 2023-08-16 LAB
BASOPHILS # BLD AUTO: 0.03 K/UL — SIGNIFICANT CHANGE UP (ref 0–0.2)
BASOPHILS NFR BLD AUTO: 0.4 % — SIGNIFICANT CHANGE UP (ref 0–2)
EOSINOPHIL # BLD AUTO: 0.11 K/UL — SIGNIFICANT CHANGE UP (ref 0–0.5)
EOSINOPHIL NFR BLD AUTO: 1.6 % — SIGNIFICANT CHANGE UP (ref 0–6)
HCT VFR BLD CALC: 43.1 % — SIGNIFICANT CHANGE UP (ref 39–50)
HGB BLD-MCNC: 14.7 G/DL — SIGNIFICANT CHANGE UP (ref 13–17)
IMM GRANULOCYTES NFR BLD AUTO: 1.2 % — HIGH (ref 0–0.9)
LYMPHOCYTES # BLD AUTO: 0.8 K/UL — LOW (ref 1–3.3)
LYMPHOCYTES # BLD AUTO: 11.8 % — LOW (ref 13–44)
MCHC RBC-ENTMCNC: 31.5 PG — SIGNIFICANT CHANGE UP (ref 27–34)
MCHC RBC-ENTMCNC: 34.1 G/DL — SIGNIFICANT CHANGE UP (ref 32–36)
MCV RBC AUTO: 92.3 FL — SIGNIFICANT CHANGE UP (ref 80–100)
MONOCYTES # BLD AUTO: 0.73 K/UL — SIGNIFICANT CHANGE UP (ref 0–0.9)
MONOCYTES NFR BLD AUTO: 10.7 % — SIGNIFICANT CHANGE UP (ref 2–14)
NEUTROPHILS # BLD AUTO: 5.05 K/UL — SIGNIFICANT CHANGE UP (ref 1.8–7.4)
NEUTROPHILS NFR BLD AUTO: 74.3 % — SIGNIFICANT CHANGE UP (ref 43–77)
NRBC # BLD: 0 /100 WBCS — SIGNIFICANT CHANGE UP (ref 0–0)
PLATELET # BLD AUTO: 219 K/UL — SIGNIFICANT CHANGE UP (ref 150–400)
RBC # BLD: 4.67 M/UL — SIGNIFICANT CHANGE UP (ref 4.2–5.8)
RBC # FLD: 13 % — SIGNIFICANT CHANGE UP (ref 10.3–14.5)
WBC # BLD: 6.8 K/UL — SIGNIFICANT CHANGE UP (ref 3.8–10.5)
WBC # FLD AUTO: 6.8 K/UL — SIGNIFICANT CHANGE UP (ref 3.8–10.5)

## 2023-08-16 PROCEDURE — 70450 CT HEAD/BRAIN W/O DYE: CPT | Mod: 26,MH

## 2023-08-16 PROCEDURE — 99215 OFFICE O/P EST HI 40 MIN: CPT

## 2023-08-16 PROCEDURE — 70450 CT HEAD/BRAIN W/O DYE: CPT

## 2023-08-16 RX ORDER — TEMOZOLOMIDE 20 MG/1
20 CAPSULE ORAL
Qty: 42 | Refills: 0 | Status: DISCONTINUED | COMMUNITY
Start: 2023-06-01 | End: 2023-08-16

## 2023-08-16 RX ORDER — TEMOZOLOMIDE 140 MG/1
140 CAPSULE ORAL
Qty: 42 | Refills: 0 | Status: DISCONTINUED | COMMUNITY
Start: 2023-06-01 | End: 2023-08-16

## 2023-08-17 ENCOUNTER — NON-APPOINTMENT (OUTPATIENT)
Age: 68
End: 2023-08-17

## 2023-08-17 LAB
ALBUMIN SERPL ELPH-MCNC: 4.2 G/DL
ALP BLD-CCNC: 86 U/L
ALT SERPL-CCNC: 18 U/L
ANION GAP SERPL CALC-SCNC: 14 MMOL/L
AST SERPL-CCNC: 19 U/L
BILIRUB SERPL-MCNC: 0.5 MG/DL
BUN SERPL-MCNC: 10 MG/DL
CALCIUM SERPL-MCNC: 9.4 MG/DL
CHLORIDE SERPL-SCNC: 97 MMOL/L
CO2 SERPL-SCNC: 23 MMOL/L
CREAT SERPL-MCNC: 0.97 MG/DL
EGFR: 86 ML/MIN/1.73M2
GLUCOSE SERPL-MCNC: 112 MG/DL
POTASSIUM SERPL-SCNC: 4.7 MMOL/L
PROT SERPL-MCNC: 6.9 G/DL
SODIUM SERPL-SCNC: 134 MMOL/L

## 2023-08-17 NOTE — HISTORY OF PRESENT ILLNESS
[FreeTextEntry1] :     Mr. Liu is a 68 yo male who is here for a follow up post craniotomy to discuss pathology and further treatment plans  In brief  PMHx notable for only orthopedic issues - NKDA    5/9/2023 - I have seen him today with a new MRI. patient and his wife have noted over the past 3 weeks some difficulty with expressive speech - word substitutions and change in personality - more irritable that usual. He denies headaches, focal weakness,numbness, seizures, or gait instability. He saw Dr. Garcia, neurology - had and MRI this am - I have personally reviewed this film - that shows a left temporal heterogeneously enhancing mass measuring 5.0 x 4.5 x 3.3 cm with extension into the left frontal lobe with surrounding edema and left to right midline shift. He was sent to ER for an expedited evaluation  5/9/2023- 5/20/2023- Admitted at [facility]. MR Spect on 5/11 revealed "Reidentified heterogeneously enhancing necrotic mass lesion centered within the left temporal lobe and extending into the left periinsular region with a cystic portion along the inferior aspect of the lesion, with a large degree of surrounding vasogenic edema throughout the left parietal temporal lobes and left basal ganglia This lesion likely reflects a  high-grade glioma. Functional imaging demonstrates no hyperactivity in the region of tumor or vasogenic edema."  Patient met criteria and was enrolled in IMVAX Trial.  5/15- had a Left pterional craniotomy for brain tumor resection with gleolan. Frozen path: high-grade glioma. Bilateral abdominal incisions made for later implantation of drug treatment. IMVAX Biologics were implanted into abdomen via incisions during bedside procedure per IMVAX trial on 5/17 under conscious sedation. These biologic agents were removed during bedside procedure on 5/19 under conscious sedation. Patient was  monitored in the NSCU, his post op course remained uncomplicated  5/16/2023 - Post op MRI notable for edema and residual nodular enhancement within the left insular  region and left anterior temporal lobe. CT Serial CT Head imaging post op showing stable resolving post operative changes. Patient was discharged home on 5/20/2023.  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  6/9/2023- Reached out to patient this am. Patient c/o blurred vision Right worse than left for past 2 days. Discussed with Dr Galvez. Will start him on Dexamethasone 2 mg daily.  6/19/2023 - 6/23/2023 - Patient called reporting seizure like symptoms and was admitted to [facility]. He reported frequent episodes of intense fear / uneasiness, feeling "stressed out of my mind," chills, piloerection to all extremities, palpitations, and increased respiratory rate, all of which lasts for less than 1 minute, and completely resolve. Patient's spouse at bedside confirming the above. Patient and spouse reporting that symptoms started about 5-6 days ago w/ about 10 episodes throughout the entire day. The frequency progressively increased over the course of the week. Yesterday, patient had these episodes about every 10-15 minutes, with the same duration of less than 1 minute. No other symptoms at this time, with the exception of varying degrees of word-finding difficulty. EEG was negative, however episodes stopped once Keppra was raised to 1500 mg bid and added clonazepam 0.5 mg bid. During this admission he was noted to be hyponatremic and  6/26/2023- ChemoRT begin 8/3/2023- Here for a follow up. Reports he feels fatigued. His dexamethasone was tapered to 1 mg daily which started yesterday. 8/7- ChemoRT completed  8/16/2023- Here for a follow up. Patient started having worsening aphasia since Sunday and worsened further  by Monday evening. Last dose of steroids was on 8/11/2023. Denies headaches, seizures, N/V.

## 2023-08-17 NOTE — DISCUSSION/SUMMARY
[FreeTextEntry1] : Patient seen and examined  GBM - Neurologically stable. CTH reviewed from today and showed increased vasogenic edema CEREBRAL EDEMA - Will start on Dexamethasone.  Take 12 mg today and then take 4 mg bid starting tomorrow. Call with an update on Friday. GI prophylaxis with Pantoprazole.  SEIZURES - Continue Keppra 1500 mg bid and Clonazepam 0.5 mg bid  HYPONATREMIA - Will continue FR 1 L for now and continue Sodium tab . He has only been taking one tab a day for a week . Will do CMP  HYPERTENSION- Continue Enalapril. Follow up with PCP for BP management  FOLLOW UP - Will d a clinical follow up along with an MRI On 9/6/2023 In the interim, if any concerns patient knows to call our office.

## 2023-08-17 NOTE — PHYSICAL EXAM
[FreeTextEntry1] : Patient seen and examined Alert, awake , Oriented X 3. Speech clear - hesitancy reduced. PERRLA, EOMI, VFF Face symmetrical. Tongue protrudes midline BROWN x 4 with good and equal strength FFM, coordination equal bilaterally Sensation intact bilaterally Gait steady. Ambulating independently.

## 2023-08-23 ENCOUNTER — LABORATORY RESULT (OUTPATIENT)
Age: 68
End: 2023-08-23

## 2023-08-23 ENCOUNTER — RESULT REVIEW (OUTPATIENT)
Age: 68
End: 2023-08-23

## 2023-08-23 ENCOUNTER — APPOINTMENT (OUTPATIENT)
Dept: HEMATOLOGY ONCOLOGY | Facility: CLINIC | Age: 68
End: 2023-08-23

## 2023-08-23 LAB
BASOPHILS # BLD AUTO: 0 K/UL — SIGNIFICANT CHANGE UP (ref 0–0.2)
BASOPHILS NFR BLD AUTO: 0 % — SIGNIFICANT CHANGE UP (ref 0–2)
EOSINOPHIL # BLD AUTO: 0.28 K/UL — SIGNIFICANT CHANGE UP (ref 0–0.5)
EOSINOPHIL NFR BLD AUTO: 2 % — SIGNIFICANT CHANGE UP (ref 0–6)
HCT VFR BLD CALC: 45 % — SIGNIFICANT CHANGE UP (ref 39–50)
HGB BLD-MCNC: 15.5 G/DL — SIGNIFICANT CHANGE UP (ref 13–17)
LYMPHOCYTES # BLD AUTO: 1.81 K/UL — SIGNIFICANT CHANGE UP (ref 1–3.3)
LYMPHOCYTES # BLD AUTO: 13 % — SIGNIFICANT CHANGE UP (ref 13–44)
MCHC RBC-ENTMCNC: 31.3 PG — SIGNIFICANT CHANGE UP (ref 27–34)
MCHC RBC-ENTMCNC: 34.4 G/DL — SIGNIFICANT CHANGE UP (ref 32–36)
MCV RBC AUTO: 90.9 FL — SIGNIFICANT CHANGE UP (ref 80–100)
METAMYELOCYTES # FLD: 2 % — HIGH (ref 0–0)
MONOCYTES # BLD AUTO: 1.39 K/UL — HIGH (ref 0–0.9)
MONOCYTES NFR BLD AUTO: 10 % — SIGNIFICANT CHANGE UP (ref 2–14)
MYELOCYTES NFR BLD: 5 % — HIGH (ref 0–0)
NEUTROPHILS # BLD AUTO: 9.18 K/UL — HIGH (ref 1.8–7.4)
NEUTROPHILS NFR BLD AUTO: 66 % — SIGNIFICANT CHANGE UP (ref 43–77)
NRBC # BLD: 0 /100 — SIGNIFICANT CHANGE UP (ref 0–0)
NRBC # BLD: SIGNIFICANT CHANGE UP /100 WBCS (ref 0–0)
PLAT MORPH BLD: NORMAL — SIGNIFICANT CHANGE UP
PLATELET # BLD AUTO: 304 K/UL — SIGNIFICANT CHANGE UP (ref 150–400)
RBC # BLD: 4.95 M/UL — SIGNIFICANT CHANGE UP (ref 4.2–5.8)
RBC # FLD: 12.9 % — SIGNIFICANT CHANGE UP (ref 10.3–14.5)
RBC BLD AUTO: SIGNIFICANT CHANGE UP
VARIANT LYMPHS # BLD: 2 % — SIGNIFICANT CHANGE UP (ref 0–6)
WBC # BLD: 13.91 K/UL — HIGH (ref 3.8–10.5)
WBC # FLD AUTO: 13.91 K/UL — HIGH (ref 3.8–10.5)

## 2023-08-31 ENCOUNTER — RESULT REVIEW (OUTPATIENT)
Age: 68
End: 2023-08-31

## 2023-08-31 ENCOUNTER — APPOINTMENT (OUTPATIENT)
Dept: HEMATOLOGY ONCOLOGY | Facility: CLINIC | Age: 68
End: 2023-08-31

## 2023-08-31 LAB
BASOPHILS # BLD AUTO: 0.07 K/UL — SIGNIFICANT CHANGE UP (ref 0–0.2)
BASOPHILS NFR BLD AUTO: 0.3 % — SIGNIFICANT CHANGE UP (ref 0–2)
EOSINOPHIL # BLD AUTO: 0 K/UL — SIGNIFICANT CHANGE UP (ref 0–0.5)
EOSINOPHIL NFR BLD AUTO: 0 % — SIGNIFICANT CHANGE UP (ref 0–6)
HCT VFR BLD CALC: 43.9 % — SIGNIFICANT CHANGE UP (ref 39–50)
HGB BLD-MCNC: 14.9 G/DL — SIGNIFICANT CHANGE UP (ref 13–17)
IMM GRANULOCYTES NFR BLD AUTO: 3.8 % — HIGH (ref 0–0.9)
LYMPHOCYTES # BLD AUTO: 0.64 K/UL — LOW (ref 1–3.3)
LYMPHOCYTES # BLD AUTO: 2.7 % — LOW (ref 13–44)
MCHC RBC-ENTMCNC: 31 PG — SIGNIFICANT CHANGE UP (ref 27–34)
MCHC RBC-ENTMCNC: 33.9 G/DL — SIGNIFICANT CHANGE UP (ref 32–36)
MCV RBC AUTO: 91.3 FL — SIGNIFICANT CHANGE UP (ref 80–100)
MONOCYTES # BLD AUTO: 1.58 K/UL — HIGH (ref 0–0.9)
MONOCYTES NFR BLD AUTO: 6.7 % — SIGNIFICANT CHANGE UP (ref 2–14)
NEUTROPHILS # BLD AUTO: 20.44 K/UL — HIGH (ref 1.8–7.4)
NEUTROPHILS NFR BLD AUTO: 86.5 % — HIGH (ref 43–77)
NRBC # BLD: 0 /100 WBCS — SIGNIFICANT CHANGE UP (ref 0–0)
PLATELET # BLD AUTO: 244 K/UL — SIGNIFICANT CHANGE UP (ref 150–400)
RBC # BLD: 4.81 M/UL — SIGNIFICANT CHANGE UP (ref 4.2–5.8)
RBC # FLD: 13 % — SIGNIFICANT CHANGE UP (ref 10.3–14.5)
WBC # BLD: 23.63 K/UL — HIGH (ref 3.8–10.5)
WBC # FLD AUTO: 23.63 K/UL — HIGH (ref 3.8–10.5)

## 2023-09-06 ENCOUNTER — LABORATORY RESULT (OUTPATIENT)
Age: 68
End: 2023-09-06

## 2023-09-06 ENCOUNTER — RESULT REVIEW (OUTPATIENT)
Age: 68
End: 2023-09-06

## 2023-09-06 ENCOUNTER — APPOINTMENT (OUTPATIENT)
Dept: NEUROLOGY | Facility: CLINIC | Age: 68
End: 2023-09-06
Payer: MEDICARE

## 2023-09-06 ENCOUNTER — APPOINTMENT (OUTPATIENT)
Dept: RADIATION ONCOLOGY | Facility: CLINIC | Age: 68
End: 2023-09-06
Payer: MEDICARE

## 2023-09-06 ENCOUNTER — APPOINTMENT (OUTPATIENT)
Dept: NEUROSURGERY | Facility: CLINIC | Age: 68
End: 2023-09-06
Payer: MEDICARE

## 2023-09-06 ENCOUNTER — APPOINTMENT (OUTPATIENT)
Dept: MRI IMAGING | Facility: IMAGING CENTER | Age: 68
End: 2023-09-06
Payer: MEDICARE

## 2023-09-06 ENCOUNTER — OUTPATIENT (OUTPATIENT)
Dept: OUTPATIENT SERVICES | Facility: HOSPITAL | Age: 68
LOS: 1 days | End: 2023-09-06
Payer: MEDICARE

## 2023-09-06 ENCOUNTER — APPOINTMENT (OUTPATIENT)
Dept: HEMATOLOGY ONCOLOGY | Facility: CLINIC | Age: 68
End: 2023-09-06

## 2023-09-06 ENCOUNTER — APPOINTMENT (OUTPATIENT)
Dept: RADIATION ONCOLOGY | Facility: CLINIC | Age: 68
End: 2023-09-06

## 2023-09-06 ENCOUNTER — NON-APPOINTMENT (OUTPATIENT)
Age: 68
End: 2023-09-06

## 2023-09-06 VITALS
SYSTOLIC BLOOD PRESSURE: 161 MMHG | BODY MASS INDEX: 30.41 KG/M2 | DIASTOLIC BLOOD PRESSURE: 84 MMHG | OXYGEN SATURATION: 95 % | HEIGHT: 72 IN | HEART RATE: 99 BPM | RESPIRATION RATE: 18 BRPM | WEIGHT: 224.54 LBS | TEMPERATURE: 96.62 F

## 2023-09-06 VITALS
TEMPERATURE: 98.9 F | WEIGHT: 218 LBS | HEART RATE: 88 BPM | DIASTOLIC BLOOD PRESSURE: 82 MMHG | HEIGHT: 72 IN | BODY MASS INDEX: 29.53 KG/M2 | SYSTOLIC BLOOD PRESSURE: 136 MMHG | OXYGEN SATURATION: 99 % | RESPIRATION RATE: 16 BRPM

## 2023-09-06 DIAGNOSIS — C71.9 MALIGNANT NEOPLASM OF BRAIN, UNSPECIFIED: ICD-10-CM

## 2023-09-06 DIAGNOSIS — Z98.890 OTHER SPECIFIED POSTPROCEDURAL STATES: Chronic | ICD-10-CM

## 2023-09-06 LAB
BASOPHILS # BLD AUTO: 0.08 K/UL — SIGNIFICANT CHANGE UP (ref 0–0.2)
BASOPHILS NFR BLD AUTO: 0.5 % — SIGNIFICANT CHANGE UP (ref 0–2)
EOSINOPHIL # BLD AUTO: 0.01 K/UL — SIGNIFICANT CHANGE UP (ref 0–0.5)
EOSINOPHIL NFR BLD AUTO: 0.1 % — SIGNIFICANT CHANGE UP (ref 0–6)
HCT VFR BLD CALC: 46.8 % — SIGNIFICANT CHANGE UP (ref 39–50)
HGB BLD-MCNC: 15.7 G/DL — SIGNIFICANT CHANGE UP (ref 13–17)
IMM GRANULOCYTES NFR BLD AUTO: 4.5 % — HIGH (ref 0–0.9)
LYMPHOCYTES # BLD AUTO: 0.56 K/UL — LOW (ref 1–3.3)
LYMPHOCYTES # BLD AUTO: 3.3 % — LOW (ref 13–44)
MCHC RBC-ENTMCNC: 31.1 PG — SIGNIFICANT CHANGE UP (ref 27–34)
MCHC RBC-ENTMCNC: 33.5 G/DL — SIGNIFICANT CHANGE UP (ref 32–36)
MCV RBC AUTO: 92.7 FL — SIGNIFICANT CHANGE UP (ref 80–100)
MONOCYTES # BLD AUTO: 1.28 K/UL — HIGH (ref 0–0.9)
MONOCYTES NFR BLD AUTO: 7.5 % — SIGNIFICANT CHANGE UP (ref 2–14)
NEUTROPHILS # BLD AUTO: 14.43 K/UL — HIGH (ref 1.8–7.4)
NEUTROPHILS NFR BLD AUTO: 84.1 % — HIGH (ref 43–77)
NRBC # BLD: 0 /100 WBCS — SIGNIFICANT CHANGE UP (ref 0–0)
PLATELET # BLD AUTO: 195 K/UL — SIGNIFICANT CHANGE UP (ref 150–400)
RBC # BLD: 5.05 M/UL — SIGNIFICANT CHANGE UP (ref 4.2–5.8)
RBC # FLD: 13.4 % — SIGNIFICANT CHANGE UP (ref 10.3–14.5)
WBC # BLD: 17.13 K/UL — HIGH (ref 3.8–10.5)
WBC # FLD AUTO: 17.13 K/UL — HIGH (ref 3.8–10.5)

## 2023-09-06 PROCEDURE — 70553 MRI BRAIN STEM W/O & W/DYE: CPT | Mod: 26,MH

## 2023-09-06 PROCEDURE — A9585: CPT

## 2023-09-06 PROCEDURE — 70553 MRI BRAIN STEM W/O & W/DYE: CPT

## 2023-09-06 PROCEDURE — 99213 OFFICE O/P EST LOW 20 MIN: CPT

## 2023-09-06 PROCEDURE — 99024 POSTOP FOLLOW-UP VISIT: CPT

## 2023-09-06 PROCEDURE — 99215 OFFICE O/P EST HI 40 MIN: CPT

## 2023-09-06 NOTE — HISTORY OF PRESENT ILLNESS
[FreeTextEntry1] : Mr. Liu is a 66 yo male who is here for a follow up post craniotomy to discuss pathology and further treatment plans  In brief  PMHx notable for only orthopedic issues - NKDA    5/9/2023 - I have seen him today with a new MRI. patient and his wife have noted over the past 3 weeks some difficulty with expressive speech - word substitutions and change in personality - more irritable that usual. He denies headaches, focal weakness,numbness, seizures, or gait instability. He saw Dr. Garcia, neurology - had and MRI this am - I have personally reviewed this film - that shows a left temporal heterogeneously enhancing mass measuring 5.0 x 4.5 x 3.3 cm with extension into the left frontal lobe with surrounding edema and left to right midline shift. He was sent to ER for an expedited evaluation  5/9/2023- 5/20/2023- Admitted at [facility]. MR Spect on 5/11 revealed "Reidentified heterogeneously enhancing necrotic mass lesion centered within the left temporal lobe and extending into the left periinsular region with a cystic portion along the inferior aspect of the lesion, with a large degree of surrounding vasogenic edema throughout the left parietal temporal lobes and left basal ganglia This lesion likely reflects a  high-grade glioma. Functional imaging demonstrates no hyperactivity in the region of tumor or vasogenic edema."  Patient met criteria and was enrolled in IMVAX Trial.  5/15- had a Left pterional craniotomy for brain tumor resection with gleolan. Frozen path: high-grade glioma. Bilateral abdominal incisions made for later implantation of drug treatment. IMVAX Biologics were implanted into abdomen via incisions during bedside procedure per IMVAX trial on 5/17 under conscious sedation. These biologic agents were removed during bedside procedure on 5/19 under conscious sedation. Patient was  monitored in the NSCU, his post op course remained uncomplicated  5/16/2023 - Post op MRI notable for edema and residual nodular enhancement within the left insular  region and left anterior temporal lobe. CT Serial CT Head imaging post op showing stable resolving post operative changes. Patient was discharged home on 5/20/2023.  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  6/9/2023- Reached out to patient this am. Patient c/o blurred vision Right worse than left for past 2 days. Discussed with Dr Galvez. Will start him on Dexamethasone 2 mg daily.  6/19/2023 - 6/23/2023 - Patient called reporting seizure like symptoms and was admitted to [facility]. He reported frequent episodes of intense fear / uneasiness, feeling "stressed out of my mind," chills, piloerection to all extremities, palpitations, and increased respiratory rate, all of which lasts for less than 1 minute, and completely resolve. Patient's spouse at bedside confirming the above. Patient and spouse reporting that symptoms started about 5-6 days ago w/ about 10 episodes throughout the entire day. The frequency progressively increased over the course of the week. Yesterday, patient had these episodes about every 10-15 minutes, with the same duration of less than 1 minute. No other symptoms at this time, with the exception of varying degrees of word-finding difficulty. EEG was negative, however episodes stopped once Keppra was raised to 1500 mg bid and added clonazepam 0.5 mg bid. During this admission he was noted to be hyponatremic and  6/26/2023- ChemoRT begins. 8/3/2023- Here for a follow up. Reports he feels fatigued. His dexamethasone was tapered to 1 mg daily which started yesterday. 8/7- ChemoRT completed. 8/16/2023- Here for a follow up. Patient started having worsening aphasia since Sunday and worsened further by Monday evening. Last dose of steroids was on 8/11/2023. Denies headaches, seizures, N/V. CTH with increased cerebral edema - Started on Dexamethasone 9/6/2023- Here for a follow up. Reports he feels very fatigued - Remains on Dexamethasone 4 mg bid - His speech has improved since being on steroids. He denies any headaches or seizures.

## 2023-09-06 NOTE — ASSESSMENT
[FreeTextEntry1] : Follow up c/o IMVAX trial for GBM  Patient doing well. Feels low on energy. Conscious, oriented, moving all 4 extremities well  MRI brain : no residual/ recurrent disease. Decadron tapered by Dr Galvez  Adv: Continue Decadron taper as advised by Dr Galvez.  Continue Keppra at the same dose for now.  Will need MRI Brain at 2 months frpm now (6 months from initial visit)

## 2023-09-06 NOTE — DISCUSSION/SUMMARY
[FreeTextEntry1] : Patient seen and examined  GBM - Neurologically stable. MRI reviewed and to my review area of abnormality has not worsened - Flair is abnormality has reduced.  Will do CBC, CMP today along with study labs Plan to restart TMZ - pending BW review CEREBRAL EDEMA - Taper Dexamethasone 4 - 2 mg.  GI prophylaxis with Pantoprazole. Remains seizure free - very fatigued - will reduce Klonopin to .25 - .5 mg  SEIZURES - Continue Keppra 1500 mg bid and Take Clonazepam 0.25 and 0.5 mg  Recommended not to drive at this time. Reviewed [place] law.  FATIGUE - Reduced Klonopin today to 0.25 and 0.5 mg and in a week will consider reducing more. Will do TSH, B12 and folate today   HYPONATREMIA -  Continue FR 1.5 L and Na tab 2 tab a day  HYPERTENSION- Continue Enalapril. Follow up with PCP for BP management  FOLLOW UP - Will d a clinical follow up in a month.  In the interim, if any concerns patient knows to call our office.

## 2023-09-06 NOTE — PHYSICAL EXAM
[General Appearance - Alert] : alert [General Appearance - In No Acute Distress] : in no acute distress [General Appearance - Well Nourished] : well nourished [] : no respiratory distress [Respiration, Rhythm And Depth] : normal respiratory rhythm and effort [Exaggerated Use Of Accessory Muscles For Inspiration] : no accessory muscle use [Edema] : there was no peripheral edema [FreeTextEntry1] : Patient seen and examined Alert, awake , Oriented X 3. Speech clear, and fluent PERRLA, EOMI, VFF Face symmetrical. Tongue protrudes midline BROWN x 4 with good and equal strength FFM, coordination equal bilaterally Sensation intact bilaterally Gait steady. Ambulating independently

## 2023-09-06 NOTE — REASON FOR VISIT
[Follow-Up: _____] : a [unfilled] follow-up visit
Detail Level: Zone
Number Of Freeze-Thaw Cycles: 2 freeze-thaw cycles
Render Post-Care Instructions In Note?: no
Show Applicator Variable?: Yes
Post-Care Instructions: I reviewed with the patient in detail post-care instructions. Patient is to wear sunprotection, and avoid picking at any of the treated lesions. Pt may apply Vaseline to crusted or scabbing areas.
Duration Of Freeze Thaw-Cycle (Seconds): 2
Consent: The patient's consent was obtained including but not limited to risks of crusting, scabbing, blistering, scarring, darker or lighter pigmentary change, recurrence, incomplete removal and infection.

## 2023-09-08 NOTE — HISTORY OF PRESENT ILLNESS
[FreeTextEntry1] : RT hx:  L brain 4600cgy over 23 Fxs, L  Brain CD 1400cgy over 7 Fxs 6/26-8/7/2023  68 yo male with left frontal glioblastoma IDH-WT, s/p maximal safe resection with residual disease on IMVAX study presents for evaluation of adjuvant radiation treatment with concurrent Temodar.     6/27/23 Presents for OTV.  2/30 Fractions completed  Recently admitted for ? seizures described as "feeling fearful"   Now on increased AED medications w/o recent episodes but seems to be more fatigue  (Keppra 1500mg 2 x daily) Continues Dex 2 mg daily Denies any new concerns or deficits  Visit dated 7/5/2023 Presents for OTV 7/23 Fxs completed Continues with Dr. Galvez. Enrolled in IMVAX trial. Concurrent chemo(6/26/2023) labs 6/29/23 Plts 282 Denies recurrence of "feeling fearful" Continues AED and Dex 2 mg in AM  Reports doing well infact he has been able to do more activities over the past week He has returned to the gym and mowed the lawn.  Wife verbalized concerns despite being able to engage in longer periods of conversation but noticing discrepancies in word finding eg: Mormon and thinking its thanksgiving. pool/ocean, coffee/tea  and at times being repetitive. Noticed blurry vision over the past few days equal on bilaterally  Denies HA/unilateral extremity weakness/slurred speech  Visit dated 7/11/2023 OTV Completed 11/30 Fxs Referred for speech therapy d/t word finding difficulty/name retrieval (sessions began yesterday). Notices this worsens over the course of the day. Continues to follow with Dr. Galvez Labs 7/5/2023 Plts 226 Reports noticing his vision is with changes "things seem wavy" while trying to see the ball playing softball over the weekend Denies HAs/unilateral extremity weakness Continues DEX 2mg PO Resumed gym activity    Visit dated 7/18/2023 OTV Completed 16/30 Fxs Continues to follow with Dr. Galvez on TMZ (resolved constipation) Reports increased fatigue noted over the past 2-3 days Noticing hair loss at treatment site Saw neuro optha 7/17/ 2023 (with plans to f/u in 3 months) LEFT > RIGHT blurry  Visit dated 7/27/2023 OTV Patient presents for OTV Completed  23/30(Fxs) w/o incident thus far. On DEX 2mg BID Feeling well overall.  Vision unchanged persistent blurriness stable. Notices a little improved fatigue no need for daily naps over the past week. Able to do gym workout within limits "but not as I am used to" Denies headaches/nausea/focal weakness/numbness/tingling Continues to follow with Dr. Galvez on TMZ  . Labs 7/24/2023 Plts 278  Visit dated 9/6/2023 Patient returns for post treatment f/u and progress check after completion of radiation to L brain 4600cgy over 23 Fxs, L  Brain CD 1400cgy over 7 Fxs 6/26-8/7/2023 and demonstrated tolerance, Report fatigue that limits participation in activity "I feel totally exhausted".  Denies skin breakdown at irradiated site..  Continues to follow with Dr. Galvez started on DEX 4mg 2xdaily after taper resulted in aphasia which the patient and wife thinks has improved. "I still feel like a teenager." TMZ initiation pending lab results. DEX decreased today 4/2.  ON Keppra 1500mg 2xdaily  MRI brain w w/o contrast 9/6/2023 - no formal read.

## 2023-09-08 NOTE — PHYSICAL EXAM
[de-identified] : healed incisional site + alopecia at Tx xite [de-identified] : expressive aphasia mild

## 2023-09-08 NOTE — REVIEW OF SYSTEMS
[Fatigue] : fatigue [Negative] : Musculoskeletal [Fatigue: Grade 2 - Fatigue not relieved by rest; limiting instrumental ADL] : Fatigue: Grade 2 - Fatigue not relieved by rest; limiting instrumental ADL [Concentration Impairment: Grade 1] : Concentration Impairment: Grade 1 - Mild inattention or decreased level of concentration [de-identified] : improving aphasia [de-identified] : difficulty seeing the TV stable [FreeTextEntry6] : bilateral abdominal incisions healed CDI

## 2023-09-08 NOTE — DISEASE MANAGEMENT
[TTNM] : - [NTNM] : - [MTNM] : - [de-identified] : y [de-identified] : y [de-identified] : Left Brain

## 2023-09-11 ENCOUNTER — NON-APPOINTMENT (OUTPATIENT)
Age: 68
End: 2023-09-11

## 2023-09-11 LAB
ALBUMIN SERPL ELPH-MCNC: 4.2 G/DL
ALP BLD-CCNC: 72 U/L
ALT SERPL-CCNC: 31 U/L
ANION GAP SERPL CALC-SCNC: 15 MMOL/L
AST SERPL-CCNC: 19 U/L
BILIRUB SERPL-MCNC: 0.4 MG/DL
BUN SERPL-MCNC: 20 MG/DL
CALCIUM SERPL-MCNC: 9.5 MG/DL
CHLORIDE SERPL-SCNC: 100 MMOL/L
CO2 SERPL-SCNC: 23 MMOL/L
CREAT SERPL-MCNC: 0.76 MG/DL
EGFR: 99 ML/MIN/1.73M2
FOLATE SERPL-MCNC: 9.2 NG/ML
GLUCOSE SERPL-MCNC: 112 MG/DL
POTASSIUM SERPL-SCNC: 4.3 MMOL/L
PROT SERPL-MCNC: 6.2 G/DL
SODIUM SERPL-SCNC: 138 MMOL/L
TSH SERPL-ACNC: 0.49 UIU/ML
VIT B12 SERPL-MCNC: 455 PG/ML

## 2023-09-14 ENCOUNTER — APPOINTMENT (OUTPATIENT)
Dept: NEUROLOGY | Facility: CLINIC | Age: 68
End: 2023-09-14
Payer: MEDICARE

## 2023-09-14 VITALS
HEART RATE: 86 BPM | HEIGHT: 72 IN | OXYGEN SATURATION: 97 % | BODY MASS INDEX: 30.07 KG/M2 | DIASTOLIC BLOOD PRESSURE: 71 MMHG | RESPIRATION RATE: 16 BRPM | SYSTOLIC BLOOD PRESSURE: 134 MMHG | WEIGHT: 222 LBS

## 2023-09-14 PROCEDURE — 99215 OFFICE O/P EST HI 40 MIN: CPT

## 2023-09-21 ENCOUNTER — RESULT REVIEW (OUTPATIENT)
Age: 68
End: 2023-09-21

## 2023-09-21 ENCOUNTER — APPOINTMENT (OUTPATIENT)
Dept: HEMATOLOGY ONCOLOGY | Facility: CLINIC | Age: 68
End: 2023-09-21

## 2023-09-21 ENCOUNTER — OUTPATIENT (OUTPATIENT)
Dept: OUTPATIENT SERVICES | Facility: HOSPITAL | Age: 68
LOS: 1 days | Discharge: HOME | End: 2023-09-21

## 2023-09-21 DIAGNOSIS — Z98.890 OTHER SPECIFIED POSTPROCEDURAL STATES: Chronic | ICD-10-CM

## 2023-09-21 DIAGNOSIS — D64.9 ANEMIA, UNSPECIFIED: ICD-10-CM

## 2023-09-21 LAB
BASOPHILS # BLD AUTO: 0.07 K/UL — SIGNIFICANT CHANGE UP (ref 0–0.2)
BASOPHILS NFR BLD AUTO: 0.7 % — SIGNIFICANT CHANGE UP (ref 0–2)
EOSINOPHIL # BLD AUTO: 0.07 K/UL — SIGNIFICANT CHANGE UP (ref 0–0.5)
EOSINOPHIL NFR BLD AUTO: 0.7 % — SIGNIFICANT CHANGE UP (ref 0–6)
HCT VFR BLD CALC: 40.8 % — SIGNIFICANT CHANGE UP (ref 39–50)
HGB BLD-MCNC: 14.2 G/DL — SIGNIFICANT CHANGE UP (ref 13–17)
IMM GRANULOCYTES NFR BLD AUTO: 4.5 % — HIGH (ref 0–0.9)
LYMPHOCYTES # BLD AUTO: 1.32 K/UL — SIGNIFICANT CHANGE UP (ref 1–3.3)
LYMPHOCYTES # BLD AUTO: 12.6 % — LOW (ref 13–44)
MCHC RBC-ENTMCNC: 31.1 PG — SIGNIFICANT CHANGE UP (ref 27–34)
MCHC RBC-ENTMCNC: 34.8 G/DL — SIGNIFICANT CHANGE UP (ref 32–36)
MCV RBC AUTO: 89.3 FL — SIGNIFICANT CHANGE UP (ref 80–100)
MONOCYTES # BLD AUTO: 0.89 K/UL — SIGNIFICANT CHANGE UP (ref 0–0.9)
MONOCYTES NFR BLD AUTO: 8.5 % — SIGNIFICANT CHANGE UP (ref 2–14)
NEUTROPHILS # BLD AUTO: 7.68 K/UL — HIGH (ref 1.8–7.4)
NEUTROPHILS NFR BLD AUTO: 73 % — SIGNIFICANT CHANGE UP (ref 43–77)
NRBC # BLD: 0 /100 WBCS — SIGNIFICANT CHANGE UP (ref 0–0)
PLATELET # BLD AUTO: 178 K/UL — SIGNIFICANT CHANGE UP (ref 150–400)
RBC # BLD: 4.57 M/UL — SIGNIFICANT CHANGE UP (ref 4.2–5.8)
RBC # FLD: 13.6 % — SIGNIFICANT CHANGE UP (ref 10.3–14.5)
WBC # BLD: 10.5 K/UL — SIGNIFICANT CHANGE UP (ref 3.8–10.5)
WBC # FLD AUTO: 10.5 K/UL — SIGNIFICANT CHANGE UP (ref 3.8–10.5)

## 2023-09-27 ENCOUNTER — LABORATORY RESULT (OUTPATIENT)
Age: 68
End: 2023-09-27

## 2023-09-27 ENCOUNTER — RESULT REVIEW (OUTPATIENT)
Age: 68
End: 2023-09-27

## 2023-09-27 ENCOUNTER — APPOINTMENT (OUTPATIENT)
Dept: HEMATOLOGY ONCOLOGY | Facility: CLINIC | Age: 68
End: 2023-09-27

## 2023-09-27 LAB
BASOPHILS # BLD AUTO: 0.02 K/UL — SIGNIFICANT CHANGE UP (ref 0–0.2)
BASOPHILS NFR BLD AUTO: 0.2 % — SIGNIFICANT CHANGE UP (ref 0–2)
EOSINOPHIL # BLD AUTO: 0.08 K/UL — SIGNIFICANT CHANGE UP (ref 0–0.5)
EOSINOPHIL NFR BLD AUTO: 0.9 % — SIGNIFICANT CHANGE UP (ref 0–6)
HCT VFR BLD CALC: 45.7 % — SIGNIFICANT CHANGE UP (ref 39–50)
HGB BLD-MCNC: 15.4 G/DL — SIGNIFICANT CHANGE UP (ref 13–17)
IMM GRANULOCYTES NFR BLD AUTO: 8.5 % — HIGH (ref 0–0.9)
LYMPHOCYTES # BLD AUTO: 1.27 K/UL — SIGNIFICANT CHANGE UP (ref 1–3.3)
LYMPHOCYTES # BLD AUTO: 13.6 % — SIGNIFICANT CHANGE UP (ref 13–44)
MCHC RBC-ENTMCNC: 30.7 PG — SIGNIFICANT CHANGE UP (ref 27–34)
MCHC RBC-ENTMCNC: 33.7 G/DL — SIGNIFICANT CHANGE UP (ref 32–36)
MCV RBC AUTO: 91.2 FL — SIGNIFICANT CHANGE UP (ref 80–100)
MONOCYTES # BLD AUTO: 0.63 K/UL — SIGNIFICANT CHANGE UP (ref 0–0.9)
MONOCYTES NFR BLD AUTO: 6.8 % — SIGNIFICANT CHANGE UP (ref 2–14)
NEUTROPHILS # BLD AUTO: 6.54 K/UL — SIGNIFICANT CHANGE UP (ref 1.8–7.4)
NEUTROPHILS NFR BLD AUTO: 70 % — SIGNIFICANT CHANGE UP (ref 43–77)
NRBC # BLD: 0 /100 WBCS — SIGNIFICANT CHANGE UP (ref 0–0)
PLATELET # BLD AUTO: 150 K/UL — SIGNIFICANT CHANGE UP (ref 150–400)
RBC # BLD: 5.01 M/UL — SIGNIFICANT CHANGE UP (ref 4.2–5.8)
RBC # FLD: 13.8 % — SIGNIFICANT CHANGE UP (ref 10.3–14.5)
WBC # BLD: 9.33 K/UL — SIGNIFICANT CHANGE UP (ref 3.8–10.5)
WBC # FLD AUTO: 9.33 K/UL — SIGNIFICANT CHANGE UP (ref 3.8–10.5)

## 2023-09-28 ENCOUNTER — INPATIENT (INPATIENT)
Facility: HOSPITAL | Age: 68
LOS: 0 days | Discharge: HOME | DRG: 175 | End: 2023-09-29
Attending: INTERNAL MEDICINE | Admitting: HOSPITALIST
Payer: MEDICARE

## 2023-09-28 ENCOUNTER — APPOINTMENT (OUTPATIENT)
Dept: CT IMAGING | Facility: IMAGING CENTER | Age: 68
End: 2023-09-28
Payer: MEDICARE

## 2023-09-28 ENCOUNTER — OUTPATIENT (OUTPATIENT)
Dept: OUTPATIENT SERVICES | Facility: HOSPITAL | Age: 68
LOS: 1 days | End: 2023-09-28

## 2023-09-28 ENCOUNTER — APPOINTMENT (OUTPATIENT)
Dept: ULTRASOUND IMAGING | Facility: IMAGING CENTER | Age: 68
End: 2023-09-28
Payer: MEDICARE

## 2023-09-28 VITALS
OXYGEN SATURATION: 95 % | SYSTOLIC BLOOD PRESSURE: 119 MMHG | RESPIRATION RATE: 18 BRPM | HEIGHT: 72 IN | DIASTOLIC BLOOD PRESSURE: 82 MMHG | WEIGHT: 199.96 LBS | TEMPERATURE: 99 F | HEART RATE: 98 BPM

## 2023-09-28 DIAGNOSIS — I10 ESSENTIAL (PRIMARY) HYPERTENSION: ICD-10-CM

## 2023-09-28 DIAGNOSIS — Z98.890 OTHER SPECIFIED POSTPROCEDURAL STATES: Chronic | ICD-10-CM

## 2023-09-28 DIAGNOSIS — E87.1 HYPO-OSMOLALITY AND HYPONATREMIA: ICD-10-CM

## 2023-09-28 DIAGNOSIS — I26.99 OTHER PULMONARY EMBOLISM WITHOUT ACUTE COR PULMONALE: ICD-10-CM

## 2023-09-28 DIAGNOSIS — C71.9 MALIGNANT NEOPLASM OF BRAIN, UNSPECIFIED: ICD-10-CM

## 2023-09-28 LAB
ALBUMIN SERPL ELPH-MCNC: 3.6 G/DL — SIGNIFICANT CHANGE UP (ref 3.3–5)
ALP SERPL-CCNC: 74 U/L — SIGNIFICANT CHANGE UP (ref 40–120)
ALT FLD-CCNC: 30 U/L — SIGNIFICANT CHANGE UP (ref 10–45)
ANION GAP SERPL CALC-SCNC: 15 MMOL/L — SIGNIFICANT CHANGE UP (ref 5–17)
APTT BLD: 26.7 SEC — SIGNIFICANT CHANGE UP (ref 24.5–35.6)
AST SERPL-CCNC: 15 U/L — SIGNIFICANT CHANGE UP (ref 10–40)
BASE EXCESS BLDV CALC-SCNC: 0.9 MMOL/L — SIGNIFICANT CHANGE UP (ref -2–3)
BASOPHILS # BLD AUTO: 0 K/UL — SIGNIFICANT CHANGE UP (ref 0–0.2)
BASOPHILS NFR BLD AUTO: 0 % — SIGNIFICANT CHANGE UP (ref 0–2)
BILIRUB SERPL-MCNC: 0.6 MG/DL — SIGNIFICANT CHANGE UP (ref 0.2–1.2)
BUN SERPL-MCNC: 15 MG/DL — SIGNIFICANT CHANGE UP (ref 7–23)
CA-I SERPL-SCNC: 1.17 MMOL/L — SIGNIFICANT CHANGE UP (ref 1.15–1.33)
CALCIUM SERPL-MCNC: 9.5 MG/DL — SIGNIFICANT CHANGE UP (ref 8.4–10.5)
CHLORIDE BLDV-SCNC: 97 MMOL/L — SIGNIFICANT CHANGE UP (ref 96–108)
CHLORIDE SERPL-SCNC: 99 MMOL/L — SIGNIFICANT CHANGE UP (ref 96–108)
CO2 BLDV-SCNC: 29 MMOL/L — HIGH (ref 22–26)
CO2 SERPL-SCNC: 19 MMOL/L — LOW (ref 22–31)
CREAT SERPL-MCNC: 0.83 MG/DL — SIGNIFICANT CHANGE UP (ref 0.5–1.3)
EGFR: 97 ML/MIN/1.73M2 — SIGNIFICANT CHANGE UP
EOSINOPHIL # BLD AUTO: 0 K/UL — SIGNIFICANT CHANGE UP (ref 0–0.5)
EOSINOPHIL NFR BLD AUTO: 0 % — SIGNIFICANT CHANGE UP (ref 0–6)
GAS PNL BLDV: 129 MMOL/L — LOW (ref 136–145)
GAS PNL BLDV: SIGNIFICANT CHANGE UP
GLUCOSE BLDV-MCNC: 160 MG/DL — HIGH (ref 70–99)
GLUCOSE SERPL-MCNC: 161 MG/DL — HIGH (ref 70–99)
HCO3 BLDV-SCNC: 27 MMOL/L — SIGNIFICANT CHANGE UP (ref 22–29)
HCT VFR BLD CALC: 44.7 % — SIGNIFICANT CHANGE UP (ref 39–50)
HCT VFR BLDA CALC: 47 % — SIGNIFICANT CHANGE UP (ref 39–51)
HGB BLD CALC-MCNC: 15.6 G/DL — SIGNIFICANT CHANGE UP (ref 12.6–17.4)
HGB BLD-MCNC: 15 G/DL — SIGNIFICANT CHANGE UP (ref 13–17)
INR BLD: 1.31 RATIO — HIGH (ref 0.85–1.18)
LACTATE BLDV-MCNC: 3.2 MMOL/L — HIGH (ref 0.5–2)
LIDOCAIN IGE QN: 34 U/L — SIGNIFICANT CHANGE UP (ref 7–60)
LYMPHOCYTES # BLD AUTO: 0.2 K/UL — LOW (ref 1–3.3)
LYMPHOCYTES # BLD AUTO: 1.8 % — LOW (ref 13–44)
MAGNESIUM SERPL-MCNC: 2 MG/DL — SIGNIFICANT CHANGE UP (ref 1.6–2.6)
MANUAL SMEAR VERIFICATION: SIGNIFICANT CHANGE UP
MCHC RBC-ENTMCNC: 31.1 PG — SIGNIFICANT CHANGE UP (ref 27–34)
MCHC RBC-ENTMCNC: 33.6 GM/DL — SIGNIFICANT CHANGE UP (ref 32–36)
MCV RBC AUTO: 92.5 FL — SIGNIFICANT CHANGE UP (ref 80–100)
METAMYELOCYTES # FLD: 0.9 % — HIGH (ref 0–0)
MONOCYTES # BLD AUTO: 0.39 K/UL — SIGNIFICANT CHANGE UP (ref 0–0.9)
MONOCYTES NFR BLD AUTO: 3.5 % — SIGNIFICANT CHANGE UP (ref 2–14)
MYELOCYTES NFR BLD: 0.9 % — HIGH (ref 0–0)
NEUTROPHILS # BLD AUTO: 10.24 K/UL — HIGH (ref 1.8–7.4)
NEUTROPHILS NFR BLD AUTO: 92.9 % — HIGH (ref 43–77)
NT-PROBNP SERPL-SCNC: <36 PG/ML — SIGNIFICANT CHANGE UP (ref 0–300)
PCO2 BLDV: 48 MMHG — SIGNIFICANT CHANGE UP (ref 42–55)
PH BLDV: 7.36 — SIGNIFICANT CHANGE UP (ref 7.32–7.43)
PLAT MORPH BLD: NORMAL — SIGNIFICANT CHANGE UP
PLATELET # BLD AUTO: 162 K/UL — SIGNIFICANT CHANGE UP (ref 150–400)
PO2 BLDV: 34 MMHG — SIGNIFICANT CHANGE UP (ref 25–45)
POTASSIUM BLDV-SCNC: 5.3 MMOL/L — HIGH (ref 3.5–5.1)
POTASSIUM SERPL-MCNC: 4.6 MMOL/L — SIGNIFICANT CHANGE UP (ref 3.5–5.3)
POTASSIUM SERPL-SCNC: 4.6 MMOL/L — SIGNIFICANT CHANGE UP (ref 3.5–5.3)
PROT SERPL-MCNC: 6.8 G/DL — SIGNIFICANT CHANGE UP (ref 6–8.3)
PROTHROM AB SERPL-ACNC: 13.6 SEC — HIGH (ref 9.5–13)
RBC # BLD: 4.83 M/UL — SIGNIFICANT CHANGE UP (ref 4.2–5.8)
RBC # FLD: 13.5 % — SIGNIFICANT CHANGE UP (ref 10.3–14.5)
RBC BLD AUTO: SIGNIFICANT CHANGE UP
SAO2 % BLDV: 54.4 % — LOW (ref 67–88)
SODIUM SERPL-SCNC: 133 MMOL/L — LOW (ref 135–145)
TROPONIN T, HIGH SENSITIVITY RESULT: 8 NG/L — SIGNIFICANT CHANGE UP (ref 0–51)
WBC # BLD: 11.02 K/UL — HIGH (ref 3.8–10.5)
WBC # FLD AUTO: 11.02 K/UL — HIGH (ref 3.8–10.5)

## 2023-09-28 PROCEDURE — 93970 EXTREMITY STUDY: CPT | Mod: 26

## 2023-09-28 PROCEDURE — 70450 CT HEAD/BRAIN W/O DYE: CPT | Mod: 26,MA

## 2023-09-28 PROCEDURE — 71275 CT ANGIOGRAPHY CHEST: CPT | Mod: 26,MH

## 2023-09-28 PROCEDURE — 99285 EMERGENCY DEPT VISIT HI MDM: CPT | Mod: GC

## 2023-09-28 PROCEDURE — 99223 1ST HOSP IP/OBS HIGH 75: CPT

## 2023-09-28 RX ORDER — PANTOPRAZOLE SODIUM 20 MG/1
40 TABLET, DELAYED RELEASE ORAL
Refills: 0 | Status: DISCONTINUED | OUTPATIENT
Start: 2023-09-28 | End: 2023-09-29

## 2023-09-28 RX ORDER — LANOLIN ALCOHOL/MO/W.PET/CERES
3 CREAM (GRAM) TOPICAL AT BEDTIME
Refills: 0 | Status: DISCONTINUED | OUTPATIENT
Start: 2023-09-28 | End: 2023-09-29

## 2023-09-28 RX ORDER — ACETAMINOPHEN 500 MG
650 TABLET ORAL EVERY 6 HOURS
Refills: 0 | Status: DISCONTINUED | OUTPATIENT
Start: 2023-09-28 | End: 2023-09-29

## 2023-09-28 RX ORDER — HEPARIN SODIUM 5000 [USP'U]/ML
INJECTION INTRAVENOUS; SUBCUTANEOUS
Qty: 25000 | Refills: 0 | Status: DISCONTINUED | OUTPATIENT
Start: 2023-09-28 | End: 2023-09-29

## 2023-09-28 RX ORDER — SODIUM CHLORIDE 9 MG/ML
1000 INJECTION INTRAMUSCULAR; INTRAVENOUS; SUBCUTANEOUS ONCE
Refills: 0 | Status: COMPLETED | OUTPATIENT
Start: 2023-09-28 | End: 2023-09-28

## 2023-09-28 RX ORDER — SODIUM CHLORIDE 9 MG/ML
1 INJECTION INTRAMUSCULAR; INTRAVENOUS; SUBCUTANEOUS THREE TIMES A DAY
Refills: 0 | Status: DISCONTINUED | OUTPATIENT
Start: 2023-09-28 | End: 2023-09-29

## 2023-09-28 RX ORDER — POLYETHYLENE GLYCOL 3350 17 G/17G
17 POWDER, FOR SOLUTION ORAL DAILY
Refills: 0 | Status: DISCONTINUED | OUTPATIENT
Start: 2023-09-28 | End: 2023-09-29

## 2023-09-28 RX ORDER — LEVETIRACETAM 250 MG/1
1500 TABLET, FILM COATED ORAL EVERY 12 HOURS
Refills: 0 | Status: DISCONTINUED | OUTPATIENT
Start: 2023-09-28 | End: 2023-09-28

## 2023-09-28 RX ORDER — DEXAMETHASONE 0.5 MG/5ML
2 ELIXIR ORAL DAILY
Refills: 0 | Status: DISCONTINUED | OUTPATIENT
Start: 2023-09-28 | End: 2023-09-29

## 2023-09-28 RX ORDER — CLONAZEPAM 1 MG
0.25 TABLET ORAL ONCE
Refills: 0 | Status: DISCONTINUED | OUTPATIENT
Start: 2023-09-28 | End: 2023-09-28

## 2023-09-28 RX ORDER — HEPARIN SODIUM 5000 [USP'U]/ML
8500 INJECTION INTRAVENOUS; SUBCUTANEOUS EVERY 6 HOURS
Refills: 0 | Status: DISCONTINUED | OUTPATIENT
Start: 2023-09-28 | End: 2023-09-29

## 2023-09-28 RX ORDER — LEVETIRACETAM 250 MG/1
1500 TABLET, FILM COATED ORAL
Refills: 0 | Status: DISCONTINUED | OUTPATIENT
Start: 2023-09-28 | End: 2023-09-29

## 2023-09-28 RX ORDER — CLONAZEPAM 1 MG
0.5 TABLET ORAL
Refills: 0 | Status: DISCONTINUED | OUTPATIENT
Start: 2023-09-28 | End: 2023-09-29

## 2023-09-28 RX ORDER — HEPARIN SODIUM 5000 [USP'U]/ML
8500 INJECTION INTRAVENOUS; SUBCUTANEOUS ONCE
Refills: 0 | Status: COMPLETED | OUTPATIENT
Start: 2023-09-28 | End: 2023-09-28

## 2023-09-28 RX ORDER — HEPARIN SODIUM 5000 [USP'U]/ML
4000 INJECTION INTRAVENOUS; SUBCUTANEOUS EVERY 6 HOURS
Refills: 0 | Status: DISCONTINUED | OUTPATIENT
Start: 2023-09-28 | End: 2023-09-29

## 2023-09-28 RX ADMIN — SODIUM CHLORIDE 1000 MILLILITER(S): 9 INJECTION INTRAMUSCULAR; INTRAVENOUS; SUBCUTANEOUS at 18:31

## 2023-09-28 RX ADMIN — HEPARIN SODIUM 8500 UNIT(S): 5000 INJECTION INTRAVENOUS; SUBCUTANEOUS at 22:43

## 2023-09-28 RX ADMIN — LEVETIRACETAM 1500 MILLIGRAM(S): 250 TABLET, FILM COATED ORAL at 20:05

## 2023-09-28 RX ADMIN — Medication 0.25 MILLIGRAM(S): at 20:06

## 2023-09-28 RX ADMIN — HEPARIN SODIUM 1800 UNIT(S)/HR: 5000 INJECTION INTRAVENOUS; SUBCUTANEOUS at 22:44

## 2023-09-28 NOTE — CHART NOTE - NSCHARTNOTEFT_GEN_A_CORE
68M Hx GBM resected 5/15/23 by you, underwent IMVAX implant/rmvl. Treated by Dr. Villegas w/ RT for 6wks finished earlier this month. Presented to Dr. Mcfadden's office outpt, was SOB, had CTA chest done which showed b/l pulmonary thromboemboli, most proximal in Rt pulmonary artery. No e/o right heart strain. ED asking about starting therapeutic AC. CTH today/MRI 9/6 show no evidence of acute or subacute heme.      -okay to start full dose AC. Please obtain f/u CTH after therapeutic.

## 2023-09-28 NOTE — H&P ADULT - NSHPPHYSICALEXAM_GEN_ALL_CORE
T(C): 36.4 (09-29-23 @ 00:07), Max: 37.1 (09-28-23 @ 15:02)  HR: 58 (09-29-23 @ 00:07) (58 - 98)  BP: 149/85 (09-29-23 @ 00:07) (119/82 - 170/90)  RR: 16 (09-29-23 @ 00:07) (14 - 18)  SpO2: 95% (09-29-23 @ 00:07) (95% - 98%)    CONSTITUTIONAL: No apparent distress  EYES: PERRLA and symmetric, EOMI, No conjunctival or scleral injection, non-icteric  ENMT: Oral mucosa with moist membranes.  NECK: Supple, symmetric. No JVD.  RESP: No respiratory distress, no use of accessory muscles; CTA b/l, no WRR  CV: RRR, +S1S2, no MRG  GI: Soft, NT, ND, no rebound, no guarding  : No suprapubic tenderness. No CVA tenderness.  LYMPH: No cervical LAD or tenderness  MSK: No spinal tenderness, normal muscle strength/tone  EXTREMITIES: No pedal edema  SKIN: No rashes or ulcers noted  NEURO: A+Ox3, responsive. No tremor, sensation intact in upper and lower extremities b/l  PSYCH: Appropriate insight/judgment; mood and affect appropriate, recent/remote memory intact

## 2023-09-28 NOTE — ED PROVIDER NOTE - PHYSICAL EXAMINATION
General: NAD  HEENT: NCAT, PERRL  Cardiac: RRR, no murmurs, 2+ peripheral pulses  Chest: CTA  Abdomen: soft, non-distended, bowel sounds present, no ttp, no rebound or guarding  Extremities: no peripheral edema, calf tenderness, or leg size discrepancies  Skin: no rashes  Neuro: AAOx4, 5+motor, sensory grossly intact  Psych: mood and affect appropriate

## 2023-09-28 NOTE — ED PROVIDER NOTE - OBJECTIVE STATEMENT
Attending Emeli Dwyer: 68-year-old male history of glioblastoma currently on treatment with Dr. Galvez presenting with concern for shortness of breath.  Per family member patient has been feeling shortness of breath the last couple of days.  Spoke with his oncologist who referred him for a CT scan.  CT scan was concerning for acute pulmonary embolism and patient was sent to the emergency department.  The patient denies any chest pain.  No black or bloody stools.  No fevers or chills. no LE swelling. no pain with inspiration Attending Emeli Dwyer: 68-year-old male history of glioblastoma currently on treatment with Dr. Galvez presenting with concern for shortness of breath.  Per family member patient has been feeling shortness of breath the last couple of days.  Spoke with his oncologist who referred him for a CT scan.  CT scan was concerning for acute pulmonary embolism and patient was sent to the emergency department.  The patient denies any chest pain.  No black or bloody stools.  No fevers or chills. no LE swelling. no pain with inspiration    Dr. Fidelia Hill MD PGY-3:  68-year-old male pmhx of hypertension, glioblastoma status post radiation for 6 weeks finished earlier this month comes to ED w/ shortness of breath in the setting of newly diagnosed pulmonary embolism seen on outpatient CT. patient has had shortness of breath over the last couple weeks prompting him to get a CT on CT there confirmed to have a PE. Their pain/symptom is moderat, constant, non mediating with rest. Started randomly.  Symptoms associated with cough. Denies trauma, falls, fever, headache, chest pain, abdominal pain, nausea, vomiting, diarrhea, melena, hematochezia, dysuria, hematuria, urinary frequency, skin changes, p.o. issues, issues urinating, issues stooling, issues with ambulation.

## 2023-09-28 NOTE — ED PROVIDER NOTE - PROGRESS NOTE DETAILS
Attending Emeli Dwyer: duplex positive for clot. ct head without evidence of hemorrhage. awaiting neurosurgery clearance for anticoagulation Attending Emeli Dwyer: d/w neurosurgery pt cleared for anticoagulation. will start heparin

## 2023-09-28 NOTE — H&P ADULT - HISTORY OF PRESENT ILLNESS
68M w/Hx of glioblastoma (s/p 6 weeks of radiation) currently following with Dr. Galvez p/w SOB. Patient has been feeling more shortness of breath than usual the last couple of days. Spoke with his oncologist who referred him for a CT scan. CT scan was concerning for acute pulmonary embolism and patient was sent to the emergency department. The patient denies any chest pain. On exam he reports improvement of his SOB since resting in stretcher. He does not require O2 at present. Denies hemoptysis or cough. Patient denies fever, chills, chest pain, cough, headache, dizziness, abd pain, nausea, vomiting, constipation, dysuria.

## 2023-09-28 NOTE — ED PROVIDER NOTE - CLINICAL SUMMARY MEDICAL DECISION MAKING FREE TEXT BOX
Impression: 68-year-old male pmhx of hypertension, glioblastoma status post radiation for 6 weeks finished earlier this month comes to ED w/ shortness of breath in the setting of newly diagnosed pulmonary embolism seen on outpatient CT. plan to collect labs, CT head for further evaluation of glioblastoma in the setting of the newly diagnosed PE, start anticoagulation.    Ordered labs, imaging, medications for diagnosis, management, and treatment. Impression: 68-year-old male pmhx of hypertension, glioblastoma status post radiation for 6 weeks finished earlier this month comes to ED w/ shortness of breath in the setting of newly diagnosed pulmonary embolism seen on outpatient CT. plan to collect labs, CT head for further evaluation of glioblastoma in the setting of the newly diagnosed PE, start anticoagulation.    Ordered labs, imaging, medications for diagnosis, management, and treatment.  Attending Emeli Dwyer: 68-year-old male history of glioblastoma currently receiving treatment presenting with concern for shortness of breath.  CT scan performed at outpatient showing evidence of a pulmonary embolism patient sent to the emergency Register for further evaluation.  Patient endorses shortness of breath with exertion.  Upon arrival patient awake and alert following commands.  Point-of-care ultrasound performed showing preserved EF with limited views of the right ventricle which did appear to be moving well.  With history of glioblastoma will obtain CT head prior to starting anticoagulation.  Will obtain labs, check cardiac markers as well as proBNP plan to admit. less likely ACS as pt with PE on CTA

## 2023-09-28 NOTE — ED ADULT NURSE NOTE - OBJECTIVE STATEMENT
Pt is a 69 y/o male pmhx glioblastoma (being treated with chemo) presenting from imaging center for abnormal CT result. Pt was seen by his MD for shortness of breath x few days. Spoke with his oncologist who referred him for a CT scan.  CT scan was concerning for acute pulmonary embolism and patient was sent to the emergency department. Pt presents alert & oriented x 4, calm, able to follow commands, speech clear. Breathing spontaneous & nonlabored. On cardiac monitor, NSR 60's. Abdomen soft & nondistended. Strength 5/5 x 4 extremities, ambulates without assist. Strong peripheral pulses noted b/l, no edema noted. Skin warm, clean, dry & intact. Denies chest pain, pain upon inspiration, abdominal pain, back pain, n/v/d, fever, chills, changes in vision. Call bell within reach and pt instructed on how to use, bed in lowest position, side rails up, wheels locked. . Pt is a 69 y/o male pmhx glioblastoma (being treated with chemo) presenting from imaging center for abnormal CT result. Pt was seen by his MD for shortness of breath x few days. Spoke with his oncologist who referred him for a CT scan.  CT scan was concerning for acute pulmonary embolism and patient was sent to the emergency department. Pt presents alert & oriented x 4, calm, able to follow commands, speech clear. Breathing spontaneous & nonlabored. On cardiac monitor, NSR 60's. Abdomen soft & nondistended. Strength 5/5 x 4 extremities, ambulates without assist. Strong peripheral pulses noted b/l, no edema noted. Skin warm, clean, dry & intact. Denies chest pain, pain upon inspiration, abdominal pain, back pain, n/v/d, fever, chills, changes in vision. Call bell within reach and pt instructed on how to use, bed in lowest position, side rails up, wheels locked. break coverage svetlana kendrick

## 2023-09-28 NOTE — ED PROVIDER NOTE - ATTENDING CONTRIBUTION TO CARE
Attending MD Emeli Dwyer:  I personally have seen and examined this patient.  Resident note reviewed and agree on plan of care and except where noted.  See HPI, PE, and MDM for details.

## 2023-09-28 NOTE — H&P ADULT - ASSESSMENT
68M w/Hx of glioblastoma (s/p 6 weeks of radiation) currently following with Dr. Galvez p/w SOB. Found to have PE on CTA.    Pt reports he no longer takes any of the medications he was taking at home? Please re-confirm in AM, left medications in med rec for now.

## 2023-09-28 NOTE — ED CLERICAL - NS ED CLERK NOTE PRE-ARRIVAL INFORMATION; ADDITIONAL PRE-ARRIVAL INFORMATION
CC/Reason For referral: [facility], positive PE  Preferred Consultant(if applicable):  Who admits for you (if needed):  Do you have documents you would like to fax over?  Would you still like to speak to an ED attending? No CC/Reason For referral: Call from [facility], +PE on scan  Preferred Consultant(if applicable):  Who admits for you (if needed):  Do you have documents you would like to fax over?  Would you still like to speak to an ED attending? No

## 2023-09-28 NOTE — ED ADULT NURSE REASSESSMENT NOTE - NS ED NURSE REASSESS COMMENT FT1
Report received from TEVIN Paiz. Pt A&Ox4, in no acute distress at this time. Patient safety and comfort measures maintained.

## 2023-09-28 NOTE — H&P ADULT - PROBLEM SELECTOR PLAN 1
- CTA: Positive for b/l PE, most proximal in R pulm. artery, no evidence of R heart strain.  - AC to be initiated per NeuroSx  - Heparin drip initiated   - When pTT therapeutic will eval with f/u CT Head - CTA: Positive for b/l PE, most proximal in R pulm. artery, no evidence of R heart strain.  - AC to be initiated per NeuroSx  - Heparin drip initiated   - When pTT therapeutic will eval with f/u CT Head  - EKG: NSR @92bpm, no sign of R heart strain - CTA: Positive for b/l PE, most proximal in R pulm. artery, no evidence of R heart strain.  - Doppler US: DVT in R gastrocnemius vein  - AC to be initiated per NeuroSx  - Heparin drip initiated   - When pTT therapeutic will eval with f/u CT Head  - EKG: NSR @92bpm, no sign of R heart strain

## 2023-09-28 NOTE — H&P ADULT - PROBLEM SELECTOR PLAN 3
- Na of 133  - Cw home NaCl tablets  - Fluid restrict to 1500cc  - Monitor Na, if Na lowers, consider urine studies - Na of 133  - NaCl tablets  - Fluid restrict to 1500cc  - Monitor Na, if Na lowers, consider urine studies

## 2023-09-28 NOTE — H&P ADULT - PROBLEM SELECTOR PLAN 2
- Cw home dexamethasone, keppra, clonazepam  - CT head: Neoplasm visualized w/surrounding vasogenic edema  - Neurochecks q4h  - Please consult oncology in AM  - NeuroSx team currently following, follow recs - Keppra 1500mg BID  - CT head: Neoplasm visualized w/surrounding vasogenic edema in L frontotemporal region  - Neurochecks q4h, no deficits on base exam  - Please consult oncology in AM  - NeuroSx team currently following, follow recs

## 2023-09-28 NOTE — H&P ADULT - TIME BILLING
Patient with complex medical hx due to glioblastoma. Discussed PE as a likely result of malignancy and need for blood thinners. Also discussed need for close monitoring due to risk of ICH.

## 2023-09-29 ENCOUNTER — TRANSCRIPTION ENCOUNTER (OUTPATIENT)
Age: 68
End: 2023-09-29

## 2023-09-29 VITALS
HEART RATE: 78 BPM | RESPIRATION RATE: 18 BRPM | SYSTOLIC BLOOD PRESSURE: 171 MMHG | DIASTOLIC BLOOD PRESSURE: 94 MMHG | TEMPERATURE: 98 F | OXYGEN SATURATION: 96 %

## 2023-09-29 DIAGNOSIS — I26.99 OTHER PULMONARY EMBOLISM WITHOUT ACUTE COR PULMONALE: ICD-10-CM

## 2023-09-29 DIAGNOSIS — I80.229 PHLEBITIS AND THROMBOPHLEBITIS OF UNSPECIFIED POPLITEAL VEIN: ICD-10-CM

## 2023-09-29 LAB
A1C WITH ESTIMATED AVERAGE GLUCOSE RESULT: 6.6 % — HIGH (ref 4–5.6)
ALBUMIN SERPL ELPH-MCNC: 3 G/DL — LOW (ref 3.3–5)
ALP SERPL-CCNC: 62 U/L — SIGNIFICANT CHANGE UP (ref 40–120)
ALT FLD-CCNC: 25 U/L — SIGNIFICANT CHANGE UP (ref 10–45)
ANION GAP SERPL CALC-SCNC: 10 MMOL/L — SIGNIFICANT CHANGE UP (ref 5–17)
APTT BLD: >200 SEC — CRITICAL HIGH (ref 24.5–35.6)
AST SERPL-CCNC: 12 U/L — SIGNIFICANT CHANGE UP (ref 10–40)
BASE EXCESS BLDV CALC-SCNC: 2.5 MMOL/L — SIGNIFICANT CHANGE UP (ref -2–3)
BILIRUB SERPL-MCNC: 0.5 MG/DL — SIGNIFICANT CHANGE UP (ref 0.2–1.2)
BUN SERPL-MCNC: 12 MG/DL — SIGNIFICANT CHANGE UP (ref 7–23)
CA-I SERPL-SCNC: 1.26 MMOL/L — SIGNIFICANT CHANGE UP (ref 1.15–1.33)
CALCIUM SERPL-MCNC: 8.9 MG/DL — SIGNIFICANT CHANGE UP (ref 8.4–10.5)
CHLORIDE BLDV-SCNC: 102 MMOL/L — SIGNIFICANT CHANGE UP (ref 96–108)
CHLORIDE SERPL-SCNC: 104 MMOL/L — SIGNIFICANT CHANGE UP (ref 96–108)
CHOLEST SERPL-MCNC: 177 MG/DL — SIGNIFICANT CHANGE UP
CO2 BLDV-SCNC: 27 MMOL/L — HIGH (ref 22–26)
CO2 SERPL-SCNC: 22 MMOL/L — SIGNIFICANT CHANGE UP (ref 22–31)
CREAT SERPL-MCNC: 0.67 MG/DL — SIGNIFICANT CHANGE UP (ref 0.5–1.3)
EGFR: 103 ML/MIN/1.73M2 — SIGNIFICANT CHANGE UP
ESTIMATED AVERAGE GLUCOSE: 143 MG/DL — HIGH (ref 68–114)
GAS PNL BLDV: 132 MMOL/L — LOW (ref 136–145)
GAS PNL BLDV: SIGNIFICANT CHANGE UP
GLUCOSE BLDV-MCNC: 134 MG/DL — HIGH (ref 70–99)
GLUCOSE SERPL-MCNC: 136 MG/DL — HIGH (ref 70–99)
HCO3 BLDV-SCNC: 26 MMOL/L — SIGNIFICANT CHANGE UP (ref 22–29)
HCT VFR BLD CALC: 38.2 % — LOW (ref 39–50)
HCT VFR BLDA CALC: 41 % — SIGNIFICANT CHANGE UP (ref 39–51)
HDLC SERPL-MCNC: 57 MG/DL — SIGNIFICANT CHANGE UP
HGB BLD CALC-MCNC: 13.7 G/DL — SIGNIFICANT CHANGE UP (ref 12.6–17.4)
HGB BLD-MCNC: 13.2 G/DL — SIGNIFICANT CHANGE UP (ref 13–17)
LACTATE BLDV-MCNC: 1.3 MMOL/L — SIGNIFICANT CHANGE UP (ref 0.5–2)
LIPID PNL WITH DIRECT LDL SERPL: 109 MG/DL — HIGH
MCHC RBC-ENTMCNC: 30.8 PG — SIGNIFICANT CHANGE UP (ref 27–34)
MCHC RBC-ENTMCNC: 34.6 GM/DL — SIGNIFICANT CHANGE UP (ref 32–36)
MCV RBC AUTO: 89 FL — SIGNIFICANT CHANGE UP (ref 80–100)
NON HDL CHOLESTEROL: 120 MG/DL — SIGNIFICANT CHANGE UP
NRBC # BLD: 0 /100 WBCS — SIGNIFICANT CHANGE UP (ref 0–0)
PCO2 BLDV: 37 MMHG — LOW (ref 42–55)
PH BLDV: 7.46 — HIGH (ref 7.32–7.43)
PLATELET # BLD AUTO: 142 K/UL — LOW (ref 150–400)
PO2 BLDV: 83 MMHG — HIGH (ref 25–45)
POTASSIUM BLDV-SCNC: 3.8 MMOL/L — SIGNIFICANT CHANGE UP (ref 3.5–5.1)
POTASSIUM SERPL-MCNC: 3.9 MMOL/L — SIGNIFICANT CHANGE UP (ref 3.5–5.3)
POTASSIUM SERPL-SCNC: 3.9 MMOL/L — SIGNIFICANT CHANGE UP (ref 3.5–5.3)
PROT SERPL-MCNC: 5.7 G/DL — LOW (ref 6–8.3)
RBC # BLD: 4.29 M/UL — SIGNIFICANT CHANGE UP (ref 4.2–5.8)
RBC # FLD: 13.6 % — SIGNIFICANT CHANGE UP (ref 10.3–14.5)
SAO2 % BLDV: 98 % — HIGH (ref 67–88)
SODIUM SERPL-SCNC: 136 MMOL/L — SIGNIFICANT CHANGE UP (ref 135–145)
TRIGL SERPL-MCNC: 55 MG/DL — SIGNIFICANT CHANGE UP
WBC # BLD: 9.36 K/UL — SIGNIFICANT CHANGE UP (ref 3.8–10.5)
WBC # FLD AUTO: 9.36 K/UL — SIGNIFICANT CHANGE UP (ref 3.8–10.5)

## 2023-09-29 PROCEDURE — 83880 ASSAY OF NATRIURETIC PEPTIDE: CPT

## 2023-09-29 PROCEDURE — 82803 BLOOD GASES ANY COMBINATION: CPT

## 2023-09-29 PROCEDURE — 80053 COMPREHEN METABOLIC PANEL: CPT

## 2023-09-29 PROCEDURE — 82435 ASSAY OF BLOOD CHLORIDE: CPT

## 2023-09-29 PROCEDURE — 84484 ASSAY OF TROPONIN QUANT: CPT

## 2023-09-29 PROCEDURE — 80061 LIPID PANEL: CPT

## 2023-09-29 PROCEDURE — 84295 ASSAY OF SERUM SODIUM: CPT

## 2023-09-29 PROCEDURE — 85610 PROTHROMBIN TIME: CPT

## 2023-09-29 PROCEDURE — 99239 HOSP IP/OBS DSCHRG MGMT >30: CPT

## 2023-09-29 PROCEDURE — 93306 TTE W/DOPPLER COMPLETE: CPT | Mod: 26

## 2023-09-29 PROCEDURE — 85730 THROMBOPLASTIN TIME PARTIAL: CPT

## 2023-09-29 PROCEDURE — 93970 EXTREMITY STUDY: CPT

## 2023-09-29 PROCEDURE — 83605 ASSAY OF LACTIC ACID: CPT

## 2023-09-29 PROCEDURE — 70450 CT HEAD/BRAIN W/O DYE: CPT | Mod: 26

## 2023-09-29 PROCEDURE — 99285 EMERGENCY DEPT VISIT HI MDM: CPT | Mod: 25

## 2023-09-29 PROCEDURE — 93308 TTE F-UP OR LMTD: CPT

## 2023-09-29 PROCEDURE — 85027 COMPLETE CBC AUTOMATED: CPT

## 2023-09-29 PROCEDURE — 85025 COMPLETE CBC W/AUTO DIFF WBC: CPT

## 2023-09-29 PROCEDURE — 82947 ASSAY GLUCOSE BLOOD QUANT: CPT

## 2023-09-29 PROCEDURE — 83735 ASSAY OF MAGNESIUM: CPT

## 2023-09-29 PROCEDURE — C8929: CPT

## 2023-09-29 PROCEDURE — 83036 HEMOGLOBIN GLYCOSYLATED A1C: CPT

## 2023-09-29 PROCEDURE — 70450 CT HEAD/BRAIN W/O DYE: CPT

## 2023-09-29 PROCEDURE — 96374 THER/PROPH/DIAG INJ IV PUSH: CPT

## 2023-09-29 PROCEDURE — 85018 HEMOGLOBIN: CPT

## 2023-09-29 PROCEDURE — 36415 COLL VENOUS BLD VENIPUNCTURE: CPT

## 2023-09-29 PROCEDURE — 71275 CT ANGIOGRAPHY CHEST: CPT

## 2023-09-29 PROCEDURE — 82330 ASSAY OF CALCIUM: CPT

## 2023-09-29 PROCEDURE — 84132 ASSAY OF SERUM POTASSIUM: CPT

## 2023-09-29 PROCEDURE — 83690 ASSAY OF LIPASE: CPT

## 2023-09-29 PROCEDURE — 85014 HEMATOCRIT: CPT

## 2023-09-29 RX ORDER — ENOXAPARIN SODIUM 100 MG/ML
150 INJECTION SUBCUTANEOUS EVERY 24 HOURS
Refills: 0 | Status: DISCONTINUED | OUTPATIENT
Start: 2023-09-29 | End: 2023-09-29

## 2023-09-29 RX ORDER — ENOXAPARIN SODIUM 100 MG/ML
150 INJECTION SUBCUTANEOUS
Qty: 30 | Refills: 0
Start: 2023-09-29 | End: 2023-10-28

## 2023-09-29 RX ADMIN — HEPARIN SODIUM 0 UNIT(S)/HR: 5000 INJECTION INTRAVENOUS; SUBCUTANEOUS at 05:34

## 2023-09-29 RX ADMIN — LEVETIRACETAM 1500 MILLIGRAM(S): 250 TABLET, FILM COATED ORAL at 05:18

## 2023-09-29 RX ADMIN — SODIUM CHLORIDE 1 GRAM(S): 9 INJECTION INTRAMUSCULAR; INTRAVENOUS; SUBCUTANEOUS at 15:57

## 2023-09-29 RX ADMIN — ENOXAPARIN SODIUM 150 MILLIGRAM(S): 100 INJECTION SUBCUTANEOUS at 10:39

## 2023-09-29 RX ADMIN — HEPARIN SODIUM 1500 UNIT(S)/HR: 5000 INJECTION INTRAVENOUS; SUBCUTANEOUS at 06:57

## 2023-09-29 RX ADMIN — LEVETIRACETAM 1500 MILLIGRAM(S): 250 TABLET, FILM COATED ORAL at 18:02

## 2023-09-29 RX ADMIN — SODIUM CHLORIDE 1 GRAM(S): 9 INJECTION INTRAMUSCULAR; INTRAVENOUS; SUBCUTANEOUS at 05:18

## 2023-09-29 RX ADMIN — HEPARIN SODIUM 1500 UNIT(S)/HR: 5000 INJECTION INTRAVENOUS; SUBCUTANEOUS at 07:10

## 2023-09-29 RX ADMIN — PANTOPRAZOLE SODIUM 40 MILLIGRAM(S): 20 TABLET, DELAYED RELEASE ORAL at 05:18

## 2023-09-29 NOTE — DISCHARGE NOTE PROVIDER - CARE PROVIDER_API CALL
Megan Galvez  Neurology  [address] [place] [id number]  Phone: [phone]  Fax: [phone]  Follow Up Time:

## 2023-09-29 NOTE — DISCHARGE NOTE PROVIDER - NSDCCPCAREPLAN_GEN_ALL_CORE_FT
PRINCIPAL DISCHARGE DIAGNOSIS  Diagnosis: Pulmonary embolism  Assessment and Plan of Treatment: Take your anticoagulation (lovenox, coumadin) as directed.  Follow up with your health care provider within one week. Call for appointment.  If you develop shortness of breath or if your shortness of breath worsens call your Health Care Provider or go to the Emergency Department.        SECONDARY DISCHARGE DIAGNOSES  Diagnosis: Glioblastoma  Assessment and Plan of Treatment: Follow up with oncology Dr. Galvez outpatient

## 2023-09-29 NOTE — DISCHARGE NOTE NURSING/CASE MANAGEMENT/SOCIAL WORK - PATIENT PORTAL LINK FT
You can access the FollowMyHealth Patient Portal offered by [facility] by registering at the following website: [email or web address]. By joining [facility]’s FollowMyHealth portal, you will also be able to view your health information using other applications (apps) compatible with our system.

## 2023-09-29 NOTE — DISCHARGE NOTE PROVIDER - NSDCFUADDAPPT_GEN_ALL_CORE_FT
APPTS ARE READY TO BE MADE: [X] YES    Best Family or Patient Contact (if needed):    Additional Information about above appointments (if needed):    1: Follow up with PCP within 1 week of discharge  2: Follow up with Oncology Dr. Galvez within 1-2 weeks of discharge      Other comments or requests:    APPTS ARE READY TO BE MADE: [X] YES    Best Family or Patient Contact (if needed):    Additional Information about above appointments (if needed):    1: Follow up with PCP within 1 week of discharge  2: Follow up with Oncology Dr. Galvez within 1-2 weeks of discharge      Other comments or requests:   Patient was previously scheduled for an appointment on 10/04 11:00a at [address] [place] [address] with Dr. Galvez.

## 2023-09-29 NOTE — DISCHARGE NOTE NURSING/CASE MANAGEMENT/SOCIAL WORK - NSDCFUADDAPPT_GEN_ALL_CORE_FT
APPTS ARE READY TO BE MADE: [X] YES    Best Family or Patient Contact (if needed):    Additional Information about above appointments (if needed):    1: Follow up with PCP within 1 week of discharge  2: Follow up with Oncology Dr. Galvez within 1-2 weeks of discharge      Other comments or requests:

## 2023-09-29 NOTE — DISCHARGE NOTE PROVIDER - HOSPITAL COURSE
HPI:  68M w/Hx of glioblastoma (s/p 6 weeks of radiation) currently following with Dr. Galvez p/w SOB. Patient has been feeling more shortness of breath than usual the last couple of days. Spoke with his oncologist who referred him for a CT scan. CT scan was concerning for acute pulmonary embolism and patient was sent to the emergency department. The patient denies any chest pain. On exam he reports improvement of his SOB since resting in stretcher. He does not require O2 at present. Denies hemoptysis or cough. Patient denies fever, chills, chest pain, cough, headache, dizziness, abd pain, nausea, vomiting, constipation, dysuria.    Hospital Course:      Important Medication Changes and Reason:    Active or Pending Issues Requiring Follow-up:  -Follow up with PCP   -Follow up with Oncologist Dr. Galvez given glioblastoma     Advanced Directives:   [X] Full code  [ ] DNR  [ ] Hospice    Discharge Diagnoses:         HPI:  68M w/Hx of glioblastoma (s/p 6 weeks of radiation) currently following with Dr. Galvez p/w SOB. Patient has been feeling more shortness of breath than usual the last couple of days. Spoke with his oncologist who referred him for a CT scan. CT scan was concerning for acute pulmonary embolism and patient was sent to the emergency department. The patient denies any chest pain. On exam he reports improvement of his SOB since resting in stretcher. He does not require O2 at present. Denies hemoptysis or cough. Patient denies fever, chills, chest pain, cough, headache, dizziness, abd pain, nausea, vomiting, constipation, dysuria.    Hospital Course:  Pt presented after findings on CT scan were concerning for acute bilateral PE, most proximal R pulmonary artery but no evidence of R heart strain. Pt O2 stable on RA, not on supplemental O2. Dopper US of LE revealed DVT in R gastrocnemius vein. Heparin gtt was initiated, and ultimately transitioned to Lovenox injections 150 mg daily. On EKG, seen in  NSR @92bpm, no sign of R heart strain. TTE checked with _________________________. Pt also with glioblastoma, seen by Dr. Galvez outpatient. CT Head revealing Neoplasm visualized w/surrounding vasogenic edema in L frontotemporal region. No deficits seen on base exam. On Keppra BID. Pt remains on NaCl tablets and on 1500 cc daily fluid restriction given chronic hyponatremia- most recent Na 136.     Important Medication Changes and Reason:  -Lovenox 150 mg Daily for PE     Active or Pending Issues Requiring Follow-up:  -Follow up with PCP   -Follow up with Oncologist Dr. Galvez given glioblastoma     Advanced Directives:   [X] Full code  [ ] DNR  [ ] Hospice    Discharge Diagnoses:  -Pulmonary Embolism      Discharge/Dispo/Med rec discussed with attending Dr. Alcaraz. Patient medically cleared for discharge home with outpatient follow up with PCP and oncology.      HPI:  68M w/Hx of glioblastoma (s/p 6 weeks of radiation) currently following with Dr. Galvez p/w SOB. Patient has been feeling more shortness of breath than usual the last couple of days. Spoke with his oncologist who referred him for a CT scan. CT scan was concerning for acute pulmonary embolism and patient was sent to the emergency department. The patient denies any chest pain. On exam he reports improvement of his SOB since resting in stretcher. He does not require O2 at present. Denies hemoptysis or cough. Patient denies fever, chills, chest pain, cough, headache, dizziness, abd pain, nausea, vomiting, constipation, dysuria.    Hospital Course:  Pt presented after findings on CT scan were concerning for acute bilateral PE, most proximal R pulmonary artery but no evidence of R heart strain. Pt O2 stable on RA, not on supplemental O2. Dopper US of LE revealed DVT in R gastrocnemius vein. Heparin gtt was initiated, and ultimately transitioned to Lovenox injections 150 mg daily. On EKG, seen in  NSR @92bpm, no sign of R heart strain. TTE checked with "Left ventricular wall thickness is normal. Left ventricular systolic function is normal with an ejection fraction of 57 % by Ulloa's method of disks. There is no evidence of a left ventricular thrombus". Pt also with glioblastoma, seen by Dr. Galvez outpatient. CT Head revealing Neoplasm visualized w/surrounding vasogenic edema in L frontotemporal region. No deficits seen on base exam. On Keppra BID. Pt remains on NaCl tablets and on 1500 cc daily fluid restriction given chronic hyponatremia- most recent Na 136.     Important Medication Changes and Reason:  -Lovenox 150 mg Daily for PE     Active or Pending Issues Requiring Follow-up:  -Follow up with PCP   -Follow up with Oncologist Dr. Galvez given glioblastoma     Advanced Directives:   [X] Full code  [ ] DNR  [ ] Hospice    Discharge Diagnoses:  -Pulmonary Embolism      Discharge/Dispo/Med rec discussed with attending Dr. Alcaraz. Patient medically cleared for discharge home with outpatient follow up with PCP and oncology.

## 2023-09-29 NOTE — DISCHARGE NOTE PROVIDER - NSDCFUSCHEDAPPT_GEN_ALL_CORE_FT
Megan Galvez  [facility]  NEUROLOGY [address]  Scheduled Appointment: 10/04/2023    [facility]  MRI  Lk  Scheduled Appointment: 11/08/2023    Melvin Bush  [facility]  NEUROSURG [address]  Scheduled Appointment: 11/08/2023

## 2023-09-29 NOTE — CHART NOTE - NSCHARTNOTEFT_GEN_A_CORE
acute bilateral PE  - discussed with patients neuro- oncologist Dr Galvez-- okay to transition to lovenox 150 mg daily  - TTE to assess RV pending  - CT head pending    discharge today after above is completed    1 hour spent in preparation of discharge    Brynn Alcaraz D.O.  Division of Hospital Medicine  Available on MS Teams

## 2023-09-29 NOTE — DISCHARGE NOTE PROVIDER - NSDCMRMEDTOKEN_GEN_ALL_CORE_FT
Yes , That's comes over to us as a referral request clonazePAM 0.5 mg oral tablet: 1 tab(s) orally 2 times a day MDD: 1 mg  dexAMETHasone 2 mg oral tablet: 1 tab(s) orally once a day  enalapril 10 mg oral tablet: 1 tab(s) orally once a day  levETIRAcetam 750 mg oral tablet: 2 tab(s) orally 2 times a day MDD: 3000 mg  melatonin 5 mg oral tablet: 1 tab(s) orally once a day (at bedtime) MDD: 1 mg  polyethylene glycol 3350 oral powder for reconstitution: 17 gram(s) orally once a day MDD: 17 gm  Protonix 40 mg oral delayed release tablet: 1 tab(s) orally once a day (before a meal) MDD: 40 mg  sodium chloride 1 g oral tablet: 2 tab(s) orally 3 times a day MDD: 6 gm   clonazePAM 0.5 mg oral tablet: 1 tab(s) orally 2 times a day MDD: 1 mg  dexAMETHasone 2 mg oral tablet: 1 tab(s) orally once a day  levETIRAcetam 750 mg oral tablet: 2 tab(s) orally 2 times a day MDD: 3000 mg  Lovenox 150 mg/mL injectable solution: 150 milligram(s) subcutaneously once a day  melatonin 5 mg oral tablet: 1 tab(s) orally once a day (at bedtime) MDD: 1 mg  polyethylene glycol 3350 oral powder for reconstitution: 17 gram(s) orally once a day MDD: 17 gm  Protonix 40 mg oral delayed release tablet: 1 tab(s) orally once a day (before a meal) MDD: 40 mg  sodium chloride 1 g oral tablet: 2 tab(s) orally 3 times a day MDD: 6 gm

## 2023-09-30 PROCEDURE — 93308 TTE F-UP OR LMTD: CPT | Mod: 26

## 2023-10-04 ENCOUNTER — APPOINTMENT (OUTPATIENT)
Dept: NEUROLOGY | Facility: CLINIC | Age: 68
End: 2023-10-04
Payer: MEDICARE

## 2023-10-04 VITALS
DIASTOLIC BLOOD PRESSURE: 79 MMHG | OXYGEN SATURATION: 95 % | WEIGHT: 222 LBS | HEART RATE: 89 BPM | BODY MASS INDEX: 30.07 KG/M2 | HEIGHT: 72 IN | RESPIRATION RATE: 16 BRPM | SYSTOLIC BLOOD PRESSURE: 136 MMHG

## 2023-10-04 PROCEDURE — 99215 OFFICE O/P EST HI 40 MIN: CPT

## 2023-10-04 RX ORDER — TEMOZOLOMIDE 5 MG/1
5 CAPSULE ORAL
Qty: 10 | Refills: 0 | Status: DISCONTINUED | COMMUNITY
Start: 2023-09-07 | End: 2023-10-04

## 2023-10-04 RX ORDER — TEMOZOLOMIDE 140 MG/1
140 CAPSULE ORAL
Qty: 5 | Refills: 0 | Status: DISCONTINUED | COMMUNITY
Start: 2023-09-07 | End: 2023-10-04

## 2023-10-04 RX ORDER — TEMOZOLOMIDE 180 MG/1
180 CAPSULE ORAL
Qty: 5 | Refills: 0 | Status: DISCONTINUED | COMMUNITY
Start: 2023-09-07 | End: 2023-10-04

## 2023-10-04 RX ORDER — ONDANSETRON 8 MG/1
8 TABLET ORAL
Qty: 30 | Refills: 2 | Status: DISCONTINUED | COMMUNITY
Start: 2023-06-01 | End: 2023-10-04

## 2023-10-04 RX ORDER — ENALAPRIL MALEATE 10 MG/1
10 TABLET ORAL DAILY
Qty: 30 | Refills: 0 | Status: DISCONTINUED | COMMUNITY
Start: 2023-05-31 | End: 2023-10-04

## 2023-10-05 ENCOUNTER — NON-APPOINTMENT (OUTPATIENT)
Age: 68
End: 2023-10-05

## 2023-10-10 ENCOUNTER — RESULT REVIEW (OUTPATIENT)
Age: 68
End: 2023-10-10

## 2023-10-10 ENCOUNTER — LABORATORY RESULT (OUTPATIENT)
Age: 68
End: 2023-10-10

## 2023-10-10 ENCOUNTER — APPOINTMENT (OUTPATIENT)
Dept: HEMATOLOGY ONCOLOGY | Facility: CLINIC | Age: 68
End: 2023-10-10

## 2023-10-10 LAB
BASOPHILS # BLD AUTO: 0.06 K/UL — SIGNIFICANT CHANGE UP (ref 0–0.2)
BASOPHILS NFR BLD AUTO: 0.6 % — SIGNIFICANT CHANGE UP (ref 0–2)
EOSINOPHIL # BLD AUTO: 0 K/UL — SIGNIFICANT CHANGE UP (ref 0–0.5)
EOSINOPHIL NFR BLD AUTO: 0 % — SIGNIFICANT CHANGE UP (ref 0–6)
HCT VFR BLD CALC: 43.4 % — SIGNIFICANT CHANGE UP (ref 39–50)
HGB BLD-MCNC: 14.8 G/DL — SIGNIFICANT CHANGE UP (ref 13–17)
IMM GRANULOCYTES NFR BLD AUTO: 4.5 % — HIGH (ref 0–0.9)
LYMPHOCYTES # BLD AUTO: 0.71 K/UL — LOW (ref 1–3.3)
LYMPHOCYTES # BLD AUTO: 7.1 % — LOW (ref 13–44)
MCHC RBC-ENTMCNC: 30.8 PG — SIGNIFICANT CHANGE UP (ref 27–34)
MCHC RBC-ENTMCNC: 34.1 G/DL — SIGNIFICANT CHANGE UP (ref 32–36)
MCV RBC AUTO: 90.4 FL — SIGNIFICANT CHANGE UP (ref 80–100)
MONOCYTES # BLD AUTO: 0.68 K/UL — SIGNIFICANT CHANGE UP (ref 0–0.9)
MONOCYTES NFR BLD AUTO: 6.8 % — SIGNIFICANT CHANGE UP (ref 2–14)
NEUTROPHILS # BLD AUTO: 8.1 K/UL — HIGH (ref 1.8–7.4)
NEUTROPHILS NFR BLD AUTO: 81 % — HIGH (ref 43–77)
NRBC # BLD: 0 /100 WBCS — SIGNIFICANT CHANGE UP (ref 0–0)
PLATELET # BLD AUTO: 262 K/UL — SIGNIFICANT CHANGE UP (ref 150–400)
RBC # BLD: 4.8 M/UL — SIGNIFICANT CHANGE UP (ref 4.2–5.8)
RBC # FLD: 13.8 % — SIGNIFICANT CHANGE UP (ref 10.3–14.5)
WBC # BLD: 10 K/UL — SIGNIFICANT CHANGE UP (ref 3.8–10.5)
WBC # FLD AUTO: 10 K/UL — SIGNIFICANT CHANGE UP (ref 3.8–10.5)

## 2023-10-17 ENCOUNTER — NON-APPOINTMENT (OUTPATIENT)
Age: 68
End: 2023-10-17

## 2023-10-24 ENCOUNTER — RESULT REVIEW (OUTPATIENT)
Age: 68
End: 2023-10-24

## 2023-10-24 ENCOUNTER — LABORATORY RESULT (OUTPATIENT)
Age: 68
End: 2023-10-24

## 2023-10-24 ENCOUNTER — APPOINTMENT (OUTPATIENT)
Dept: HEMATOLOGY ONCOLOGY | Facility: CLINIC | Age: 68
End: 2023-10-24

## 2023-10-24 LAB
BASOPHILS # BLD AUTO: 0 K/UL — SIGNIFICANT CHANGE UP (ref 0–0.2)
BASOPHILS # BLD AUTO: 0 K/UL — SIGNIFICANT CHANGE UP (ref 0–0.2)
BASOPHILS NFR BLD AUTO: 0 % — SIGNIFICANT CHANGE UP (ref 0–2)
BASOPHILS NFR BLD AUTO: 0 % — SIGNIFICANT CHANGE UP (ref 0–2)
EOSINOPHIL # BLD AUTO: 0 K/UL — SIGNIFICANT CHANGE UP (ref 0–0.5)
EOSINOPHIL # BLD AUTO: 0 K/UL — SIGNIFICANT CHANGE UP (ref 0–0.5)
EOSINOPHIL NFR BLD AUTO: 0 % — SIGNIFICANT CHANGE UP (ref 0–6)
EOSINOPHIL NFR BLD AUTO: 0 % — SIGNIFICANT CHANGE UP (ref 0–6)
HCT VFR BLD CALC: 43.1 % — SIGNIFICANT CHANGE UP (ref 39–50)
HCT VFR BLD CALC: 43.1 % — SIGNIFICANT CHANGE UP (ref 39–50)
HGB BLD-MCNC: 15 G/DL — SIGNIFICANT CHANGE UP (ref 13–17)
HGB BLD-MCNC: 15 G/DL — SIGNIFICANT CHANGE UP (ref 13–17)
LYMPHOCYTES # BLD AUTO: 0.64 K/UL — LOW (ref 1–3.3)
LYMPHOCYTES # BLD AUTO: 0.64 K/UL — LOW (ref 1–3.3)
LYMPHOCYTES # BLD AUTO: 5 % — LOW (ref 13–44)
LYMPHOCYTES # BLD AUTO: 5 % — LOW (ref 13–44)
MCHC RBC-ENTMCNC: 31.9 PG — SIGNIFICANT CHANGE UP (ref 27–34)
MCHC RBC-ENTMCNC: 31.9 PG — SIGNIFICANT CHANGE UP (ref 27–34)
MCHC RBC-ENTMCNC: 34.8 G/DL — SIGNIFICANT CHANGE UP (ref 32–36)
MCHC RBC-ENTMCNC: 34.8 G/DL — SIGNIFICANT CHANGE UP (ref 32–36)
MCV RBC AUTO: 91.7 FL — SIGNIFICANT CHANGE UP (ref 80–100)
MCV RBC AUTO: 91.7 FL — SIGNIFICANT CHANGE UP (ref 80–100)
MONOCYTES # BLD AUTO: 0.38 K/UL — SIGNIFICANT CHANGE UP (ref 0–0.9)
MONOCYTES # BLD AUTO: 0.38 K/UL — SIGNIFICANT CHANGE UP (ref 0–0.9)
MONOCYTES NFR BLD AUTO: 3 % — SIGNIFICANT CHANGE UP (ref 2–14)
MONOCYTES NFR BLD AUTO: 3 % — SIGNIFICANT CHANGE UP (ref 2–14)
MYELOCYTES NFR BLD: 6 % — HIGH (ref 0–0)
MYELOCYTES NFR BLD: 6 % — HIGH (ref 0–0)
NEUTROPHILS # BLD AUTO: 10.99 K/UL — HIGH (ref 1.8–7.4)
NEUTROPHILS # BLD AUTO: 10.99 K/UL — HIGH (ref 1.8–7.4)
NEUTROPHILS NFR BLD AUTO: 86 % — HIGH (ref 43–77)
NEUTROPHILS NFR BLD AUTO: 86 % — HIGH (ref 43–77)
NRBC # BLD: 0 /100 — SIGNIFICANT CHANGE UP (ref 0–0)
NRBC # BLD: 0 /100 — SIGNIFICANT CHANGE UP (ref 0–0)
NRBC # BLD: SIGNIFICANT CHANGE UP /100 WBCS (ref 0–0)
NRBC # BLD: SIGNIFICANT CHANGE UP /100 WBCS (ref 0–0)
PLAT MORPH BLD: NORMAL — SIGNIFICANT CHANGE UP
PLAT MORPH BLD: NORMAL — SIGNIFICANT CHANGE UP
PLATELET # BLD AUTO: 222 K/UL — SIGNIFICANT CHANGE UP (ref 150–400)
PLATELET # BLD AUTO: 222 K/UL — SIGNIFICANT CHANGE UP (ref 150–400)
RBC # BLD: 4.7 M/UL — SIGNIFICANT CHANGE UP (ref 4.2–5.8)
RBC # BLD: 4.7 M/UL — SIGNIFICANT CHANGE UP (ref 4.2–5.8)
RBC # FLD: 14.7 % — HIGH (ref 10.3–14.5)
RBC # FLD: 14.7 % — HIGH (ref 10.3–14.5)
RBC BLD AUTO: SIGNIFICANT CHANGE UP
RBC BLD AUTO: SIGNIFICANT CHANGE UP
WBC # BLD: 12.78 K/UL — HIGH (ref 3.8–10.5)
WBC # BLD: 12.78 K/UL — HIGH (ref 3.8–10.5)
WBC # FLD AUTO: 12.78 K/UL — HIGH (ref 3.8–10.5)
WBC # FLD AUTO: 12.78 K/UL — HIGH (ref 3.8–10.5)

## 2023-10-31 ENCOUNTER — RESULT REVIEW (OUTPATIENT)
Age: 68
End: 2023-10-31

## 2023-10-31 ENCOUNTER — APPOINTMENT (OUTPATIENT)
Dept: MRI IMAGING | Facility: IMAGING CENTER | Age: 68
End: 2023-10-31
Payer: MEDICARE

## 2023-10-31 ENCOUNTER — OUTPATIENT (OUTPATIENT)
Dept: OUTPATIENT SERVICES | Facility: HOSPITAL | Age: 68
LOS: 1 days | End: 2023-10-31
Payer: MEDICARE

## 2023-10-31 ENCOUNTER — APPOINTMENT (OUTPATIENT)
Dept: NEUROLOGY | Facility: CLINIC | Age: 68
End: 2023-10-31
Payer: MEDICARE

## 2023-10-31 ENCOUNTER — LABORATORY RESULT (OUTPATIENT)
Age: 68
End: 2023-10-31

## 2023-10-31 ENCOUNTER — APPOINTMENT (OUTPATIENT)
Dept: HEMATOLOGY ONCOLOGY | Facility: CLINIC | Age: 68
End: 2023-10-31

## 2023-10-31 VITALS
HEIGHT: 72 IN | RESPIRATION RATE: 16 BRPM | HEART RATE: 88 BPM | WEIGHT: 222 LBS | BODY MASS INDEX: 30.07 KG/M2 | DIASTOLIC BLOOD PRESSURE: 84 MMHG | SYSTOLIC BLOOD PRESSURE: 134 MMHG | OXYGEN SATURATION: 98 %

## 2023-10-31 DIAGNOSIS — Z98.890 OTHER SPECIFIED POSTPROCEDURAL STATES: Chronic | ICD-10-CM

## 2023-10-31 DIAGNOSIS — C71.9 MALIGNANT NEOPLASM OF BRAIN, UNSPECIFIED: ICD-10-CM

## 2023-10-31 LAB
BASOPHILS # BLD AUTO: 0 K/UL — SIGNIFICANT CHANGE UP (ref 0–0.2)
BASOPHILS # BLD AUTO: 0 K/UL — SIGNIFICANT CHANGE UP (ref 0–0.2)
BASOPHILS NFR BLD AUTO: 0 % — SIGNIFICANT CHANGE UP (ref 0–2)
BASOPHILS NFR BLD AUTO: 0 % — SIGNIFICANT CHANGE UP (ref 0–2)
EOSINOPHIL # BLD AUTO: 0 K/UL — SIGNIFICANT CHANGE UP (ref 0–0.5)
EOSINOPHIL # BLD AUTO: 0 K/UL — SIGNIFICANT CHANGE UP (ref 0–0.5)
EOSINOPHIL NFR BLD AUTO: 0 % — SIGNIFICANT CHANGE UP (ref 0–6)
EOSINOPHIL NFR BLD AUTO: 0 % — SIGNIFICANT CHANGE UP (ref 0–6)
HCT VFR BLD CALC: 44.6 % — SIGNIFICANT CHANGE UP (ref 39–50)
HCT VFR BLD CALC: 44.6 % — SIGNIFICANT CHANGE UP (ref 39–50)
HGB BLD-MCNC: 15.3 G/DL — SIGNIFICANT CHANGE UP (ref 13–17)
HGB BLD-MCNC: 15.3 G/DL — SIGNIFICANT CHANGE UP (ref 13–17)
LYMPHOCYTES # BLD AUTO: 1.5 K/UL — SIGNIFICANT CHANGE UP (ref 1–3.3)
LYMPHOCYTES # BLD AUTO: 1.5 K/UL — SIGNIFICANT CHANGE UP (ref 1–3.3)
LYMPHOCYTES # BLD AUTO: 10 % — LOW (ref 13–44)
LYMPHOCYTES # BLD AUTO: 10 % — LOW (ref 13–44)
MCHC RBC-ENTMCNC: 31.5 PG — SIGNIFICANT CHANGE UP (ref 27–34)
MCHC RBC-ENTMCNC: 31.5 PG — SIGNIFICANT CHANGE UP (ref 27–34)
MCHC RBC-ENTMCNC: 34.3 G/DL — SIGNIFICANT CHANGE UP (ref 32–36)
MCHC RBC-ENTMCNC: 34.3 G/DL — SIGNIFICANT CHANGE UP (ref 32–36)
MCV RBC AUTO: 91.8 FL — SIGNIFICANT CHANGE UP (ref 80–100)
MCV RBC AUTO: 91.8 FL — SIGNIFICANT CHANGE UP (ref 80–100)
MONOCYTES # BLD AUTO: 1.5 K/UL — HIGH (ref 0–0.9)
MONOCYTES # BLD AUTO: 1.5 K/UL — HIGH (ref 0–0.9)
MONOCYTES NFR BLD AUTO: 10 % — SIGNIFICANT CHANGE UP (ref 2–14)
MONOCYTES NFR BLD AUTO: 10 % — SIGNIFICANT CHANGE UP (ref 2–14)
MYELOCYTES NFR BLD: 3 % — HIGH (ref 0–0)
MYELOCYTES NFR BLD: 3 % — HIGH (ref 0–0)
NEUTROPHILS # BLD AUTO: 11.55 K/UL — HIGH (ref 1.8–7.4)
NEUTROPHILS # BLD AUTO: 11.55 K/UL — HIGH (ref 1.8–7.4)
NEUTROPHILS NFR BLD AUTO: 77 % — SIGNIFICANT CHANGE UP (ref 43–77)
NEUTROPHILS NFR BLD AUTO: 77 % — SIGNIFICANT CHANGE UP (ref 43–77)
NRBC # BLD: 0 /100 — SIGNIFICANT CHANGE UP (ref 0–0)
NRBC # BLD: 0 /100 — SIGNIFICANT CHANGE UP (ref 0–0)
NRBC # BLD: SIGNIFICANT CHANGE UP /100 WBCS (ref 0–0)
NRBC # BLD: SIGNIFICANT CHANGE UP /100 WBCS (ref 0–0)
PLAT MORPH BLD: NORMAL — SIGNIFICANT CHANGE UP
PLAT MORPH BLD: NORMAL — SIGNIFICANT CHANGE UP
PLATELET # BLD AUTO: 221 K/UL — SIGNIFICANT CHANGE UP (ref 150–400)
PLATELET # BLD AUTO: 221 K/UL — SIGNIFICANT CHANGE UP (ref 150–400)
RBC # BLD: 4.86 M/UL — SIGNIFICANT CHANGE UP (ref 4.2–5.8)
RBC # BLD: 4.86 M/UL — SIGNIFICANT CHANGE UP (ref 4.2–5.8)
RBC # FLD: 14.9 % — HIGH (ref 10.3–14.5)
RBC # FLD: 14.9 % — HIGH (ref 10.3–14.5)
RBC BLD AUTO: SIGNIFICANT CHANGE UP
RBC BLD AUTO: SIGNIFICANT CHANGE UP
WBC # BLD: 15 K/UL — HIGH (ref 3.8–10.5)
WBC # BLD: 15 K/UL — HIGH (ref 3.8–10.5)
WBC # FLD AUTO: 15 K/UL — HIGH (ref 3.8–10.5)
WBC # FLD AUTO: 15 K/UL — HIGH (ref 3.8–10.5)

## 2023-10-31 PROCEDURE — 99215 OFFICE O/P EST HI 40 MIN: CPT

## 2023-10-31 PROCEDURE — A9585: CPT

## 2023-10-31 PROCEDURE — 70553 MRI BRAIN STEM W/O & W/DYE: CPT | Mod: 26,MH

## 2023-10-31 PROCEDURE — 70553 MRI BRAIN STEM W/O & W/DYE: CPT

## 2023-10-31 RX ORDER — METOPROLOL SUCCINATE 25 MG/1
25 TABLET, EXTENDED RELEASE ORAL DAILY
Qty: 30 | Refills: 0 | Status: ACTIVE | COMMUNITY
Start: 2023-10-31

## 2023-10-31 RX ORDER — DEXAMETHASONE 4 MG/1
4 TABLET ORAL
Qty: 60 | Refills: 1 | Status: DISCONTINUED | COMMUNITY
Start: 2023-10-19 | End: 2023-10-31

## 2023-11-07 LAB
ALBUMIN SERPL ELPH-MCNC: 4 G/DL
ALP BLD-CCNC: 60 U/L
ALT SERPL-CCNC: 37 U/L
ANION GAP SERPL CALC-SCNC: 14 MMOL/L
APPEARANCE: CLEAR
AST SERPL-CCNC: 15 U/L
BILIRUB SERPL-MCNC: 0.5 MG/DL
BILIRUBIN URINE: NEGATIVE
BLOOD URINE: NEGATIVE
BUN SERPL-MCNC: 18 MG/DL
CALCIUM SERPL-MCNC: 9.4 MG/DL
CHLORIDE SERPL-SCNC: 102 MMOL/L
CO2 SERPL-SCNC: 25 MMOL/L
COLOR: YELLOW
CREAT SERPL-MCNC: 0.88 MG/DL
EGFR: 95 ML/MIN/1.73M2
GLUCOSE QUALITATIVE U: NEGATIVE MG/DL
GLUCOSE SERPL-MCNC: 91 MG/DL
HAV IGM SER QL: NONREACTIVE
HBV CORE IGG+IGM SER QL: NONREACTIVE
HBV CORE IGM SER QL: NONREACTIVE
HBV SURFACE AB SER QL: NONREACTIVE
HBV SURFACE AG SER QL: NONREACTIVE
HBV SURFACE AG SER QL: NONREACTIVE
HCV AB SER QL: NONREACTIVE
HCV S/CO RATIO: 0.06 S/CO
KETONES URINE: NEGATIVE MG/DL
LEUKOCYTE ESTERASE URINE: ABNORMAL
NITRITE URINE: NEGATIVE
PH URINE: 6
POTASSIUM SERPL-SCNC: 4.2 MMOL/L
PROT SERPL-MCNC: 6.3 G/DL
PROTEIN URINE: NORMAL MG/DL
SODIUM SERPL-SCNC: 141 MMOL/L
SPECIFIC GRAVITY URINE: >1.03
UROBILINOGEN URINE: 0.2 MG/DL

## 2023-11-08 ENCOUNTER — APPOINTMENT (OUTPATIENT)
Dept: NEUROLOGY | Facility: CLINIC | Age: 68
End: 2023-11-08
Payer: MEDICARE

## 2023-11-08 ENCOUNTER — APPOINTMENT (OUTPATIENT)
Dept: NEUROSURGERY | Facility: CLINIC | Age: 68
End: 2023-11-08
Payer: MEDICARE

## 2023-11-08 VITALS
RESPIRATION RATE: 16 BRPM | HEART RATE: 87 BPM | BODY MASS INDEX: 30.07 KG/M2 | OXYGEN SATURATION: 98 % | WEIGHT: 222 LBS | DIASTOLIC BLOOD PRESSURE: 82 MMHG | SYSTOLIC BLOOD PRESSURE: 139 MMHG | HEIGHT: 72 IN

## 2023-11-08 DIAGNOSIS — R56.9 UNSPECIFIED CONVULSIONS: ICD-10-CM

## 2023-11-08 PROCEDURE — 99214 OFFICE O/P EST MOD 30 MIN: CPT

## 2023-11-14 ENCOUNTER — APPOINTMENT (OUTPATIENT)
Dept: INFUSION THERAPY | Facility: HOSPITAL | Age: 68
End: 2023-11-14

## 2023-11-14 ENCOUNTER — RESULT REVIEW (OUTPATIENT)
Age: 68
End: 2023-11-14

## 2023-11-14 ENCOUNTER — APPOINTMENT (OUTPATIENT)
Dept: HEMATOLOGY ONCOLOGY | Facility: CLINIC | Age: 68
End: 2023-11-14

## 2023-11-14 LAB
ALBUMIN SERPL ELPH-MCNC: 3.8 G/DL — SIGNIFICANT CHANGE UP (ref 3.3–5)
ALBUMIN SERPL ELPH-MCNC: 3.8 G/DL — SIGNIFICANT CHANGE UP (ref 3.3–5)
ALP SERPL-CCNC: 55 U/L — SIGNIFICANT CHANGE UP (ref 40–120)
ALP SERPL-CCNC: 55 U/L — SIGNIFICANT CHANGE UP (ref 40–120)
ALT FLD-CCNC: 61 U/L — HIGH (ref 10–45)
ALT FLD-CCNC: 61 U/L — HIGH (ref 10–45)
ANION GAP SERPL CALC-SCNC: 13 MMOL/L — SIGNIFICANT CHANGE UP (ref 5–17)
ANION GAP SERPL CALC-SCNC: 13 MMOL/L — SIGNIFICANT CHANGE UP (ref 5–17)
APPEARANCE UR: CLEAR — SIGNIFICANT CHANGE UP
APPEARANCE UR: CLEAR — SIGNIFICANT CHANGE UP
AST SERPL-CCNC: 32 U/L — SIGNIFICANT CHANGE UP (ref 10–40)
AST SERPL-CCNC: 32 U/L — SIGNIFICANT CHANGE UP (ref 10–40)
BASOPHILS # BLD AUTO: 0 K/UL — SIGNIFICANT CHANGE UP (ref 0–0.2)
BASOPHILS # BLD AUTO: 0 K/UL — SIGNIFICANT CHANGE UP (ref 0–0.2)
BASOPHILS NFR BLD AUTO: 0 % — SIGNIFICANT CHANGE UP (ref 0–2)
BASOPHILS NFR BLD AUTO: 0 % — SIGNIFICANT CHANGE UP (ref 0–2)
BILIRUB SERPL-MCNC: 0.4 MG/DL — SIGNIFICANT CHANGE UP (ref 0.2–1.2)
BILIRUB SERPL-MCNC: 0.4 MG/DL — SIGNIFICANT CHANGE UP (ref 0.2–1.2)
BILIRUB UR-MCNC: NEGATIVE — SIGNIFICANT CHANGE UP
BILIRUB UR-MCNC: NEGATIVE — SIGNIFICANT CHANGE UP
BUN SERPL-MCNC: 14 MG/DL — SIGNIFICANT CHANGE UP (ref 7–23)
BUN SERPL-MCNC: 14 MG/DL — SIGNIFICANT CHANGE UP (ref 7–23)
CALCIUM SERPL-MCNC: 9.2 MG/DL — SIGNIFICANT CHANGE UP (ref 8.4–10.5)
CALCIUM SERPL-MCNC: 9.2 MG/DL — SIGNIFICANT CHANGE UP (ref 8.4–10.5)
CHLORIDE SERPL-SCNC: 100 MMOL/L — SIGNIFICANT CHANGE UP (ref 96–108)
CHLORIDE SERPL-SCNC: 100 MMOL/L — SIGNIFICANT CHANGE UP (ref 96–108)
CO2 SERPL-SCNC: 23 MMOL/L — SIGNIFICANT CHANGE UP (ref 22–31)
CO2 SERPL-SCNC: 23 MMOL/L — SIGNIFICANT CHANGE UP (ref 22–31)
COLOR SPEC: YELLOW — SIGNIFICANT CHANGE UP
COLOR SPEC: YELLOW — SIGNIFICANT CHANGE UP
CREAT SERPL-MCNC: 0.87 MG/DL — SIGNIFICANT CHANGE UP (ref 0.5–1.3)
CREAT SERPL-MCNC: 0.87 MG/DL — SIGNIFICANT CHANGE UP (ref 0.5–1.3)
DACRYOCYTES BLD QL SMEAR: SLIGHT — SIGNIFICANT CHANGE UP
DACRYOCYTES BLD QL SMEAR: SLIGHT — SIGNIFICANT CHANGE UP
DIFF PNL FLD: ABNORMAL
DIFF PNL FLD: ABNORMAL
EGFR: 95 ML/MIN/1.73M2 — SIGNIFICANT CHANGE UP
EGFR: 95 ML/MIN/1.73M2 — SIGNIFICANT CHANGE UP
EOSINOPHIL # BLD AUTO: 0 K/UL — SIGNIFICANT CHANGE UP (ref 0–0.5)
EOSINOPHIL # BLD AUTO: 0 K/UL — SIGNIFICANT CHANGE UP (ref 0–0.5)
EOSINOPHIL NFR BLD AUTO: 0 % — SIGNIFICANT CHANGE UP (ref 0–6)
EOSINOPHIL NFR BLD AUTO: 0 % — SIGNIFICANT CHANGE UP (ref 0–6)
GLUCOSE SERPL-MCNC: 128 MG/DL — HIGH (ref 70–99)
GLUCOSE SERPL-MCNC: 128 MG/DL — HIGH (ref 70–99)
GLUCOSE UR QL: NEGATIVE MG/DL — SIGNIFICANT CHANGE UP
GLUCOSE UR QL: NEGATIVE MG/DL — SIGNIFICANT CHANGE UP
HCT VFR BLD CALC: 40.7 % — SIGNIFICANT CHANGE UP (ref 39–50)
HCT VFR BLD CALC: 40.7 % — SIGNIFICANT CHANGE UP (ref 39–50)
HGB BLD-MCNC: 14.3 G/DL — SIGNIFICANT CHANGE UP (ref 13–17)
HGB BLD-MCNC: 14.3 G/DL — SIGNIFICANT CHANGE UP (ref 13–17)
KETONES UR-MCNC: NEGATIVE MG/DL — SIGNIFICANT CHANGE UP
KETONES UR-MCNC: NEGATIVE MG/DL — SIGNIFICANT CHANGE UP
LEUKOCYTE ESTERASE UR-ACNC: NEGATIVE — SIGNIFICANT CHANGE UP
LEUKOCYTE ESTERASE UR-ACNC: NEGATIVE — SIGNIFICANT CHANGE UP
LYMPHOCYTES # BLD AUTO: 0.51 K/UL — LOW (ref 1–3.3)
LYMPHOCYTES # BLD AUTO: 0.51 K/UL — LOW (ref 1–3.3)
LYMPHOCYTES # BLD AUTO: 5 % — LOW (ref 13–44)
LYMPHOCYTES # BLD AUTO: 5 % — LOW (ref 13–44)
MCHC RBC-ENTMCNC: 32.4 PG — SIGNIFICANT CHANGE UP (ref 27–34)
MCHC RBC-ENTMCNC: 32.4 PG — SIGNIFICANT CHANGE UP (ref 27–34)
MCHC RBC-ENTMCNC: 35.1 G/DL — SIGNIFICANT CHANGE UP (ref 32–36)
MCHC RBC-ENTMCNC: 35.1 G/DL — SIGNIFICANT CHANGE UP (ref 32–36)
MCV RBC AUTO: 92.3 FL — SIGNIFICANT CHANGE UP (ref 80–100)
MCV RBC AUTO: 92.3 FL — SIGNIFICANT CHANGE UP (ref 80–100)
METAMYELOCYTES # FLD: 1 % — HIGH (ref 0–0)
METAMYELOCYTES # FLD: 1 % — HIGH (ref 0–0)
MONOCYTES # BLD AUTO: 0.61 K/UL — SIGNIFICANT CHANGE UP (ref 0–0.9)
MONOCYTES # BLD AUTO: 0.61 K/UL — SIGNIFICANT CHANGE UP (ref 0–0.9)
MONOCYTES NFR BLD AUTO: 6 % — SIGNIFICANT CHANGE UP (ref 2–14)
MONOCYTES NFR BLD AUTO: 6 % — SIGNIFICANT CHANGE UP (ref 2–14)
MYELOCYTES NFR BLD: 3 % — HIGH (ref 0–0)
MYELOCYTES NFR BLD: 3 % — HIGH (ref 0–0)
NEUTROPHILS # BLD AUTO: 8.59 K/UL — HIGH (ref 1.8–7.4)
NEUTROPHILS # BLD AUTO: 8.59 K/UL — HIGH (ref 1.8–7.4)
NEUTROPHILS NFR BLD AUTO: 85 % — HIGH (ref 43–77)
NEUTROPHILS NFR BLD AUTO: 85 % — HIGH (ref 43–77)
NITRITE UR-MCNC: NEGATIVE — SIGNIFICANT CHANGE UP
NITRITE UR-MCNC: NEGATIVE — SIGNIFICANT CHANGE UP
NRBC # BLD: 0 /100 — SIGNIFICANT CHANGE UP (ref 0–0)
NRBC # BLD: 0 /100 — SIGNIFICANT CHANGE UP (ref 0–0)
NRBC # BLD: SIGNIFICANT CHANGE UP /100 WBCS (ref 0–0)
NRBC # BLD: SIGNIFICANT CHANGE UP /100 WBCS (ref 0–0)
PH UR: 6.5 — SIGNIFICANT CHANGE UP (ref 5–8)
PH UR: 6.5 — SIGNIFICANT CHANGE UP (ref 5–8)
PLAT MORPH BLD: NORMAL — SIGNIFICANT CHANGE UP
PLAT MORPH BLD: NORMAL — SIGNIFICANT CHANGE UP
PLATELET # BLD AUTO: 162 K/UL — SIGNIFICANT CHANGE UP (ref 150–400)
PLATELET # BLD AUTO: 162 K/UL — SIGNIFICANT CHANGE UP (ref 150–400)
POIKILOCYTOSIS BLD QL AUTO: SLIGHT — SIGNIFICANT CHANGE UP
POIKILOCYTOSIS BLD QL AUTO: SLIGHT — SIGNIFICANT CHANGE UP
POTASSIUM SERPL-MCNC: 4.4 MMOL/L — SIGNIFICANT CHANGE UP (ref 3.5–5.3)
POTASSIUM SERPL-MCNC: 4.4 MMOL/L — SIGNIFICANT CHANGE UP (ref 3.5–5.3)
POTASSIUM SERPL-SCNC: 4.4 MMOL/L — SIGNIFICANT CHANGE UP (ref 3.5–5.3)
POTASSIUM SERPL-SCNC: 4.4 MMOL/L — SIGNIFICANT CHANGE UP (ref 3.5–5.3)
PROT SERPL-MCNC: 6.2 G/DL — SIGNIFICANT CHANGE UP (ref 6–8.3)
PROT SERPL-MCNC: 6.2 G/DL — SIGNIFICANT CHANGE UP (ref 6–8.3)
PROT UR-MCNC: NEGATIVE MG/DL — SIGNIFICANT CHANGE UP
PROT UR-MCNC: NEGATIVE MG/DL — SIGNIFICANT CHANGE UP
RBC # BLD: 4.41 M/UL — SIGNIFICANT CHANGE UP (ref 4.2–5.8)
RBC # BLD: 4.41 M/UL — SIGNIFICANT CHANGE UP (ref 4.2–5.8)
RBC # FLD: 15.8 % — HIGH (ref 10.3–14.5)
RBC # FLD: 15.8 % — HIGH (ref 10.3–14.5)
RBC BLD AUTO: ABNORMAL
RBC BLD AUTO: ABNORMAL
SODIUM SERPL-SCNC: 136 MMOL/L — SIGNIFICANT CHANGE UP (ref 135–145)
SODIUM SERPL-SCNC: 136 MMOL/L — SIGNIFICANT CHANGE UP (ref 135–145)
SP GR SPEC: 1.02 — SIGNIFICANT CHANGE UP (ref 1–1.03)
SP GR SPEC: 1.02 — SIGNIFICANT CHANGE UP (ref 1–1.03)
UROBILINOGEN FLD QL: 0.2 MG/DL — SIGNIFICANT CHANGE UP (ref 0.2–1)
UROBILINOGEN FLD QL: 0.2 MG/DL — SIGNIFICANT CHANGE UP (ref 0.2–1)
WBC # BLD: 10.1 K/UL — SIGNIFICANT CHANGE UP (ref 3.8–10.5)
WBC # BLD: 10.1 K/UL — SIGNIFICANT CHANGE UP (ref 3.8–10.5)
WBC # FLD AUTO: 10.1 K/UL — SIGNIFICANT CHANGE UP (ref 3.8–10.5)
WBC # FLD AUTO: 10.1 K/UL — SIGNIFICANT CHANGE UP (ref 3.8–10.5)

## 2023-11-15 ENCOUNTER — OUTPATIENT (OUTPATIENT)
Dept: OUTPATIENT SERVICES | Facility: HOSPITAL | Age: 68
LOS: 1 days | Discharge: HOME | End: 2023-11-15

## 2023-11-15 DIAGNOSIS — Z98.890 OTHER SPECIFIED POSTPROCEDURAL STATES: Chronic | ICD-10-CM

## 2023-11-15 DIAGNOSIS — Z51.11 ENCOUNTER FOR ANTINEOPLASTIC CHEMOTHERAPY: ICD-10-CM

## 2023-11-15 DIAGNOSIS — D64.9 ANEMIA, UNSPECIFIED: ICD-10-CM

## 2023-11-15 DIAGNOSIS — C71.9 MALIGNANT NEOPLASM OF BRAIN, UNSPECIFIED: ICD-10-CM

## 2023-11-15 LAB
BACTERIA # UR AUTO: NEGATIVE /HPF — SIGNIFICANT CHANGE UP
BACTERIA # UR AUTO: NEGATIVE /HPF — SIGNIFICANT CHANGE UP
CAST: 0 /LPF — SIGNIFICANT CHANGE UP (ref 0–4)
CAST: 0 /LPF — SIGNIFICANT CHANGE UP (ref 0–4)
RBC CASTS # UR COMP ASSIST: 6 /HPF — HIGH (ref 0–4)
RBC CASTS # UR COMP ASSIST: 6 /HPF — HIGH (ref 0–4)
SQUAMOUS # UR AUTO: 0 /HPF — SIGNIFICANT CHANGE UP (ref 0–5)
SQUAMOUS # UR AUTO: 0 /HPF — SIGNIFICANT CHANGE UP (ref 0–5)
WBC UR QL: 0 /HPF — SIGNIFICANT CHANGE UP (ref 0–5)
WBC UR QL: 0 /HPF — SIGNIFICANT CHANGE UP (ref 0–5)

## 2023-11-21 ENCOUNTER — NON-APPOINTMENT (OUTPATIENT)
Age: 68
End: 2023-11-21

## 2023-11-28 ENCOUNTER — RESULT REVIEW (OUTPATIENT)
Age: 68
End: 2023-11-28

## 2023-11-28 ENCOUNTER — APPOINTMENT (OUTPATIENT)
Dept: NEUROLOGY | Facility: CLINIC | Age: 68
End: 2023-11-28
Payer: MEDICARE

## 2023-11-28 ENCOUNTER — APPOINTMENT (OUTPATIENT)
Dept: HEMATOLOGY ONCOLOGY | Facility: CLINIC | Age: 68
End: 2023-11-28

## 2023-11-28 ENCOUNTER — APPOINTMENT (OUTPATIENT)
Dept: INFUSION THERAPY | Facility: HOSPITAL | Age: 68
End: 2023-11-28

## 2023-11-28 VITALS
HEART RATE: 86 BPM | SYSTOLIC BLOOD PRESSURE: 132 MMHG | BODY MASS INDEX: 30.07 KG/M2 | RESPIRATION RATE: 16 BRPM | DIASTOLIC BLOOD PRESSURE: 89 MMHG | WEIGHT: 222 LBS | HEIGHT: 72 IN | OXYGEN SATURATION: 98 %

## 2023-11-28 LAB
ALBUMIN SERPL ELPH-MCNC: 4 G/DL — SIGNIFICANT CHANGE UP (ref 3.3–5)
ALBUMIN SERPL ELPH-MCNC: 4 G/DL — SIGNIFICANT CHANGE UP (ref 3.3–5)
ALP SERPL-CCNC: 59 U/L — SIGNIFICANT CHANGE UP (ref 40–120)
ALP SERPL-CCNC: 59 U/L — SIGNIFICANT CHANGE UP (ref 40–120)
ALT FLD-CCNC: 55 U/L — HIGH (ref 10–45)
ALT FLD-CCNC: 55 U/L — HIGH (ref 10–45)
ANION GAP SERPL CALC-SCNC: 11 MMOL/L — SIGNIFICANT CHANGE UP (ref 5–17)
ANION GAP SERPL CALC-SCNC: 11 MMOL/L — SIGNIFICANT CHANGE UP (ref 5–17)
AST SERPL-CCNC: 23 U/L — SIGNIFICANT CHANGE UP (ref 10–40)
AST SERPL-CCNC: 23 U/L — SIGNIFICANT CHANGE UP (ref 10–40)
BASOPHILS # BLD AUTO: 0 K/UL — SIGNIFICANT CHANGE UP (ref 0–0.2)
BASOPHILS # BLD AUTO: 0 K/UL — SIGNIFICANT CHANGE UP (ref 0–0.2)
BASOPHILS NFR BLD AUTO: 0 % — SIGNIFICANT CHANGE UP (ref 0–2)
BASOPHILS NFR BLD AUTO: 0 % — SIGNIFICANT CHANGE UP (ref 0–2)
BILIRUB SERPL-MCNC: 0.5 MG/DL — SIGNIFICANT CHANGE UP (ref 0.2–1.2)
BILIRUB SERPL-MCNC: 0.5 MG/DL — SIGNIFICANT CHANGE UP (ref 0.2–1.2)
BLASTS # FLD: 1 % — HIGH (ref 0–0)
BLASTS # FLD: 1 % — HIGH (ref 0–0)
BUN SERPL-MCNC: 12 MG/DL — SIGNIFICANT CHANGE UP (ref 7–23)
BUN SERPL-MCNC: 12 MG/DL — SIGNIFICANT CHANGE UP (ref 7–23)
CALCIUM SERPL-MCNC: 9.5 MG/DL — SIGNIFICANT CHANGE UP (ref 8.4–10.5)
CALCIUM SERPL-MCNC: 9.5 MG/DL — SIGNIFICANT CHANGE UP (ref 8.4–10.5)
CHLORIDE SERPL-SCNC: 97 MMOL/L — SIGNIFICANT CHANGE UP (ref 96–108)
CHLORIDE SERPL-SCNC: 97 MMOL/L — SIGNIFICANT CHANGE UP (ref 96–108)
CO2 SERPL-SCNC: 27 MMOL/L — SIGNIFICANT CHANGE UP (ref 22–31)
CO2 SERPL-SCNC: 27 MMOL/L — SIGNIFICANT CHANGE UP (ref 22–31)
CREAT SERPL-MCNC: 0.7 MG/DL — SIGNIFICANT CHANGE UP (ref 0.5–1.3)
CREAT SERPL-MCNC: 0.7 MG/DL — SIGNIFICANT CHANGE UP (ref 0.5–1.3)
EGFR: 102 ML/MIN/1.73M2 — SIGNIFICANT CHANGE UP
EGFR: 102 ML/MIN/1.73M2 — SIGNIFICANT CHANGE UP
EOSINOPHIL # BLD AUTO: 0 K/UL — SIGNIFICANT CHANGE UP (ref 0–0.5)
EOSINOPHIL # BLD AUTO: 0 K/UL — SIGNIFICANT CHANGE UP (ref 0–0.5)
EOSINOPHIL NFR BLD AUTO: 0 % — SIGNIFICANT CHANGE UP (ref 0–6)
EOSINOPHIL NFR BLD AUTO: 0 % — SIGNIFICANT CHANGE UP (ref 0–6)
GLUCOSE SERPL-MCNC: 129 MG/DL — HIGH (ref 70–99)
GLUCOSE SERPL-MCNC: 129 MG/DL — HIGH (ref 70–99)
HCT VFR BLD CALC: 40.9 % — SIGNIFICANT CHANGE UP (ref 39–50)
HCT VFR BLD CALC: 40.9 % — SIGNIFICANT CHANGE UP (ref 39–50)
HGB BLD-MCNC: 14.4 G/DL — SIGNIFICANT CHANGE UP (ref 13–17)
HGB BLD-MCNC: 14.4 G/DL — SIGNIFICANT CHANGE UP (ref 13–17)
LYMPHOCYTES # BLD AUTO: 0.18 K/UL — LOW (ref 1–3.3)
LYMPHOCYTES # BLD AUTO: 0.18 K/UL — LOW (ref 1–3.3)
LYMPHOCYTES # BLD AUTO: 2 % — LOW (ref 13–44)
LYMPHOCYTES # BLD AUTO: 2 % — LOW (ref 13–44)
MCHC RBC-ENTMCNC: 33.2 PG — SIGNIFICANT CHANGE UP (ref 27–34)
MCHC RBC-ENTMCNC: 33.2 PG — SIGNIFICANT CHANGE UP (ref 27–34)
MCHC RBC-ENTMCNC: 35.2 G/DL — SIGNIFICANT CHANGE UP (ref 32–36)
MCHC RBC-ENTMCNC: 35.2 G/DL — SIGNIFICANT CHANGE UP (ref 32–36)
MCV RBC AUTO: 94.2 FL — SIGNIFICANT CHANGE UP (ref 80–100)
MCV RBC AUTO: 94.2 FL — SIGNIFICANT CHANGE UP (ref 80–100)
METAMYELOCYTES # FLD: 4 % — HIGH (ref 0–0)
METAMYELOCYTES # FLD: 4 % — HIGH (ref 0–0)
MONOCYTES # BLD AUTO: 0.18 K/UL — SIGNIFICANT CHANGE UP (ref 0–0.9)
MONOCYTES # BLD AUTO: 0.18 K/UL — SIGNIFICANT CHANGE UP (ref 0–0.9)
MONOCYTES NFR BLD AUTO: 2 % — SIGNIFICANT CHANGE UP (ref 2–14)
MONOCYTES NFR BLD AUTO: 2 % — SIGNIFICANT CHANGE UP (ref 2–14)
MYELOCYTES NFR BLD: 4 % — HIGH (ref 0–0)
MYELOCYTES NFR BLD: 4 % — HIGH (ref 0–0)
NEUTROPHILS # BLD AUTO: 8 K/UL — HIGH (ref 1.8–7.4)
NEUTROPHILS # BLD AUTO: 8 K/UL — HIGH (ref 1.8–7.4)
NEUTROPHILS NFR BLD AUTO: 87 % — HIGH (ref 43–77)
NEUTROPHILS NFR BLD AUTO: 87 % — HIGH (ref 43–77)
NRBC # BLD: 1 /100 — HIGH (ref 0–0)
NRBC # BLD: 1 /100 — HIGH (ref 0–0)
NRBC # BLD: SIGNIFICANT CHANGE UP /100 WBCS (ref 0–0)
NRBC # BLD: SIGNIFICANT CHANGE UP /100 WBCS (ref 0–0)
PLAT MORPH BLD: NORMAL — SIGNIFICANT CHANGE UP
PLAT MORPH BLD: NORMAL — SIGNIFICANT CHANGE UP
PLATELET # BLD AUTO: 214 K/UL — SIGNIFICANT CHANGE UP (ref 150–400)
PLATELET # BLD AUTO: 214 K/UL — SIGNIFICANT CHANGE UP (ref 150–400)
POTASSIUM SERPL-MCNC: 4.6 MMOL/L — SIGNIFICANT CHANGE UP (ref 3.5–5.3)
POTASSIUM SERPL-MCNC: 4.6 MMOL/L — SIGNIFICANT CHANGE UP (ref 3.5–5.3)
POTASSIUM SERPL-SCNC: 4.6 MMOL/L — SIGNIFICANT CHANGE UP (ref 3.5–5.3)
POTASSIUM SERPL-SCNC: 4.6 MMOL/L — SIGNIFICANT CHANGE UP (ref 3.5–5.3)
PROT SERPL-MCNC: 6.5 G/DL — SIGNIFICANT CHANGE UP (ref 6–8.3)
PROT SERPL-MCNC: 6.5 G/DL — SIGNIFICANT CHANGE UP (ref 6–8.3)
RBC # BLD: 4.34 M/UL — SIGNIFICANT CHANGE UP (ref 4.2–5.8)
RBC # BLD: 4.34 M/UL — SIGNIFICANT CHANGE UP (ref 4.2–5.8)
RBC # FLD: 16 % — HIGH (ref 10.3–14.5)
RBC # FLD: 16 % — HIGH (ref 10.3–14.5)
RBC BLD AUTO: SIGNIFICANT CHANGE UP
RBC BLD AUTO: SIGNIFICANT CHANGE UP
SODIUM SERPL-SCNC: 134 MMOL/L — LOW (ref 135–145)
SODIUM SERPL-SCNC: 134 MMOL/L — LOW (ref 135–145)
WBC # BLD: 9.19 K/UL — SIGNIFICANT CHANGE UP (ref 3.8–10.5)
WBC # BLD: 9.19 K/UL — SIGNIFICANT CHANGE UP (ref 3.8–10.5)
WBC # FLD AUTO: 9.19 K/UL — SIGNIFICANT CHANGE UP (ref 3.8–10.5)
WBC # FLD AUTO: 9.19 K/UL — SIGNIFICANT CHANGE UP (ref 3.8–10.5)

## 2023-11-28 PROCEDURE — 99215 OFFICE O/P EST HI 40 MIN: CPT

## 2023-11-28 RX ORDER — NORMAL SALT TABLETS 1 G/G
1 TABLET ORAL DAILY
Qty: 60 | Refills: 0 | Status: DISCONTINUED | COMMUNITY
Start: 2023-06-28 | End: 2023-11-28

## 2023-11-29 DIAGNOSIS — Z51.11 ENCOUNTER FOR ANTINEOPLASTIC CHEMOTHERAPY: ICD-10-CM

## 2023-11-29 DIAGNOSIS — C71.9 MALIGNANT NEOPLASM OF BRAIN, UNSPECIFIED: ICD-10-CM

## 2023-11-29 LAB
APPEARANCE UR: CLEAR — SIGNIFICANT CHANGE UP
APPEARANCE UR: CLEAR — SIGNIFICANT CHANGE UP
BACTERIA # UR AUTO: NEGATIVE /HPF — SIGNIFICANT CHANGE UP
BACTERIA # UR AUTO: NEGATIVE /HPF — SIGNIFICANT CHANGE UP
BILIRUB UR-MCNC: NEGATIVE — SIGNIFICANT CHANGE UP
BILIRUB UR-MCNC: NEGATIVE — SIGNIFICANT CHANGE UP
CAST: 0 /LPF — SIGNIFICANT CHANGE UP (ref 0–4)
CAST: 0 /LPF — SIGNIFICANT CHANGE UP (ref 0–4)
COLOR SPEC: YELLOW — SIGNIFICANT CHANGE UP
COLOR SPEC: YELLOW — SIGNIFICANT CHANGE UP
DIFF PNL FLD: ABNORMAL
DIFF PNL FLD: ABNORMAL
GLUCOSE UR QL: NEGATIVE MG/DL — SIGNIFICANT CHANGE UP
GLUCOSE UR QL: NEGATIVE MG/DL — SIGNIFICANT CHANGE UP
KETONES UR-MCNC: NEGATIVE MG/DL — SIGNIFICANT CHANGE UP
KETONES UR-MCNC: NEGATIVE MG/DL — SIGNIFICANT CHANGE UP
LEUKOCYTE ESTERASE UR-ACNC: NEGATIVE — SIGNIFICANT CHANGE UP
LEUKOCYTE ESTERASE UR-ACNC: NEGATIVE — SIGNIFICANT CHANGE UP
MUCOUS THREADS # UR AUTO: PRESENT
MUCOUS THREADS # UR AUTO: PRESENT
NITRITE UR-MCNC: NEGATIVE — SIGNIFICANT CHANGE UP
NITRITE UR-MCNC: NEGATIVE — SIGNIFICANT CHANGE UP
PH UR: 6 — SIGNIFICANT CHANGE UP (ref 5–8)
PH UR: 6 — SIGNIFICANT CHANGE UP (ref 5–8)
PROT UR-MCNC: NEGATIVE MG/DL — SIGNIFICANT CHANGE UP
PROT UR-MCNC: NEGATIVE MG/DL — SIGNIFICANT CHANGE UP
RBC CASTS # UR COMP ASSIST: 2 /HPF — SIGNIFICANT CHANGE UP (ref 0–4)
RBC CASTS # UR COMP ASSIST: 2 /HPF — SIGNIFICANT CHANGE UP (ref 0–4)
REVIEW: SIGNIFICANT CHANGE UP
REVIEW: SIGNIFICANT CHANGE UP
SP GR SPEC: 1.02 — SIGNIFICANT CHANGE UP (ref 1–1.03)
SP GR SPEC: 1.02 — SIGNIFICANT CHANGE UP (ref 1–1.03)
SQUAMOUS # UR AUTO: 0 /HPF — SIGNIFICANT CHANGE UP (ref 0–5)
SQUAMOUS # UR AUTO: 0 /HPF — SIGNIFICANT CHANGE UP (ref 0–5)
UROBILINOGEN FLD QL: 0.2 MG/DL — SIGNIFICANT CHANGE UP (ref 0.2–1)
UROBILINOGEN FLD QL: 0.2 MG/DL — SIGNIFICANT CHANGE UP (ref 0.2–1)
WBC UR QL: 0 /HPF — SIGNIFICANT CHANGE UP (ref 0–5)
WBC UR QL: 0 /HPF — SIGNIFICANT CHANGE UP (ref 0–5)

## 2023-12-05 ENCOUNTER — RESULT REVIEW (OUTPATIENT)
Age: 68
End: 2023-12-05

## 2023-12-05 ENCOUNTER — LABORATORY RESULT (OUTPATIENT)
Age: 68
End: 2023-12-05

## 2023-12-05 ENCOUNTER — APPOINTMENT (OUTPATIENT)
Dept: HEMATOLOGY ONCOLOGY | Facility: CLINIC | Age: 68
End: 2023-12-05

## 2023-12-05 LAB
BASOPHILS # BLD AUTO: 0.04 K/UL — SIGNIFICANT CHANGE UP (ref 0–0.2)
BASOPHILS # BLD AUTO: 0.04 K/UL — SIGNIFICANT CHANGE UP (ref 0–0.2)
BASOPHILS NFR BLD AUTO: 0.5 % — SIGNIFICANT CHANGE UP (ref 0–2)
BASOPHILS NFR BLD AUTO: 0.5 % — SIGNIFICANT CHANGE UP (ref 0–2)
EOSINOPHIL # BLD AUTO: 0.02 K/UL — SIGNIFICANT CHANGE UP (ref 0–0.5)
EOSINOPHIL # BLD AUTO: 0.02 K/UL — SIGNIFICANT CHANGE UP (ref 0–0.5)
EOSINOPHIL NFR BLD AUTO: 0.2 % — SIGNIFICANT CHANGE UP (ref 0–6)
EOSINOPHIL NFR BLD AUTO: 0.2 % — SIGNIFICANT CHANGE UP (ref 0–6)
HCT VFR BLD CALC: 40.7 % — SIGNIFICANT CHANGE UP (ref 39–50)
HCT VFR BLD CALC: 40.7 % — SIGNIFICANT CHANGE UP (ref 39–50)
HGB BLD-MCNC: 14.1 G/DL — SIGNIFICANT CHANGE UP (ref 13–17)
HGB BLD-MCNC: 14.1 G/DL — SIGNIFICANT CHANGE UP (ref 13–17)
IMM GRANULOCYTES NFR BLD AUTO: 4.6 % — HIGH (ref 0–0.9)
IMM GRANULOCYTES NFR BLD AUTO: 4.6 % — HIGH (ref 0–0.9)
LYMPHOCYTES # BLD AUTO: 0.63 K/UL — LOW (ref 1–3.3)
LYMPHOCYTES # BLD AUTO: 0.63 K/UL — LOW (ref 1–3.3)
LYMPHOCYTES # BLD AUTO: 7.6 % — LOW (ref 13–44)
LYMPHOCYTES # BLD AUTO: 7.6 % — LOW (ref 13–44)
MCHC RBC-ENTMCNC: 33.1 PG — SIGNIFICANT CHANGE UP (ref 27–34)
MCHC RBC-ENTMCNC: 33.1 PG — SIGNIFICANT CHANGE UP (ref 27–34)
MCHC RBC-ENTMCNC: 34.6 G/DL — SIGNIFICANT CHANGE UP (ref 32–36)
MCHC RBC-ENTMCNC: 34.6 G/DL — SIGNIFICANT CHANGE UP (ref 32–36)
MCV RBC AUTO: 95.5 FL — SIGNIFICANT CHANGE UP (ref 80–100)
MCV RBC AUTO: 95.5 FL — SIGNIFICANT CHANGE UP (ref 80–100)
MONOCYTES # BLD AUTO: 0.57 K/UL — SIGNIFICANT CHANGE UP (ref 0–0.9)
MONOCYTES # BLD AUTO: 0.57 K/UL — SIGNIFICANT CHANGE UP (ref 0–0.9)
MONOCYTES NFR BLD AUTO: 6.9 % — SIGNIFICANT CHANGE UP (ref 2–14)
MONOCYTES NFR BLD AUTO: 6.9 % — SIGNIFICANT CHANGE UP (ref 2–14)
NEUTROPHILS # BLD AUTO: 6.65 K/UL — SIGNIFICANT CHANGE UP (ref 1.8–7.4)
NEUTROPHILS # BLD AUTO: 6.65 K/UL — SIGNIFICANT CHANGE UP (ref 1.8–7.4)
NEUTROPHILS NFR BLD AUTO: 80.2 % — HIGH (ref 43–77)
NEUTROPHILS NFR BLD AUTO: 80.2 % — HIGH (ref 43–77)
NRBC # BLD: 0 /100 WBCS — SIGNIFICANT CHANGE UP (ref 0–0)
NRBC # BLD: 0 /100 WBCS — SIGNIFICANT CHANGE UP (ref 0–0)
PLATELET # BLD AUTO: 204 K/UL — SIGNIFICANT CHANGE UP (ref 150–400)
PLATELET # BLD AUTO: 204 K/UL — SIGNIFICANT CHANGE UP (ref 150–400)
RBC # BLD: 4.26 M/UL — SIGNIFICANT CHANGE UP (ref 4.2–5.8)
RBC # BLD: 4.26 M/UL — SIGNIFICANT CHANGE UP (ref 4.2–5.8)
RBC # FLD: 16.3 % — HIGH (ref 10.3–14.5)
RBC # FLD: 16.3 % — HIGH (ref 10.3–14.5)
WBC # BLD: 8.29 K/UL — SIGNIFICANT CHANGE UP (ref 3.8–10.5)
WBC # BLD: 8.29 K/UL — SIGNIFICANT CHANGE UP (ref 3.8–10.5)
WBC # FLD AUTO: 8.29 K/UL — SIGNIFICANT CHANGE UP (ref 3.8–10.5)
WBC # FLD AUTO: 8.29 K/UL — SIGNIFICANT CHANGE UP (ref 3.8–10.5)

## 2023-12-06 ENCOUNTER — NON-APPOINTMENT (OUTPATIENT)
Age: 68
End: 2023-12-06

## 2023-12-12 ENCOUNTER — RESULT REVIEW (OUTPATIENT)
Age: 68
End: 2023-12-12

## 2023-12-12 ENCOUNTER — APPOINTMENT (OUTPATIENT)
Dept: HEMATOLOGY ONCOLOGY | Facility: CLINIC | Age: 68
End: 2023-12-12

## 2023-12-12 ENCOUNTER — APPOINTMENT (OUTPATIENT)
Dept: INFUSION THERAPY | Facility: HOSPITAL | Age: 68
End: 2023-12-12

## 2023-12-12 LAB
BASOPHILS # BLD AUTO: 0.04 K/UL — SIGNIFICANT CHANGE UP (ref 0–0.2)
BASOPHILS # BLD AUTO: 0.04 K/UL — SIGNIFICANT CHANGE UP (ref 0–0.2)
BASOPHILS NFR BLD AUTO: 0.5 % — SIGNIFICANT CHANGE UP (ref 0–2)
BASOPHILS NFR BLD AUTO: 0.5 % — SIGNIFICANT CHANGE UP (ref 0–2)
EOSINOPHIL # BLD AUTO: 0.01 K/UL — SIGNIFICANT CHANGE UP (ref 0–0.5)
EOSINOPHIL # BLD AUTO: 0.01 K/UL — SIGNIFICANT CHANGE UP (ref 0–0.5)
EOSINOPHIL NFR BLD AUTO: 0.1 % — SIGNIFICANT CHANGE UP (ref 0–6)
EOSINOPHIL NFR BLD AUTO: 0.1 % — SIGNIFICANT CHANGE UP (ref 0–6)
GGT SERPL-CCNC: 25 U/L
HCT VFR BLD CALC: 41.2 % — SIGNIFICANT CHANGE UP (ref 39–50)
HCT VFR BLD CALC: 41.2 % — SIGNIFICANT CHANGE UP (ref 39–50)
HGB BLD-MCNC: 14.3 G/DL — SIGNIFICANT CHANGE UP (ref 13–17)
HGB BLD-MCNC: 14.3 G/DL — SIGNIFICANT CHANGE UP (ref 13–17)
IMM GRANULOCYTES NFR BLD AUTO: 3.3 % — HIGH (ref 0–0.9)
IMM GRANULOCYTES NFR BLD AUTO: 3.3 % — HIGH (ref 0–0.9)
LDH SERPL-CCNC: 176 U/L
LYMPHOCYTES # BLD AUTO: 0.47 K/UL — LOW (ref 1–3.3)
LYMPHOCYTES # BLD AUTO: 0.47 K/UL — LOW (ref 1–3.3)
LYMPHOCYTES # BLD AUTO: 5.4 % — LOW (ref 13–44)
LYMPHOCYTES # BLD AUTO: 5.4 % — LOW (ref 13–44)
MCHC RBC-ENTMCNC: 33.3 PG — SIGNIFICANT CHANGE UP (ref 27–34)
MCHC RBC-ENTMCNC: 33.3 PG — SIGNIFICANT CHANGE UP (ref 27–34)
MCHC RBC-ENTMCNC: 34.7 G/DL — SIGNIFICANT CHANGE UP (ref 32–36)
MCHC RBC-ENTMCNC: 34.7 G/DL — SIGNIFICANT CHANGE UP (ref 32–36)
MCV RBC AUTO: 96 FL — SIGNIFICANT CHANGE UP (ref 80–100)
MCV RBC AUTO: 96 FL — SIGNIFICANT CHANGE UP (ref 80–100)
MONOCYTES # BLD AUTO: 0.59 K/UL — SIGNIFICANT CHANGE UP (ref 0–0.9)
MONOCYTES # BLD AUTO: 0.59 K/UL — SIGNIFICANT CHANGE UP (ref 0–0.9)
MONOCYTES NFR BLD AUTO: 6.8 % — SIGNIFICANT CHANGE UP (ref 2–14)
MONOCYTES NFR BLD AUTO: 6.8 % — SIGNIFICANT CHANGE UP (ref 2–14)
NEUTROPHILS # BLD AUTO: 7.31 K/UL — SIGNIFICANT CHANGE UP (ref 1.8–7.4)
NEUTROPHILS # BLD AUTO: 7.31 K/UL — SIGNIFICANT CHANGE UP (ref 1.8–7.4)
NEUTROPHILS NFR BLD AUTO: 83.9 % — HIGH (ref 43–77)
NEUTROPHILS NFR BLD AUTO: 83.9 % — HIGH (ref 43–77)
NRBC # BLD: 0 /100 WBCS — SIGNIFICANT CHANGE UP (ref 0–0)
NRBC # BLD: 0 /100 WBCS — SIGNIFICANT CHANGE UP (ref 0–0)
PLATELET # BLD AUTO: 201 K/UL — SIGNIFICANT CHANGE UP (ref 150–400)
PLATELET # BLD AUTO: 201 K/UL — SIGNIFICANT CHANGE UP (ref 150–400)
RBC # BLD: 4.29 M/UL — SIGNIFICANT CHANGE UP (ref 4.2–5.8)
RBC # BLD: 4.29 M/UL — SIGNIFICANT CHANGE UP (ref 4.2–5.8)
RBC # FLD: 15.9 % — HIGH (ref 10.3–14.5)
RBC # FLD: 15.9 % — HIGH (ref 10.3–14.5)
WBC # BLD: 8.71 K/UL — SIGNIFICANT CHANGE UP (ref 3.8–10.5)
WBC # BLD: 8.71 K/UL — SIGNIFICANT CHANGE UP (ref 3.8–10.5)
WBC # FLD AUTO: 8.71 K/UL — SIGNIFICANT CHANGE UP (ref 3.8–10.5)
WBC # FLD AUTO: 8.71 K/UL — SIGNIFICANT CHANGE UP (ref 3.8–10.5)

## 2023-12-13 LAB
ALBUMIN SERPL ELPH-MCNC: 4.1 G/DL — SIGNIFICANT CHANGE UP (ref 3.3–5)
ALBUMIN SERPL ELPH-MCNC: 4.1 G/DL — SIGNIFICANT CHANGE UP (ref 3.3–5)
ALP SERPL-CCNC: 67 U/L — SIGNIFICANT CHANGE UP (ref 40–120)
ALP SERPL-CCNC: 67 U/L — SIGNIFICANT CHANGE UP (ref 40–120)
ALT FLD-CCNC: 38 U/L — SIGNIFICANT CHANGE UP (ref 10–45)
ALT FLD-CCNC: 38 U/L — SIGNIFICANT CHANGE UP (ref 10–45)
ANION GAP SERPL CALC-SCNC: 18 MMOL/L — HIGH (ref 5–17)
ANION GAP SERPL CALC-SCNC: 18 MMOL/L — HIGH (ref 5–17)
APPEARANCE UR: CLEAR — SIGNIFICANT CHANGE UP
APPEARANCE UR: CLEAR — SIGNIFICANT CHANGE UP
AST SERPL-CCNC: 25 U/L — SIGNIFICANT CHANGE UP (ref 10–40)
AST SERPL-CCNC: 25 U/L — SIGNIFICANT CHANGE UP (ref 10–40)
BACTERIA # UR AUTO: NEGATIVE /HPF — SIGNIFICANT CHANGE UP
BACTERIA # UR AUTO: NEGATIVE /HPF — SIGNIFICANT CHANGE UP
BILIRUB SERPL-MCNC: 0.3 MG/DL — SIGNIFICANT CHANGE UP (ref 0.2–1.2)
BILIRUB SERPL-MCNC: 0.3 MG/DL — SIGNIFICANT CHANGE UP (ref 0.2–1.2)
BILIRUB UR-MCNC: NEGATIVE — SIGNIFICANT CHANGE UP
BILIRUB UR-MCNC: NEGATIVE — SIGNIFICANT CHANGE UP
BUN SERPL-MCNC: 12 MG/DL — SIGNIFICANT CHANGE UP (ref 7–23)
BUN SERPL-MCNC: 12 MG/DL — SIGNIFICANT CHANGE UP (ref 7–23)
CALCIUM SERPL-MCNC: 9.7 MG/DL — SIGNIFICANT CHANGE UP (ref 8.4–10.5)
CALCIUM SERPL-MCNC: 9.7 MG/DL — SIGNIFICANT CHANGE UP (ref 8.4–10.5)
CAST: 0 /LPF — SIGNIFICANT CHANGE UP (ref 0–4)
CAST: 0 /LPF — SIGNIFICANT CHANGE UP (ref 0–4)
CHLORIDE SERPL-SCNC: 97 MMOL/L — SIGNIFICANT CHANGE UP (ref 96–108)
CHLORIDE SERPL-SCNC: 97 MMOL/L — SIGNIFICANT CHANGE UP (ref 96–108)
CO2 SERPL-SCNC: 21 MMOL/L — LOW (ref 22–31)
CO2 SERPL-SCNC: 21 MMOL/L — LOW (ref 22–31)
COLOR SPEC: YELLOW — SIGNIFICANT CHANGE UP
COLOR SPEC: YELLOW — SIGNIFICANT CHANGE UP
CREAT SERPL-MCNC: 0.86 MG/DL — SIGNIFICANT CHANGE UP (ref 0.5–1.3)
CREAT SERPL-MCNC: 0.86 MG/DL — SIGNIFICANT CHANGE UP (ref 0.5–1.3)
DIFF PNL FLD: ABNORMAL
DIFF PNL FLD: ABNORMAL
EGFR: 96 ML/MIN/1.73M2 — SIGNIFICANT CHANGE UP
EGFR: 96 ML/MIN/1.73M2 — SIGNIFICANT CHANGE UP
GLUCOSE SERPL-MCNC: 92 MG/DL — SIGNIFICANT CHANGE UP (ref 70–99)
GLUCOSE SERPL-MCNC: 92 MG/DL — SIGNIFICANT CHANGE UP (ref 70–99)
GLUCOSE UR QL: NEGATIVE MG/DL — SIGNIFICANT CHANGE UP
GLUCOSE UR QL: NEGATIVE MG/DL — SIGNIFICANT CHANGE UP
KETONES UR-MCNC: NEGATIVE MG/DL — SIGNIFICANT CHANGE UP
KETONES UR-MCNC: NEGATIVE MG/DL — SIGNIFICANT CHANGE UP
LEUKOCYTE ESTERASE UR-ACNC: NEGATIVE — SIGNIFICANT CHANGE UP
LEUKOCYTE ESTERASE UR-ACNC: NEGATIVE — SIGNIFICANT CHANGE UP
NITRITE UR-MCNC: NEGATIVE — SIGNIFICANT CHANGE UP
NITRITE UR-MCNC: NEGATIVE — SIGNIFICANT CHANGE UP
PH UR: 6 — SIGNIFICANT CHANGE UP (ref 5–8)
PH UR: 6 — SIGNIFICANT CHANGE UP (ref 5–8)
POTASSIUM SERPL-MCNC: 4.1 MMOL/L — SIGNIFICANT CHANGE UP (ref 3.5–5.3)
POTASSIUM SERPL-MCNC: 4.1 MMOL/L — SIGNIFICANT CHANGE UP (ref 3.5–5.3)
POTASSIUM SERPL-SCNC: 4.1 MMOL/L — SIGNIFICANT CHANGE UP (ref 3.5–5.3)
POTASSIUM SERPL-SCNC: 4.1 MMOL/L — SIGNIFICANT CHANGE UP (ref 3.5–5.3)
PROT SERPL-MCNC: 6.9 G/DL — SIGNIFICANT CHANGE UP (ref 6–8.3)
PROT SERPL-MCNC: 6.9 G/DL — SIGNIFICANT CHANGE UP (ref 6–8.3)
PROT UR-MCNC: NEGATIVE MG/DL — SIGNIFICANT CHANGE UP
PROT UR-MCNC: NEGATIVE MG/DL — SIGNIFICANT CHANGE UP
RBC CASTS # UR COMP ASSIST: 6 /HPF — HIGH (ref 0–4)
RBC CASTS # UR COMP ASSIST: 6 /HPF — HIGH (ref 0–4)
SODIUM SERPL-SCNC: 136 MMOL/L — SIGNIFICANT CHANGE UP (ref 135–145)
SODIUM SERPL-SCNC: 136 MMOL/L — SIGNIFICANT CHANGE UP (ref 135–145)
SP GR SPEC: 1.02 — SIGNIFICANT CHANGE UP (ref 1–1.03)
SP GR SPEC: 1.02 — SIGNIFICANT CHANGE UP (ref 1–1.03)
SQUAMOUS # UR AUTO: 0 /HPF — SIGNIFICANT CHANGE UP (ref 0–5)
SQUAMOUS # UR AUTO: 0 /HPF — SIGNIFICANT CHANGE UP (ref 0–5)
UROBILINOGEN FLD QL: 0.2 MG/DL — SIGNIFICANT CHANGE UP (ref 0.2–1)
UROBILINOGEN FLD QL: 0.2 MG/DL — SIGNIFICANT CHANGE UP (ref 0.2–1)
WBC UR QL: 0 /HPF — SIGNIFICANT CHANGE UP (ref 0–5)
WBC UR QL: 0 /HPF — SIGNIFICANT CHANGE UP (ref 0–5)

## 2023-12-18 LAB
ALBUMIN SERPL ELPH-MCNC: 4.1 G/DL
ALBUMIN SERPL ELPH-MCNC: 4.2 G/DL
ALP BLD-CCNC: 61 U/L
ALP BLD-CCNC: 64 U/L
ALP BLD-CCNC: 71 U/L
ALP BLD-CCNC: 76 U/L
ALT SERPL-CCNC: 31 U/L
ALT SERPL-CCNC: 33 U/L
ALT SERPL-CCNC: 33 U/L
ALT SERPL-CCNC: 43 U/L
AMYLASE/CREAT SERPL: 67 U/L
AMYLASE/CREAT SERPL: 69 U/L
AMYLASE/CREAT SERPL: 73 U/L
AMYLASE/CREAT SERPL: 87 U/L
ANION GAP SERPL CALC-SCNC: 11 MMOL/L
ANION GAP SERPL CALC-SCNC: 13 MMOL/L
ANION GAP SERPL CALC-SCNC: 15 MMOL/L
ANION GAP SERPL CALC-SCNC: 16 MMOL/L
APTT BLD: 22.6 SEC
APTT BLD: 24.6 SEC
APTT BLD: 26.8 SEC
APTT BLD: 32 SEC
APTT BLD: 38.5 SEC
AST SERPL-CCNC: 14 U/L
AST SERPL-CCNC: 18 U/L
AST SERPL-CCNC: 19 U/L
AST SERPL-CCNC: 21 U/L
BILIRUB SERPL-MCNC: 0.3 MG/DL
BILIRUB SERPL-MCNC: 0.4 MG/DL
BILIRUB SERPL-MCNC: 0.4 MG/DL
BILIRUB SERPL-MCNC: 0.5 MG/DL
BUN SERPL-MCNC: 11 MG/DL
BUN SERPL-MCNC: 14 MG/DL
BUN SERPL-MCNC: 15 MG/DL
BUN SERPL-MCNC: 20 MG/DL
CALCIUM SERPL-MCNC: 9.2 MG/DL
CALCIUM SERPL-MCNC: 9.4 MG/DL
CALCIUM SERPL-MCNC: 9.5 MG/DL
CALCIUM SERPL-MCNC: 9.8 MG/DL
CHLORIDE SERPL-SCNC: 100 MMOL/L
CHLORIDE SERPL-SCNC: 103 MMOL/L
CK SERPL-CCNC: 24 U/L
CK SERPL-CCNC: 29 U/L
CK SERPL-CCNC: 34 U/L
CK SERPL-CCNC: 44 U/L
CO2 SERPL-SCNC: 22 MMOL/L
CO2 SERPL-SCNC: 23 MMOL/L
CO2 SERPL-SCNC: 23 MMOL/L
CO2 SERPL-SCNC: 25 MMOL/L
CREAT SERPL-MCNC: 0.75 MG/DL
CREAT SERPL-MCNC: 0.82 MG/DL
CREAT SERPL-MCNC: 0.82 MG/DL
CREAT SERPL-MCNC: 0.91 MG/DL
EGFR: 100 ML/MIN/1.73M2
EGFR: 93 ML/MIN/1.73M2
EGFR: 97 ML/MIN/1.73M2
EGFR: 97 ML/MIN/1.73M2
FIBRINOGEN AG PPP IA-MCNC: 349 MG/DL
FIBRINOGEN AG PPP IA-MCNC: 379 MG/DL
FIBRINOGEN AG PPP IA-MCNC: 398 MG/DL
FIBRINOGEN AG PPP IA-MCNC: 494 MG/DL
FIBRINOGEN PPP-MCNC: 292 MG/DL
GGT SERPL-CCNC: 34 U/L
GGT SERPL-CCNC: 39 U/L
GGT SERPL-CCNC: 42 U/L
GGT SERPL-CCNC: 44 U/L
GLUCOSE SERPL-MCNC: 112 MG/DL
GLUCOSE SERPL-MCNC: 130 MG/DL
GLUCOSE SERPL-MCNC: 147 MG/DL
GLUCOSE SERPL-MCNC: 99 MG/DL
INR PPP: 0.95 RATIO
INR PPP: 0.97 RATIO
INR PPP: 1.09 RATIO
INR PPP: 1.12 RATIO
INR PPP: 1.14 RATIO
LDH SERPL-CCNC: 199 U/L
LDH SERPL-CCNC: 243 U/L
LDH SERPL-CCNC: 260 U/L
LDH SERPL-CCNC: 333 U/L
LPL SERPL-CCNC: 33 U/L
LPL SERPL-CCNC: 34 U/L
LPL SERPL-CCNC: 41 U/L
LPL SERPL-CCNC: 48 U/L
LPL SERPL-CCNC: 53 U/L
MAGNESIUM SERPL-MCNC: 1.9 MG/DL
MAGNESIUM SERPL-MCNC: 1.9 MG/DL
MAGNESIUM SERPL-MCNC: 2 MG/DL
MAGNESIUM SERPL-MCNC: 2 MG/DL
MAGNESIUM SERPL-MCNC: 2.1 MG/DL
POTASSIUM SERPL-SCNC: 4.1 MMOL/L
POTASSIUM SERPL-SCNC: 4.2 MMOL/L
POTASSIUM SERPL-SCNC: 4.2 MMOL/L
POTASSIUM SERPL-SCNC: 4.3 MMOL/L
PROT SERPL-MCNC: 6.2 G/DL
PROT SERPL-MCNC: 6.2 G/DL
PROT SERPL-MCNC: 6.3 G/DL
PROT SERPL-MCNC: 6.3 G/DL
PT BLD: 10.8 SEC
PT BLD: 11 SEC
PT BLD: 12.6 SEC
PT BLD: 12.7 SEC
PT BLD: 12.9 SEC
SODIUM SERPL-SCNC: 137 MMOL/L
SODIUM SERPL-SCNC: 138 MMOL/L
SODIUM SERPL-SCNC: 141 MMOL/L
SODIUM SERPL-SCNC: 141 MMOL/L
URATE SERPL-MCNC: 3.9 MG/DL
URATE SERPL-MCNC: 4.4 MG/DL
URATE SERPL-MCNC: 4.9 MG/DL
URATE SERPL-MCNC: 5.3 MG/DL
URATE SERPL-MCNC: 5.8 MG/DL

## 2023-12-26 ENCOUNTER — APPOINTMENT (OUTPATIENT)
Dept: INFUSION THERAPY | Facility: HOSPITAL | Age: 68
End: 2023-12-26

## 2023-12-26 ENCOUNTER — APPOINTMENT (OUTPATIENT)
Dept: NEUROSURGERY | Facility: CLINIC | Age: 68
End: 2023-12-26
Payer: MEDICARE

## 2023-12-26 ENCOUNTER — RESULT REVIEW (OUTPATIENT)
Age: 68
End: 2023-12-26

## 2023-12-26 ENCOUNTER — APPOINTMENT (OUTPATIENT)
Dept: HEMATOLOGY ONCOLOGY | Facility: CLINIC | Age: 68
End: 2023-12-26

## 2023-12-26 VITALS
RESPIRATION RATE: 16 BRPM | HEIGHT: 72 IN | TEMPERATURE: 97.9 F | BODY MASS INDEX: 31.83 KG/M2 | OXYGEN SATURATION: 96 % | DIASTOLIC BLOOD PRESSURE: 84 MMHG | SYSTOLIC BLOOD PRESSURE: 133 MMHG | HEART RATE: 90 BPM | WEIGHT: 235 LBS

## 2023-12-26 LAB
APPEARANCE UR: CLEAR — SIGNIFICANT CHANGE UP
APPEARANCE UR: CLEAR — SIGNIFICANT CHANGE UP
BASOPHILS # BLD AUTO: 0.05 K/UL — SIGNIFICANT CHANGE UP (ref 0–0.2)
BASOPHILS # BLD AUTO: 0.05 K/UL — SIGNIFICANT CHANGE UP (ref 0–0.2)
BASOPHILS NFR BLD AUTO: 0.5 % — SIGNIFICANT CHANGE UP (ref 0–2)
BASOPHILS NFR BLD AUTO: 0.5 % — SIGNIFICANT CHANGE UP (ref 0–2)
BILIRUB UR-MCNC: NEGATIVE — SIGNIFICANT CHANGE UP
BILIRUB UR-MCNC: NEGATIVE — SIGNIFICANT CHANGE UP
COLOR SPEC: YELLOW — SIGNIFICANT CHANGE UP
COLOR SPEC: YELLOW — SIGNIFICANT CHANGE UP
DIFF PNL FLD: ABNORMAL
DIFF PNL FLD: ABNORMAL
EOSINOPHIL # BLD AUTO: 0.01 K/UL — SIGNIFICANT CHANGE UP (ref 0–0.5)
EOSINOPHIL # BLD AUTO: 0.01 K/UL — SIGNIFICANT CHANGE UP (ref 0–0.5)
EOSINOPHIL NFR BLD AUTO: 0.1 % — SIGNIFICANT CHANGE UP (ref 0–6)
EOSINOPHIL NFR BLD AUTO: 0.1 % — SIGNIFICANT CHANGE UP (ref 0–6)
GLUCOSE UR QL: NEGATIVE MG/DL — SIGNIFICANT CHANGE UP
GLUCOSE UR QL: NEGATIVE MG/DL — SIGNIFICANT CHANGE UP
HCT VFR BLD CALC: 39 % — SIGNIFICANT CHANGE UP (ref 39–50)
HCT VFR BLD CALC: 39 % — SIGNIFICANT CHANGE UP (ref 39–50)
HGB BLD-MCNC: 13.9 G/DL — SIGNIFICANT CHANGE UP (ref 13–17)
HGB BLD-MCNC: 13.9 G/DL — SIGNIFICANT CHANGE UP (ref 13–17)
IMM GRANULOCYTES NFR BLD AUTO: 4.6 % — HIGH (ref 0–0.9)
IMM GRANULOCYTES NFR BLD AUTO: 4.6 % — HIGH (ref 0–0.9)
KETONES UR-MCNC: NEGATIVE MG/DL — SIGNIFICANT CHANGE UP
KETONES UR-MCNC: NEGATIVE MG/DL — SIGNIFICANT CHANGE UP
LEUKOCYTE ESTERASE UR-ACNC: NEGATIVE — SIGNIFICANT CHANGE UP
LEUKOCYTE ESTERASE UR-ACNC: NEGATIVE — SIGNIFICANT CHANGE UP
LYMPHOCYTES # BLD AUTO: 0.56 K/UL — LOW (ref 1–3.3)
LYMPHOCYTES # BLD AUTO: 0.56 K/UL — LOW (ref 1–3.3)
LYMPHOCYTES # BLD AUTO: 5.3 % — LOW (ref 13–44)
LYMPHOCYTES # BLD AUTO: 5.3 % — LOW (ref 13–44)
MCHC RBC-ENTMCNC: 33.7 PG — SIGNIFICANT CHANGE UP (ref 27–34)
MCHC RBC-ENTMCNC: 33.7 PG — SIGNIFICANT CHANGE UP (ref 27–34)
MCHC RBC-ENTMCNC: 35.6 G/DL — SIGNIFICANT CHANGE UP (ref 32–36)
MCHC RBC-ENTMCNC: 35.6 G/DL — SIGNIFICANT CHANGE UP (ref 32–36)
MCV RBC AUTO: 94.7 FL — SIGNIFICANT CHANGE UP (ref 80–100)
MCV RBC AUTO: 94.7 FL — SIGNIFICANT CHANGE UP (ref 80–100)
MONOCYTES # BLD AUTO: 0.71 K/UL — SIGNIFICANT CHANGE UP (ref 0–0.9)
MONOCYTES # BLD AUTO: 0.71 K/UL — SIGNIFICANT CHANGE UP (ref 0–0.9)
MONOCYTES NFR BLD AUTO: 6.7 % — SIGNIFICANT CHANGE UP (ref 2–14)
MONOCYTES NFR BLD AUTO: 6.7 % — SIGNIFICANT CHANGE UP (ref 2–14)
NEUTROPHILS # BLD AUTO: 8.77 K/UL — HIGH (ref 1.8–7.4)
NEUTROPHILS # BLD AUTO: 8.77 K/UL — HIGH (ref 1.8–7.4)
NEUTROPHILS NFR BLD AUTO: 82.8 % — HIGH (ref 43–77)
NEUTROPHILS NFR BLD AUTO: 82.8 % — HIGH (ref 43–77)
NITRITE UR-MCNC: NEGATIVE — SIGNIFICANT CHANGE UP
NITRITE UR-MCNC: NEGATIVE — SIGNIFICANT CHANGE UP
NRBC # BLD: 0 /100 WBCS — SIGNIFICANT CHANGE UP (ref 0–0)
NRBC # BLD: 0 /100 WBCS — SIGNIFICANT CHANGE UP (ref 0–0)
PH UR: 6 — SIGNIFICANT CHANGE UP (ref 5–8)
PH UR: 6 — SIGNIFICANT CHANGE UP (ref 5–8)
PLATELET # BLD AUTO: 230 K/UL — SIGNIFICANT CHANGE UP (ref 150–400)
PLATELET # BLD AUTO: 230 K/UL — SIGNIFICANT CHANGE UP (ref 150–400)
PROT UR-MCNC: NEGATIVE MG/DL — SIGNIFICANT CHANGE UP
PROT UR-MCNC: NEGATIVE MG/DL — SIGNIFICANT CHANGE UP
RBC # BLD: 4.12 M/UL — LOW (ref 4.2–5.8)
RBC # BLD: 4.12 M/UL — LOW (ref 4.2–5.8)
RBC # FLD: 15.2 % — HIGH (ref 10.3–14.5)
RBC # FLD: 15.2 % — HIGH (ref 10.3–14.5)
SP GR SPEC: 1.01 — SIGNIFICANT CHANGE UP (ref 1–1.03)
SP GR SPEC: 1.01 — SIGNIFICANT CHANGE UP (ref 1–1.03)
UROBILINOGEN FLD QL: 0.2 MG/DL — SIGNIFICANT CHANGE UP (ref 0.2–1)
UROBILINOGEN FLD QL: 0.2 MG/DL — SIGNIFICANT CHANGE UP (ref 0.2–1)
WBC # BLD: 10.59 K/UL — HIGH (ref 3.8–10.5)
WBC # BLD: 10.59 K/UL — HIGH (ref 3.8–10.5)
WBC # FLD AUTO: 10.59 K/UL — HIGH (ref 3.8–10.5)
WBC # FLD AUTO: 10.59 K/UL — HIGH (ref 3.8–10.5)

## 2023-12-26 PROCEDURE — 99212 OFFICE O/P EST SF 10 MIN: CPT

## 2023-12-26 NOTE — ASSESSMENT
[FreeTextEntry1] : Discussion: GBM treatment as per Dr. Galvez. contact office with any questions or concerns.

## 2023-12-26 NOTE — PHYSICAL EXAM
[General Appearance - Alert] : alert [General Appearance - Well Nourished] : well nourished [Person] : oriented to person [Place] : oriented to place [Fluency] : fluency intact [Motor Tone] : muscle tone was normal in all four extremities [Motor Strength] : muscle strength was normal in all four extremities [No Muscle Atrophy] : normal bulk in all four extremities [PERRL With Normal Accommodation] : pupils were equal in size, round, reactive to light, with normal accommodation [Extraocular Movements] : extraocular movements were intact [Abnormal Walk] : normal gait [FreeTextEntry1] : alert and oriented x2

## 2023-12-26 NOTE — REASON FOR VISIT
[Follow-Up: _____] : a [unfilled] follow-up visit [Spouse] : spouse [FreeTextEntry1] : 67M w/ hx of 3 weeks some difficulty with expressive speech, MRI showed L FT mass, s/p 5/15 Left pterional craniotomy for brain tumor resection with gleolan. Frozen path: high-grade glioma. Bilateral abdominal incisions made for later implantation of drug treatment. IMVAX Biologics were implanted into abdomen via incisions during bedside procedure per IMVAX trial on 5/17 under conscious sedation. These biologic agents were removed during bedside procedure on 5/19 under conscious sedation. Patient was monitored in the NSCU, his post op course remained uncomplicated.  10/31/2023- MRI showed Mild increase in extent of a persistent left temporal heterogeneous ring-enhancing mass. Perfusion analysis of the mass demonstrates increased cerebral blood volume compatible with neoplasm. Moderate increase in surrounding nonenhancing FLAIR hyperintense signal abnormality is nonspecific and may represent treatment related changes versus nonenhancing tumor. Mild increase in mass effect. No midline shift.  11/14/2023- FIRST DOSE OF IV AVASTIN   Today12/26/23 he presents in office with his wife his 30 days safety visit.    He reports feeling well, fatigued at times. speech is clear. some wfd, face symmetrical, EOMI, PERRL reports blurry vision b/l eyes. BROWN Strong X4 5/5, gait steady ambulating w/o assistive device.  KPS 80

## 2023-12-27 LAB
BACTERIA # UR AUTO: NEGATIVE /HPF — SIGNIFICANT CHANGE UP
BACTERIA # UR AUTO: NEGATIVE /HPF — SIGNIFICANT CHANGE UP
CAST: 0 /LPF — SIGNIFICANT CHANGE UP (ref 0–4)
CAST: 0 /LPF — SIGNIFICANT CHANGE UP (ref 0–4)
RBC CASTS # UR COMP ASSIST: 0 /HPF — SIGNIFICANT CHANGE UP (ref 0–4)
RBC CASTS # UR COMP ASSIST: 0 /HPF — SIGNIFICANT CHANGE UP (ref 0–4)
SQUAMOUS # UR AUTO: 0 /HPF — SIGNIFICANT CHANGE UP (ref 0–5)
SQUAMOUS # UR AUTO: 0 /HPF — SIGNIFICANT CHANGE UP (ref 0–5)
WBC UR QL: 0 /HPF — SIGNIFICANT CHANGE UP (ref 0–5)
WBC UR QL: 0 /HPF — SIGNIFICANT CHANGE UP (ref 0–5)

## 2024-01-01 ENCOUNTER — OUTPATIENT (OUTPATIENT)
Dept: OUTPATIENT SERVICES | Facility: HOSPITAL | Age: 69
LOS: 1 days | End: 2024-01-01
Payer: MEDICARE

## 2024-01-01 ENCOUNTER — NON-APPOINTMENT (OUTPATIENT)
Age: 69
End: 2024-01-01

## 2024-01-01 ENCOUNTER — APPOINTMENT (OUTPATIENT)
Dept: HEMATOLOGY ONCOLOGY | Facility: CLINIC | Age: 69
End: 2024-01-01

## 2024-01-01 ENCOUNTER — OUTPATIENT (OUTPATIENT)
Dept: OUTPATIENT SERVICES | Facility: HOSPITAL | Age: 69
LOS: 1 days | Discharge: HOME | End: 2024-01-01

## 2024-01-01 ENCOUNTER — APPOINTMENT (OUTPATIENT)
Dept: INFUSION THERAPY | Facility: HOSPITAL | Age: 69
End: 2024-01-01

## 2024-01-01 DIAGNOSIS — Z98.890 OTHER SPECIFIED POSTPROCEDURAL STATES: Chronic | ICD-10-CM

## 2024-01-01 DIAGNOSIS — C71.9 MALIGNANT NEOPLASM OF BRAIN, UNSPECIFIED: ICD-10-CM

## 2024-01-01 DIAGNOSIS — D64.9 ANEMIA, UNSPECIFIED: ICD-10-CM

## 2024-01-01 LAB
BASOPHILS # BLD AUTO: 0.03 K/UL — SIGNIFICANT CHANGE UP (ref 0–0.2)
BASOPHILS NFR BLD AUTO: 0.4 % — SIGNIFICANT CHANGE UP (ref 0–2)
EOSINOPHIL # BLD AUTO: 0.01 K/UL — SIGNIFICANT CHANGE UP (ref 0–0.5)
EOSINOPHIL NFR BLD AUTO: 0.1 % — SIGNIFICANT CHANGE UP (ref 0–6)
HCT VFR BLD CALC: 41.6 % — SIGNIFICANT CHANGE UP (ref 39–50)
HGB BLD-MCNC: 14.1 G/DL — SIGNIFICANT CHANGE UP (ref 13–17)
IMM GRANULOCYTES NFR BLD AUTO: 4.9 % — HIGH (ref 0–0.9)
LYMPHOCYTES # BLD AUTO: 0.39 K/UL — LOW (ref 1–3.3)
LYMPHOCYTES # BLD AUTO: 4.8 % — LOW (ref 13–44)
MCHC RBC-ENTMCNC: 31.6 PG — SIGNIFICANT CHANGE UP (ref 27–34)
MCHC RBC-ENTMCNC: 33.9 G/DL — SIGNIFICANT CHANGE UP (ref 32–36)
MCV RBC AUTO: 93.3 FL — SIGNIFICANT CHANGE UP (ref 80–100)
MONOCYTES # BLD AUTO: 0.36 K/UL — SIGNIFICANT CHANGE UP (ref 0–0.9)
MONOCYTES NFR BLD AUTO: 4.4 % — SIGNIFICANT CHANGE UP (ref 2–14)
NEUTROPHILS # BLD AUTO: 6.99 K/UL — SIGNIFICANT CHANGE UP (ref 1.8–7.4)
NEUTROPHILS NFR BLD AUTO: 85.4 % — HIGH (ref 43–77)
NRBC # BLD: 0 /100 WBCS — SIGNIFICANT CHANGE UP (ref 0–0)
NRBC BLD-RTO: 0 /100 WBCS — SIGNIFICANT CHANGE UP (ref 0–0)
PLATELET # BLD AUTO: 187 K/UL — SIGNIFICANT CHANGE UP (ref 150–400)
RBC # BLD: 4.46 M/UL — SIGNIFICANT CHANGE UP (ref 4.2–5.8)
RBC # FLD: 17.8 % — HIGH (ref 10.3–14.5)
WBC # BLD: 8.18 K/UL — SIGNIFICANT CHANGE UP (ref 3.8–10.5)
WBC # FLD AUTO: 8.18 K/UL — SIGNIFICANT CHANGE UP (ref 3.8–10.5)

## 2024-01-02 LAB
ALBUMIN SERPL ELPH-MCNC: 4 G/DL
ALP BLD-CCNC: 65 U/L
ALT SERPL-CCNC: 29 U/L
AMYLASE/CREAT SERPL: 89 U/L
ANION GAP SERPL CALC-SCNC: 17 MMOL/L
APTT BLD: 32.5 SEC
AST SERPL-CCNC: 19 U/L
BILIRUB SERPL-MCNC: 0.2 MG/DL
BUN SERPL-MCNC: 12 MG/DL
CALCIUM SERPL-MCNC: 9.3 MG/DL
CHLORIDE SERPL-SCNC: 100 MMOL/L
CK SERPL-CCNC: 37 U/L
CO2 SERPL-SCNC: 21 MMOL/L
CREAT SERPL-MCNC: 0.72 MG/DL
EGFR: 101 ML/MIN/1.73M2
FIBRINOGEN AG PPP IA-MCNC: 426 MG/DL
GGT SERPL-CCNC: 31 U/L
GLUCOSE SERPL-MCNC: 131 MG/DL
INR PPP: 1.25 RATIO
LDH SERPL-CCNC: 369 U/L
LPL SERPL-CCNC: 42 U/L
MAGNESIUM SERPL-MCNC: 1.7 MG/DL
POTASSIUM SERPL-SCNC: 3.6 MMOL/L
PROT SERPL-MCNC: 6.4 G/DL
PT BLD: 14 SEC
SODIUM SERPL-SCNC: 138 MMOL/L
URATE SERPL-MCNC: 3.8 MG/DL

## 2024-01-03 ENCOUNTER — RESULT REVIEW (OUTPATIENT)
Age: 69
End: 2024-01-03

## 2024-01-03 ENCOUNTER — OUTPATIENT (OUTPATIENT)
Dept: OUTPATIENT SERVICES | Facility: HOSPITAL | Age: 69
LOS: 1 days | End: 2024-01-03
Payer: MEDICARE

## 2024-01-03 ENCOUNTER — APPOINTMENT (OUTPATIENT)
Dept: HEMATOLOGY ONCOLOGY | Facility: CLINIC | Age: 69
End: 2024-01-03

## 2024-01-03 ENCOUNTER — APPOINTMENT (OUTPATIENT)
Dept: MRI IMAGING | Facility: IMAGING CENTER | Age: 69
End: 2024-01-03
Payer: MEDICARE

## 2024-01-03 ENCOUNTER — LABORATORY RESULT (OUTPATIENT)
Age: 69
End: 2024-01-03

## 2024-01-03 ENCOUNTER — APPOINTMENT (OUTPATIENT)
Dept: NEUROLOGY | Facility: CLINIC | Age: 69
End: 2024-01-03
Payer: MEDICARE

## 2024-01-03 VITALS
RESPIRATION RATE: 16 BRPM | OXYGEN SATURATION: 97 % | DIASTOLIC BLOOD PRESSURE: 85 MMHG | HEART RATE: 78 BPM | HEIGHT: 72 IN | WEIGHT: 230 LBS | SYSTOLIC BLOOD PRESSURE: 158 MMHG | BODY MASS INDEX: 31.15 KG/M2

## 2024-01-03 DIAGNOSIS — Z98.890 OTHER SPECIFIED POSTPROCEDURAL STATES: Chronic | ICD-10-CM

## 2024-01-03 DIAGNOSIS — C71.9 MALIGNANT NEOPLASM OF BRAIN, UNSPECIFIED: ICD-10-CM

## 2024-01-03 LAB
BASOPHILS # BLD AUTO: 0.03 K/UL — SIGNIFICANT CHANGE UP (ref 0–0.2)
BASOPHILS # BLD AUTO: 0.03 K/UL — SIGNIFICANT CHANGE UP (ref 0–0.2)
BASOPHILS NFR BLD AUTO: 0.2 % — SIGNIFICANT CHANGE UP (ref 0–2)
BASOPHILS NFR BLD AUTO: 0.2 % — SIGNIFICANT CHANGE UP (ref 0–2)
EOSINOPHIL # BLD AUTO: 0.01 K/UL — SIGNIFICANT CHANGE UP (ref 0–0.5)
EOSINOPHIL # BLD AUTO: 0.01 K/UL — SIGNIFICANT CHANGE UP (ref 0–0.5)
EOSINOPHIL NFR BLD AUTO: 0.1 % — SIGNIFICANT CHANGE UP (ref 0–6)
EOSINOPHIL NFR BLD AUTO: 0.1 % — SIGNIFICANT CHANGE UP (ref 0–6)
HCT VFR BLD CALC: 41.9 % — SIGNIFICANT CHANGE UP (ref 39–50)
HCT VFR BLD CALC: 41.9 % — SIGNIFICANT CHANGE UP (ref 39–50)
HGB BLD-MCNC: 14.5 G/DL — SIGNIFICANT CHANGE UP (ref 13–17)
HGB BLD-MCNC: 14.5 G/DL — SIGNIFICANT CHANGE UP (ref 13–17)
IMM GRANULOCYTES NFR BLD AUTO: 4 % — HIGH (ref 0–0.9)
IMM GRANULOCYTES NFR BLD AUTO: 4 % — HIGH (ref 0–0.9)
LYMPHOCYTES # BLD AUTO: 0.35 K/UL — LOW (ref 1–3.3)
LYMPHOCYTES # BLD AUTO: 0.35 K/UL — LOW (ref 1–3.3)
LYMPHOCYTES # BLD AUTO: 2.9 % — LOW (ref 13–44)
LYMPHOCYTES # BLD AUTO: 2.9 % — LOW (ref 13–44)
MCHC RBC-ENTMCNC: 33.5 PG — SIGNIFICANT CHANGE UP (ref 27–34)
MCHC RBC-ENTMCNC: 33.5 PG — SIGNIFICANT CHANGE UP (ref 27–34)
MCHC RBC-ENTMCNC: 34.6 G/DL — SIGNIFICANT CHANGE UP (ref 32–36)
MCHC RBC-ENTMCNC: 34.6 G/DL — SIGNIFICANT CHANGE UP (ref 32–36)
MCV RBC AUTO: 96.8 FL — SIGNIFICANT CHANGE UP (ref 80–100)
MCV RBC AUTO: 96.8 FL — SIGNIFICANT CHANGE UP (ref 80–100)
MONOCYTES # BLD AUTO: 0.94 K/UL — HIGH (ref 0–0.9)
MONOCYTES # BLD AUTO: 0.94 K/UL — HIGH (ref 0–0.9)
MONOCYTES NFR BLD AUTO: 7.7 % — SIGNIFICANT CHANGE UP (ref 2–14)
MONOCYTES NFR BLD AUTO: 7.7 % — SIGNIFICANT CHANGE UP (ref 2–14)
NEUTROPHILS # BLD AUTO: 10.46 K/UL — HIGH (ref 1.8–7.4)
NEUTROPHILS # BLD AUTO: 10.46 K/UL — HIGH (ref 1.8–7.4)
NEUTROPHILS NFR BLD AUTO: 85.1 % — HIGH (ref 43–77)
NEUTROPHILS NFR BLD AUTO: 85.1 % — HIGH (ref 43–77)
NRBC # BLD: 0 /100 WBCS — SIGNIFICANT CHANGE UP (ref 0–0)
NRBC # BLD: 0 /100 WBCS — SIGNIFICANT CHANGE UP (ref 0–0)
PLATELET # BLD AUTO: 206 K/UL — SIGNIFICANT CHANGE UP (ref 150–400)
PLATELET # BLD AUTO: 206 K/UL — SIGNIFICANT CHANGE UP (ref 150–400)
RBC # BLD: 4.33 M/UL — SIGNIFICANT CHANGE UP (ref 4.2–5.8)
RBC # BLD: 4.33 M/UL — SIGNIFICANT CHANGE UP (ref 4.2–5.8)
RBC # FLD: 14.7 % — HIGH (ref 10.3–14.5)
RBC # FLD: 14.7 % — HIGH (ref 10.3–14.5)
WBC # BLD: 12.28 K/UL — HIGH (ref 3.8–10.5)
WBC # BLD: 12.28 K/UL — HIGH (ref 3.8–10.5)
WBC # FLD AUTO: 12.28 K/UL — HIGH (ref 3.8–10.5)
WBC # FLD AUTO: 12.28 K/UL — HIGH (ref 3.8–10.5)

## 2024-01-03 PROCEDURE — 99215 OFFICE O/P EST HI 40 MIN: CPT

## 2024-01-03 PROCEDURE — 70553 MRI BRAIN STEM W/O & W/DYE: CPT | Mod: 26,MH

## 2024-01-03 PROCEDURE — A9585: CPT

## 2024-01-03 PROCEDURE — 70553 MRI BRAIN STEM W/O & W/DYE: CPT

## 2024-01-04 RX ORDER — PANTOPRAZOLE 40 MG/1
40 TABLET, DELAYED RELEASE ORAL
Qty: 30 | Refills: 6 | Status: ACTIVE | COMMUNITY
Start: 2023-05-31 | End: 1900-01-01

## 2024-01-05 ENCOUNTER — NON-APPOINTMENT (OUTPATIENT)
Age: 69
End: 2024-01-05

## 2024-01-05 NOTE — DISCUSSION/SUMMARY
[FreeTextEntry1] : Patient seen and examined  GBM - Neurologically stable  MRI reviewed from today and i have compared it with MRI from 10/31/2023 - To my review, left temporal area with significantly reduced enhancement and flair changes- Official report including perfusion pending Will continue with TMZ and IV Avastin Completed 4 doses of IV Avastin thus far TMZ 5th cycle due to start on 1/8/2024 CEREBRAL EDEMA - Taper Dexamethasone to 2 mg daily.GI prophylaxis with Pantoprazole. PE - Continue Lovenox 150 mg daily - Diagnosed with PE on 9/28/2023- Recommended to switch injection sites while administering injections- Recommended to follow up with Dr Carpenter SEIZURES - Continue Keppra to 1500 - 1500 mg daily and Remains seizure free - very fatigued - Continue Klonopin to 0.25 mg twice a day- HYPONATREMIA - Continue  Na tabs 1 tab a day - Will follow up on blood works. Will continue close monitoring.   HYPERTENSION- Continue Metoprolol 25 mg daily. Follow up with PCP for BP management.  FOLLOW UP - Will follow up prior to the infusion on 1/30/2024. In the interim, if any concerns patient knows to call our office.

## 2024-01-05 NOTE — PHYSICAL EXAM
[General Appearance - Alert] : alert [General Appearance - In No Acute Distress] : in no acute distress [] : no respiratory distress [Respiration, Rhythm And Depth] : normal respiratory rhythm and effort [Exaggerated Use Of Accessory Muscles For Inspiration] : no accessory muscle use [Edema] : there was no peripheral edema [FreeTextEntry1] : Patient seen and examined  KPS : 80 Alert, awake , Oriented  + word finding difficulty Able to name objects - some when choices given PERRLA, EOMI, VFF Face symmetrical. Tongue protrudes midline BROWN x 4 with good and equal strength FFM, coordination equal bilaterally Sensation intact bilaterally Gait steady. Ambulating independently.

## 2024-01-05 NOTE — HISTORY OF PRESENT ILLNESS
[FreeTextEntry1] : Mr. Liu is a 69 yo male who is here for a follow up for his known GBM diagnosis  In brief  PMHx notable for only orthopedic issues - NKDA    5/9/2023 - I have seen him today with a new MRI. patient and his wife have noted over the past 3 weeks some difficulty with expressive speech - word substitutions and change in personality - more irritable that usual. He denies headaches, focal weakness,numbness, seizures, or gait instability. He saw Dr. Garcia, neurology - had and MRI this am - I have personally reviewed this film - that shows a left temporal heterogeneously enhancing mass measuring 5.0 x 4.5 x 3.3 cm with extension into the left frontal lobe with surrounding edema and left to right midline shift. He was sent to ER for an expedited evaluation  5/9/2023- 5/20/2023- Admitted at [facility]. MR Spect on 5/11 revealed "Reidentified heterogeneously enhancing necrotic mass lesion centered within the left temporal lobe and extending into the left periinsular region with a cystic portion along the inferior aspect of the lesion, with a large degree of surrounding vasogenic edema throughout the left parietal temporal lobes and left basal ganglia This lesion likely reflects a high-grade glioma. Functional imaging demonstrates no hyperactivity in the region of tumor or vasogenic edema."  Patient met criteria and was enrolled in IMVAX Trial.  5/15- had a Left pterional craniotomy for brain tumor resection with gleolan. Frozen path: high-grade glioma. Bilateral abdominal incisions made for later implantation of drug treatment. IMVAX Biologics were implanted into abdomen via incisions during bedside procedure per IMVAX trial on 5/17 under conscious sedation. These biologic agents were removed during bedside procedure on 5/19 under conscious sedation. Patient was  monitored in the NSCU, his post op course remained uncomplicated  5/16/2023 - Post op MRI notable for edema and residual nodular enhancement within the left insular  region and left anterior temporal lobe. CT Serial CT Head imaging post op showing stable resolving post operative changes. Patient was discharged home on 5/20/2023.  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  6/9/2023- Reached out to patient this am. Patient c/o blurred vision Right worse than left for past 2 days. Discussed with Dr Galvez. Will start him on Dexamethasone 2 mg daily.  6/19/2023 - 6/23/2023 - Patient called reporting seizure like symptoms and was admitted to [facility]. He reported frequent episodes of intense fear / uneasiness, feeling "stressed out of my mind," chills, piloerection to all extremities, palpitations, and increased respiratory rate, all of which lasts for less than 1 minute, and completely resolve. Patient's spouse at bedside confirming the above. Patient and spouse reporting that symptoms started about 5-6 days ago w/ about 10 episodes throughout the entire day. The frequency progressively increased over the course of the week. Yesterday, patient had these episodes about every 10-15 minutes, with the same duration of less than 1 minute. No other symptoms at this time, with the exception of varying degrees of word-finding difficulty. EEG was negative, however episodes stopped once Keppra was raised to 1500 mg bid and added clonazepam 0.5 mg bid. During this admission he was noted to be hyponatremic and  6/26/2023- ChemoRT begins. 8/3/2023- Here for a follow up. Reports he feels fatigued. His dexamethasone was tapered to 1 mg daily which started yesterday. 8/7- ChemoRT completed. 8/16/2023- Here for a follow up. Patient started having worsening aphasia since Sunday and worsened further by Monday evening. Last dose of steroids was on 8/11/2023. Denies headaches, seizures, N/V. CTH with increased cerebral edema - Started on Dexamethasone 9/6/2023 - MRI showed Left temporal craniotomy and postoperative changes in the left temporal lobe ring enhancement which is in size compared with 5/16/2023. Significantly less vasogenic edema and mass effect with resolution of midline shift compared with 5/16/2023. Although MR perfusion doesn't demonstrate increased blood in the left temporal lobe suggesting the presence of neoplasm. There does not appear to be progression. Plan was made to start on TMZ maintenance cycles 9/11- TMZ first cycle begin 9/14/2023- Reduced Klonopin to 0.25 mg at bedtime- Two days later he started having " freezing" episodes - Restarted Klonopin 0.25 mg bid 9/28/2023- Patient c/o sob - CT chest angio + PE - Admitted to [facility] - d/c on Lovenox 150 mg daily 10/4/2023 - Reduced Klonopin to 0.25 mg at bedtime only 10/9-10/13- TMZ second cycle- TDD of 450 mg ( dose based on 200 mg/m2) 10/10/2023- Patient's clonazepam was lowered to 0.25 mg at bedtime - Patient started having focal seizures ( freezing and piloerection episodes) - Recommended to take Klonopin 0.5 mg in the am - Patient now feeling better - will continue with Clonazepam 0.5 mg bid 10/17/2023- Spoke with patient's wife. Per wife and his family patient has increased word finding difficulty. I recommended to take Dexamethasone 4 mg bid 10/31/2023- MRI showed Mild increase in extent of a persistent left temporal heterogeneous ring-enhancing mass. Perfusion analysis of the mass demonstrates increased cerebral blood volume compatible with neoplasm. Moderate increase in surrounding nonenhancing FLAIR hyperintense signal abnormality is nonspecific and may represent treatment related changes versus nonenhancing tumor. Mild increase in mass effect. No midline shift..He remains on Dexamethasone 4 mg bid. His expressive Aphasia remains the same. 11/8/2023- Here for a follow up and to discuss further treatment plan. His aphasia and fatigue remains the same despite being on Dexamethasone 4 mg bid. Added IV AVastin to TMZ 11/13-11/17- TMZ third cycle- TDD of 450 mg ( dose based on 200 mg/m2) 11/14/2023- FIRST DOSE OF IV AVASTIN 11/28/2023- Here for a follow up prior to the second dose of IV Avastin. His wife noticed him being a little bit more active a day after the first infusion. Per patient he feels the same; He continues to have tremendous fatigue, speech remains the same. He feels out of breath when he takes a flight of stairs , on resting no sob 12/11-12/15- TMZ 4th cycle - TDD of 450 mg ( dose based on 200 mg/m2) 1/3/2024 - Here for a follow up along with a new MRI. Had 4 IV AVastin's thus far. Since after the 3rd infusion, wife noted an improvement in his alertness and energy level. Patient had " freezing episodes" two weeks ago, raised Keppra to 1500 mg bid and Dexamethasone 4 mg daily. Since then no more seizure like activity  Denies headaches - his wife notes clinical improvement over the past 2 weeks - more energy and no further seizures. Speech is also improved.

## 2024-01-09 ENCOUNTER — APPOINTMENT (OUTPATIENT)
Dept: HEMATOLOGY ONCOLOGY | Facility: CLINIC | Age: 69
End: 2024-01-09

## 2024-01-09 ENCOUNTER — APPOINTMENT (OUTPATIENT)
Dept: INFUSION THERAPY | Facility: HOSPITAL | Age: 69
End: 2024-01-09

## 2024-01-10 ENCOUNTER — OUTPATIENT (OUTPATIENT)
Dept: OUTPATIENT SERVICES | Facility: HOSPITAL | Age: 69
LOS: 1 days | Discharge: HOME | End: 2024-01-10

## 2024-01-10 DIAGNOSIS — Z98.890 OTHER SPECIFIED POSTPROCEDURAL STATES: Chronic | ICD-10-CM

## 2024-01-10 DIAGNOSIS — D64.9 ANEMIA, UNSPECIFIED: ICD-10-CM

## 2024-01-16 ENCOUNTER — RESULT REVIEW (OUTPATIENT)
Age: 69
End: 2024-01-16

## 2024-01-16 ENCOUNTER — APPOINTMENT (OUTPATIENT)
Dept: NEUROLOGY | Facility: CLINIC | Age: 69
End: 2024-01-16
Payer: MEDICARE

## 2024-01-16 ENCOUNTER — APPOINTMENT (OUTPATIENT)
Dept: INFUSION THERAPY | Facility: HOSPITAL | Age: 69
End: 2024-01-16

## 2024-01-16 ENCOUNTER — APPOINTMENT (OUTPATIENT)
Dept: HEMATOLOGY ONCOLOGY | Facility: CLINIC | Age: 69
End: 2024-01-16

## 2024-01-16 VITALS
HEART RATE: 73 BPM | HEIGHT: 72 IN | WEIGHT: 233 LBS | DIASTOLIC BLOOD PRESSURE: 80 MMHG | SYSTOLIC BLOOD PRESSURE: 147 MMHG | BODY MASS INDEX: 31.56 KG/M2 | TEMPERATURE: 97.7 F | RESPIRATION RATE: 16 BRPM | OXYGEN SATURATION: 96 %

## 2024-01-16 LAB
APPEARANCE UR: CLEAR — SIGNIFICANT CHANGE UP
BACTERIA # UR AUTO: NEGATIVE /HPF — SIGNIFICANT CHANGE UP
BASOPHILS # BLD AUTO: 0.05 K/UL — SIGNIFICANT CHANGE UP (ref 0–0.2)
BASOPHILS NFR BLD AUTO: 0.4 % — SIGNIFICANT CHANGE UP (ref 0–2)
BILIRUB UR-MCNC: NEGATIVE — SIGNIFICANT CHANGE UP
CAST: 1 /LPF — SIGNIFICANT CHANGE UP (ref 0–4)
COLOR SPEC: YELLOW — SIGNIFICANT CHANGE UP
DIFF PNL FLD: ABNORMAL
EOSINOPHIL # BLD AUTO: 0.02 K/UL — SIGNIFICANT CHANGE UP (ref 0–0.5)
EOSINOPHIL NFR BLD AUTO: 0.2 % — SIGNIFICANT CHANGE UP (ref 0–6)
GLUCOSE UR QL: NEGATIVE MG/DL — SIGNIFICANT CHANGE UP
HCT VFR BLD CALC: 42.2 % — SIGNIFICANT CHANGE UP (ref 39–50)
HGB BLD-MCNC: 14.9 G/DL — SIGNIFICANT CHANGE UP (ref 13–17)
IMM GRANULOCYTES NFR BLD AUTO: 4.8 % — HIGH (ref 0–0.9)
KETONES UR-MCNC: NEGATIVE MG/DL — SIGNIFICANT CHANGE UP
LEUKOCYTE ESTERASE UR-ACNC: NEGATIVE — SIGNIFICANT CHANGE UP
LYMPHOCYTES # BLD AUTO: 0.65 K/UL — LOW (ref 1–3.3)
LYMPHOCYTES # BLD AUTO: 5.8 % — LOW (ref 13–44)
MCHC RBC-ENTMCNC: 33 PG — SIGNIFICANT CHANGE UP (ref 27–34)
MCHC RBC-ENTMCNC: 35.3 G/DL — SIGNIFICANT CHANGE UP (ref 32–36)
MCV RBC AUTO: 93.4 FL — SIGNIFICANT CHANGE UP (ref 80–100)
MONOCYTES # BLD AUTO: 0.58 K/UL — SIGNIFICANT CHANGE UP (ref 0–0.9)
MONOCYTES NFR BLD AUTO: 5.2 % — SIGNIFICANT CHANGE UP (ref 2–14)
NEUTROPHILS # BLD AUTO: 9.32 K/UL — HIGH (ref 1.8–7.4)
NEUTROPHILS NFR BLD AUTO: 83.6 % — HIGH (ref 43–77)
NITRITE UR-MCNC: NEGATIVE — SIGNIFICANT CHANGE UP
NRBC # BLD: 0 /100 WBCS — SIGNIFICANT CHANGE UP (ref 0–0)
PH UR: 6 — SIGNIFICANT CHANGE UP (ref 5–8)
PLATELET # BLD AUTO: 208 K/UL — SIGNIFICANT CHANGE UP (ref 150–400)
PROT UR-MCNC: SIGNIFICANT CHANGE UP MG/DL
RBC # BLD: 4.52 M/UL — SIGNIFICANT CHANGE UP (ref 4.2–5.8)
RBC # FLD: 14.3 % — SIGNIFICANT CHANGE UP (ref 10.3–14.5)
RBC CASTS # UR COMP ASSIST: 3 /HPF — SIGNIFICANT CHANGE UP (ref 0–4)
REVIEW: SIGNIFICANT CHANGE UP
SP GR SPEC: 1.02 — SIGNIFICANT CHANGE UP (ref 1–1.03)
SQUAMOUS # UR AUTO: 0 /HPF — SIGNIFICANT CHANGE UP (ref 0–5)
UROBILINOGEN FLD QL: 0.2 MG/DL — SIGNIFICANT CHANGE UP (ref 0.2–1)
WBC # BLD: 11.15 K/UL — HIGH (ref 3.8–10.5)
WBC # FLD AUTO: 11.15 K/UL — HIGH (ref 3.8–10.5)
WBC UR QL: 0 /HPF — SIGNIFICANT CHANGE UP (ref 0–5)

## 2024-01-16 PROCEDURE — 99215 OFFICE O/P EST HI 40 MIN: CPT

## 2024-01-16 NOTE — HISTORY OF PRESENT ILLNESS
[FreeTextEntry1] : Mr. Liu is a 69 yo male who is here for a follow up for his known GBM diagnosis  In brief  PMHx notable for only orthopedic issues - NKDA    5/9/2023 - I have seen him today with a new MRI. patient and his wife have noted over the past 3 weeks some difficulty with expressive speech - word substitutions and change in personality - more irritable that usual. He denies headaches, focal weakness,numbness, seizures, or gait instability. He saw Dr. Garcia, neurology - had and MRI this am - I have personally reviewed this film - that shows a left temporal heterogeneously enhancing mass measuring 5.0 x 4.5 x 3.3 cm with extension into the left frontal lobe with surrounding edema and left to right midline shift. He was sent to ER for an expedited evaluation  5/9/2023- 5/20/2023- Admitted at [facility]. MR Spect on 5/11 revealed "Reidentified heterogeneously enhancing necrotic mass lesion centered within the left temporal lobe and extending into the left periinsular region with a cystic portion along the inferior aspect of the lesion, with a large degree of surrounding vasogenic edema throughout the left parietal temporal lobes and left basal ganglia This lesion likely reflects a high-grade glioma. Functional imaging demonstrates no hyperactivity in the region of tumor or vasogenic edema."  Patient met criteria and was enrolled in IMVAX Trial.  5/15- had a Left pterional craniotomy for brain tumor resection with gleolan. Frozen path: high-grade glioma. Bilateral abdominal incisions made for later implantation of drug treatment. IMVAX Biologics were implanted into abdomen via incisions during bedside procedure per IMVAX trial on 5/17 under conscious sedation. These biologic agents were removed during bedside procedure on 5/19 under conscious sedation. Patient was  monitored in the NSCU, his post op course remained uncomplicated  5/16/2023 - Post op MRI notable for edema and residual nodular enhancement within the left insular  region and left anterior temporal lobe. CT Serial CT Head imaging post op showing stable resolving post operative changes. Patient was discharged home on 5/20/2023.  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  6/9/2023- Reached out to patient this am. Patient c/o blurred vision Right worse than left for past 2 days. Discussed with Dr Galvez. Will start him on Dexamethasone 2 mg daily.  6/19/2023 - 6/23/2023 - Patient called reporting seizure like symptoms and was admitted to [facility]. He reported frequent episodes of intense fear / uneasiness, feeling "stressed out of my mind," chills, piloerection to all extremities, palpitations, and increased respiratory rate, all of which lasts for less than 1 minute, and completely resolve. Patient's spouse at bedside confirming the above. Patient and spouse reporting that symptoms started about 5-6 days ago w/ about 10 episodes throughout the entire day. The frequency progressively increased over the course of the week. Yesterday, patient had these episodes about every 10-15 minutes, with the same duration of less than 1 minute. No other symptoms at this time, with the exception of varying degrees of word-finding difficulty. EEG was negative, however episodes stopped once Keppra was raised to 1500 mg bid and added clonazepam 0.5 mg bid. During this admission he was noted to be hyponatremic and  6/26/2023- ChemoRT begins. 8/3/2023- Here for a follow up. Reports he feels fatigued. His dexamethasone was tapered to 1 mg daily which started yesterday. 8/7- ChemoRT completed. 8/16/2023- Here for a follow up. Patient started having worsening aphasia since Sunday and worsened further by Monday evening. Last dose of steroids was on 8/11/2023. Denies headaches, seizures, N/V. CTH with increased cerebral edema - Started on Dexamethasone 9/6/2023 - MRI showed Left temporal craniotomy and postoperative changes in the left temporal lobe ring enhancement which is in size compared with 5/16/2023. Significantly less vasogenic edema and mass effect with resolution of midline shift compared with 5/16/2023. Although MR perfusion doesn't demonstrate increased blood in the left temporal lobe suggesting the presence of neoplasm. There does not appear to be progression. Plan was made to start on TMZ maintenance cycles 9/11- TMZ first cycle begin 9/14/2023- Reduced Klonopin to 0.25 mg at bedtime- Two days later he started having " freezing" episodes - Restarted Klonopin 0.25 mg bid 9/28/2023- Patient c/o sob - CT chest angio + PE - Admitted to [facility] - d/c on Lovenox 150 mg daily 10/4/2023 - Reduced Klonopin to 0.25 mg at bedtime only 10/9-10/13- TMZ second cycle- TDD of 450 mg ( dose based on 200 mg/m2) 10/10/2023- Patient's clonazepam was lowered to 0.25 mg at bedtime - Patient started having focal seizures ( freezing and piloerection episodes) - Recommended to take Klonopin 0.5 mg in the am - Patient now feeling better - will continue with Clonazepam 0.5 mg bid 10/17/2023- Spoke with patient's wife. Per wife and his family patient has increased word finding difficulty. I recommended to take Dexamethasone 4 mg bid 10/31/2023- MRI showed Mild increase in extent of a persistent left temporal heterogeneous ring-enhancing mass. Perfusion analysis of the mass demonstrates increased cerebral blood volume compatible with neoplasm. Moderate increase in surrounding nonenhancing FLAIR hyperintense signal abnormality is nonspecific and may represent treatment related changes versus nonenhancing tumor. Mild increase in mass effect. No midline shift..He remains on Dexamethasone 4 mg bid. His expressive Aphasia remains the same. 11/8/2023- Here for a follow up and to discuss further treatment plan. His aphasia and fatigue remains the same despite being on Dexamethasone 4 mg bid. Added IV AVastin to TMZ 11/13-11/17- TMZ third cycle- TDD of 450 mg ( dose based on 200 mg/m2) 11/14/2023- FIRST DOSE OF IV AVASTIN 11/28/2023- Here for a follow up prior to the second dose of IV Avastin. His wife noticed him being a little bit more active a day after the first infusion. Per patient he feels the same; He continues to have tremendous fatigue, speech remains the same. He feels out of breath when he takes a flight of stairs , on resting no sob 12/11-12/15- TMZ 4th cycle - TDD of 450 mg ( dose based on 200 mg/m2) 1/3/2024 - MRI stable Had 4 IV AVastin's thus far. Since after the 3rd infusion, wife noted an improvement in his alertness and energy level. Patient had " freezing episodes" two weeks ago, raised Keppra to 1500 mg bid and Dexamethasone 4 mg daily. Since then no more seizure like activity Denies headaches - his wife notes clinical improvement over the past 2 weeks - more energy and no further seizures. Speech is also improved. 1/5/2023- Patient got diagnosed with acute bronchitis - Started on Augmentin X 10 days - Chemo and immunotherapy on hold 1/16/2024- Patient here for a follow up. He completed his Abx yesterday. He is feeling better

## 2024-01-16 NOTE — PHYSICAL EXAM
[General Appearance - Alert] : alert [General Appearance - In No Acute Distress] : in no acute distress [Oriented To Time, Place, And Person] : oriented to person, place, and time [] : no respiratory distress [Respiration, Rhythm And Depth] : normal respiratory rhythm and effort [Exaggerated Use Of Accessory Muscles For Inspiration] : no accessory muscle use [Edema] : there was no peripheral edema [FreeTextEntry1] : Patient seen and examined  KPS : 80 Alert, awake , Oriented + word finding difficulty Able to name objects - some when choices given PERRLA, EOMI, Right VFD Face symmetrical. Tongue protrudes midline BROWN x 4 with good and equal strength FFM, coordination equal bilaterally Sensation intact bilaterally Gait steady. Ambulating independently.

## 2024-01-16 NOTE — DISCUSSION/SUMMARY
[FreeTextEntry1] : Patient seen and examined  GBM - Neurologically stable Will continue with TMZ and IV Avastin 5th dose of IV Avastin today TMZ 5th cycle delayed sec to acute bronchitis - Rx to begin on 1/18/2024 CEREBRAL EDEMA - Taper Dexamethasone to 2 mg daily.GI prophylaxis with Pantoprazole.- If clinically remains stable - will taper on the 18th  PE - Continue Lovenox 150 mg daily - Diagnosed with PE on 9/28/2023- Recommended to switch injection sites while administering injections- Recommended to follow up with Dr Carpenter( reinforced) SEIZURES - Continue Keppra to 1500 - 1500 mg daily and Remains seizure free - very fatigued - Continue Klonopin to 0.25 mg twice a day- HYPONATREMIA - Continue Na tabs 1 tab a day - Will follow up on blood works. Will continue close monitoring.  HYPERTENSION- Continue Metoprolol 25 mg daily. Follow up with PCP for BP management.  FOLLOW UP - Will follow up prior to the infusion on 2/13/2024 In the interim, if any concerns patient knows to call our office.

## 2024-01-17 DIAGNOSIS — Z51.11 ENCOUNTER FOR ANTINEOPLASTIC CHEMOTHERAPY: ICD-10-CM

## 2024-01-17 DIAGNOSIS — C71.9 MALIGNANT NEOPLASM OF BRAIN, UNSPECIFIED: ICD-10-CM

## 2024-01-17 LAB
ALBUMIN SERPL ELPH-MCNC: 4.2 G/DL — SIGNIFICANT CHANGE UP (ref 3.3–5)
ALP SERPL-CCNC: 82 U/L — SIGNIFICANT CHANGE UP (ref 40–120)
ALT FLD-CCNC: 46 U/L — HIGH (ref 10–45)
ANION GAP SERPL CALC-SCNC: 19 MMOL/L — HIGH (ref 5–17)
AST SERPL-CCNC: 30 U/L — SIGNIFICANT CHANGE UP (ref 10–40)
BILIRUB SERPL-MCNC: 0.2 MG/DL — SIGNIFICANT CHANGE UP (ref 0.2–1.2)
BUN SERPL-MCNC: 12 MG/DL — SIGNIFICANT CHANGE UP (ref 7–23)
CALCIUM SERPL-MCNC: 9.7 MG/DL — SIGNIFICANT CHANGE UP (ref 8.4–10.5)
CHLORIDE SERPL-SCNC: 99 MMOL/L — SIGNIFICANT CHANGE UP (ref 96–108)
CO2 SERPL-SCNC: 21 MMOL/L — LOW (ref 22–31)
CREAT SERPL-MCNC: 0.67 MG/DL — SIGNIFICANT CHANGE UP (ref 0.5–1.3)
EGFR: 103 ML/MIN/1.73M2 — SIGNIFICANT CHANGE UP
GLUCOSE SERPL-MCNC: 127 MG/DL — HIGH (ref 70–99)
POTASSIUM SERPL-MCNC: 4.4 MMOL/L — SIGNIFICANT CHANGE UP (ref 3.5–5.3)
POTASSIUM SERPL-SCNC: 4.4 MMOL/L — SIGNIFICANT CHANGE UP (ref 3.5–5.3)
PROT SERPL-MCNC: 7.3 G/DL — SIGNIFICANT CHANGE UP (ref 6–8.3)
SODIUM SERPL-SCNC: 139 MMOL/L — SIGNIFICANT CHANGE UP (ref 135–145)

## 2024-01-18 ENCOUNTER — NON-APPOINTMENT (OUTPATIENT)
Age: 69
End: 2024-01-18

## 2024-01-23 ENCOUNTER — APPOINTMENT (OUTPATIENT)
Dept: NEUROLOGY | Facility: CLINIC | Age: 69
End: 2024-01-23

## 2024-01-26 DIAGNOSIS — R21 RASH AND OTHER NONSPECIFIC SKIN ERUPTION: ICD-10-CM

## 2024-01-30 ENCOUNTER — RESULT REVIEW (OUTPATIENT)
Age: 69
End: 2024-01-30

## 2024-01-30 ENCOUNTER — APPOINTMENT (OUTPATIENT)
Dept: HEMATOLOGY ONCOLOGY | Facility: CLINIC | Age: 69
End: 2024-01-30

## 2024-01-30 ENCOUNTER — APPOINTMENT (OUTPATIENT)
Dept: INFUSION THERAPY | Facility: HOSPITAL | Age: 69
End: 2024-01-30

## 2024-01-30 LAB
APPEARANCE UR: CLEAR — SIGNIFICANT CHANGE UP
BACTERIA # UR AUTO: NEGATIVE /HPF — SIGNIFICANT CHANGE UP
BASOPHILS # BLD AUTO: 0.06 K/UL — SIGNIFICANT CHANGE UP (ref 0–0.2)
BASOPHILS NFR BLD AUTO: 0.6 % — SIGNIFICANT CHANGE UP (ref 0–2)
BILIRUB UR-MCNC: NEGATIVE — SIGNIFICANT CHANGE UP
CAST: 0 /LPF — SIGNIFICANT CHANGE UP (ref 0–4)
COLOR SPEC: YELLOW — SIGNIFICANT CHANGE UP
DIFF PNL FLD: ABNORMAL
EOSINOPHIL # BLD AUTO: 0.02 K/UL — SIGNIFICANT CHANGE UP (ref 0–0.5)
EOSINOPHIL NFR BLD AUTO: 0.2 % — SIGNIFICANT CHANGE UP (ref 0–6)
GLUCOSE UR QL: NEGATIVE MG/DL — SIGNIFICANT CHANGE UP
HCT VFR BLD CALC: 42 % — SIGNIFICANT CHANGE UP (ref 39–50)
HGB BLD-MCNC: 14.5 G/DL — SIGNIFICANT CHANGE UP (ref 13–17)
IMM GRANULOCYTES NFR BLD AUTO: 3.9 % — HIGH (ref 0–0.9)
KETONES UR-MCNC: NEGATIVE MG/DL — SIGNIFICANT CHANGE UP
LEUKOCYTE ESTERASE UR-ACNC: NEGATIVE — SIGNIFICANT CHANGE UP
LYMPHOCYTES # BLD AUTO: 0.83 K/UL — LOW (ref 1–3.3)
LYMPHOCYTES # BLD AUTO: 8 % — LOW (ref 13–44)
MCHC RBC-ENTMCNC: 32.9 PG — SIGNIFICANT CHANGE UP (ref 27–34)
MCHC RBC-ENTMCNC: 34.5 G/DL — SIGNIFICANT CHANGE UP (ref 32–36)
MCV RBC AUTO: 95.2 FL — SIGNIFICANT CHANGE UP (ref 80–100)
MONOCYTES # BLD AUTO: 0.91 K/UL — HIGH (ref 0–0.9)
MONOCYTES NFR BLD AUTO: 8.8 % — SIGNIFICANT CHANGE UP (ref 2–14)
NEUTROPHILS # BLD AUTO: 8.17 K/UL — HIGH (ref 1.8–7.4)
NEUTROPHILS NFR BLD AUTO: 78.5 % — HIGH (ref 43–77)
NITRITE UR-MCNC: NEGATIVE — SIGNIFICANT CHANGE UP
NRBC # BLD: 0 /100 WBCS — SIGNIFICANT CHANGE UP (ref 0–0)
PH UR: 5.5 — SIGNIFICANT CHANGE UP (ref 5–8)
PLATELET # BLD AUTO: 227 K/UL — SIGNIFICANT CHANGE UP (ref 150–400)
PROT UR-MCNC: SIGNIFICANT CHANGE UP MG/DL
RBC # BLD: 4.41 M/UL — SIGNIFICANT CHANGE UP (ref 4.2–5.8)
RBC # FLD: 14.2 % — SIGNIFICANT CHANGE UP (ref 10.3–14.5)
RBC CASTS # UR COMP ASSIST: 2 /HPF — SIGNIFICANT CHANGE UP (ref 0–4)
SP GR SPEC: 1.03 — SIGNIFICANT CHANGE UP (ref 1–1.03)
SQUAMOUS # UR AUTO: 1 /HPF — SIGNIFICANT CHANGE UP (ref 0–5)
UROBILINOGEN FLD QL: 0.2 MG/DL — SIGNIFICANT CHANGE UP (ref 0.2–1)
WBC # BLD: 10.4 K/UL — SIGNIFICANT CHANGE UP (ref 3.8–10.5)
WBC # FLD AUTO: 10.4 K/UL — SIGNIFICANT CHANGE UP (ref 3.8–10.5)
WBC UR QL: 0 /HPF — SIGNIFICANT CHANGE UP (ref 0–5)

## 2024-01-31 LAB
ALBUMIN SERPL ELPH-MCNC: 4 G/DL — SIGNIFICANT CHANGE UP (ref 3.3–5)
ALP SERPL-CCNC: 69 U/L — SIGNIFICANT CHANGE UP (ref 40–120)
ALT FLD-CCNC: 41 U/L — SIGNIFICANT CHANGE UP (ref 10–45)
ANION GAP SERPL CALC-SCNC: 13 MMOL/L — SIGNIFICANT CHANGE UP (ref 5–17)
AST SERPL-CCNC: 31 U/L — SIGNIFICANT CHANGE UP (ref 10–40)
BILIRUB SERPL-MCNC: 0.2 MG/DL — SIGNIFICANT CHANGE UP (ref 0.2–1.2)
BUN SERPL-MCNC: 14 MG/DL — SIGNIFICANT CHANGE UP (ref 7–23)
CALCIUM SERPL-MCNC: 9.7 MG/DL — SIGNIFICANT CHANGE UP (ref 8.4–10.5)
CHLORIDE SERPL-SCNC: 101 MMOL/L — SIGNIFICANT CHANGE UP (ref 96–108)
CO2 SERPL-SCNC: 22 MMOL/L — SIGNIFICANT CHANGE UP (ref 22–31)
CREAT SERPL-MCNC: 0.72 MG/DL — SIGNIFICANT CHANGE UP (ref 0.5–1.3)
EGFR: 101 ML/MIN/1.73M2 — SIGNIFICANT CHANGE UP
GLUCOSE SERPL-MCNC: 124 MG/DL — HIGH (ref 70–99)
POTASSIUM SERPL-MCNC: 4.6 MMOL/L — SIGNIFICANT CHANGE UP (ref 3.5–5.3)
POTASSIUM SERPL-SCNC: 4.6 MMOL/L — SIGNIFICANT CHANGE UP (ref 3.5–5.3)
PROT SERPL-MCNC: 6.8 G/DL — SIGNIFICANT CHANGE UP (ref 6–8.3)
SODIUM SERPL-SCNC: 137 MMOL/L — SIGNIFICANT CHANGE UP (ref 135–145)

## 2024-02-01 ENCOUNTER — EMERGENCY (EMERGENCY)
Facility: HOSPITAL | Age: 69
LOS: 1 days | Discharge: HOME | End: 2024-02-01
Attending: EMERGENCY MEDICINE
Payer: MEDICARE

## 2024-02-01 VITALS
DIASTOLIC BLOOD PRESSURE: 87 MMHG | RESPIRATION RATE: 18 BRPM | TEMPERATURE: 98 F | OXYGEN SATURATION: 97 % | SYSTOLIC BLOOD PRESSURE: 156 MMHG | HEART RATE: 98 BPM

## 2024-02-01 VITALS — HEIGHT: 72 IN

## 2024-02-01 DIAGNOSIS — Z98.890 OTHER SPECIFIED POSTPROCEDURAL STATES: Chronic | ICD-10-CM

## 2024-02-01 LAB
ALBUMIN SERPL ELPH-MCNC: 3.6 G/DL — SIGNIFICANT CHANGE UP (ref 3.3–5)
ALP SERPL-CCNC: 71 U/L — SIGNIFICANT CHANGE UP (ref 40–120)
ALT FLD-CCNC: 46 U/L — HIGH (ref 10–45)
ANION GAP SERPL CALC-SCNC: 14 MMOL/L — SIGNIFICANT CHANGE UP (ref 5–17)
APTT BLD: 25 SEC — SIGNIFICANT CHANGE UP (ref 24.5–35.6)
AST SERPL-CCNC: 31 U/L — SIGNIFICANT CHANGE UP (ref 10–40)
BASOPHILS # BLD AUTO: 0 K/UL — SIGNIFICANT CHANGE UP (ref 0–0.2)
BASOPHILS # BLD AUTO: 0.04 K/UL — SIGNIFICANT CHANGE UP (ref 0–0.2)
BASOPHILS NFR BLD AUTO: 0 % — SIGNIFICANT CHANGE UP (ref 0–2)
BASOPHILS NFR BLD AUTO: 0.3 % — SIGNIFICANT CHANGE UP (ref 0–2)
BILIRUB SERPL-MCNC: 0.2 MG/DL — SIGNIFICANT CHANGE UP (ref 0.2–1.2)
BLD GP AB SCN SERPL QL: NEGATIVE — SIGNIFICANT CHANGE UP
BUN SERPL-MCNC: 18 MG/DL — SIGNIFICANT CHANGE UP (ref 7–23)
CALCIUM SERPL-MCNC: 8.7 MG/DL — SIGNIFICANT CHANGE UP (ref 8.4–10.5)
CHLORIDE SERPL-SCNC: 102 MMOL/L — SIGNIFICANT CHANGE UP (ref 96–108)
CO2 SERPL-SCNC: 19 MMOL/L — LOW (ref 22–31)
CREAT SERPL-MCNC: 0.89 MG/DL — SIGNIFICANT CHANGE UP (ref 0.5–1.3)
EGFR: 95 ML/MIN/1.73M2 — SIGNIFICANT CHANGE UP
EOSINOPHIL # BLD AUTO: 0 K/UL — SIGNIFICANT CHANGE UP (ref 0–0.5)
EOSINOPHIL # BLD AUTO: 0.02 K/UL — SIGNIFICANT CHANGE UP (ref 0–0.5)
EOSINOPHIL NFR BLD AUTO: 0 % — SIGNIFICANT CHANGE UP (ref 0–6)
EOSINOPHIL NFR BLD AUTO: 0.2 % — SIGNIFICANT CHANGE UP (ref 0–6)
GLUCOSE SERPL-MCNC: 205 MG/DL — HIGH (ref 70–99)
HCT VFR BLD CALC: 33.1 % — LOW (ref 39–50)
HCT VFR BLD CALC: 38.6 % — LOW (ref 39–50)
HGB BLD-MCNC: 11.3 G/DL — LOW (ref 13–17)
HGB BLD-MCNC: 12.7 G/DL — LOW (ref 13–17)
IMM GRANULOCYTES NFR BLD AUTO: 5.1 % — HIGH (ref 0–0.9)
INR BLD: 1.07 RATIO — SIGNIFICANT CHANGE UP (ref 0.85–1.18)
LYMPHOCYTES # BLD AUTO: 0.55 K/UL — LOW (ref 1–3.3)
LYMPHOCYTES # BLD AUTO: 1.07 K/UL — SIGNIFICANT CHANGE UP (ref 1–3.3)
LYMPHOCYTES # BLD AUTO: 5.3 % — LOW (ref 13–44)
LYMPHOCYTES # BLD AUTO: 9.2 % — LOW (ref 13–44)
MANUAL SMEAR VERIFICATION: SIGNIFICANT CHANGE UP
MCHC RBC-ENTMCNC: 32.2 PG — SIGNIFICANT CHANGE UP (ref 27–34)
MCHC RBC-ENTMCNC: 32.8 PG — SIGNIFICANT CHANGE UP (ref 27–34)
MCHC RBC-ENTMCNC: 32.9 GM/DL — SIGNIFICANT CHANGE UP (ref 32–36)
MCHC RBC-ENTMCNC: 34.1 GM/DL — SIGNIFICANT CHANGE UP (ref 32–36)
MCV RBC AUTO: 96.2 FL — SIGNIFICANT CHANGE UP (ref 80–100)
MCV RBC AUTO: 98 FL — SIGNIFICANT CHANGE UP (ref 80–100)
MONOCYTES # BLD AUTO: 1.01 K/UL — HIGH (ref 0–0.9)
MONOCYTES # BLD AUTO: 1.12 K/UL — HIGH (ref 0–0.9)
MONOCYTES NFR BLD AUTO: 9.6 % — SIGNIFICANT CHANGE UP (ref 2–14)
MONOCYTES NFR BLD AUTO: 9.7 % — SIGNIFICANT CHANGE UP (ref 2–14)
NEUTROPHILS # BLD AUTO: 8.8 K/UL — HIGH (ref 1.8–7.4)
NEUTROPHILS # BLD AUTO: 8.88 K/UL — HIGH (ref 1.8–7.4)
NEUTROPHILS NFR BLD AUTO: 75.6 % — SIGNIFICANT CHANGE UP (ref 43–77)
NEUTROPHILS NFR BLD AUTO: 85 % — HIGH (ref 43–77)
NRBC # BLD: 0 /100 WBCS — SIGNIFICANT CHANGE UP (ref 0–0)
PLAT MORPH BLD: NORMAL — SIGNIFICANT CHANGE UP
PLATELET # BLD AUTO: 194 K/UL — SIGNIFICANT CHANGE UP (ref 150–400)
PLATELET # BLD AUTO: 263 K/UL — SIGNIFICANT CHANGE UP (ref 150–400)
POTASSIUM SERPL-MCNC: 3.9 MMOL/L — SIGNIFICANT CHANGE UP (ref 3.5–5.3)
POTASSIUM SERPL-SCNC: 3.9 MMOL/L — SIGNIFICANT CHANGE UP (ref 3.5–5.3)
PROT SERPL-MCNC: 5.8 G/DL — LOW (ref 6–8.3)
PROTHROM AB SERPL-ACNC: 11.7 SEC — SIGNIFICANT CHANGE UP (ref 9.5–13)
RBC # BLD: 3.44 M/UL — LOW (ref 4.2–5.8)
RBC # BLD: 3.94 M/UL — LOW (ref 4.2–5.8)
RBC # FLD: 14 % — SIGNIFICANT CHANGE UP (ref 10.3–14.5)
RBC # FLD: 14.3 % — SIGNIFICANT CHANGE UP (ref 10.3–14.5)
RBC BLD AUTO: NORMAL — SIGNIFICANT CHANGE UP
RH IG SCN BLD-IMP: NEGATIVE — SIGNIFICANT CHANGE UP
SODIUM SERPL-SCNC: 135 MMOL/L — SIGNIFICANT CHANGE UP (ref 135–145)
WBC # BLD: 10.45 K/UL — SIGNIFICANT CHANGE UP (ref 3.8–10.5)
WBC # BLD: 11.64 K/UL — HIGH (ref 3.8–10.5)
WBC # FLD AUTO: 10.45 K/UL — SIGNIFICANT CHANGE UP (ref 3.8–10.5)
WBC # FLD AUTO: 11.64 K/UL — HIGH (ref 3.8–10.5)

## 2024-02-01 PROCEDURE — 80053 COMPREHEN METABOLIC PANEL: CPT

## 2024-02-01 PROCEDURE — 99284 EMERGENCY DEPT VISIT MOD MDM: CPT | Mod: 25

## 2024-02-01 PROCEDURE — 86850 RBC ANTIBODY SCREEN: CPT

## 2024-02-01 PROCEDURE — 86900 BLOOD TYPING SEROLOGIC ABO: CPT

## 2024-02-01 PROCEDURE — 86901 BLOOD TYPING SEROLOGIC RH(D): CPT

## 2024-02-01 PROCEDURE — 93005 ELECTROCARDIOGRAM TRACING: CPT | Mod: XU

## 2024-02-01 PROCEDURE — 85610 PROTHROMBIN TIME: CPT

## 2024-02-01 PROCEDURE — 36415 COLL VENOUS BLD VENIPUNCTURE: CPT

## 2024-02-01 PROCEDURE — 99285 EMERGENCY DEPT VISIT HI MDM: CPT | Mod: 25

## 2024-02-01 PROCEDURE — 85730 THROMBOPLASTIN TIME PARTIAL: CPT

## 2024-02-01 PROCEDURE — 30901 CONTROL OF NOSEBLEED: CPT | Mod: LT

## 2024-02-01 PROCEDURE — 71045 X-RAY EXAM CHEST 1 VIEW: CPT | Mod: 26

## 2024-02-01 PROCEDURE — 30903 CONTROL OF NOSEBLEED: CPT | Mod: RT

## 2024-02-01 PROCEDURE — 71045 X-RAY EXAM CHEST 1 VIEW: CPT

## 2024-02-01 PROCEDURE — 85025 COMPLETE CBC W/AUTO DIFF WBC: CPT

## 2024-02-01 RX ORDER — TRANEXAMIC ACID 100 MG/ML
5 INJECTION, SOLUTION INTRAVENOUS ONCE
Refills: 0 | Status: COMPLETED | OUTPATIENT
Start: 2024-02-01 | End: 2024-02-01

## 2024-02-01 RX ORDER — SODIUM CHLORIDE 9 MG/ML
1000 INJECTION INTRAMUSCULAR; INTRAVENOUS; SUBCUTANEOUS ONCE
Refills: 0 | Status: COMPLETED | OUTPATIENT
Start: 2024-02-01 | End: 2024-02-01

## 2024-02-01 RX ADMIN — SODIUM CHLORIDE 1000 MILLILITER(S): 9 INJECTION INTRAMUSCULAR; INTRAVENOUS; SUBCUTANEOUS at 01:06

## 2024-02-01 RX ADMIN — TRANEXAMIC ACID 5 MILLILITER(S): 100 INJECTION, SOLUTION INTRAVENOUS at 00:35

## 2024-02-01 NOTE — ED ADULT NURSE NOTE - CCCP TRG CHIEF CMPLNT
nose bleed Tazorac Counseling:  Patient advised that medication is irritating and drying.  Patient may need to apply sparingly and wash off after an hour before eventually leaving it on overnight.  The patient verbalized understanding of the proper use and possible adverse effects of tazorac.  All of the patient's questions and concerns were addressed.

## 2024-02-01 NOTE — ED PROVIDER NOTE - OBJECTIVE STATEMENT
Patient is a 68-year-old history of glioblastoma, presenting with epistaxis.  Patient states he was at home tonight sleeping, woke up with a bad nosebleed.  No history of nosebleeds.  Has been getting chemotherapy last few days ago.  Follows with [facility].  No history of bleeding in the past.  After the bleeding started he had a few episodes of diarrhea.  He was feeling very weak and diaphoretic at that time.  Patient now feels a lot better.  Bleeding was controlled with multiple tampons.  Patient still feels like there is some bleeding down the back of his throat.  Denies chest pain, dizziness, shortness of breath, syncope, abdominal pain, blood in stool.

## 2024-02-01 NOTE — ED PROVIDER NOTE - NSFOLLOWUPINSTRUCTIONS_ED_ALL_ED_FT
You were evaluated in the Emergency Department for nose bleeding.    We recommend that you:  1. Please follow-up with your primary care doctor.  2. Please follow-up with your hematologist/oncologist.  3. Keep the nasal packing in until you can follow-up with the ENT doctor. The hospital should call you to help set up an appointment. If you do not hear from someone by tomorrow, please call the number provided to schedule an appointment.  4. If you start bleeding again and bleeding can not be controlled at home, please return to the emergency room immediately.       *** Return immediately if you have worsening symptoms, difficulty breathing, chest pain, syncope, or any other new/concerning symptoms. ***

## 2024-02-01 NOTE — ED PROVIDER NOTE - PHYSICAL EXAMINATION
GENERAL: no acute distress, non-toxic appearing  HEAD: normocephalic, atraumatic  HEENT: PERRLA, EOMI, normal conjunctiva, +blood in bilateral nares, mild bleeding in oropharynx  CARDIAC: regular rate and rhythm, no appreciable murmurs  PULM: moving air throughout, clear lungs on the left, +crackles RUL  GI: abdomen nondistended, soft, nontender  NEURO: alert and oriented x 3, normal speech, no gross neurologic deficit  MSK: no visible deformities  SKIN: no visible rashes, dry, well-perfused  PSYCH: appropriate mood and affect

## 2024-02-01 NOTE — ED PROVIDER NOTE - CLINICAL SUMMARY MEDICAL DECISION MAKING FREE TEXT BOX
Patient awake and alert, tachycardic, blood pressure stable 130/90.  Patient with tampon saturated in left nare, mild bleeding to back of throat.  Patient with dried blood to face.  No bleeding from right nare at this time.  Patient said most of the bleeding was coming from the left side. Patient with fairly controlled bleeding at this time. Will get labs, r/o anemia vs thrombocytopenia. Will give fluids, unpack nose and reassess.

## 2024-02-01 NOTE — ED PROVIDER NOTE - PROGRESS NOTE DETAILS
Nadine Decker MD PGY3: Patient with controlled bleeding at this time with nasal packing. Will give emergent ENT follow-up. Patient has had decrease in hgb with significant amount of bleeding prior to arrival to ED. Since in ED bleeding has been controlled with packing. Patient vitals improving. Currently asymptomatic. Patient offered observation vs discharge with return precautions. patient would like to be discharged.

## 2024-02-01 NOTE — ED PROVIDER NOTE - PATIENT PORTAL LINK FT
You can access the FollowMyHealth Patient Portal offered by [facility] by registering at the following website: [email or web address]. By joining [facility]’s FollowMyHealth portal, you will also be able to view your health information using other applications (apps) compatible with our system.

## 2024-02-01 NOTE — ED ADULT NURSE NOTE - OBJECTIVE STATEMENT
67 y/o Male presents to ED via EMS from home with c/o nose bleed. PMH of glioblastoma on oral chemo last dose saturday, on lovenox for hx DVT/PE, . Pt states he woke up to go to bathroom endorsing 1 episode of diarrhea. Then noticed an excessive nose bleed that would not subside. Endorses lightheadedness and unsteady gait. Feeling weak and diaphoretic at home. Denies SOB, CP, HA, N/V. Pt is A&Ox3, speaking full sentences without difficulty. Airway patent with spontaneous unlabored breathing. No diaphoresis or edema noted. Pt placed on continuous cardiac monitor. IV inserted labs drawn and sent. Safety maintained bed is in the lowest position, locked and call bell in reach.

## 2024-02-01 NOTE — ED PROVIDER NOTE - NSFOLLOWUPCLINICS_GEN_ALL_ED_FT
[facility] - ENT  Otolaryngology (ENT)  [address] [place] [address]  Phone: [phone]  Fax:

## 2024-02-01 NOTE — ED PROVIDER NOTE - ATTENDING CONTRIBUTION TO CARE
MD Ascencio:  patient seen and evaluated personally.   I agree with the History & Physical,  Impression & Plan other than what was detailed in my note.  MD Ascencio  68-year-old male history of glioblastoma, on Lovenox which she last was this morning presented to the emergency department with nosebleed coming out of the left nare.  He states that he had a large amount of blood.  He is feeling lightheaded and mildly dizzy.  He had couple tampons in his nose to stop the bleeding however it came in and was on.  Tachycardic initially when he came in.  Large amount of blood on his face in the left nare.  No active blood noted in posterior pharynx.  Will get TXA and Afrin spray in there.  Likely will need.  And monitoring.  Given degree of blood loss will check CBC.

## 2024-02-01 NOTE — ED ADULT NURSE NOTE - NSFALLRISKINTERV_ED_ALL_ED

## 2024-02-02 ENCOUNTER — APPOINTMENT (OUTPATIENT)
Dept: OTOLARYNGOLOGY | Facility: CLINIC | Age: 69
End: 2024-02-02
Payer: MEDICARE

## 2024-02-02 PROCEDURE — 31231 NASAL ENDOSCOPY DX: CPT

## 2024-02-02 PROCEDURE — 99213 OFFICE O/P EST LOW 20 MIN: CPT | Mod: 25

## 2024-02-13 ENCOUNTER — APPOINTMENT (OUTPATIENT)
Dept: HEMATOLOGY ONCOLOGY | Facility: CLINIC | Age: 69
End: 2024-02-13

## 2024-02-13 ENCOUNTER — RESULT REVIEW (OUTPATIENT)
Age: 69
End: 2024-02-13

## 2024-02-13 ENCOUNTER — APPOINTMENT (OUTPATIENT)
Dept: INFUSION THERAPY | Facility: HOSPITAL | Age: 69
End: 2024-02-13

## 2024-02-13 ENCOUNTER — APPOINTMENT (OUTPATIENT)
Dept: NEUROLOGY | Facility: CLINIC | Age: 69
End: 2024-02-13
Payer: MEDICARE

## 2024-02-13 LAB
ALBUMIN SERPL ELPH-MCNC: 3.9 G/DL — SIGNIFICANT CHANGE UP (ref 3.3–5)
ALP SERPL-CCNC: 62 U/L — SIGNIFICANT CHANGE UP (ref 40–120)
ALT FLD-CCNC: 36 U/L — SIGNIFICANT CHANGE UP (ref 10–45)
ANION GAP SERPL CALC-SCNC: 11 MMOL/L — SIGNIFICANT CHANGE UP (ref 5–17)
APPEARANCE UR: CLEAR — SIGNIFICANT CHANGE UP
AST SERPL-CCNC: 21 U/L — SIGNIFICANT CHANGE UP (ref 10–40)
BACTERIA # UR AUTO: NEGATIVE /HPF — SIGNIFICANT CHANGE UP
BASOPHILS # BLD AUTO: 0.03 K/UL — SIGNIFICANT CHANGE UP (ref 0–0.2)
BASOPHILS NFR BLD AUTO: 0.3 % — SIGNIFICANT CHANGE UP (ref 0–2)
BILIRUB SERPL-MCNC: 0.2 MG/DL — SIGNIFICANT CHANGE UP (ref 0.2–1.2)
BILIRUB UR-MCNC: NEGATIVE — SIGNIFICANT CHANGE UP
BUN SERPL-MCNC: 13 MG/DL — SIGNIFICANT CHANGE UP (ref 7–23)
CALCIUM SERPL-MCNC: 9.5 MG/DL — SIGNIFICANT CHANGE UP (ref 8.4–10.5)
CAST: 0 /LPF — SIGNIFICANT CHANGE UP (ref 0–4)
CHLORIDE SERPL-SCNC: 103 MMOL/L — SIGNIFICANT CHANGE UP (ref 96–108)
CO2 SERPL-SCNC: 25 MMOL/L — SIGNIFICANT CHANGE UP (ref 22–31)
COLOR SPEC: YELLOW — SIGNIFICANT CHANGE UP
CREAT SERPL-MCNC: 0.87 MG/DL — SIGNIFICANT CHANGE UP (ref 0.5–1.3)
DIFF PNL FLD: NEGATIVE — SIGNIFICANT CHANGE UP
EGFR: 95 ML/MIN/1.73M2 — SIGNIFICANT CHANGE UP
EOSINOPHIL # BLD AUTO: 0.01 K/UL — SIGNIFICANT CHANGE UP (ref 0–0.5)
EOSINOPHIL NFR BLD AUTO: 0.1 % — SIGNIFICANT CHANGE UP (ref 0–6)
GLUCOSE SERPL-MCNC: 115 MG/DL — HIGH (ref 70–99)
GLUCOSE UR QL: NEGATIVE MG/DL — SIGNIFICANT CHANGE UP
HCT VFR BLD CALC: 34.8 % — LOW (ref 39–50)
HGB BLD-MCNC: 12 G/DL — LOW (ref 13–17)
IMM GRANULOCYTES NFR BLD AUTO: 1.7 % — HIGH (ref 0–0.9)
KETONES UR-MCNC: NEGATIVE MG/DL — SIGNIFICANT CHANGE UP
LEUKOCYTE ESTERASE UR-ACNC: NEGATIVE — SIGNIFICANT CHANGE UP
LYMPHOCYTES # BLD AUTO: 0.72 K/UL — LOW (ref 1–3.3)
LYMPHOCYTES # BLD AUTO: 7.9 % — LOW (ref 13–44)
MCHC RBC-ENTMCNC: 33.5 PG — SIGNIFICANT CHANGE UP (ref 27–34)
MCHC RBC-ENTMCNC: 34.5 G/DL — SIGNIFICANT CHANGE UP (ref 32–36)
MCV RBC AUTO: 97.2 FL — SIGNIFICANT CHANGE UP (ref 80–100)
MONOCYTES # BLD AUTO: 0.76 K/UL — SIGNIFICANT CHANGE UP (ref 0–0.9)
MONOCYTES NFR BLD AUTO: 8.4 % — SIGNIFICANT CHANGE UP (ref 2–14)
NEUTROPHILS # BLD AUTO: 7.41 K/UL — HIGH (ref 1.8–7.4)
NEUTROPHILS NFR BLD AUTO: 81.6 % — HIGH (ref 43–77)
NITRITE UR-MCNC: NEGATIVE — SIGNIFICANT CHANGE UP
NRBC # BLD: 0 /100 WBCS — SIGNIFICANT CHANGE UP (ref 0–0)
PH UR: 6.5 — SIGNIFICANT CHANGE UP (ref 5–8)
PLATELET # BLD AUTO: 247 K/UL — SIGNIFICANT CHANGE UP (ref 150–400)
POTASSIUM SERPL-MCNC: 3.7 MMOL/L — SIGNIFICANT CHANGE UP (ref 3.5–5.3)
POTASSIUM SERPL-SCNC: 3.7 MMOL/L — SIGNIFICANT CHANGE UP (ref 3.5–5.3)
PROT SERPL-MCNC: 6.4 G/DL — SIGNIFICANT CHANGE UP (ref 6–8.3)
PROT UR-MCNC: 30 MG/DL
RBC # BLD: 3.58 M/UL — LOW (ref 4.2–5.8)
RBC # FLD: 15.5 % — HIGH (ref 10.3–14.5)
RBC CASTS # UR COMP ASSIST: 4 /HPF — SIGNIFICANT CHANGE UP (ref 0–4)
SODIUM SERPL-SCNC: 139 MMOL/L — SIGNIFICANT CHANGE UP (ref 135–145)
SP GR SPEC: 1.02 — SIGNIFICANT CHANGE UP (ref 1–1.03)
SQUAMOUS # UR AUTO: 0 /HPF — SIGNIFICANT CHANGE UP (ref 0–5)
UROBILINOGEN FLD QL: 1 MG/DL — SIGNIFICANT CHANGE UP (ref 0.2–1)
WBC # BLD: 9.08 K/UL — SIGNIFICANT CHANGE UP (ref 3.8–10.5)
WBC # FLD AUTO: 9.08 K/UL — SIGNIFICANT CHANGE UP (ref 3.8–10.5)
WBC UR QL: 0 /HPF — SIGNIFICANT CHANGE UP (ref 0–5)

## 2024-02-13 PROCEDURE — 99215 OFFICE O/P EST HI 40 MIN: CPT

## 2024-02-13 NOTE — HISTORY OF PRESENT ILLNESS
[de-identified] : 68 year old male referred by [facility] for evaluation of epistaxis. Reports severe left sided nosebleed which prompted visit to the ED. Rhino Rocket placed 2/01. Patient is currently on Lovenox injections daily due to history of PE and DVT.  Wife states patient removed Rhino Rocket at home yesterday. No bleeding since.  Currently being treated for glioblastoma multiforme.

## 2024-02-13 NOTE — ASSESSMENT
[FreeTextEntry1] : 68 year M present for recurrent epistaxis. Patient is naive to any moisturizing nasal sprays or gels.    ED Rhino Rocket placed 2/01.Patient removed rhino rocket on his own. Lovenox injections daily due to history of PE and DVT  Nasal endoscopy shows No polyps, purulence or masses, Right macerated nasal septal/Inferior turbinate mucosa due to Rhino rocket. Right Middle turbinate oozing  Nasopore placed in right nare with pressure on middle turbinate with nasal endoscopy   Recommend Recurrent Epistaxis - Discussed need to increased moisture therapy with saline spray and gel, along with the application technique and what to do if epistaxis.  - Start Nasal Saline Spray/Gel ( (e.g. Ocean Spray Nasal Saline, AYR Nasal Gel) to both anterior nares. Spray/Gel should be use at least 3-4X daily. You can't overuse saltwater spray. - Most nosebleeds start from the front of the nose on the septum (the part of the nose that divides it into a right and left side).  The skin on the septum can dry out and crack, exposing blood vessels which then bleed.   - Discussed how to manage active nose bleed: 1) Lean forward to prevent swallowing blood.  Swallowing too much blood can make you sick and vomit. This also prevents possible aspiration of blood 2) Spray Afrin (oxymetazoline) an over-the-counter nasal decongestant that some people use to help stop ACTIVE bleeding. PLEASE BE CAUTIONED: IT SHOULD NOT BE USED more than 3 days or if you have heart or blood pressure issues.   3) Pinch the nostrils closed with moderate pressure for 5-10 minutes after spraying Afrin. 4) If still bleeding spray Afrin again and give it another 5-10 minutes. 5) If the amount of bleeding cannot be control it at home, please go to the emergency room.  -Return to clinic in 1 months or sooner if new/worsen symptoms present

## 2024-02-13 NOTE — PHYSICAL EXAM
[FreeTextEntry1] : Patient seen and examined Alert and awake Oriented X 3. Speech clear and fluent- occasional hesitancy but better overall PERRLA, EOMI, VFF  ? RUQ defect Face symmetrical. Tongue protrudes midline BROWN x 4 with good and equal strength FFM, coordination equal bilaterally Sensation intact bilaterally Gait steady. Ambulating independently

## 2024-02-13 NOTE — DISCUSSION/SUMMARY
[FreeTextEntry1] : In summary, this is a 67 yo man with a left frontal GBM, IDH wt - enrolled in IMVAX clinical trial. Treated with chemoRT 6/26 - 8/7/2023. Avastin added in 11/2023 - noted clinical improvement. Has received 5 adjuvant cycles of TMZ (last on 1/18) and has had 5 infusions of Avastin. Nosebleed more than 10 days ago - seen by ENT with no visible vessel or active bleeding. Neurologically stable. Check CBC and CMP. Avastin today. Reduce Decadron to 1 mg daily. TMZ cycle #6 due 2/15 pending blood work review. MRI and follow up in 4 weeks. Call if any issues.

## 2024-02-13 NOTE — HISTORY OF PRESENT ILLNESS
[FreeTextEntry1] : Mr. Liu is a 67 yo male who is here for a follow up for his known GBM diagnosis  In brief  PMHx notable for only orthopedic issues - NKDA    5/9/2023 - I have seen him today with a new MRI. patient and his wife have noted over the past 3 weeks some difficulty with expressive speech - word substitutions and change in personality - more irritable that usual. He denies headaches, focal weakness,numbness, seizures, or gait instability. He saw Dr. Garcia, neurology - had and MRI this am - I have personally reviewed this film - that shows a left temporal heterogeneously enhancing mass measuring 5.0 x 4.5 x 3.3 cm with extension into the left frontal lobe with surrounding edema and left to right midline shift. He was sent to ER for an expedited evaluation  5/9/2023- 5/20/2023- Admitted at [facility]. MR Spect on 5/11 revealed "Reidentified heterogeneously enhancing necrotic mass lesion centered within the left temporal lobe and extending into the left periinsular region with a cystic portion along the inferior aspect of the lesion, with a large degree of surrounding vasogenic edema throughout the left parietal temporal lobes and left basal ganglia This lesion likely reflects a high-grade glioma. Functional imaging demonstrates no hyperactivity in the region of tumor or vasogenic edema."  Patient met criteria and was enrolled in IMVAX Trial.  5/15- had a Left pterional craniotomy for brain tumor resection with gleolan. Frozen path: high-grade glioma. Bilateral abdominal incisions made for later implantation of drug treatment. IMVAX Biologics were implanted into abdomen via incisions during bedside procedure per IMVAX trial on 5/17 under conscious sedation. These biologic agents were removed during bedside procedure on 5/19 under conscious sedation. Patient was  monitored in the NSCU, his post op course remained uncomplicated  5/16/2023 - Post op MRI notable for edema and residual nodular enhancement within the left insular  region and left anterior temporal lobe. CT Serial CT Head imaging post op showing stable resolving post operative changes. Patient was discharged home on 5/20/2023.  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  6/9/2023- Reached out to patient this am. Patient c/o blurred vision Right worse than left for past 2 days. Discussed with Dr Galvez. Will start him on Dexamethasone 2 mg daily.  6/19/2023 - 6/23/2023 - Patient called reporting seizure like symptoms and was admitted to [facility]. He reported frequent episodes of intense fear / uneasiness, feeling "stressed out of my mind," chills, piloerection to all extremities, palpitations, and increased respiratory rate, all of which lasts for less than 1 minute, and completely resolve. Patient's spouse at bedside confirming the above. Patient and spouse reporting that symptoms started about 5-6 days ago w/ about 10 episodes throughout the entire day. The frequency progressively increased over the course of the week. Yesterday, patient had these episodes about every 10-15 minutes, with the same duration of less than 1 minute. No other symptoms at this time, with the exception of varying degrees of word-finding difficulty. EEG was negative, however episodes stopped once Keppra was raised to 1500 mg bid and added clonazepam 0.5 mg bid. During this admission he was noted to be hyponatremic and  6/26/2023- ChemoRT begins. 8/3/2023- Here for a follow up. Reports he feels fatigued. His dexamethasone was tapered to 1 mg daily which started yesterday. 8/7- ChemoRT completed. 8/16/2023- Here for a follow up. Patient started having worsening aphasia since Sunday and worsened further by Monday evening. Last dose of steroids was on 8/11/2023. Denies headaches, seizures, N/V. CTH with increased cerebral edema - Started on Dexamethasone 9/6/2023 - MRI showed Left temporal craniotomy and postoperative changes in the left temporal lobe ring enhancement which is in size compared with 5/16/2023. Significantly less vasogenic edema and mass effect with resolution of midline shift compared with 5/16/2023. Although MR perfusion doesn't demonstrate increased blood in the left temporal lobe suggesting the presence of neoplasm. There does not appear to be progression. Plan was made to start on TMZ maintenance cycles 9/11- TMZ first cycle begin 9/14/2023- Reduced Klonopin to 0.25 mg at bedtime- Two days later he started having " freezing" episodes - Restarted Klonopin 0.25 mg bid 9/28/2023- Patient c/o sob - CT chest angio + PE - Admitted to [facility] - d/c on Lovenox 150 mg daily 10/4/2023 - Reduced Klonopin to 0.25 mg at bedtime only 10/9-10/13- TMZ second cycle- TDD of 450 mg ( dose based on 200 mg/m2) 10/10/2023- Patient's clonazepam was lowered to 0.25 mg at bedtime - Patient started having focal seizures ( freezing and piloerection episodes) - Recommended to take Klonopin 0.5 mg in the am - Patient now feeling better - will continue with Clonazepam 0.5 mg bid 10/17/2023- Spoke with patient's wife. Per wife and his family patient has increased word finding difficulty. I recommended to take Dexamethasone 4 mg bid 10/31/2023- MRI showed Mild increase in extent of a persistent left temporal heterogeneous ring-enhancing mass. Perfusion analysis of the mass demonstrates increased cerebral blood volume compatible with neoplasm. Moderate increase in surrounding nonenhancing FLAIR hyperintense signal abnormality is nonspecific and may represent treatment related changes versus nonenhancing tumor. Mild increase in mass effect. No midline shift..He remains on Dexamethasone 4 mg bid. His expressive Aphasia remains the same. 11/8/2023- Here for a follow up and to discuss further treatment plan. His aphasia and fatigue remains the same despite being on Dexamethasone 4 mg bid. Added IV AVastin to TMZ 11/13-11/17- TMZ third cycle- TDD of 450 mg ( dose based on 200 mg/m2) 11/14/2023- FIRST DOSE OF IV AVASTIN 11/28/2023- Here for a follow up prior to the second dose of IV Avastin. His wife noticed him being a little bit more active a day after the first infusion. Per patient he feels the same; He continues to have tremendous fatigue, speech remains the same. He feels out of breath when he takes a flight of stairs , on resting no sob 12/11-12/15- TMZ 4th cycle - TDD of 450 mg ( dose based on 200 mg/m2) 1/3/2024 - MRI stable Had 4 IV AVastin's thus far. Since after the 3rd infusion, wife noted an improvement in his alertness and energy level. Patient had " freezing episodes" two weeks ago, raised Keppra to 1500 mg bid and Dexamethasone 4 mg daily. Since then no more seizure like activity Denies headaches - his wife notes clinical improvement over the past 2 weeks - more energy and no further seizures. Speech is also improved. 1/5/2023- Patient got diagnosed with acute bronchitis - Started on Augmentin X 10 days - Chemo and immunotherapy on hold. Completed Abx on 1/15/2024 1/16/2024- 5th dose of IV Avastin 1/18/2024- 5th cycle of TMZ 2/1/2024 - Patient went to ER with epistaxis - Rhino rocket placed - No further interventions required, patient followed up with ENT as well. 2/13/2024- Here for a follow up  He has had no further nasal bleeding - he is following instructions from ENT - Afrin bid and saline spray. He is feeling well - he has no headache- his wife reports that his speech is more fluent.

## 2024-02-15 ENCOUNTER — NON-APPOINTMENT (OUTPATIENT)
Age: 69
End: 2024-02-15

## 2024-02-20 RX ORDER — LEVETIRACETAM 500 MG/1
500 TABLET, FILM COATED ORAL
Qty: 180 | Refills: 6 | Status: ACTIVE | COMMUNITY
Start: 2023-05-31 | End: 1900-01-01

## 2024-02-20 RX ORDER — ENOXAPARIN SODIUM 150 MG/ML
150 INJECTION, SOLUTION SUBCUTANEOUS DAILY
Qty: 3 | Refills: 5 | Status: ACTIVE | COMMUNITY
Start: 2023-10-04 | End: 1900-01-01

## 2024-02-27 ENCOUNTER — RESULT REVIEW (OUTPATIENT)
Age: 69
End: 2024-02-27

## 2024-02-27 ENCOUNTER — APPOINTMENT (OUTPATIENT)
Dept: NEUROSURGERY | Facility: CLINIC | Age: 69
End: 2024-02-27
Payer: SUBSIDIZED

## 2024-02-27 ENCOUNTER — APPOINTMENT (OUTPATIENT)
Dept: INFUSION THERAPY | Facility: HOSPITAL | Age: 69
End: 2024-02-27

## 2024-02-27 ENCOUNTER — APPOINTMENT (OUTPATIENT)
Dept: HEMATOLOGY ONCOLOGY | Facility: CLINIC | Age: 69
End: 2024-02-27

## 2024-02-27 VITALS
HEART RATE: 91 BPM | RESPIRATION RATE: 16 BRPM | OXYGEN SATURATION: 97 % | TEMPERATURE: 97.8 F | WEIGHT: 233 LBS | BODY MASS INDEX: 31.56 KG/M2 | SYSTOLIC BLOOD PRESSURE: 158 MMHG | DIASTOLIC BLOOD PRESSURE: 88 MMHG | HEIGHT: 72 IN

## 2024-02-27 DIAGNOSIS — Z00.6 ENCOUNTER FOR EXAMINATION FOR NORMAL COMPARISON AND CONTROL IN CLINICAL RESEARCH PROGRAM: ICD-10-CM

## 2024-02-27 LAB
ALBUMIN SERPL ELPH-MCNC: 4 G/DL — SIGNIFICANT CHANGE UP (ref 3.3–5)
ALP SERPL-CCNC: 78 U/L — SIGNIFICANT CHANGE UP (ref 40–120)
ALT FLD-CCNC: 28 U/L — SIGNIFICANT CHANGE UP (ref 10–45)
ANION GAP SERPL CALC-SCNC: 14 MMOL/L — SIGNIFICANT CHANGE UP (ref 5–17)
AST SERPL-CCNC: 20 U/L — SIGNIFICANT CHANGE UP (ref 10–40)
BASOPHILS # BLD AUTO: 0.03 K/UL — SIGNIFICANT CHANGE UP (ref 0–0.2)
BASOPHILS NFR BLD AUTO: 0.4 % — SIGNIFICANT CHANGE UP (ref 0–2)
BILIRUB SERPL-MCNC: 0.2 MG/DL — SIGNIFICANT CHANGE UP (ref 0.2–1.2)
BUN SERPL-MCNC: 11 MG/DL — SIGNIFICANT CHANGE UP (ref 7–23)
CALCIUM SERPL-MCNC: 9.8 MG/DL — SIGNIFICANT CHANGE UP (ref 8.4–10.5)
CHLORIDE SERPL-SCNC: 103 MMOL/L — SIGNIFICANT CHANGE UP (ref 96–108)
CO2 SERPL-SCNC: 24 MMOL/L — SIGNIFICANT CHANGE UP (ref 22–31)
CREAT SERPL-MCNC: 0.94 MG/DL — SIGNIFICANT CHANGE UP (ref 0.5–1.3)
EGFR: 89 ML/MIN/1.73M2 — SIGNIFICANT CHANGE UP
EOSINOPHIL # BLD AUTO: 0.05 K/UL — SIGNIFICANT CHANGE UP (ref 0–0.5)
EOSINOPHIL NFR BLD AUTO: 0.7 % — SIGNIFICANT CHANGE UP (ref 0–6)
GLUCOSE SERPL-MCNC: 115 MG/DL — HIGH (ref 70–99)
HCT VFR BLD CALC: 35.8 % — LOW (ref 39–50)
HGB BLD-MCNC: 11.9 G/DL — LOW (ref 13–17)
IMM GRANULOCYTES NFR BLD AUTO: 1.6 % — HIGH (ref 0–0.9)
LYMPHOCYTES # BLD AUTO: 0.69 K/UL — LOW (ref 1–3.3)
LYMPHOCYTES # BLD AUTO: 9.8 % — LOW (ref 13–44)
MCHC RBC-ENTMCNC: 31.2 PG — SIGNIFICANT CHANGE UP (ref 27–34)
MCHC RBC-ENTMCNC: 33.2 G/DL — SIGNIFICANT CHANGE UP (ref 32–36)
MCV RBC AUTO: 93.7 FL — SIGNIFICANT CHANGE UP (ref 80–100)
MONOCYTES # BLD AUTO: 0.65 K/UL — SIGNIFICANT CHANGE UP (ref 0–0.9)
MONOCYTES NFR BLD AUTO: 9.2 % — SIGNIFICANT CHANGE UP (ref 2–14)
NEUTROPHILS # BLD AUTO: 5.52 K/UL — SIGNIFICANT CHANGE UP (ref 1.8–7.4)
NEUTROPHILS NFR BLD AUTO: 78.3 % — HIGH (ref 43–77)
NRBC # BLD: 0 /100 WBCS — SIGNIFICANT CHANGE UP (ref 0–0)
PLATELET # BLD AUTO: 308 K/UL — SIGNIFICANT CHANGE UP (ref 150–400)
POTASSIUM SERPL-MCNC: 4.3 MMOL/L — SIGNIFICANT CHANGE UP (ref 3.5–5.3)
POTASSIUM SERPL-SCNC: 4.3 MMOL/L — SIGNIFICANT CHANGE UP (ref 3.5–5.3)
PROT SERPL-MCNC: 7 G/DL — SIGNIFICANT CHANGE UP (ref 6–8.3)
RBC # BLD: 3.82 M/UL — LOW (ref 4.2–5.8)
RBC # FLD: 15.5 % — HIGH (ref 10.3–14.5)
SODIUM SERPL-SCNC: 141 MMOL/L — SIGNIFICANT CHANGE UP (ref 135–145)
WBC # BLD: 7.05 K/UL — SIGNIFICANT CHANGE UP (ref 3.8–10.5)
WBC # FLD AUTO: 7.05 K/UL — SIGNIFICANT CHANGE UP (ref 3.8–10.5)

## 2024-02-27 PROCEDURE — 99212 OFFICE O/P EST SF 10 MIN: CPT

## 2024-02-28 PROBLEM — Z00.6 PATIENT IN CLINICAL RESEARCH STUDY: Status: ACTIVE | Noted: 2023-05-30

## 2024-02-28 LAB
ALBUMIN SERPL ELPH-MCNC: 3.9 G/DL
ALP BLD-CCNC: 78 U/L
ALT SERPL-CCNC: 26 U/L
AMYLASE/CREAT SERPL: 73 U/L
ANION GAP SERPL CALC-SCNC: 13 MMOL/L
APPEARANCE UR: ABNORMAL
APTT BLD: 35.7 SEC
AST SERPL-CCNC: 15 U/L
BACTERIA # UR AUTO: NEGATIVE /HPF — SIGNIFICANT CHANGE UP
BASOPHILS # BLD AUTO: 0.04 K/UL
BASOPHILS NFR BLD AUTO: 0.5 %
BILIRUB DIRECT SERPL-MCNC: 0.1 MG/DL
BILIRUB SERPL-MCNC: 0.2 MG/DL
BILIRUB UR-MCNC: NEGATIVE — SIGNIFICANT CHANGE UP
BUN SERPL-MCNC: 10 MG/DL
CALCIUM SERPL-MCNC: 9.3 MG/DL
CAST: 0 /LPF — SIGNIFICANT CHANGE UP (ref 0–4)
CHLORIDE SERPL-SCNC: 105 MMOL/L
CK SERPL-CCNC: 46 U/L
CO2 SERPL-SCNC: 23 MMOL/L
COLOR SPEC: YELLOW — SIGNIFICANT CHANGE UP
CREAT SERPL-MCNC: 1.01 MG/DL
DIFF PNL FLD: NEGATIVE — SIGNIFICANT CHANGE UP
EGFR: 82 ML/MIN/1.73M2
EOSINOPHIL # BLD AUTO: 0.02 K/UL
EOSINOPHIL NFR BLD AUTO: 0.3 %
FIBRINOGEN PPP-MCNC: 722 MG/DL
GGT SERPL-CCNC: 28 U/L
GLUCOSE SERPL-MCNC: 110 MG/DL
GLUCOSE UR QL: NEGATIVE MG/DL — SIGNIFICANT CHANGE UP
HCT VFR BLD CALC: 37.7 %
HGB BLD-MCNC: 12 G/DL
IMM GRANULOCYTES NFR BLD AUTO: 1.3 %
INR PPP: 1.23 RATIO
KETONES UR-MCNC: NEGATIVE MG/DL — SIGNIFICANT CHANGE UP
LDH SERPL-CCNC: 240 U/L
LEUKOCYTE ESTERASE UR-ACNC: NEGATIVE — SIGNIFICANT CHANGE UP
LPL SERPL-CCNC: 32 U/L
LYMPHOCYTES # BLD AUTO: 0.79 K/UL
LYMPHOCYTES NFR BLD AUTO: 10.1 %
MAGNESIUM SERPL-MCNC: 2 MG/DL
MAN DIFF?: NORMAL
MCHC RBC-ENTMCNC: 31.1 PG
MCHC RBC-ENTMCNC: 31.8 GM/DL
MCV RBC AUTO: 97.7 FL
MONOCYTES # BLD AUTO: 0.71 K/UL
MONOCYTES NFR BLD AUTO: 9 %
NEUTROPHILS # BLD AUTO: 6.19 K/UL
NEUTROPHILS NFR BLD AUTO: 78.8 %
NITRITE UR-MCNC: NEGATIVE — SIGNIFICANT CHANGE UP
PH UR: 6 — SIGNIFICANT CHANGE UP (ref 5–8)
PHOSPHATE SERPL-MCNC: 3.3 MG/DL
PLATELET # BLD AUTO: 383 K/UL
POTASSIUM SERPL-SCNC: 4.5 MMOL/L
PROT SERPL-MCNC: 6.6 G/DL
PROT UR-MCNC: SIGNIFICANT CHANGE UP MG/DL
PT BLD: 13.9 SEC
RBC # BLD: 3.86 M/UL
RBC # FLD: 15.8 %
RBC CASTS # UR COMP ASSIST: 2 /HPF — SIGNIFICANT CHANGE UP (ref 0–4)
REVIEW: SIGNIFICANT CHANGE UP
SODIUM SERPL-SCNC: 141 MMOL/L
SP GR SPEC: 1.03 — SIGNIFICANT CHANGE UP (ref 1–1.03)
SQUAMOUS # UR AUTO: 1 /HPF — SIGNIFICANT CHANGE UP (ref 0–5)
URATE SERPL-MCNC: 5.1 MG/DL
UROBILINOGEN FLD QL: 1 MG/DL — SIGNIFICANT CHANGE UP (ref 0.2–1)
WBC # FLD AUTO: 7.85 K/UL
WBC UR QL: 0 /HPF — SIGNIFICANT CHANGE UP (ref 0–5)

## 2024-02-28 NOTE — PHYSICAL EXAM
[General Appearance - Alert] : alert [General Appearance - Well Nourished] : well nourished [Person] : oriented to person [Place] : oriented to place [Motor Tone] : muscle tone was normal in all four extremities [Motor Strength] : muscle strength was normal in all four extremities [No Muscle Atrophy] : normal bulk in all four extremities [PERRL With Normal Accommodation] : pupils were equal in size, round, reactive to light, with normal accommodation [Extraocular Movements] : extraocular movements were intact [Abnormal Walk] : normal gait [FreeTextEntry1] : alert and oriented x2  [Time] : disoriented to time

## 2024-02-28 NOTE — REASON FOR VISIT
[Follow-Up: _____] : a [unfilled] follow-up visit [Spouse] : spouse [FreeTextEntry1] : 67M w/ hx of 3 weeks some difficulty with expressive speech, MRI showed L FT mass, s/p 5/15 Left pterional craniotomy for brain tumor resection with gleolan. Frozen path: high-grade glioma. Bilateral abdominal incisions made for later implantation of drug treatment. IMVAX Biologics were implanted into abdomen via incisions during bedside procedure per IMVAX trial on 5/17 under conscious sedation. These biologic agents were removed during bedside procedure on 5/19 under conscious sedation. Patient was monitored in the NSCU, his post op course remained uncomplicated.  10/31/2023- MRI showed Mild increase in extent of a persistent left temporal heterogeneous ring-enhancing mass. Perfusion analysis of the mass demonstrates increased cerebral blood volume compatible with neoplasm. Moderate increase in surrounding nonenhancing FLAIR hyperintense signal abnormality is nonspecific and may represent treatment related changes versus nonenhancing tumor. Mild increase in mass effect. No midline shift.  11/14/2023- FIRST DOSE OF IV AVASTIN  Today 2/27/24 he presents in office with his wife for a trial follow up visit. On 2/1 his wife reports severe epistaxis and diarrhea resulting in a ED visit. He was seen by ENT a rhino rocket was placed. He was encouraged to keep hydrated and rest.  Hospital admission was not required he was discharged home. He f/u with ENT after his discharge- no visible or active bleeding noted. No nosebleeds since his hospital visit.   He reports feeling well, fatigued at times. Exam: Pt A+O x2 (disoriented to time) EOMI, PERRL reports continued blurry vision b/l eyes. speech is clear. some wfd, face symmetrical, BROWN Strong X4 5/5, gait steady ambulating w/o assistive device. KPS 80

## 2024-02-28 NOTE — ASSESSMENT
[FreeTextEntry1] : Discussion: GBM management per Dr. Galvez. contact office with any questions or concerns.

## 2024-03-01 ENCOUNTER — APPOINTMENT (OUTPATIENT)
Dept: OTOLARYNGOLOGY | Facility: CLINIC | Age: 69
End: 2024-03-01
Payer: MEDICARE

## 2024-03-01 VITALS
WEIGHT: 233 LBS | BODY MASS INDEX: 31.56 KG/M2 | HEIGHT: 72 IN | SYSTOLIC BLOOD PRESSURE: 158 MMHG | DIASTOLIC BLOOD PRESSURE: 89 MMHG

## 2024-03-01 DIAGNOSIS — R04.0 EPISTAXIS: ICD-10-CM

## 2024-03-01 PROCEDURE — 99213 OFFICE O/P EST LOW 20 MIN: CPT | Mod: 25

## 2024-03-01 PROCEDURE — 31231 NASAL ENDOSCOPY DX: CPT

## 2024-03-07 ENCOUNTER — OUTPATIENT (OUTPATIENT)
Dept: OUTPATIENT SERVICES | Facility: HOSPITAL | Age: 69
LOS: 1 days | Discharge: HOME | End: 2024-03-07

## 2024-03-07 DIAGNOSIS — Z98.890 OTHER SPECIFIED POSTPROCEDURAL STATES: Chronic | ICD-10-CM

## 2024-03-07 DIAGNOSIS — D64.9 ANEMIA, UNSPECIFIED: ICD-10-CM

## 2024-03-07 PROBLEM — R04.0 EPISTAXIS: Status: ACTIVE | Noted: 2024-02-02

## 2024-03-07 NOTE — HISTORY OF PRESENT ILLNESS
[de-identified] : 68 year old male follow up of epistaxis Patient states he is feeling well since last visit. Had x1 nose bleed since last visit States having intermittent clear nasal drainage. Has been using sinus rinse and nasal gel.

## 2024-03-07 NOTE — ASSESSMENT
[FreeTextEntry1] : 68 year M follow up for epistaxis.   Nasal endoscopy shows Moderate Inferior Turbinates Hypertrophy, No polyps, purulence or masses, Posterior Nasal pharynx Cobblestone/Clear Drainage, Moderate mucus production coating nasal cavity. No visual source of bleed. Areas where rhino rocket was place has headed. Bilateral nasal crusting   Recommend Recurrent Epistaxis - Discussed need to increased moisture therapy with saline spray and gel, along with the application technique and what to do if epistaxis.  - Start Nasal Saline Spray/Gel ( (e.g. Ocean Spray Nasal Saline, AYR Nasal Gel) to both anterior nares. Spray/Gel should be use at least 3-4X daily. You can't overuse saltwater spray. -Continue Sinus Rinse - Most nosebleeds start from the front of the nose on the septum (the part of the nose that divides it into a right and left side).  The skin on the septum can dry out and crack, exposing blood vessels which then bleed.   - Discussed how to manage active nose bleed: 1) Lean forward to prevent swallowing blood.  Swallowing too much blood can make you sick and vomit. This also prevents possible aspiration of blood 2) Spray Afrin (oxymetazoline) an over-the-counter nasal decongestant that some people use to help stop ACTIVE bleeding. PLEASE BE CAUTIONED: IT SHOULD NOT BE USED more than 3 days or if you have heart or blood pressure issues.   3) Pinch the nostrils closed with moderate pressure for 5-10 minutes after spraying Afrin. 4) If still bleeding spray Afrin again and give it another 5-10 minutes. 5) If the amount of bleeding cannot be control it at home, please go to the emergency room.  -Return to clinic in 4 months or sooner if new/worsen symptoms present

## 2024-03-12 ENCOUNTER — RESULT REVIEW (OUTPATIENT)
Age: 69
End: 2024-03-12

## 2024-03-12 ENCOUNTER — APPOINTMENT (OUTPATIENT)
Dept: HEMATOLOGY ONCOLOGY | Facility: CLINIC | Age: 69
End: 2024-03-12

## 2024-03-12 ENCOUNTER — APPOINTMENT (OUTPATIENT)
Dept: INFUSION THERAPY | Facility: HOSPITAL | Age: 69
End: 2024-03-12

## 2024-03-12 LAB
ALBUMIN SERPL ELPH-MCNC: 4.1 G/DL — SIGNIFICANT CHANGE UP (ref 3.3–5)
ALP SERPL-CCNC: 67 U/L — SIGNIFICANT CHANGE UP (ref 40–120)
ALT FLD-CCNC: 22 U/L — SIGNIFICANT CHANGE UP (ref 10–45)
ANION GAP SERPL CALC-SCNC: 14 MMOL/L — SIGNIFICANT CHANGE UP (ref 5–17)
APPEARANCE UR: CLEAR — SIGNIFICANT CHANGE UP
AST SERPL-CCNC: 26 U/L — SIGNIFICANT CHANGE UP (ref 10–40)
BASOPHILS # BLD AUTO: 0.04 K/UL — SIGNIFICANT CHANGE UP (ref 0–0.2)
BASOPHILS NFR BLD AUTO: 0.5 % — SIGNIFICANT CHANGE UP (ref 0–2)
BILIRUB SERPL-MCNC: 0.2 MG/DL — SIGNIFICANT CHANGE UP (ref 0.2–1.2)
BILIRUB UR-MCNC: NEGATIVE — SIGNIFICANT CHANGE UP
BUN SERPL-MCNC: 9 MG/DL — SIGNIFICANT CHANGE UP (ref 7–23)
CALCIUM SERPL-MCNC: 9.7 MG/DL — SIGNIFICANT CHANGE UP (ref 8.4–10.5)
CHLORIDE SERPL-SCNC: 107 MMOL/L — SIGNIFICANT CHANGE UP (ref 96–108)
CO2 SERPL-SCNC: 21 MMOL/L — LOW (ref 22–31)
COLOR SPEC: YELLOW — SIGNIFICANT CHANGE UP
CREAT SERPL-MCNC: 0.87 MG/DL — SIGNIFICANT CHANGE UP (ref 0.5–1.3)
DIFF PNL FLD: NEGATIVE — SIGNIFICANT CHANGE UP
EGFR: 95 ML/MIN/1.73M2 — SIGNIFICANT CHANGE UP
EOSINOPHIL # BLD AUTO: 0.04 K/UL — SIGNIFICANT CHANGE UP (ref 0–0.5)
EOSINOPHIL NFR BLD AUTO: 0.5 % — SIGNIFICANT CHANGE UP (ref 0–6)
GLUCOSE SERPL-MCNC: 108 MG/DL — HIGH (ref 70–99)
GLUCOSE UR QL: NEGATIVE MG/DL — SIGNIFICANT CHANGE UP
HCT VFR BLD CALC: 40 % — SIGNIFICANT CHANGE UP (ref 39–50)
HGB BLD-MCNC: 13 G/DL — SIGNIFICANT CHANGE UP (ref 13–17)
IMM GRANULOCYTES NFR BLD AUTO: 0.9 % — SIGNIFICANT CHANGE UP (ref 0–0.9)
KETONES UR-MCNC: ABNORMAL MG/DL
LEUKOCYTE ESTERASE UR-ACNC: NEGATIVE — SIGNIFICANT CHANGE UP
LYMPHOCYTES # BLD AUTO: 0.62 K/UL — LOW (ref 1–3.3)
LYMPHOCYTES # BLD AUTO: 7.9 % — LOW (ref 13–44)
MCHC RBC-ENTMCNC: 30.3 PG — SIGNIFICANT CHANGE UP (ref 27–34)
MCHC RBC-ENTMCNC: 32.5 G/DL — SIGNIFICANT CHANGE UP (ref 32–36)
MCV RBC AUTO: 93.2 FL — SIGNIFICANT CHANGE UP (ref 80–100)
MONOCYTES # BLD AUTO: 0.64 K/UL — SIGNIFICANT CHANGE UP (ref 0–0.9)
MONOCYTES NFR BLD AUTO: 8.2 % — SIGNIFICANT CHANGE UP (ref 2–14)
NEUTROPHILS # BLD AUTO: 6.41 K/UL — SIGNIFICANT CHANGE UP (ref 1.8–7.4)
NEUTROPHILS NFR BLD AUTO: 82 % — HIGH (ref 43–77)
NITRITE UR-MCNC: NEGATIVE — SIGNIFICANT CHANGE UP
NRBC # BLD: 0 /100 WBCS — SIGNIFICANT CHANGE UP (ref 0–0)
PH UR: 6 — SIGNIFICANT CHANGE UP (ref 5–8)
PLATELET # BLD AUTO: 254 K/UL — SIGNIFICANT CHANGE UP (ref 150–400)
POTASSIUM SERPL-MCNC: 3.9 MMOL/L — SIGNIFICANT CHANGE UP (ref 3.5–5.3)
POTASSIUM SERPL-SCNC: 3.9 MMOL/L — SIGNIFICANT CHANGE UP (ref 3.5–5.3)
PROT SERPL-MCNC: 6.7 G/DL — SIGNIFICANT CHANGE UP (ref 6–8.3)
PROT UR-MCNC: SIGNIFICANT CHANGE UP MG/DL
RBC # BLD: 4.29 M/UL — SIGNIFICANT CHANGE UP (ref 4.2–5.8)
RBC # FLD: 15.3 % — HIGH (ref 10.3–14.5)
SODIUM SERPL-SCNC: 141 MMOL/L — SIGNIFICANT CHANGE UP (ref 135–145)
SP GR SPEC: 1.03 — SIGNIFICANT CHANGE UP (ref 1–1.03)
UROBILINOGEN FLD QL: 0.2 MG/DL — SIGNIFICANT CHANGE UP (ref 0.2–1)
WBC # BLD: 7.82 K/UL — SIGNIFICANT CHANGE UP (ref 3.8–10.5)
WBC # FLD AUTO: 7.82 K/UL — SIGNIFICANT CHANGE UP (ref 3.8–10.5)

## 2024-03-13 ENCOUNTER — APPOINTMENT (OUTPATIENT)
Dept: MRI IMAGING | Facility: IMAGING CENTER | Age: 69
End: 2024-03-13

## 2024-03-13 DIAGNOSIS — C71.9 MALIGNANT NEOPLASM OF BRAIN, UNSPECIFIED: ICD-10-CM

## 2024-03-13 DIAGNOSIS — Z51.11 ENCOUNTER FOR ANTINEOPLASTIC CHEMOTHERAPY: ICD-10-CM

## 2024-03-18 LAB — FIBRINOGEN PPP-MCNC: 448 MG/DL

## 2024-03-19 ENCOUNTER — APPOINTMENT (OUTPATIENT)
Dept: MRI IMAGING | Facility: IMAGING CENTER | Age: 69
End: 2024-03-19
Payer: MEDICARE

## 2024-03-19 ENCOUNTER — OUTPATIENT (OUTPATIENT)
Dept: OUTPATIENT SERVICES | Facility: HOSPITAL | Age: 69
LOS: 1 days | End: 2024-03-19
Payer: MEDICARE

## 2024-03-19 ENCOUNTER — APPOINTMENT (OUTPATIENT)
Dept: NEUROLOGY | Facility: CLINIC | Age: 69
End: 2024-03-19
Payer: MEDICARE

## 2024-03-19 VITALS
HEIGHT: 72 IN | SYSTOLIC BLOOD PRESSURE: 151 MMHG | BODY MASS INDEX: 31.56 KG/M2 | TEMPERATURE: 97.5 F | HEART RATE: 85 BPM | DIASTOLIC BLOOD PRESSURE: 82 MMHG | OXYGEN SATURATION: 98 % | RESPIRATION RATE: 16 BRPM | WEIGHT: 233 LBS

## 2024-03-19 DIAGNOSIS — C71.9 MALIGNANT NEOPLASM OF BRAIN, UNSPECIFIED: ICD-10-CM

## 2024-03-19 DIAGNOSIS — Z98.890 OTHER SPECIFIED POSTPROCEDURAL STATES: Chronic | ICD-10-CM

## 2024-03-19 PROCEDURE — 70553 MRI BRAIN STEM W/O & W/DYE: CPT | Mod: 26,MH

## 2024-03-19 PROCEDURE — 70553 MRI BRAIN STEM W/O & W/DYE: CPT

## 2024-03-19 PROCEDURE — 99215 OFFICE O/P EST HI 40 MIN: CPT

## 2024-03-19 PROCEDURE — A9585: CPT

## 2024-03-19 NOTE — PHYSICAL EXAM
[General Appearance - In No Acute Distress] : in no acute distress [General Appearance - Alert] : alert [Oriented To Time, Place, And Person] : oriented to person, place, and time [Impaired Insight] : insight and judgment were intact [Affect] : the affect was normal [Mood] : the mood was normal [] : no respiratory distress [Respiration, Rhythm And Depth] : normal respiratory rhythm and effort [Exaggerated Use Of Accessory Muscles For Inspiration] : no accessory muscle use [Edema] : there was no peripheral edema [Abnormal Walk] : normal gait [FreeTextEntry1] : Patient seen and examined Alert and awake Oriented X 3. Speech clear and fluent- occasional hesitancy but better overall PERRLA, EOMI, VFF ? RUQ defect - appears improved Face symmetrical. Tongue protrudes midline BROWN x 4 with good and equal strength FFM, coordination equal bilaterally Sensation intact bilaterally Gait steady. Ambulating independently. [Over the Past 2 Weeks, Have You Felt Down, Depressed, or Hopeless?] : 1.) Over the past 2 weeks, have you felt down, depressed, or hopeless? No [Over the Past 2 Weeks, Have You Felt Little Interest or Pleasure Doing Things?] : 2.) Over the past 2 weeks, have you felt little interest or pleasure doing things? No

## 2024-03-19 NOTE — DISCUSSION/SUMMARY
[FreeTextEntry1] : Patient seen and examined In summary, this is a 69 yo man with a left frontal GBM, IDH wt - enrolled in IMVAX clinical trial. Treated with chemoRT 6/26 - 8/7/2023.Avastin added in 11/2023 - noted clinical improvement. Has completed 6  adjuvant cycles of TMZ (last on 2/2024) and has had 9 infusions of Avastin. GBM - Neurologically stable Will continue with  IV Avastin MRI reviewed from today and I compared it with MRI from 1/3/2024 , left temporal area of abnormality appears stable. Official report pending CEREBRAL EDEMA - Stop  Dexamethasone 1 mg daily.GI prophylaxis with Pantoprazole.- Continue Protonix for 2 more weeks PE - Continue Lovenox 150 mg daily - Diagnosed with PE on 9/28/2023- Recommended to switch injection sites while administering injections- Recommended to follow up with Dr Carpenter( reinforced)- Appointment with Dr Carpenter next week SEIZURES - Continue Keppra to 1500 - 1500 mg daily and Remains seizure free - fatigue , improved - Continue Klonopin to 0.25 mg twice a day- HYPONATREMIA - Stop  Na tabs 1 tab a day Last Na level -141 - Will follow up on blood works. Will continue close monitoring. HYPERTENSION- Continue Metoprolol 25 mg daily. Follow up with PCP for BP management.  FOLLOW UP - Will follow up prior to the infusion in a month (4/9) In the interim, if any concerns patient knows to call our office.

## 2024-03-19 NOTE — HISTORY OF PRESENT ILLNESS
[FreeTextEntry1] : Mr. Liu is a 69 yo male who is here for a follow up for his known GBM diagnosis  In brief  PMHx notable for only orthopedic issues - NKDA    5/9/2023 - I have seen him today with a new MRI. patient and his wife have noted over the past 3 weeks some difficulty with expressive speech - word substitutions and change in personality - more irritable that usual. He denies headaches, focal weakness,numbness, seizures, or gait instability. He saw Dr. Garcia, neurology - had and MRI this am - I have personally reviewed this film - that shows a left temporal heterogeneously enhancing mass measuring 5.0 x 4.5 x 3.3 cm with extension into the left frontal lobe with surrounding edema and left to right midline shift. He was sent to ER for an expedited evaluation  5/9/2023- 5/20/2023- Admitted at [facility]. MR Spect on 5/11 revealed "Reidentified heterogeneously enhancing necrotic mass lesion centered within the left temporal lobe and extending into the left periinsular region with a cystic portion along the inferior aspect of the lesion, with a large degree of surrounding vasogenic edema throughout the left parietal temporal lobes and left basal ganglia This lesion likely reflects a high-grade glioma. Functional imaging demonstrates no hyperactivity in the region of tumor or vasogenic edema."  Patient met criteria and was enrolled in IMVAX Trial.  5/15- had a Left pterional craniotomy for brain tumor resection with gleolan. Frozen path: high-grade glioma. Bilateral abdominal incisions made for later implantation of drug treatment. IMVAX Biologics were implanted into abdomen via incisions during bedside procedure per IMVAX trial on 5/17 under conscious sedation. These biologic agents were removed during bedside procedure on 5/19 under conscious sedation. Patient was  monitored in the NSCU, his post op course remained uncomplicated  5/16/2023 - Post op MRI notable for edema and residual nodular enhancement within the left insular  region and left anterior temporal lobe. CT Serial CT Head imaging post op showing stable resolving post operative changes. Patient was discharged home on 5/20/2023.  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  6/9/2023- Reached out to patient this am. Patient c/o blurred vision Right worse than left for past 2 days. Discussed with Dr Galvez. Will start him on Dexamethasone 2 mg daily.  6/19/2023 - 6/23/2023 - Patient called reporting seizure like symptoms and was admitted to [facility]. He reported frequent episodes of intense fear / uneasiness, feeling "stressed out of my mind," chills, piloerection to all extremities, palpitations, and increased respiratory rate, all of which lasts for less than 1 minute, and completely resolve. Patient's spouse at bedside confirming the above. Patient and spouse reporting that symptoms started about 5-6 days ago w/ about 10 episodes throughout the entire day. The frequency progressively increased over the course of the week. Yesterday, patient had these episodes about every 10-15 minutes, with the same duration of less than 1 minute. No other symptoms at this time, with the exception of varying degrees of word-finding difficulty. EEG was negative, however episodes stopped once Keppra was raised to 1500 mg bid and added clonazepam 0.5 mg bid. During this admission he was noted to be hyponatremic and  6/26/2023- ChemoRT begins. 8/3/2023- Here for a follow up. Reports he feels fatigued. His dexamethasone was tapered to 1 mg daily which started yesterday. 8/7- ChemoRT completed. 8/16/2023- Here for a follow up. Patient started having worsening aphasia since Sunday and worsened further by Monday evening. Last dose of steroids was on 8/11/2023. Denies headaches, seizures, N/V. CTH with increased cerebral edema - Started on Dexamethasone 9/6/2023 - MRI showed Left temporal craniotomy and postoperative changes in the left temporal lobe ring enhancement which is in size compared with 5/16/2023. Significantly less vasogenic edema and mass effect with resolution of midline shift compared with 5/16/2023. Although MR perfusion doesn't demonstrate increased blood in the left temporal lobe suggesting the presence of neoplasm. There does not appear to be progression. Plan was made to start on TMZ maintenance cycles 9/11- TMZ first cycle begin 9/14/2023- Reduced Klonopin to 0.25 mg at bedtime- Two days later he started having " freezing" episodes - Restarted Klonopin 0.25 mg bid 9/28/2023- Patient c/o sob - CT chest angio + PE - Admitted to [facility] - d/c on Lovenox 150 mg daily 10/4/2023 - Reduced Klonopin to 0.25 mg at bedtime only 10/9-10/13- TMZ second cycle- TDD of 450 mg ( dose based on 200 mg/m2) 10/10/2023- Patient's clonazepam was lowered to 0.25 mg at bedtime - Patient started having focal seizures ( freezing and piloerection episodes) - Recommended to take Klonopin 0.5 mg in the am - Patient now feeling better - will continue with Clonazepam 0.5 mg bid 10/17/2023- Spoke with patient's wife. Per wife and his family patient has increased word finding difficulty. I recommended to take Dexamethasone 4 mg bid 10/31/2023- MRI showed Mild increase in extent of a persistent left temporal heterogeneous ring-enhancing mass. Perfusion analysis of the mass demonstrates increased cerebral blood volume compatible with neoplasm. Moderate increase in surrounding nonenhancing FLAIR hyperintense signal abnormality is nonspecific and may represent treatment related changes versus nonenhancing tumor. Mild increase in mass effect. No midline shift..He remains on Dexamethasone 4 mg bid. His expressive Aphasia remains the same. 11/8/2023- Here for a follow up and to discuss further treatment plan. His aphasia and fatigue remains the same despite being on Dexamethasone 4 mg bid. Added IV AVastin to TMZ 11/13-11/17- TMZ third cycle- TDD of 450 mg ( dose based on 200 mg/m2) 11/14/2023- FIRST DOSE OF IV AVASTIN 11/28/2023- Here for a follow up prior to the second dose of IV Avastin. His wife noticed him being a little bit more active a day after the first infusion. Per patient he feels the same; He continues to have tremendous fatigue, speech remains the same. He feels out of breath when he takes a flight of stairs , on resting no sob 12/11-12/15- TMZ 4th cycle - TDD of 450 mg ( dose based on 200 mg/m2) 1/3/2024 - MRI stable Had 4 IV AVastin's thus far. Since after the 3rd infusion, wife noted an improvement in his alertness and energy level. Patient had " freezing episodes" two weeks ago, raised Keppra to 1500 mg bid and Dexamethasone 4 mg daily. Since then no more seizure like activity Denies headaches - his wife notes clinical improvement over the past 2 weeks - more energy and no further seizures. Speech is also improved. 1/5/2023- Patient got diagnosed with acute bronchitis - Started on Augmentin X 10 days - Chemo and immunotherapy on hold. Completed Abx on 1/15/2024 1/16/2024- 5th dose of IV Avastin 1/18/2024- 5th cycle of TMZ 2/1/2024 - Patient went to ER with epistaxis - Rhino rocket placed - No further interventions required, patient followed up with ENT as well. 2/13/2024-  He has had no further nasal bleeding - he is following instructions from ENT - Afrin bid and saline spray. He is feeling well - he has no headache- his wife reports that his speech is more fluent. 2/15-2/19- TMZ 6th cycle 3/19/2024- Patient had 9 IV Avastin infusion thus far. Reports his energy is better since last visit. Reports fatigue but better Denies headaches

## 2024-03-21 ENCOUNTER — NON-APPOINTMENT (OUTPATIENT)
Age: 69
End: 2024-03-21

## 2024-03-22 ENCOUNTER — APPOINTMENT (OUTPATIENT)
Dept: CARDIOLOGY | Facility: CLINIC | Age: 69
End: 2024-03-22
Payer: MEDICARE

## 2024-03-22 ENCOUNTER — NON-APPOINTMENT (OUTPATIENT)
Age: 69
End: 2024-03-22

## 2024-03-22 VITALS
BODY MASS INDEX: 31.56 KG/M2 | HEIGHT: 72 IN | SYSTOLIC BLOOD PRESSURE: 132 MMHG | OXYGEN SATURATION: 96 % | DIASTOLIC BLOOD PRESSURE: 82 MMHG | HEART RATE: 108 BPM | WEIGHT: 233 LBS

## 2024-03-22 DIAGNOSIS — I82.409 ACUTE EMBOLISM AND THROMBOSIS OF UNSPECIFIED DEEP VEINS OF UNSPECIFIED LOWER EXTREMITY: ICD-10-CM

## 2024-03-22 DIAGNOSIS — Z80.3 FAMILY HISTORY OF MALIGNANT NEOPLASM OF BREAST: ICD-10-CM

## 2024-03-22 DIAGNOSIS — C71.9 MALIGNANT NEOPLASM OF BRAIN, UNSPECIFIED: ICD-10-CM

## 2024-03-22 DIAGNOSIS — R06.09 OTHER FORMS OF DYSPNEA: ICD-10-CM

## 2024-03-22 PROCEDURE — 99204 OFFICE O/P NEW MOD 45 MIN: CPT

## 2024-03-26 ENCOUNTER — APPOINTMENT (OUTPATIENT)
Dept: HEMATOLOGY ONCOLOGY | Facility: CLINIC | Age: 69
End: 2024-03-26

## 2024-03-26 ENCOUNTER — APPOINTMENT (OUTPATIENT)
Dept: INFUSION THERAPY | Facility: HOSPITAL | Age: 69
End: 2024-03-26

## 2024-03-26 ENCOUNTER — NON-APPOINTMENT (OUTPATIENT)
Age: 69
End: 2024-03-26

## 2024-03-26 NOTE — REVIEW OF SYSTEMS
[Negative] : Genitourinary [FreeTextEntry5] : see HPI [FreeTextEntry9] : see HPI [FreeTextEntry6] : see HPI [de-identified] : see HPI [de-identified] : see HPI [de-identified] : see HPI [de-identified] : see HPI

## 2024-03-26 NOTE — PHYSICAL EXAM
[Normal Oral Mucosa] : normal oral mucosa [Heart Sounds] : normal S1 and S2 [Respiration, Rhythm And Depth] : normal respiratory rhythm and effort [Auscultation Breath Sounds / Voice Sounds] : lungs were clear to auscultation bilaterally [Bowel Sounds] : normal bowel sounds [Abdomen Soft] : soft [Abnormal Walk] : normal gait [Abdomen Tenderness] : non-tender [] : no ischemic changes [No Skin Ulcers] : no skin ulcer [Oriented To Time, Place, And Person] : oriented to person, place, and time [FreeTextEntry1] : No LE edema, bilateral calves soft, Palpable DP and PT bilaterally

## 2024-03-26 NOTE — REASON FOR VISIT
[Initial Evaluation] : an initial evaluation of [FreeTextEntry1] : [birth year]  69 y/o M with PMHX GBM under the care of Dr. Galvez and Dr. Bush, Left pterional craniotomy for brain tumor resection in 5/2023, R below the knee DVT in 9/2023, CTA chest 9/2023 showed pulmonary thromboemboli in both lungs, most proximally in the right pulmonary artery. No evidence of right heart strain.  On Lovenox 150 mg daily. No bleeding issues on Lovenox. Denies C/P. Reports dyspnea on climbing 2 flights of stairs for the past 2 days. No LE pain or edema.  TTE 9/2023:   1. Left ventricular cavity is small. Left ventricular wall thickness is normal. Left ventricular systolic function is normal with an ejection fraction of 57 % by Ulloa's method of disks.  2. Tricuspid annular plane systolic excursion (TAPSE) is 2.6 cm (normal >=1.7 cm).  3. No pericardial effusion seen.  4. Technically difficult image quality.  5. There is no evidence of a left ventricular thrombus.  LE venous duplex 9/2023: RIGHT: Normal compressibility of the RIGHT common femoral, femoral and popliteal  veins. Doppler examination shows normal spontaneous and phasic flow. Acute DVT within the right gastrocnemius vein. Posterior tibial and  peroneal veins are patent.  LEFT: Normal compressibility of the LEFT common femoral, femoral and popliteal  veins. Doppler examination shows normal spontaneous and phasic flow. No LEFT calf vein thrombosis is detected.  Medications: Lovenox 150 mg BID Toprol XL 25 mg daily Norvasc 10 mg daily Klonopin Keppra Protonix Avatin infusions every 2 weeks

## 2024-03-26 NOTE — ASSESSMENT
[FreeTextEntry1] : Assessment: 1. PE 9/2023 2. R Below the knee DVT 9/2023 3. GBM s/p craniotomy in 5/2023     Last treatment of chemotherapy 2/2024  Plan: 1. Continue Lovenox 150 mg daily. 2. Repeat surveillance LE venous duplex.  3. Continue excellent care with Dr. Galvez and Eleazar. 4. Repeat Echocardiogram due to history of dyspnea on exertion. 5. Follow-up in 3 months. Call with any questions. Office will call with test results.   I, Dr. Jason Carpenter personally performed the evaluation and management (E/M) services for this new patient.  That E/M includes conducting the initial examination, assessing all conditions, and establishing the plan of care.  Today, my ACP, Edil Reyes, was here to observe my evaluation and management services for this patient to be followed going forward.

## 2024-03-27 ENCOUNTER — NON-APPOINTMENT (OUTPATIENT)
Age: 69
End: 2024-03-27

## 2024-03-28 ENCOUNTER — NON-APPOINTMENT (OUTPATIENT)
Age: 69
End: 2024-03-28

## 2024-03-29 ENCOUNTER — APPOINTMENT (OUTPATIENT)
Dept: ULTRASOUND IMAGING | Facility: CLINIC | Age: 69
End: 2024-03-29
Payer: MEDICARE

## 2024-03-29 ENCOUNTER — OUTPATIENT (OUTPATIENT)
Dept: OUTPATIENT SERVICES | Facility: HOSPITAL | Age: 69
LOS: 1 days | End: 2024-03-29
Payer: MEDICARE

## 2024-03-29 DIAGNOSIS — I82.409 ACUTE EMBOLISM AND THROMBOSIS OF UNSPECIFIED DEEP VEINS OF UNSPECIFIED LOWER EXTREMITY: ICD-10-CM

## 2024-03-29 DIAGNOSIS — Z98.890 OTHER SPECIFIED POSTPROCEDURAL STATES: Chronic | ICD-10-CM

## 2024-03-29 PROCEDURE — 93970 EXTREMITY STUDY: CPT | Mod: 26

## 2024-03-29 PROCEDURE — 93970 EXTREMITY STUDY: CPT

## 2024-04-05 ENCOUNTER — NON-APPOINTMENT (OUTPATIENT)
Age: 69
End: 2024-04-05

## 2024-04-06 ENCOUNTER — APPOINTMENT (OUTPATIENT)
Dept: CARDIOLOGY | Facility: CLINIC | Age: 69
End: 2024-04-06
Payer: MEDICARE

## 2024-04-06 PROCEDURE — 93306 TTE W/DOPPLER COMPLETE: CPT

## 2024-04-09 ENCOUNTER — RESULT REVIEW (OUTPATIENT)
Age: 69
End: 2024-04-09

## 2024-04-09 ENCOUNTER — APPOINTMENT (OUTPATIENT)
Dept: NEUROLOGY | Facility: CLINIC | Age: 69
End: 2024-04-09
Payer: MEDICARE

## 2024-04-09 ENCOUNTER — APPOINTMENT (OUTPATIENT)
Dept: INFUSION THERAPY | Facility: HOSPITAL | Age: 69
End: 2024-04-09

## 2024-04-09 ENCOUNTER — APPOINTMENT (OUTPATIENT)
Dept: HEMATOLOGY ONCOLOGY | Facility: CLINIC | Age: 69
End: 2024-04-09

## 2024-04-09 VITALS
RESPIRATION RATE: 16 BRPM | HEIGHT: 72 IN | TEMPERATURE: 98.4 F | OXYGEN SATURATION: 96 % | SYSTOLIC BLOOD PRESSURE: 142 MMHG | WEIGHT: 226 LBS | HEART RATE: 97 BPM | DIASTOLIC BLOOD PRESSURE: 74 MMHG | BODY MASS INDEX: 30.61 KG/M2

## 2024-04-09 LAB
ALBUMIN SERPL ELPH-MCNC: 4.1 G/DL — SIGNIFICANT CHANGE UP (ref 3.3–5)
ALP SERPL-CCNC: 61 U/L — SIGNIFICANT CHANGE UP (ref 40–120)
ALT FLD-CCNC: 18 U/L — SIGNIFICANT CHANGE UP (ref 10–45)
ANION GAP SERPL CALC-SCNC: 12 MMOL/L — SIGNIFICANT CHANGE UP (ref 5–17)
AST SERPL-CCNC: 17 U/L — SIGNIFICANT CHANGE UP (ref 10–40)
BASOPHILS # BLD AUTO: 0.03 K/UL — SIGNIFICANT CHANGE UP (ref 0–0.2)
BASOPHILS NFR BLD AUTO: 0.2 % — SIGNIFICANT CHANGE UP (ref 0–2)
BILIRUB SERPL-MCNC: 0.2 MG/DL — SIGNIFICANT CHANGE UP (ref 0.2–1.2)
BUN SERPL-MCNC: 14 MG/DL — SIGNIFICANT CHANGE UP (ref 7–23)
CALCIUM SERPL-MCNC: 9.7 MG/DL — SIGNIFICANT CHANGE UP (ref 8.4–10.5)
CHLORIDE SERPL-SCNC: 104 MMOL/L — SIGNIFICANT CHANGE UP (ref 96–108)
CO2 SERPL-SCNC: 25 MMOL/L — SIGNIFICANT CHANGE UP (ref 22–31)
CREAT SERPL-MCNC: 0.85 MG/DL — SIGNIFICANT CHANGE UP (ref 0.5–1.3)
EGFR: 96 ML/MIN/1.73M2 — SIGNIFICANT CHANGE UP
EOSINOPHIL # BLD AUTO: 0.03 K/UL — SIGNIFICANT CHANGE UP (ref 0–0.5)
EOSINOPHIL NFR BLD AUTO: 0.2 % — SIGNIFICANT CHANGE UP (ref 0–6)
GLUCOSE SERPL-MCNC: 105 MG/DL — HIGH (ref 70–99)
HCT VFR BLD CALC: 40.2 % — SIGNIFICANT CHANGE UP (ref 39–50)
HGB BLD-MCNC: 13.1 G/DL — SIGNIFICANT CHANGE UP (ref 13–17)
IMM GRANULOCYTES NFR BLD AUTO: 1.9 % — HIGH (ref 0–0.9)
LYMPHOCYTES # BLD AUTO: 0.74 K/UL — LOW (ref 1–3.3)
LYMPHOCYTES # BLD AUTO: 6.1 % — LOW (ref 13–44)
MCHC RBC-ENTMCNC: 28.9 PG — SIGNIFICANT CHANGE UP (ref 27–34)
MCHC RBC-ENTMCNC: 32.6 G/DL — SIGNIFICANT CHANGE UP (ref 32–36)
MCV RBC AUTO: 88.5 FL — SIGNIFICANT CHANGE UP (ref 80–100)
MONOCYTES # BLD AUTO: 0.99 K/UL — HIGH (ref 0–0.9)
MONOCYTES NFR BLD AUTO: 8.2 % — SIGNIFICANT CHANGE UP (ref 2–14)
NEUTROPHILS # BLD AUTO: 10.12 K/UL — HIGH (ref 1.8–7.4)
NEUTROPHILS NFR BLD AUTO: 83.4 % — HIGH (ref 43–77)
NRBC # BLD: 0 /100 WBCS — SIGNIFICANT CHANGE UP (ref 0–0)
PLATELET # BLD AUTO: 355 K/UL — SIGNIFICANT CHANGE UP (ref 150–400)
POTASSIUM SERPL-MCNC: 4.1 MMOL/L — SIGNIFICANT CHANGE UP (ref 3.5–5.3)
POTASSIUM SERPL-SCNC: 4.1 MMOL/L — SIGNIFICANT CHANGE UP (ref 3.5–5.3)
PROT SERPL-MCNC: 6.7 G/DL — SIGNIFICANT CHANGE UP (ref 6–8.3)
RBC # BLD: 4.54 M/UL — SIGNIFICANT CHANGE UP (ref 4.2–5.8)
RBC # FLD: 16.1 % — HIGH (ref 10.3–14.5)
SODIUM SERPL-SCNC: 141 MMOL/L — SIGNIFICANT CHANGE UP (ref 135–145)
WBC # BLD: 12.14 K/UL — HIGH (ref 3.8–10.5)
WBC # FLD AUTO: 12.14 K/UL — HIGH (ref 3.8–10.5)

## 2024-04-09 PROCEDURE — 99215 OFFICE O/P EST HI 40 MIN: CPT

## 2024-04-09 NOTE — HISTORY OF PRESENT ILLNESS
[FreeTextEntry1] :     Mr. Liu is a 69 yo male who is here for a follow up for his known GBM diagnosis  In brief  PMHx notable for only orthopedic issues - NKDA    5/9/2023 - I have seen him today with a new MRI. patient and his wife have noted over the past 3 weeks some difficulty with expressive speech - word substitutions and change in personality - more irritable that usual. He denies headaches, focal weakness,numbness, seizures, or gait instability. He saw Dr. Garcia, neurology - had and MRI this am - I have personally reviewed this film - that shows a left temporal heterogeneously enhancing mass measuring 5.0 x 4.5 x 3.3 cm with extension into the left frontal lobe with surrounding edema and left to right midline shift. He was sent to ER for an expedited evaluation  5/9/2023- 5/20/2023- Admitted at [facility]. MR Spect on 5/11 revealed "Reidentified heterogeneously enhancing necrotic mass lesion centered within the left temporal lobe and extending into the left periinsular region with a cystic portion along the inferior aspect of the lesion, with a large degree of surrounding vasogenic edema throughout the left parietal temporal lobes and left basal ganglia This lesion likely reflects a high-grade glioma. Functional imaging demonstrates no hyperactivity in the region of tumor or vasogenic edema."  Patient met criteria and was enrolled in IMVAX Trial.  5/15- had a Left pterional craniotomy for brain tumor resection with gleolan. Frozen path: high-grade glioma. Bilateral abdominal incisions made for later implantation of drug treatment. IMVAX Biologics were implanted into abdomen via incisions during bedside procedure per IMVAX trial on 5/17 under conscious sedation. These biologic agents were removed during bedside procedure on 5/19 under conscious sedation. Patient was  monitored in the NSCU, his post op course remained uncomplicated  5/16/2023 - Post op MRI notable for edema and residual nodular enhancement within the left insular  region and left anterior temporal lobe. CT Serial CT Head imaging post op showing stable resolving post operative changes. Patient was discharged home on 5/20/2023.  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  6/9/2023- Reached out to patient this am. Patient c/o blurred vision Right worse than left for past 2 days. Discussed with Dr Galvez. Will start him on Dexamethasone 2 mg daily.  6/19/2023 - 6/23/2023 - Patient called reporting seizure like symptoms and was admitted to [facility]. He reported frequent episodes of intense fear / uneasiness, feeling "stressed out of my mind," chills, piloerection to all extremities, palpitations, and increased respiratory rate, all of which lasts for less than 1 minute, and completely resolve. Patient's spouse at bedside confirming the above. Patient and spouse reporting that symptoms started about 5-6 days ago w/ about 10 episodes throughout the entire day. The frequency progressively increased over the course of the week. Yesterday, patient had these episodes about every 10-15 minutes, with the same duration of less than 1 minute. No other symptoms at this time, with the exception of varying degrees of word-finding difficulty. EEG was negative, however episodes stopped once Keppra was raised to 1500 mg bid and added clonazepam 0.5 mg bid. During this admission he was noted to be hyponatremic and  6/26/2023- ChemoRT begins. 8/3/2023- Here for a follow up. Reports he feels fatigued. His dexamethasone was tapered to 1 mg daily which started yesterday. 8/7- ChemoRT completed. 8/16/2023- Here for a follow up. Patient started having worsening aphasia since Sunday and worsened further by Monday evening. Last dose of steroids was on 8/11/2023. Denies headaches, seizures, N/V. CTH with increased cerebral edema - Started on Dexamethasone 9/6/2023 - MRI showed Left temporal craniotomy and postoperative changes in the left temporal lobe ring enhancement which is in size compared with 5/16/2023. Significantly less vasogenic edema and mass effect with resolution of midline shift compared with 5/16/2023. Although MR perfusion doesn't demonstrate increased blood in the left temporal lobe suggesting the presence of neoplasm. There does not appear to be progression. Plan was made to start on TMZ maintenance cycles 9/11- TMZ first cycle begin 9/14/2023- Reduced Klonopin to 0.25 mg at bedtime- Two days later he started having " freezing" episodes - Restarted Klonopin 0.25 mg bid 9/28/2023- Patient c/o sob - CT chest angio + PE - Admitted to [facility] - d/c on Lovenox 150 mg daily 10/4/2023 - Reduced Klonopin to 0.25 mg at bedtime only 10/9-10/13- TMZ second cycle- TDD of 450 mg ( dose based on 200 mg/m2) 10/10/2023- Patient's clonazepam was lowered to 0.25 mg at bedtime - Patient started having focal seizures ( freezing and piloerection episodes) - Recommended to take Klonopin 0.5 mg in the am - Patient now feeling better - will continue with Clonazepam 0.5 mg bid 10/17/2023- Spoke with patient's wife. Per wife and his family patient has increased word finding difficulty. I recommended to take Dexamethasone 4 mg bid 10/31/2023- MRI showed Mild increase in extent of a persistent left temporal heterogeneous ring-enhancing mass. Perfusion analysis of the mass demonstrates increased cerebral blood volume compatible with neoplasm. Moderate increase in surrounding nonenhancing FLAIR hyperintense signal abnormality is nonspecific and may represent treatment related changes versus nonenhancing tumor. Mild increase in mass effect. No midline shift..He remains on Dexamethasone 4 mg bid. His expressive Aphasia remains the same. 11/8/2023- Here for a follow up and to discuss further treatment plan. His aphasia and fatigue remains the same despite being on Dexamethasone 4 mg bid. Added IV AVastin to TMZ 11/13-11/17- TMZ third cycle- TDD of 450 mg ( dose based on 200 mg/m2) 11/14/2023- FIRST DOSE OF IV AVASTIN 11/28/2023- Here for a follow up prior to the second dose of IV Avastin. His wife noticed him being a little bit more active a day after the first infusion. Per patient he feels the same; He continues to have tremendous fatigue, speech remains the same. He feels out of breath when he takes a flight of stairs , on resting no sob 12/11-12/15- TMZ 4th cycle - TDD of 450 mg ( dose based on 200 mg/m2) 1/3/2024 - MRI stable Had 4 IV AVastin's thus far. Since after the 3rd infusion, wife noted an improvement in his alertness and energy level. Patient had " freezing episodes" two weeks ago, raised Keppra to 1500 mg bid and Dexamethasone 4 mg daily. Since then no more seizure like activity Denies headaches - his wife notes clinical improvement over the past 2 weeks - more energy and no further seizures. Speech is also improved. 1/5/2023- Patient got diagnosed with acute bronchitis - Started on Augmentin X 10 days - Chemo and immunotherapy on hold. Completed Abx on 1/15/2024 1/16/2024- 5th dose of IV Avastin 1/18/2024- 5th cycle of TMZ 2/1/2024 - Patient went to ER with epistaxis - Rhino rocket placed - No further interventions required, patient followed up with ENT as well. 2/13/2024- He has had no further nasal bleeding - he is following instructions from ENT - Afrin bid and saline spray. He is feeling well - he has no headache- his wife reports that his speech is more fluent. 2/15-2/19- TMZ 6th cycle 3/19/2024- MRI showed Grossly stable postsurgical/treatment changes in the left anterior temporal lobe.However, increasing prominence of small foci of restricted diffusion in the left occipital periventricular region. The largest of these foci demonstrates mild hyperperfusion. Progression of tumor cannot be ruled out. Attention on follow-up recommended. 4/9/2024- Patient had 9 IV Avastin infusion thus far. He was very fatigued when he was off of steroids, so restarted on Dexamethasone 2 mg on March 27th. Since then he has been feeling better. His main challenge is his Right VFD Denies headaches, seizures

## 2024-04-09 NOTE — DISCUSSION/SUMMARY
[FreeTextEntry1] : Patient seen and examined In summary, this is a 69 yo man with a left frontal GBM, IDH wt - enrolled in IMVAX clinical trial. Treated with chemoRT 6/26 - 8/7/2023.Avastin added in 11/2023 - noted clinical improvement. Has completed 6 adjuvant cycles of TMZ (last on 2/2024) and has had 9 infusions of Avastin. GBM - Neurologically stable Will continue with IV Avastin- Today 10th dose  CEREBRAL EDEMA - c/w Dexamethasone 2 mg daily.GI prophylaxis with Pantoprazole. PE - Continue Lovenox 150 mg daily - Diagnosed with PE on 9/28/2023- Recommended to switch injection sites while administering injections-Continue follow up with  Dr Carpenter SEIZURES - Continue Keppra to 1500 - 1500 mg daily and Remains seizure free - fatigue , improved - Continue Klonopin to 0.25 mg twice a day- HYPONATREMIA - Off Na tabs- Will monitor CMP HYPERTENSION- Continue Metoprolol 25 mg daily. Follow up with PCP for BP management.  FOLLOW UP - Will follow up along with an MRI on 5/14/2024.  In the interim, if any concerns patient knows to call our office.

## 2024-04-09 NOTE — PHYSICAL EXAM
[General Appearance - Alert] : alert [General Appearance - In No Acute Distress] : in no acute distress [General Appearance - Well Nourished] : well nourished [Oriented To Time, Place, And Person] : oriented to person, place, and time [] : no respiratory distress [Respiration, Rhythm And Depth] : normal respiratory rhythm and effort [Exaggerated Use Of Accessory Muscles For Inspiration] : no accessory muscle use [Edema] : there was no peripheral edema [FreeTextEntry1] :     Patient seen and examined Alert and awake Oriented X 3. Speech clear and fluent- occasional hesitancy but better overall PERRLA, EOMI, RUQ VF defect  Face symmetrical. Tongue protrudes midline BROWN x 4 with good and equal strength FFM, coordination equal bilaterally Sensation intact bilaterally Gait steady. Ambulating independently. [Over the Past 2 Weeks, Have You Felt Down, Depressed, or Hopeless?] : 1.) Over the past 2 weeks, have you felt down, depressed, or hopeless? No [Over the Past 2 Weeks, Have You Felt Little Interest or Pleasure Doing Things?] : 2.) Over the past 2 weeks, have you felt little interest or pleasure doing things? No

## 2024-04-10 LAB
APPEARANCE UR: CLEAR — SIGNIFICANT CHANGE UP
BILIRUB UR-MCNC: NEGATIVE — SIGNIFICANT CHANGE UP
COLOR SPEC: YELLOW — SIGNIFICANT CHANGE UP
DIFF PNL FLD: NEGATIVE — SIGNIFICANT CHANGE UP
GLUCOSE UR QL: NEGATIVE MG/DL — SIGNIFICANT CHANGE UP
KETONES UR-MCNC: NEGATIVE MG/DL — SIGNIFICANT CHANGE UP
LEUKOCYTE ESTERASE UR-ACNC: NEGATIVE — SIGNIFICANT CHANGE UP
NITRITE UR-MCNC: NEGATIVE — SIGNIFICANT CHANGE UP
PH UR: 6 — SIGNIFICANT CHANGE UP (ref 5–8)
PROT UR-MCNC: NEGATIVE MG/DL — SIGNIFICANT CHANGE UP
SP GR SPEC: >1.03 — HIGH (ref 1–1.03)
UROBILINOGEN FLD QL: 0.2 MG/DL — SIGNIFICANT CHANGE UP (ref 0.2–1)

## 2024-04-23 ENCOUNTER — RESULT REVIEW (OUTPATIENT)
Age: 69
End: 2024-04-23

## 2024-04-23 ENCOUNTER — APPOINTMENT (OUTPATIENT)
Dept: HEMATOLOGY ONCOLOGY | Facility: CLINIC | Age: 69
End: 2024-04-23

## 2024-04-23 ENCOUNTER — APPOINTMENT (OUTPATIENT)
Dept: INFUSION THERAPY | Facility: HOSPITAL | Age: 69
End: 2024-04-23

## 2024-04-23 LAB
ALBUMIN SERPL ELPH-MCNC: 4.3 G/DL — SIGNIFICANT CHANGE UP (ref 3.3–5)
ALP SERPL-CCNC: 60 U/L — SIGNIFICANT CHANGE UP (ref 40–120)
ALT FLD-CCNC: 16 U/L — SIGNIFICANT CHANGE UP (ref 10–45)
ANION GAP SERPL CALC-SCNC: 15 MMOL/L — SIGNIFICANT CHANGE UP (ref 5–17)
AST SERPL-CCNC: 25 U/L — SIGNIFICANT CHANGE UP (ref 10–40)
BASOPHILS # BLD AUTO: 0.04 K/UL — SIGNIFICANT CHANGE UP (ref 0–0.2)
BASOPHILS NFR BLD AUTO: 0.4 % — SIGNIFICANT CHANGE UP (ref 0–2)
BILIRUB SERPL-MCNC: 0.4 MG/DL — SIGNIFICANT CHANGE UP (ref 0.2–1.2)
BUN SERPL-MCNC: 12 MG/DL — SIGNIFICANT CHANGE UP (ref 7–23)
CALCIUM SERPL-MCNC: 10 MG/DL — SIGNIFICANT CHANGE UP (ref 8.4–10.5)
CHLORIDE SERPL-SCNC: 101 MMOL/L — SIGNIFICANT CHANGE UP (ref 96–108)
CO2 SERPL-SCNC: 23 MMOL/L — SIGNIFICANT CHANGE UP (ref 22–31)
CREAT SERPL-MCNC: 0.82 MG/DL — SIGNIFICANT CHANGE UP (ref 0.5–1.3)
EGFR: 97 ML/MIN/1.73M2 — SIGNIFICANT CHANGE UP
EOSINOPHIL # BLD AUTO: 0.01 K/UL — SIGNIFICANT CHANGE UP (ref 0–0.5)
EOSINOPHIL NFR BLD AUTO: 0.1 % — SIGNIFICANT CHANGE UP (ref 0–6)
GLUCOSE SERPL-MCNC: 107 MG/DL — HIGH (ref 70–99)
HCT VFR BLD CALC: 41 % — SIGNIFICANT CHANGE UP (ref 39–50)
HGB BLD-MCNC: 13.6 G/DL — SIGNIFICANT CHANGE UP (ref 13–17)
IMM GRANULOCYTES NFR BLD AUTO: 1.1 % — HIGH (ref 0–0.9)
LYMPHOCYTES # BLD AUTO: 0.49 K/UL — LOW (ref 1–3.3)
LYMPHOCYTES # BLD AUTO: 5.3 % — LOW (ref 13–44)
MCHC RBC-ENTMCNC: 28.6 PG — SIGNIFICANT CHANGE UP (ref 27–34)
MCHC RBC-ENTMCNC: 33.2 G/DL — SIGNIFICANT CHANGE UP (ref 32–36)
MCV RBC AUTO: 86.3 FL — SIGNIFICANT CHANGE UP (ref 80–100)
MONOCYTES # BLD AUTO: 0.55 K/UL — SIGNIFICANT CHANGE UP (ref 0–0.9)
MONOCYTES NFR BLD AUTO: 5.9 % — SIGNIFICANT CHANGE UP (ref 2–14)
NEUTROPHILS # BLD AUTO: 8.13 K/UL — HIGH (ref 1.8–7.4)
NEUTROPHILS NFR BLD AUTO: 87.2 % — HIGH (ref 43–77)
NRBC # BLD: 0 /100 WBCS — SIGNIFICANT CHANGE UP (ref 0–0)
PLATELET # BLD AUTO: 205 K/UL — SIGNIFICANT CHANGE UP (ref 150–400)
POTASSIUM SERPL-MCNC: 4.4 MMOL/L — SIGNIFICANT CHANGE UP (ref 3.5–5.3)
POTASSIUM SERPL-SCNC: 4.4 MMOL/L — SIGNIFICANT CHANGE UP (ref 3.5–5.3)
PROT SERPL-MCNC: 7.1 G/DL — SIGNIFICANT CHANGE UP (ref 6–8.3)
RBC # BLD: 4.75 M/UL — SIGNIFICANT CHANGE UP (ref 4.2–5.8)
RBC # FLD: 16.7 % — HIGH (ref 10.3–14.5)
SODIUM SERPL-SCNC: 139 MMOL/L — SIGNIFICANT CHANGE UP (ref 135–145)
WBC # BLD: 9.32 K/UL — SIGNIFICANT CHANGE UP (ref 3.8–10.5)
WBC # FLD AUTO: 9.32 K/UL — SIGNIFICANT CHANGE UP (ref 3.8–10.5)

## 2024-04-24 LAB
APPEARANCE UR: CLEAR — SIGNIFICANT CHANGE UP
BACTERIA # UR AUTO: NEGATIVE /HPF — SIGNIFICANT CHANGE UP
BILIRUB UR-MCNC: NEGATIVE — SIGNIFICANT CHANGE UP
CAST: 0 /LPF — SIGNIFICANT CHANGE UP (ref 0–4)
COLOR SPEC: YELLOW — SIGNIFICANT CHANGE UP
DIFF PNL FLD: NEGATIVE — SIGNIFICANT CHANGE UP
GLUCOSE UR QL: NEGATIVE MG/DL — SIGNIFICANT CHANGE UP
KETONES UR-MCNC: ABNORMAL MG/DL
LEUKOCYTE ESTERASE UR-ACNC: NEGATIVE — SIGNIFICANT CHANGE UP
NITRITE UR-MCNC: NEGATIVE — SIGNIFICANT CHANGE UP
PH UR: 6.5 — SIGNIFICANT CHANGE UP (ref 5–8)
PROT UR-MCNC: 30 MG/DL
RBC CASTS # UR COMP ASSIST: 4 /HPF — SIGNIFICANT CHANGE UP (ref 0–4)
SP GR SPEC: 1.03 — SIGNIFICANT CHANGE UP (ref 1–1.03)
SQUAMOUS # UR AUTO: 0 /HPF — SIGNIFICANT CHANGE UP (ref 0–5)
UROBILINOGEN FLD QL: 1 MG/DL — SIGNIFICANT CHANGE UP (ref 0.2–1)
WBC UR QL: 0 /HPF — SIGNIFICANT CHANGE UP (ref 0–5)

## 2024-04-30 ENCOUNTER — OUTPATIENT (OUTPATIENT)
Dept: OUTPATIENT SERVICES | Facility: HOSPITAL | Age: 69
LOS: 1 days | Discharge: HOME | End: 2024-04-30

## 2024-04-30 DIAGNOSIS — Z98.890 OTHER SPECIFIED POSTPROCEDURAL STATES: Chronic | ICD-10-CM

## 2024-04-30 DIAGNOSIS — D64.9 ANEMIA, UNSPECIFIED: ICD-10-CM

## 2024-05-07 ENCOUNTER — APPOINTMENT (OUTPATIENT)
Dept: HEMATOLOGY ONCOLOGY | Facility: CLINIC | Age: 69
End: 2024-05-07

## 2024-05-07 ENCOUNTER — RESULT REVIEW (OUTPATIENT)
Age: 69
End: 2024-05-07

## 2024-05-07 ENCOUNTER — APPOINTMENT (OUTPATIENT)
Dept: INFUSION THERAPY | Facility: HOSPITAL | Age: 69
End: 2024-05-07

## 2024-05-07 LAB
ALBUMIN SERPL ELPH-MCNC: 4.2 G/DL — SIGNIFICANT CHANGE UP (ref 3.3–5)
ALP SERPL-CCNC: 55 U/L — SIGNIFICANT CHANGE UP (ref 40–120)
ALT FLD-CCNC: 25 U/L — SIGNIFICANT CHANGE UP (ref 10–45)
ANION GAP SERPL CALC-SCNC: 12 MMOL/L — SIGNIFICANT CHANGE UP (ref 5–17)
APPEARANCE UR: CLEAR — SIGNIFICANT CHANGE UP
AST SERPL-CCNC: 20 U/L — SIGNIFICANT CHANGE UP (ref 10–40)
BASOPHILS # BLD AUTO: 0.04 K/UL — SIGNIFICANT CHANGE UP (ref 0–0.2)
BASOPHILS NFR BLD AUTO: 0.4 % — SIGNIFICANT CHANGE UP (ref 0–2)
BILIRUB SERPL-MCNC: 0.3 MG/DL — SIGNIFICANT CHANGE UP (ref 0.2–1.2)
BILIRUB UR-MCNC: ABNORMAL
BUN SERPL-MCNC: 17 MG/DL — SIGNIFICANT CHANGE UP (ref 7–23)
CALCIUM SERPL-MCNC: 10 MG/DL — SIGNIFICANT CHANGE UP (ref 8.4–10.5)
CHLORIDE SERPL-SCNC: 102 MMOL/L — SIGNIFICANT CHANGE UP (ref 96–108)
CO2 SERPL-SCNC: 23 MMOL/L — SIGNIFICANT CHANGE UP (ref 22–31)
COLOR SPEC: SIGNIFICANT CHANGE UP
CREAT SERPL-MCNC: 0.95 MG/DL — SIGNIFICANT CHANGE UP (ref 0.5–1.3)
DIFF PNL FLD: NEGATIVE — SIGNIFICANT CHANGE UP
EGFR: 88 ML/MIN/1.73M2 — SIGNIFICANT CHANGE UP
EOSINOPHIL # BLD AUTO: 0.03 K/UL — SIGNIFICANT CHANGE UP (ref 0–0.5)
EOSINOPHIL NFR BLD AUTO: 0.3 % — SIGNIFICANT CHANGE UP (ref 0–6)
GLUCOSE SERPL-MCNC: 112 MG/DL — HIGH (ref 70–99)
GLUCOSE UR QL: NEGATIVE MG/DL — SIGNIFICANT CHANGE UP
HCT VFR BLD CALC: 41.8 % — SIGNIFICANT CHANGE UP (ref 39–50)
HGB BLD-MCNC: 13.8 G/DL — SIGNIFICANT CHANGE UP (ref 13–17)
IMM GRANULOCYTES NFR BLD AUTO: 1.6 % — HIGH (ref 0–0.9)
KETONES UR-MCNC: ABNORMAL MG/DL
LEUKOCYTE ESTERASE UR-ACNC: ABNORMAL
LYMPHOCYTES # BLD AUTO: 0.86 K/UL — LOW (ref 1–3.3)
LYMPHOCYTES # BLD AUTO: 8.4 % — LOW (ref 13–44)
MCHC RBC-ENTMCNC: 27.9 PG — SIGNIFICANT CHANGE UP (ref 27–34)
MCHC RBC-ENTMCNC: 33 G/DL — SIGNIFICANT CHANGE UP (ref 32–36)
MCV RBC AUTO: 84.6 FL — SIGNIFICANT CHANGE UP (ref 80–100)
MONOCYTES # BLD AUTO: 0.99 K/UL — HIGH (ref 0–0.9)
MONOCYTES NFR BLD AUTO: 9.7 % — SIGNIFICANT CHANGE UP (ref 2–14)
NEUTROPHILS # BLD AUTO: 8.16 K/UL — HIGH (ref 1.8–7.4)
NEUTROPHILS NFR BLD AUTO: 79.6 % — HIGH (ref 43–77)
NITRITE UR-MCNC: NEGATIVE — SIGNIFICANT CHANGE UP
NRBC # BLD: 0 /100 WBCS — SIGNIFICANT CHANGE UP (ref 0–0)
PH UR: 5.5 — SIGNIFICANT CHANGE UP (ref 5–8)
PLATELET # BLD AUTO: 272 K/UL — SIGNIFICANT CHANGE UP (ref 150–400)
POTASSIUM SERPL-MCNC: 4.2 MMOL/L — SIGNIFICANT CHANGE UP (ref 3.5–5.3)
POTASSIUM SERPL-SCNC: 4.2 MMOL/L — SIGNIFICANT CHANGE UP (ref 3.5–5.3)
PROT SERPL-MCNC: 6.9 G/DL — SIGNIFICANT CHANGE UP (ref 6–8.3)
PROT UR-MCNC: 30 MG/DL
RBC # BLD: 4.94 M/UL — SIGNIFICANT CHANGE UP (ref 4.2–5.8)
RBC # FLD: 17 % — HIGH (ref 10.3–14.5)
SODIUM SERPL-SCNC: 137 MMOL/L — SIGNIFICANT CHANGE UP (ref 135–145)
SP GR SPEC: >1.03 — HIGH (ref 1–1.03)
UROBILINOGEN FLD QL: 1 MG/DL — SIGNIFICANT CHANGE UP (ref 0.2–1)
WBC # BLD: 10.24 K/UL — SIGNIFICANT CHANGE UP (ref 3.8–10.5)
WBC # FLD AUTO: 10.24 K/UL — SIGNIFICANT CHANGE UP (ref 3.8–10.5)

## 2024-05-08 DIAGNOSIS — C71.9 MALIGNANT NEOPLASM OF BRAIN, UNSPECIFIED: ICD-10-CM

## 2024-05-08 DIAGNOSIS — Z51.11 ENCOUNTER FOR ANTINEOPLASTIC CHEMOTHERAPY: ICD-10-CM

## 2024-05-08 LAB
BACTERIA # UR AUTO: NEGATIVE /HPF — SIGNIFICANT CHANGE UP
CAST: 8 /LPF — HIGH (ref 0–4)
HYALINE CASTS # UR AUTO: PRESENT
MUCOUS THREADS # UR AUTO: PRESENT
RBC CASTS # UR COMP ASSIST: 2 /HPF — SIGNIFICANT CHANGE UP (ref 0–4)
REVIEW: SIGNIFICANT CHANGE UP
SQUAMOUS # UR AUTO: 1 /HPF — SIGNIFICANT CHANGE UP (ref 0–5)
WBC UR QL: 0 /HPF — SIGNIFICANT CHANGE UP (ref 0–5)

## 2024-05-14 ENCOUNTER — OUTPATIENT (OUTPATIENT)
Dept: OUTPATIENT SERVICES | Facility: HOSPITAL | Age: 69
LOS: 1 days | End: 2024-05-14

## 2024-05-14 DIAGNOSIS — Z98.890 OTHER SPECIFIED POSTPROCEDURAL STATES: Chronic | ICD-10-CM

## 2024-05-14 DIAGNOSIS — C71.9 MALIGNANT NEOPLASM OF BRAIN, UNSPECIFIED: ICD-10-CM

## 2024-05-15 ENCOUNTER — OUTPATIENT (OUTPATIENT)
Dept: OUTPATIENT SERVICES | Facility: HOSPITAL | Age: 69
LOS: 1 days | End: 2024-05-15
Payer: MEDICARE

## 2024-05-15 ENCOUNTER — APPOINTMENT (OUTPATIENT)
Dept: MRI IMAGING | Facility: IMAGING CENTER | Age: 69
End: 2024-05-15
Payer: MEDICARE

## 2024-05-15 ENCOUNTER — APPOINTMENT (OUTPATIENT)
Dept: NEUROLOGY | Facility: CLINIC | Age: 69
End: 2024-05-15
Payer: MEDICARE

## 2024-05-15 VITALS
HEART RATE: 70 BPM | HEIGHT: 72 IN | OXYGEN SATURATION: 97 % | TEMPERATURE: 97.8 F | RESPIRATION RATE: 16 BRPM | BODY MASS INDEX: 30.61 KG/M2 | SYSTOLIC BLOOD PRESSURE: 142 MMHG | WEIGHT: 226 LBS | DIASTOLIC BLOOD PRESSURE: 80 MMHG

## 2024-05-15 DIAGNOSIS — D49.6 NEOPLASM OF UNSPECIFIED BEHAVIOR OF BRAIN: ICD-10-CM

## 2024-05-15 DIAGNOSIS — Z98.890 OTHER SPECIFIED POSTPROCEDURAL STATES: Chronic | ICD-10-CM

## 2024-05-15 PROCEDURE — 70553 MRI BRAIN STEM W/O & W/DYE: CPT | Mod: 26,MH

## 2024-05-15 PROCEDURE — A9585: CPT

## 2024-05-15 PROCEDURE — 99215 OFFICE O/P EST HI 40 MIN: CPT

## 2024-05-15 PROCEDURE — 70553 MRI BRAIN STEM W/O & W/DYE: CPT

## 2024-05-15 RX ORDER — DEXAMETHASONE 2 MG/1
2 TABLET ORAL DAILY
Qty: 30 | Refills: 2 | Status: DISCONTINUED | COMMUNITY
Start: 2023-06-09 | End: 2024-05-15

## 2024-05-15 NOTE — PHYSICAL EXAM
[General Appearance - Alert] : alert [General Appearance - In No Acute Distress] : in no acute distress [General Appearance - Well Nourished] : well nourished [Oriented To Time, Place, And Person] : oriented to person, place, and time [Affect] : the affect was normal [Mood] : the mood was normal [] : no respiratory distress [Respiration, Rhythm And Depth] : normal respiratory rhythm and effort [Exaggerated Use Of Accessory Muscles For Inspiration] : no accessory muscle use [Edema] : there was no peripheral edema [FreeTextEntry1] : Patient seen and examined Alert and awake Oriented X 3. Speech clear and fluent- occasional hesitancy but better overall PERRLA, EOMI, RUQ VF defect Face symmetrical. Tongue protrudes midline BROWN x 4 with good and equal strength FFM, coordination equal bilaterally Sensation intact bilaterally Gait steady. Ambulating independently.

## 2024-05-15 NOTE — DISCUSSION/SUMMARY
[FreeTextEntry1] : Patient seen and examined In summary, this is a 67 yo man with a left frontal GBM, IDH wt - enrolled in IMVAX clinical trial. Treated with chemoRT 6/26 - 8/7/2023.Avastin added in 11/2023 - noted clinical improvement. Has completed 6 adjuvant cycles of TMZ (last on 2/2024) and has had 12 infusions of Avastin. GBM - Neurologically stable MRI reviewed from today and I compared it with MRI from 3/19/2024, 1/3/2024 and 10/31/2023 - To my review left fronto parietal area appears stable  Will continue with IV Avastin CEREBRAL EDEMA - Taper Dexamethasone to 1 mg daily.GI prophylaxis with Pantoprazole. PE - Continue Lovenox 150 mg daily - Diagnosed with PE on 9/28/2023- Recommended to switch injection sites while administering injections-Continue follow up with Dr Carpenter SEIZURES - Continue Keppra to 1500 - 1500 mg daily and Remains seizure free - fatigue , improved - Continue Klonopin to 0.25 mg twice a day- HYPERTENSION- Continue Metoprolol 25 mg daily. Follow up with PCP for BP management.  FOLLOW UP - Will follow up in a month prior to the infusion on 6/4/2024. In the interim, if any concerns patient knows to call our office.

## 2024-05-15 NOTE — HISTORY OF PRESENT ILLNESS
[FreeTextEntry1] : Mr. Liu is a 69 yo male who is here for a follow up for his known GBM diagnosis  In brief  PMHx notable for only orthopedic issues - NKDA    5/9/2023 - I have seen him today with a new MRI. patient and his wife have noted over the past 3 weeks some difficulty with expressive speech - word substitutions and change in personality - more irritable that usual. He denies headaches, focal weakness,numbness, seizures, or gait instability. He saw Dr. Garcia, neurology - had and MRI this am - I have personally reviewed this film - that shows a left temporal heterogeneously enhancing mass measuring 5.0 x 4.5 x 3.3 cm with extension into the left frontal lobe with surrounding edema and left to right midline shift. He was sent to ER for an expedited evaluation  5/9/2023- 5/20/2023- Admitted at [facility]. MR Spect on 5/11 revealed "Reidentified heterogeneously enhancing necrotic mass lesion centered within the left temporal lobe and extending into the left periinsular region with a cystic portion along the inferior aspect of the lesion, with a large degree of surrounding vasogenic edema throughout the left parietal temporal lobes and left basal ganglia This lesion likely reflects a high-grade glioma. Functional imaging demonstrates no hyperactivity in the region of tumor or vasogenic edema."  Patient met criteria and was enrolled in IMVAX Trial.  5/15- had a Left pterional craniotomy for brain tumor resection with gleolan. Frozen path: high-grade glioma. Bilateral abdominal incisions made for later implantation of drug treatment. IMVAX Biologics were implanted into abdomen via incisions during bedside procedure per IMVAX trial on 5/17 under conscious sedation. These biologic agents were removed during bedside procedure on 5/19 under conscious sedation. Patient was  monitored in the NSCU, his post op course remained uncomplicated  5/16/2023 - Post op MRI notable for edema and residual nodular enhancement within the left insular  region and left anterior temporal lobe. CT Serial CT Head imaging post op showing stable resolving post operative changes. Patient was discharged home on 5/20/2023.  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  6/9/2023- Reached out to patient this am. Patient c/o blurred vision Right worse than left for past 2 days. Discussed with Dr Galvez. Will start him on Dexamethasone 2 mg daily.  6/19/2023 - 6/23/2023 - Patient called reporting seizure like symptoms and was admitted to [facility]. He reported frequent episodes of intense fear / uneasiness, feeling "stressed out of my mind," chills, piloerection to all extremities, palpitations, and increased respiratory rate, all of which lasts for less than 1 minute, and completely resolve. Patient's spouse at bedside confirming the above. Patient and spouse reporting that symptoms started about 5-6 days ago w/ about 10 episodes throughout the entire day. The frequency progressively increased over the course of the week. Yesterday, patient had these episodes about every 10-15 minutes, with the same duration of less than 1 minute. No other symptoms at this time, with the exception of varying degrees of word-finding difficulty. EEG was negative, however episodes stopped once Keppra was raised to 1500 mg bid and added clonazepam 0.5 mg bid. During this admission he was noted to be hyponatremic and  6/26/2023- ChemoRT begins. 8/3/2023- Here for a follow up. Reports he feels fatigued. His dexamethasone was tapered to 1 mg daily which started yesterday. 8/7- ChemoRT completed. 8/16/2023- Here for a follow up. Patient started having worsening aphasia since Sunday and worsened further by Monday evening. Last dose of steroids was on 8/11/2023. Denies headaches, seizures, N/V. CTH with increased cerebral edema - Started on Dexamethasone 9/6/2023 - MRI showed Left temporal craniotomy and postoperative changes in the left temporal lobe ring enhancement which is in size compared with 5/16/2023. Significantly less vasogenic edema and mass effect with resolution of midline shift compared with 5/16/2023. Although MR perfusion doesn't demonstrate increased blood in the left temporal lobe suggesting the presence of neoplasm. There does not appear to be progression. Plan was made to start on TMZ maintenance cycles 9/11- TMZ first cycle begin 9/14/2023- Reduced Klonopin to 0.25 mg at bedtime- Two days later he started having " freezing" episodes - Restarted Klonopin 0.25 mg bid 9/28/2023- Patient c/o sob - CT chest angio + PE - Admitted to [facility] - d/c on Lovenox 150 mg daily 10/4/2023 - Reduced Klonopin to 0.25 mg at bedtime only 10/9-10/13- TMZ second cycle- TDD of 450 mg ( dose based on 200 mg/m2) 10/10/2023- Patient's clonazepam was lowered to 0.25 mg at bedtime - Patient started having focal seizures ( freezing and piloerection episodes) - Recommended to take Klonopin 0.5 mg in the am - Patient now feeling better - will continue with Clonazepam 0.5 mg bid 10/17/2023- Spoke with patient's wife. Per wife and his family patient has increased word finding difficulty. I recommended to take Dexamethasone 4 mg bid 10/31/2023- MRI showed Mild increase in extent of a persistent left temporal heterogeneous ring-enhancing mass. Perfusion analysis of the mass demonstrates increased cerebral blood volume compatible with neoplasm. Moderate increase in surrounding nonenhancing FLAIR hyperintense signal abnormality is nonspecific and may represent treatment related changes versus nonenhancing tumor. Mild increase in mass effect. No midline shift..He remains on Dexamethasone 4 mg bid. His expressive Aphasia remains the same. 11/8/2023- Here for a follow up and to discuss further treatment plan. His aphasia and fatigue remains the same despite being on Dexamethasone 4 mg bid. Added IV AVastin to TMZ 11/13-11/17- TMZ third cycle- TDD of 450 mg ( dose based on 200 mg/m2) 11/14/2023- FIRST DOSE OF IV AVASTIN 11/28/2023- Here for a follow up prior to the second dose of IV Avastin. His wife noticed him being a little bit more active a day after the first infusion. Per patient he feels the same; He continues to have tremendous fatigue, speech remains the same. He feels out of breath when he takes a flight of stairs , on resting no sob 12/11-12/15- TMZ 4th cycle - TDD of 450 mg ( dose based on 200 mg/m2) 1/3/2024 - MRI stable Had 4 IV AVastin's thus far. Since after the 3rd infusion, wife noted an improvement in his alertness and energy level. Patient had " freezing episodes" two weeks ago, raised Keppra to 1500 mg bid and Dexamethasone 4 mg daily. Since then no more seizure like activity Denies headaches - his wife notes clinical improvement over the past 2 weeks - more energy and no further seizures. Speech is also improved. 1/5/2023- Patient got diagnosed with acute bronchitis - Started on Augmentin X 10 days - Chemo and immunotherapy on hold. Completed Abx on 1/15/2024 1/16/2024- 5th dose of IV Avastin 1/18/2024- 5th cycle of TMZ 2/1/2024 - Patient went to ER with epistaxis - Rhino rocket placed - No further interventions required, patient followed up with ENT as well. 2/13/2024- He has had no further nasal bleeding - he is following instructions from ENT - Afrin bid and saline spray. He is feeling well - he has no headache- his wife reports that his speech is more fluent. 2/15-2/19- TMZ 6th cycle 3/19/2024- MRI showed Grossly stable postsurgical/treatment changes in the left anterior temporal lobe.However, increasing prominence of small foci of restricted diffusion in the left occipital periventricular region. The largest of these foci demonstrates mild hyperperfusion. Progression of tumor cannot be ruled out. Attention on follow-up recommended. 4/9/2024- Patient had 9 IV Avastin infusion thus far. He was very fatigued when he was off of steroids, so restarted on Dexamethasone 2 mg on March 27th. Since then he has been feeling better. His main challenge is his Right VFD  5/14/2024- Here for a follow up with a new MRI. Denies headaches, seizures- Had 12 infusions of IV Avastin thus far

## 2024-05-21 ENCOUNTER — RESULT REVIEW (OUTPATIENT)
Age: 69
End: 2024-05-21

## 2024-05-21 ENCOUNTER — APPOINTMENT (OUTPATIENT)
Dept: INFUSION THERAPY | Facility: HOSPITAL | Age: 69
End: 2024-05-21

## 2024-05-21 ENCOUNTER — APPOINTMENT (OUTPATIENT)
Dept: HEMATOLOGY ONCOLOGY | Facility: CLINIC | Age: 69
End: 2024-05-21

## 2024-05-21 LAB
ALBUMIN SERPL ELPH-MCNC: 4.2 G/DL — SIGNIFICANT CHANGE UP (ref 3.3–5)
ALP SERPL-CCNC: 63 U/L — SIGNIFICANT CHANGE UP (ref 40–120)
ALT FLD-CCNC: 26 U/L — SIGNIFICANT CHANGE UP (ref 10–45)
ANION GAP SERPL CALC-SCNC: 14 MMOL/L — SIGNIFICANT CHANGE UP (ref 5–17)
APPEARANCE UR: CLEAR — SIGNIFICANT CHANGE UP
AST SERPL-CCNC: 24 U/L — SIGNIFICANT CHANGE UP (ref 10–40)
BASOPHILS # BLD AUTO: 0.05 K/UL — SIGNIFICANT CHANGE UP (ref 0–0.2)
BASOPHILS NFR BLD AUTO: 0.5 % — SIGNIFICANT CHANGE UP (ref 0–2)
BILIRUB SERPL-MCNC: 0.2 MG/DL — SIGNIFICANT CHANGE UP (ref 0.2–1.2)
BILIRUB UR-MCNC: NEGATIVE — SIGNIFICANT CHANGE UP
BUN SERPL-MCNC: 16 MG/DL — SIGNIFICANT CHANGE UP (ref 7–23)
CALCIUM SERPL-MCNC: 9.6 MG/DL — SIGNIFICANT CHANGE UP (ref 8.4–10.5)
CHLORIDE SERPL-SCNC: 103 MMOL/L — SIGNIFICANT CHANGE UP (ref 96–108)
CO2 SERPL-SCNC: 22 MMOL/L — SIGNIFICANT CHANGE UP (ref 22–31)
COLOR SPEC: YELLOW — SIGNIFICANT CHANGE UP
CREAT SERPL-MCNC: 0.78 MG/DL — SIGNIFICANT CHANGE UP (ref 0.5–1.3)
DIFF PNL FLD: NEGATIVE — SIGNIFICANT CHANGE UP
EGFR: 98 ML/MIN/1.73M2 — SIGNIFICANT CHANGE UP
EOSINOPHIL # BLD AUTO: 0.03 K/UL — SIGNIFICANT CHANGE UP (ref 0–0.5)
EOSINOPHIL NFR BLD AUTO: 0.3 % — SIGNIFICANT CHANGE UP (ref 0–6)
GLUCOSE SERPL-MCNC: 94 MG/DL — SIGNIFICANT CHANGE UP (ref 70–99)
GLUCOSE UR QL: NEGATIVE MG/DL — SIGNIFICANT CHANGE UP
HCT VFR BLD CALC: 41.9 % — SIGNIFICANT CHANGE UP (ref 39–50)
HGB BLD-MCNC: 13.8 G/DL — SIGNIFICANT CHANGE UP (ref 13–17)
IMM GRANULOCYTES NFR BLD AUTO: 1.4 % — HIGH (ref 0–0.9)
KETONES UR-MCNC: NEGATIVE MG/DL — SIGNIFICANT CHANGE UP
LEUKOCYTE ESTERASE UR-ACNC: NEGATIVE — SIGNIFICANT CHANGE UP
LYMPHOCYTES # BLD AUTO: 0.77 K/UL — LOW (ref 1–3.3)
LYMPHOCYTES # BLD AUTO: 7.4 % — LOW (ref 13–44)
MCHC RBC-ENTMCNC: 27.9 PG — SIGNIFICANT CHANGE UP (ref 27–34)
MCHC RBC-ENTMCNC: 32.9 G/DL — SIGNIFICANT CHANGE UP (ref 32–36)
MCV RBC AUTO: 84.8 FL — SIGNIFICANT CHANGE UP (ref 80–100)
MONOCYTES # BLD AUTO: 0.99 K/UL — HIGH (ref 0–0.9)
MONOCYTES NFR BLD AUTO: 9.5 % — SIGNIFICANT CHANGE UP (ref 2–14)
NEUTROPHILS # BLD AUTO: 8.47 K/UL — HIGH (ref 1.8–7.4)
NEUTROPHILS NFR BLD AUTO: 80.9 % — HIGH (ref 43–77)
NITRITE UR-MCNC: NEGATIVE — SIGNIFICANT CHANGE UP
NRBC # BLD: 0 /100 WBCS — SIGNIFICANT CHANGE UP (ref 0–0)
PH UR: 5.5 — SIGNIFICANT CHANGE UP (ref 5–8)
PLATELET # BLD AUTO: 212 K/UL — SIGNIFICANT CHANGE UP (ref 150–400)
POTASSIUM SERPL-MCNC: 4.6 MMOL/L — SIGNIFICANT CHANGE UP (ref 3.5–5.3)
POTASSIUM SERPL-SCNC: 4.6 MMOL/L — SIGNIFICANT CHANGE UP (ref 3.5–5.3)
PROT SERPL-MCNC: 6.9 G/DL — SIGNIFICANT CHANGE UP (ref 6–8.3)
PROT UR-MCNC: SIGNIFICANT CHANGE UP MG/DL
RBC # BLD: 4.94 M/UL — SIGNIFICANT CHANGE UP (ref 4.2–5.8)
RBC # FLD: 17.7 % — HIGH (ref 10.3–14.5)
SODIUM SERPL-SCNC: 138 MMOL/L — SIGNIFICANT CHANGE UP (ref 135–145)
SP GR SPEC: >1.03 — HIGH (ref 1–1.03)
UROBILINOGEN FLD QL: 0.2 MG/DL — SIGNIFICANT CHANGE UP (ref 0.2–1)
WBC # BLD: 10.46 K/UL — SIGNIFICANT CHANGE UP (ref 3.8–10.5)
WBC # FLD AUTO: 10.46 K/UL — SIGNIFICANT CHANGE UP (ref 3.8–10.5)

## 2024-06-04 ENCOUNTER — RESULT REVIEW (OUTPATIENT)
Age: 69
End: 2024-06-04

## 2024-06-04 ENCOUNTER — APPOINTMENT (OUTPATIENT)
Dept: INFUSION THERAPY | Facility: HOSPITAL | Age: 69
End: 2024-06-04

## 2024-06-04 ENCOUNTER — APPOINTMENT (OUTPATIENT)
Dept: NEUROLOGY | Facility: CLINIC | Age: 69
End: 2024-06-04
Payer: MEDICARE

## 2024-06-04 ENCOUNTER — APPOINTMENT (OUTPATIENT)
Dept: HEMATOLOGY ONCOLOGY | Facility: CLINIC | Age: 69
End: 2024-06-04

## 2024-06-04 VITALS
SYSTOLIC BLOOD PRESSURE: 133 MMHG | OXYGEN SATURATION: 96 % | RESPIRATION RATE: 16 BRPM | HEIGHT: 72 IN | DIASTOLIC BLOOD PRESSURE: 70 MMHG | BODY MASS INDEX: 30.34 KG/M2 | HEART RATE: 68 BPM | TEMPERATURE: 98 F | WEIGHT: 224 LBS

## 2024-06-04 LAB
ALBUMIN SERPL ELPH-MCNC: 4.4 G/DL — SIGNIFICANT CHANGE UP (ref 3.3–5)
ALP SERPL-CCNC: 64 U/L — SIGNIFICANT CHANGE UP (ref 40–120)
ALT FLD-CCNC: 24 U/L — SIGNIFICANT CHANGE UP (ref 10–45)
ANION GAP SERPL CALC-SCNC: 14 MMOL/L — SIGNIFICANT CHANGE UP (ref 5–17)
APPEARANCE UR: CLEAR — SIGNIFICANT CHANGE UP
AST SERPL-CCNC: 22 U/L — SIGNIFICANT CHANGE UP (ref 10–40)
BASOPHILS # BLD AUTO: 0.03 K/UL — SIGNIFICANT CHANGE UP (ref 0–0.2)
BASOPHILS NFR BLD AUTO: 0.3 % — SIGNIFICANT CHANGE UP (ref 0–2)
BILIRUB SERPL-MCNC: 0.4 MG/DL — SIGNIFICANT CHANGE UP (ref 0.2–1.2)
BILIRUB UR-MCNC: NEGATIVE — SIGNIFICANT CHANGE UP
BUN SERPL-MCNC: 13 MG/DL — SIGNIFICANT CHANGE UP (ref 7–23)
CALCIUM SERPL-MCNC: 10 MG/DL — SIGNIFICANT CHANGE UP (ref 8.4–10.5)
CHLORIDE SERPL-SCNC: 100 MMOL/L — SIGNIFICANT CHANGE UP (ref 96–108)
CO2 SERPL-SCNC: 25 MMOL/L — SIGNIFICANT CHANGE UP (ref 22–31)
COLOR SPEC: YELLOW — SIGNIFICANT CHANGE UP
CREAT SERPL-MCNC: 0.96 MG/DL — SIGNIFICANT CHANGE UP (ref 0.5–1.3)
DIFF PNL FLD: NEGATIVE — SIGNIFICANT CHANGE UP
EGFR: 87 ML/MIN/1.73M2 — SIGNIFICANT CHANGE UP
EOSINOPHIL # BLD AUTO: 0.02 K/UL — SIGNIFICANT CHANGE UP (ref 0–0.5)
EOSINOPHIL NFR BLD AUTO: 0.2 % — SIGNIFICANT CHANGE UP (ref 0–6)
GLUCOSE SERPL-MCNC: 112 MG/DL — HIGH (ref 70–99)
GLUCOSE UR QL: NEGATIVE MG/DL — SIGNIFICANT CHANGE UP
HCT VFR BLD CALC: 41.1 % — SIGNIFICANT CHANGE UP (ref 39–50)
HGB BLD-MCNC: 13.6 G/DL — SIGNIFICANT CHANGE UP (ref 13–17)
IMM GRANULOCYTES NFR BLD AUTO: 1.4 % — HIGH (ref 0–0.9)
KETONES UR-MCNC: NEGATIVE MG/DL — SIGNIFICANT CHANGE UP
LEUKOCYTE ESTERASE UR-ACNC: NEGATIVE — SIGNIFICANT CHANGE UP
LYMPHOCYTES # BLD AUTO: 0.78 K/UL — LOW (ref 1–3.3)
LYMPHOCYTES # BLD AUTO: 7.6 % — LOW (ref 13–44)
MCHC RBC-ENTMCNC: 27.7 PG — SIGNIFICANT CHANGE UP (ref 27–34)
MCHC RBC-ENTMCNC: 33.1 G/DL — SIGNIFICANT CHANGE UP (ref 32–36)
MCV RBC AUTO: 83.7 FL — SIGNIFICANT CHANGE UP (ref 80–100)
MONOCYTES # BLD AUTO: 0.83 K/UL — SIGNIFICANT CHANGE UP (ref 0–0.9)
MONOCYTES NFR BLD AUTO: 8.1 % — SIGNIFICANT CHANGE UP (ref 2–14)
NEUTROPHILS # BLD AUTO: 8.41 K/UL — HIGH (ref 1.8–7.4)
NEUTROPHILS NFR BLD AUTO: 82.4 % — HIGH (ref 43–77)
NITRITE UR-MCNC: NEGATIVE — SIGNIFICANT CHANGE UP
NRBC # BLD: 0 /100 WBCS — SIGNIFICANT CHANGE UP (ref 0–0)
PH UR: 6 — SIGNIFICANT CHANGE UP (ref 5–8)
PLATELET # BLD AUTO: 234 K/UL — SIGNIFICANT CHANGE UP (ref 150–400)
POTASSIUM SERPL-MCNC: 4.6 MMOL/L — SIGNIFICANT CHANGE UP (ref 3.5–5.3)
POTASSIUM SERPL-SCNC: 4.6 MMOL/L — SIGNIFICANT CHANGE UP (ref 3.5–5.3)
PROT SERPL-MCNC: 7.1 G/DL — SIGNIFICANT CHANGE UP (ref 6–8.3)
PROT UR-MCNC: NEGATIVE MG/DL — SIGNIFICANT CHANGE UP
RBC # BLD: 4.91 M/UL — SIGNIFICANT CHANGE UP (ref 4.2–5.8)
RBC # FLD: 18.2 % — HIGH (ref 10.3–14.5)
SODIUM SERPL-SCNC: 140 MMOL/L — SIGNIFICANT CHANGE UP (ref 135–145)
SP GR SPEC: 1.02 — SIGNIFICANT CHANGE UP (ref 1–1.03)
UROBILINOGEN FLD QL: 0.2 MG/DL — SIGNIFICANT CHANGE UP (ref 0.2–1)
WBC # BLD: 10.21 K/UL — SIGNIFICANT CHANGE UP (ref 3.8–10.5)
WBC # FLD AUTO: 10.21 K/UL — SIGNIFICANT CHANGE UP (ref 3.8–10.5)

## 2024-06-04 PROCEDURE — 99215 OFFICE O/P EST HI 40 MIN: CPT

## 2024-06-04 PROCEDURE — G2211 COMPLEX E/M VISIT ADD ON: CPT

## 2024-06-04 RX ORDER — CLONAZEPAM 0.5 MG/1
0.5 TABLET ORAL
Qty: 60 | Refills: 0 | Status: ACTIVE | COMMUNITY
Start: 2023-06-28 | End: 1900-01-01

## 2024-06-04 RX ORDER — ONDANSETRON 8 MG/1
8 TABLET ORAL
Qty: 30 | Refills: 2 | Status: DISCONTINUED | COMMUNITY
Start: 2023-09-07 | End: 2024-06-04

## 2024-06-04 RX ORDER — SOD CHLOR,BICARB/SQUEEZ BOTTLE
PACKET, WITH RINSE DEVICE NASAL
Qty: 1 | Refills: 3 | Status: DISCONTINUED | COMMUNITY
Start: 2024-02-02 | End: 2024-06-04

## 2024-06-04 RX ORDER — DEXAMETHASONE 1 MG/1
1 TABLET ORAL DAILY
Qty: 30 | Refills: 1 | Status: ACTIVE | COMMUNITY
Start: 2024-02-13 | End: 1900-01-01

## 2024-06-04 RX ORDER — NYSTATIN AND TRIAMCINOLONE ACETONIDE 100000; 1 MG/G; MG/G
100000-0.1 CREAM TOPICAL 3 TIMES DAILY
Qty: 30 | Refills: 5 | Status: DISCONTINUED | COMMUNITY
Start: 2024-01-26 | End: 2024-06-04

## 2024-06-04 RX ORDER — TEMOZOLOMIDE 100 MG/1
100 CAPSULE ORAL
Qty: 10 | Refills: 0 | Status: DISCONTINUED | COMMUNITY
Start: 2023-10-05 | End: 2024-06-04

## 2024-06-04 RX ORDER — TEMOZOLOMIDE 250 MG/1
250 CAPSULE ORAL
Qty: 5 | Refills: 0 | Status: DISCONTINUED | COMMUNITY
Start: 2023-10-05 | End: 2024-06-04

## 2024-06-04 RX ORDER — NORMAL SALT TABLETS 1 G/G
1 TABLET ORAL
Qty: 30 | Refills: 0 | Status: DISCONTINUED | COMMUNITY
Start: 2023-11-30 | End: 2024-06-04

## 2024-06-04 NOTE — PHYSICAL EXAM
[General Appearance - Alert] : alert [General Appearance - In No Acute Distress] : in no acute distress [General Appearance - Well Nourished] : well nourished [Oriented To Time, Place, And Person] : oriented to person, place, and time [] : no respiratory distress [Respiration, Rhythm And Depth] : normal respiratory rhythm and effort [Exaggerated Use Of Accessory Muscles For Inspiration] : no accessory muscle use [Edema] : there was no peripheral edema [FreeTextEntry1] : Patient seen and examined Alert and awake Oriented X 3. Speech clear and fluent- occasional hesitancy but better overall continues to improve PERRLA, EOMI, RUQ VF defect Face symmetrical. Tongue protrudes midline BROWN x 4 with good and equal strength FFM, coordination equal bilaterally Sensation intact bilaterally Gait steady. Ambulating independently. Glycopyrrolate Counseling:  I discussed with the patient the risks of glycopyrrolate including but not limited to skin rash, drowsiness, dry mouth, difficulty urinating, and blurred vision.

## 2024-06-04 NOTE — HISTORY OF PRESENT ILLNESS
[FreeTextEntry1] : Mr. Liu is a 69 yo male who is here for a follow up for his known GBM diagnosis  In brief  PMHx notable for only orthopedic issues - NKDA    5/9/2023 - I have seen him today with a new MRI. patient and his wife have noted over the past 3 weeks some difficulty with expressive speech - word substitutions and change in personality - more irritable that usual. He denies headaches, focal weakness,numbness, seizures, or gait instability. He saw Dr. Garcia, neurology - had and MRI this am - I have personally reviewed this film - that shows a left temporal heterogeneously enhancing mass measuring 5.0 x 4.5 x 3.3 cm with extension into the left frontal lobe with surrounding edema and left to right midline shift. He was sent to ER for an expedited evaluation  5/9/2023- 5/20/2023- Admitted at [facility]. MR Spect on 5/11 revealed "Reidentified heterogeneously enhancing necrotic mass lesion centered within the left temporal lobe and extending into the left periinsular region with a cystic portion along the inferior aspect of the lesion, with a large degree of surrounding vasogenic edema throughout the left parietal temporal lobes and left basal ganglia This lesion likely reflects a high-grade glioma. Functional imaging demonstrates no hyperactivity in the region of tumor or vasogenic edema."  Patient met criteria and was enrolled in IMVAX Trial.  5/15- had a Left pterional craniotomy for brain tumor resection with gleolan. Frozen path: high-grade glioma. Bilateral abdominal incisions made for later implantation of drug treatment. IMVAX Biologics were implanted into abdomen via incisions during bedside procedure per IMVAX trial on 5/17 under conscious sedation. These biologic agents were removed during bedside procedure on 5/19 under conscious sedation. Patient was  monitored in the NSCU, his post op course remained uncomplicated  5/16/2023 - Post op MRI notable for edema and residual nodular enhancement within the left insular  region and left anterior temporal lobe. CT Serial CT Head imaging post op showing stable resolving post operative changes. Patient was discharged home on 5/20/2023.  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  6/9/2023- Reached out to patient this am. Patient c/o blurred vision Right worse than left for past 2 days. Discussed with Dr Galvez. Will start him on Dexamethasone 2 mg daily.  6/19/2023 - 6/23/2023 - Patient called reporting seizure like symptoms and was admitted to [facility]. He reported frequent episodes of intense fear / uneasiness, feeling "stressed out of my mind," chills, piloerection to all extremities, palpitations, and increased respiratory rate, all of which lasts for less than 1 minute, and completely resolve. Patient's spouse at bedside confirming the above. Patient and spouse reporting that symptoms started about 5-6 days ago w/ about 10 episodes throughout the entire day. The frequency progressively increased over the course of the week. Yesterday, patient had these episodes about every 10-15 minutes, with the same duration of less than 1 minute. No other symptoms at this time, with the exception of varying degrees of word-finding difficulty. EEG was negative, however episodes stopped once Keppra was raised to 1500 mg bid and added clonazepam 0.5 mg bid. During this admission he was noted to be hyponatremic and  6/26/2023- ChemoRT begins. 8/3/2023- Here for a follow up. Reports he feels fatigued. His dexamethasone was tapered to 1 mg daily which started yesterday. 8/7- ChemoRT completed. 8/16/2023- Here for a follow up. Patient started having worsening aphasia since Sunday and worsened further by Monday evening. Last dose of steroids was on 8/11/2023. Denies headaches, seizures, N/V. CTH with increased cerebral edema - Started on Dexamethasone 9/6/2023 - MRI showed Left temporal craniotomy and postoperative changes in the left temporal lobe ring enhancement which is in size compared with 5/16/2023. Significantly less vasogenic edema and mass effect with resolution of midline shift compared with 5/16/2023. Although MR perfusion doesn't demonstrate increased blood in the left temporal lobe suggesting the presence of neoplasm. There does not appear to be progression. Plan was made to start on TMZ maintenance cycles 9/11- TMZ first cycle begin 9/14/2023- Reduced Klonopin to 0.25 mg at bedtime- Two days later he started having " freezing" episodes - Restarted Klonopin 0.25 mg bid 9/28/2023- Patient c/o sob - CT chest angio + PE - Admitted to [facility] - d/c on Lovenox 150 mg daily 10/4/2023 - Reduced Klonopin to 0.25 mg at bedtime only 10/9-10/13- TMZ second cycle- TDD of 450 mg ( dose based on 200 mg/m2) 10/10/2023- Patient's clonazepam was lowered to 0.25 mg at bedtime - Patient started having focal seizures ( freezing and piloerection episodes) - Recommended to take Klonopin 0.5 mg in the am - Patient now feeling better - will continue with Clonazepam 0.5 mg bid 10/17/2023- Spoke with patient's wife. Per wife and his family patient has increased word finding difficulty. I recommended to take Dexamethasone 4 mg bid 10/31/2023- MRI showed Mild increase in extent of a persistent left temporal heterogeneous ring-enhancing mass. Perfusion analysis of the mass demonstrates increased cerebral blood volume compatible with neoplasm. Moderate increase in surrounding nonenhancing FLAIR hyperintense signal abnormality is nonspecific and may represent treatment related changes versus nonenhancing tumor. Mild increase in mass effect. No midline shift..He remains on Dexamethasone 4 mg bid. His expressive Aphasia remains the same. 11/8/2023- Here for a follow up and to discuss further treatment plan. His aphasia and fatigue remains the same despite being on Dexamethasone 4 mg bid. Added IV AVastin to TMZ 11/13-11/17- TMZ third cycle- TDD of 450 mg ( dose based on 200 mg/m2) 11/14/2023- FIRST DOSE OF IV AVASTIN 11/28/2023- Here for a follow up prior to the second dose of IV Avastin. His wife noticed him being a little bit more active a day after the first infusion. Per patient he feels the same; He continues to have tremendous fatigue, speech remains the same. He feels out of breath when he takes a flight of stairs , on resting no sob 12/11-12/15- TMZ 4th cycle - TDD of 450 mg ( dose based on 200 mg/m2) 1/3/2024 - MRI stable Had 4 IV AVastin's thus far. Since after the 3rd infusion, wife noted an improvement in his alertness and energy level. Patient had " freezing episodes" two weeks ago, raised Keppra to 1500 mg bid and Dexamethasone 4 mg daily. Since then no more seizure like activity Denies headaches - his wife notes clinical improvement over the past 2 weeks - more energy and no further seizures. Speech is also improved. 1/5/2023- Patient got diagnosed with acute bronchitis - Started on Augmentin X 10 days - Chemo and immunotherapy on hold. Completed Abx on 1/15/2024 1/16/2024- 5th dose of IV Avastin 1/18/2024- 5th cycle of TMZ 2/1/2024 - Patient went to ER with epistaxis - Rhino rocket placed - No further interventions required, patient followed up with ENT as well. 2/13/2024- He has had no further nasal bleeding - he is following instructions from ENT - Afrin bid and saline spray. He is feeling well - he has no headache- his wife reports that his speech is more fluent. 2/15-2/19- TMZ 6th cycle 3/19/2024- MRI showed Grossly stable postsurgical/treatment changes in the left anterior temporal lobe.However, increasing prominence of small foci of restricted diffusion in the left occipital periventricular region. The largest of these foci demonstrates mild hyperperfusion. Progression of tumor cannot be ruled out. Attention on follow-up recommended. 4/9/2024- Patient had 9 IV Avastin infusion thus far. He was very fatigued when he was off of steroids, so restarted on Dexamethasone 2 mg on March 27th. Since then he has been feeling better. His main challenge is his Right VFD  5/14/2024-  Had 12 infusions of IV Avastin thus far. MRI showed -Grossly stable posttreatment changes in the left anterior temporal/insular region. Grossly stable enhancing nodules in the left occipital periventricular/subependymal region. New mildly enhancing nodules in the left posterior hippocampus, right splenium, and right parietal white matter with associated mild hyperperfusion. Degree of enhancement and perfusion may be decreased by Avastin effects. These are suspicious for progressive disease but superimposed treatment changes cannot be ruled out. 6/4/2024- Here for a follow up. Reports he is feeling a bit better. Continues to have word finding difficulty but improving. He is concerned about his vision as he do not find his improving Denies headaches

## 2024-06-04 NOTE — DISCUSSION/SUMMARY
[FreeTextEntry1] : Patient seen and examined In summary, this is a 67 yo man with a left frontal GBM, IDH wt - enrolled in IMVAX clinical trial. Treated with chemoRT 6/26 - 8/7/2023.Avastin added in 11/2023 - noted clinical improvement. Has completed 6 adjuvant cycles of TMZ (last on 2/2024) and has had 13 infusions of Avastin. GBM - Neurologically stable Today to receive 13th dose of IV Avastin Will continue with IV Avastin CEREBRAL EDEMA - c/w Dexamethasone to 1 mg daily.GI prophylaxis with Pantoprazole. PE - Continue Lovenox 150 mg daily - Diagnosed with PE on 9/28/2023- Recommended to switch injection sites while administering injections-Continue follow up with Dr Carpenter SEIZURES - Continue Keppra to 1500 - 1500 mg daily and Remains seizure free - fatigue , improved - Continue Klonopin to 0.25 mg twice a day HYPERTENSION- Continue Metoprolol 25 mg daily. Follow up with PCP for BP management. WORD FINDING DIFFICULTY - Recommended speech therapy - Patient will think about it  FOLLOW UP - Will follow up along with an MRI on 7/16/2024.  In the interim, if any concerns patient knows to call our office.

## 2024-06-12 NOTE — HISTORY OF PRESENT ILLNESS
[FreeTextEntry1] : 67M w/ hx of  3 weeks some difficulty with expressive speech, MRI showed L FT mass, s/p 5/15 Left pterional craniotomy for brain tumor resection with gleolan. Frozen path: high-grade glioma. Bilateral abdominal incisions made for later implantation of drug treatment. IMVAX Biologics were implanted into abdomen via incisions during bedside procedure per IMVAX trial on 5/17 under conscious sedation. These biologic agents were removed during bedside procedure on 5/19 under conscious sedation. Patient was monitored in the NSCU, his post op course remained uncomplicated  Exam:  Very mild WFD, otherwise intact. KPS 80.  cranial and abdominal incisions c/d/i

## 2024-06-12 NOTE — ASSESSMENT
[FreeTextEntry1] : Plan:\par \par Radiation and TMZ to start ~5 weeks miniumum after surgery\par continue with IMVAX protocol\par \par Patient seen, discussed, and examined with attending, Dr. Bush\par Spent a total of 45 minutes with patient \par

## 2024-06-18 ENCOUNTER — RESULT REVIEW (OUTPATIENT)
Age: 69
End: 2024-06-18

## 2024-06-18 ENCOUNTER — APPOINTMENT (OUTPATIENT)
Dept: INFUSION THERAPY | Facility: HOSPITAL | Age: 69
End: 2024-06-18

## 2024-06-18 ENCOUNTER — APPOINTMENT (OUTPATIENT)
Dept: HEMATOLOGY ONCOLOGY | Facility: CLINIC | Age: 69
End: 2024-06-18

## 2024-06-18 LAB
ALBUMIN SERPL ELPH-MCNC: 4.1 G/DL — SIGNIFICANT CHANGE UP (ref 3.3–5)
ALP SERPL-CCNC: 63 U/L — SIGNIFICANT CHANGE UP (ref 40–120)
ALT FLD-CCNC: 22 U/L — SIGNIFICANT CHANGE UP (ref 10–45)
ANION GAP SERPL CALC-SCNC: 13 MMOL/L — SIGNIFICANT CHANGE UP (ref 5–17)
AST SERPL-CCNC: 20 U/L — SIGNIFICANT CHANGE UP (ref 10–40)
BASOPHILS # BLD AUTO: 0.04 K/UL — SIGNIFICANT CHANGE UP (ref 0–0.2)
BASOPHILS NFR BLD AUTO: 0.5 % — SIGNIFICANT CHANGE UP (ref 0–2)
BILIRUB SERPL-MCNC: 0.3 MG/DL — SIGNIFICANT CHANGE UP (ref 0.2–1.2)
BUN SERPL-MCNC: 10 MG/DL — SIGNIFICANT CHANGE UP (ref 7–23)
CALCIUM SERPL-MCNC: 9.5 MG/DL — SIGNIFICANT CHANGE UP (ref 8.4–10.5)
CHLORIDE SERPL-SCNC: 103 MMOL/L — SIGNIFICANT CHANGE UP (ref 96–108)
CO2 SERPL-SCNC: 22 MMOL/L — SIGNIFICANT CHANGE UP (ref 22–31)
CREAT SERPL-MCNC: 0.89 MG/DL — SIGNIFICANT CHANGE UP (ref 0.5–1.3)
EGFR: 95 ML/MIN/1.73M2 — SIGNIFICANT CHANGE UP
EOSINOPHIL # BLD AUTO: 0.08 K/UL — SIGNIFICANT CHANGE UP (ref 0–0.5)
EOSINOPHIL NFR BLD AUTO: 0.9 % — SIGNIFICANT CHANGE UP (ref 0–6)
GLUCOSE SERPL-MCNC: 105 MG/DL — HIGH (ref 70–99)
HCT VFR BLD CALC: 39.7 % — SIGNIFICANT CHANGE UP (ref 39–50)
HGB BLD-MCNC: 13.2 G/DL — SIGNIFICANT CHANGE UP (ref 13–17)
IMM GRANULOCYTES NFR BLD AUTO: 1 % — HIGH (ref 0–0.9)
LYMPHOCYTES # BLD AUTO: 0.9 K/UL — LOW (ref 1–3.3)
LYMPHOCYTES # BLD AUTO: 10.3 % — LOW (ref 13–44)
MCHC RBC-ENTMCNC: 27.7 PG — SIGNIFICANT CHANGE UP (ref 27–34)
MCHC RBC-ENTMCNC: 33.2 G/DL — SIGNIFICANT CHANGE UP (ref 32–36)
MCV RBC AUTO: 83.4 FL — SIGNIFICANT CHANGE UP (ref 80–100)
MONOCYTES # BLD AUTO: 0.81 K/UL — SIGNIFICANT CHANGE UP (ref 0–0.9)
MONOCYTES NFR BLD AUTO: 9.3 % — SIGNIFICANT CHANGE UP (ref 2–14)
NEUTROPHILS # BLD AUTO: 6.81 K/UL — SIGNIFICANT CHANGE UP (ref 1.8–7.4)
NEUTROPHILS NFR BLD AUTO: 78 % — HIGH (ref 43–77)
NRBC # BLD: 0 /100 WBCS — SIGNIFICANT CHANGE UP (ref 0–0)
PLATELET # BLD AUTO: 238 K/UL — SIGNIFICANT CHANGE UP (ref 150–400)
POTASSIUM SERPL-MCNC: 3.8 MMOL/L — SIGNIFICANT CHANGE UP (ref 3.5–5.3)
POTASSIUM SERPL-SCNC: 3.8 MMOL/L — SIGNIFICANT CHANGE UP (ref 3.5–5.3)
PROT SERPL-MCNC: 6.8 G/DL — SIGNIFICANT CHANGE UP (ref 6–8.3)
RBC # BLD: 4.76 M/UL — SIGNIFICANT CHANGE UP (ref 4.2–5.8)
RBC # FLD: 18.4 % — HIGH (ref 10.3–14.5)
SODIUM SERPL-SCNC: 138 MMOL/L — SIGNIFICANT CHANGE UP (ref 135–145)
WBC # BLD: 8.73 K/UL — SIGNIFICANT CHANGE UP (ref 3.8–10.5)
WBC # FLD AUTO: 8.73 K/UL — SIGNIFICANT CHANGE UP (ref 3.8–10.5)

## 2024-06-19 LAB
APPEARANCE UR: ABNORMAL
BACTERIA # UR AUTO: NEGATIVE /HPF — SIGNIFICANT CHANGE UP
BILIRUB UR-MCNC: NEGATIVE — SIGNIFICANT CHANGE UP
CAST: 0 /LPF — SIGNIFICANT CHANGE UP (ref 0–4)
COLOR SPEC: YELLOW — SIGNIFICANT CHANGE UP
DIFF PNL FLD: NEGATIVE — SIGNIFICANT CHANGE UP
GLUCOSE UR QL: NEGATIVE MG/DL — SIGNIFICANT CHANGE UP
KETONES UR-MCNC: NEGATIVE MG/DL — SIGNIFICANT CHANGE UP
LEUKOCYTE ESTERASE UR-ACNC: ABNORMAL
NITRITE UR-MCNC: NEGATIVE — SIGNIFICANT CHANGE UP
PH UR: 6 — SIGNIFICANT CHANGE UP (ref 5–8)
PROT UR-MCNC: NEGATIVE MG/DL — SIGNIFICANT CHANGE UP
RBC CASTS # UR COMP ASSIST: 1 /HPF — SIGNIFICANT CHANGE UP (ref 0–4)
SP GR SPEC: 1.02 — SIGNIFICANT CHANGE UP (ref 1–1.03)
SQUAMOUS # UR AUTO: 0 /HPF — SIGNIFICANT CHANGE UP (ref 0–5)
UROBILINOGEN FLD QL: 0.2 MG/DL — SIGNIFICANT CHANGE UP (ref 0.2–1)
WBC UR QL: 0 /HPF — SIGNIFICANT CHANGE UP (ref 0–5)

## 2024-06-26 ENCOUNTER — NON-APPOINTMENT (OUTPATIENT)
Age: 69
End: 2024-06-26

## 2024-06-26 ENCOUNTER — APPOINTMENT (OUTPATIENT)
Dept: CARDIOLOGY | Facility: CLINIC | Age: 69
End: 2024-06-26
Payer: MEDICARE

## 2024-06-26 VITALS
OXYGEN SATURATION: 95 % | SYSTOLIC BLOOD PRESSURE: 144 MMHG | HEIGHT: 72 IN | WEIGHT: 228 LBS | DIASTOLIC BLOOD PRESSURE: 78 MMHG | BODY MASS INDEX: 30.88 KG/M2 | HEART RATE: 65 BPM

## 2024-06-26 DIAGNOSIS — Z86.711 PERSONAL HISTORY OF PULMONARY EMBOLISM: ICD-10-CM

## 2024-06-26 PROCEDURE — 99214 OFFICE O/P EST MOD 30 MIN: CPT

## 2024-06-26 PROCEDURE — G2211 COMPLEX E/M VISIT ADD ON: CPT

## 2024-06-28 ENCOUNTER — NON-APPOINTMENT (OUTPATIENT)
Age: 69
End: 2024-06-28

## 2024-07-02 ENCOUNTER — RESULT REVIEW (OUTPATIENT)
Age: 69
End: 2024-07-02

## 2024-07-02 ENCOUNTER — APPOINTMENT (OUTPATIENT)
Dept: INFUSION THERAPY | Facility: HOSPITAL | Age: 69
End: 2024-07-02

## 2024-07-02 ENCOUNTER — APPOINTMENT (OUTPATIENT)
Dept: HEMATOLOGY ONCOLOGY | Facility: CLINIC | Age: 69
End: 2024-07-02

## 2024-07-16 ENCOUNTER — APPOINTMENT (OUTPATIENT)
Dept: MRI IMAGING | Facility: IMAGING CENTER | Age: 69
End: 2024-07-16
Payer: MEDICARE

## 2024-07-16 ENCOUNTER — APPOINTMENT (OUTPATIENT)
Dept: INFUSION THERAPY | Facility: HOSPITAL | Age: 69
End: 2024-07-16

## 2024-07-16 ENCOUNTER — APPOINTMENT (OUTPATIENT)
Dept: NEUROLOGY | Facility: CLINIC | Age: 69
End: 2024-07-16
Payer: MEDICARE

## 2024-07-16 ENCOUNTER — OUTPATIENT (OUTPATIENT)
Dept: OUTPATIENT SERVICES | Facility: HOSPITAL | Age: 69
LOS: 1 days | End: 2024-07-16
Payer: MEDICARE

## 2024-07-16 ENCOUNTER — APPOINTMENT (OUTPATIENT)
Dept: HEMATOLOGY ONCOLOGY | Facility: CLINIC | Age: 69
End: 2024-07-16

## 2024-07-16 VITALS
OXYGEN SATURATION: 97 % | WEIGHT: 224 LBS | BODY MASS INDEX: 30.34 KG/M2 | DIASTOLIC BLOOD PRESSURE: 76 MMHG | HEIGHT: 72 IN | TEMPERATURE: 98.2 F | SYSTOLIC BLOOD PRESSURE: 141 MMHG | RESPIRATION RATE: 16 BRPM | HEART RATE: 60 BPM

## 2024-07-16 DIAGNOSIS — C71.9 MALIGNANT NEOPLASM OF BRAIN, UNSPECIFIED: ICD-10-CM

## 2024-07-16 DIAGNOSIS — Z98.890 OTHER SPECIFIED POSTPROCEDURAL STATES: Chronic | ICD-10-CM

## 2024-07-16 PROCEDURE — 70553 MRI BRAIN STEM W/O & W/DYE: CPT

## 2024-07-16 PROCEDURE — 99215 OFFICE O/P EST HI 40 MIN: CPT

## 2024-07-16 PROCEDURE — 70553 MRI BRAIN STEM W/O & W/DYE: CPT | Mod: 26,MH

## 2024-07-16 PROCEDURE — A9585: CPT

## 2024-07-16 RX ORDER — DEXAMETHASONE 1 MG/1
1 TABLET ORAL
Qty: 90 | Refills: 0 | Status: ACTIVE | COMMUNITY
Start: 2024-07-15 | End: 1900-01-01

## 2024-07-19 ENCOUNTER — APPOINTMENT (OUTPATIENT)
Dept: NEUROLOGY | Facility: CLINIC | Age: 69
End: 2024-07-19

## 2024-07-19 RX ORDER — TEMOZOLOMIDE 100 MG/1
100 CAPSULE ORAL
Qty: 10 | Refills: 0 | Status: ACTIVE | COMMUNITY
Start: 2024-07-19 | End: 1900-01-01

## 2024-07-19 RX ORDER — TEMOZOLOMIDE 140 MG/1
140 CAPSULE ORAL
Qty: 5 | Refills: 0 | Status: ACTIVE | COMMUNITY
Start: 2024-07-19 | End: 1900-01-01

## 2024-07-19 RX ORDER — ONDANSETRON 8 MG/1
8 TABLET ORAL
Qty: 30 | Refills: 2 | Status: ACTIVE | COMMUNITY
Start: 2024-07-19 | End: 1900-01-01

## 2024-07-30 ENCOUNTER — RESULT REVIEW (OUTPATIENT)
Age: 69
End: 2024-07-30

## 2024-07-30 ENCOUNTER — APPOINTMENT (OUTPATIENT)
Dept: INFUSION THERAPY | Facility: HOSPITAL | Age: 69
End: 2024-07-30

## 2024-07-30 ENCOUNTER — APPOINTMENT (OUTPATIENT)
Dept: HEMATOLOGY ONCOLOGY | Facility: CLINIC | Age: 69
End: 2024-07-30

## 2024-08-01 ENCOUNTER — NON-APPOINTMENT (OUTPATIENT)
Age: 69
End: 2024-08-01

## 2024-08-06 ENCOUNTER — APPOINTMENT (OUTPATIENT)
Dept: HEMATOLOGY ONCOLOGY | Facility: CLINIC | Age: 69
End: 2024-08-06

## 2024-08-08 ENCOUNTER — LABORATORY RESULT (OUTPATIENT)
Age: 69
End: 2024-08-08

## 2024-08-13 ENCOUNTER — RESULT REVIEW (OUTPATIENT)
Age: 69
End: 2024-08-13

## 2024-08-13 ENCOUNTER — APPOINTMENT (OUTPATIENT)
Dept: HEMATOLOGY ONCOLOGY | Facility: CLINIC | Age: 69
End: 2024-08-13

## 2024-08-13 ENCOUNTER — APPOINTMENT (OUTPATIENT)
Dept: NEUROLOGY | Facility: CLINIC | Age: 69
End: 2024-08-13
Payer: MEDICARE

## 2024-08-13 ENCOUNTER — APPOINTMENT (OUTPATIENT)
Dept: INFUSION THERAPY | Facility: HOSPITAL | Age: 69
End: 2024-08-13

## 2024-08-13 VITALS
BODY MASS INDEX: 30.07 KG/M2 | RESPIRATION RATE: 16 BRPM | WEIGHT: 222 LBS | HEART RATE: 88 BPM | HEIGHT: 72 IN | DIASTOLIC BLOOD PRESSURE: 85 MMHG | TEMPERATURE: 97.9 F | OXYGEN SATURATION: 97 % | SYSTOLIC BLOOD PRESSURE: 148 MMHG

## 2024-08-13 PROCEDURE — 99215 OFFICE O/P EST HI 40 MIN: CPT

## 2024-08-13 NOTE — PHYSICAL EXAM
[] : no respiratory distress [Respiration, Rhythm And Depth] : normal respiratory rhythm and effort [Exaggerated Use Of Accessory Muscles For Inspiration] : no accessory muscle use [Edema] : there was no peripheral edema [FreeTextEntry1] : Patient seen and examined Alert and awake Oriented X 3. Speech clear, increased WFD PERRLA, EOMI, RUQ VF defect Face symmetrical. Tongue protrudes midline BROWN x 4 with good and equal strength FFM, coordination equal bilaterally Sensation intact bilaterally Gait steady. Ambulating independently.

## 2024-08-13 NOTE — DISCUSSION/SUMMARY
[FreeTextEntry1] : Patient seen and examined In summary, this is a 67 yo man with a left frontal GBM, IDH wt - enrolled in IMVAX clinical trial. Treated with chemoRT 6/26 - 8/7/2023.Avastin added in 11/2023 - noted clinical improvement. Has completed 6 adjuvant cycles of TMZ (last on 2/2024 and now restarted ) and has had 14 infusions of Avastin thus far  GBM - Neurologically stable On TMZ + IV Avastin  TMZ next dose due on 8/23/2024  Will follow up on blood work  CEREBRAL EDEMA -  Taper  Dexamethasone to 3 mg daily ( didnt do this last visit).GI prophylaxis with Pantoprazole. PE - Continue Lovenox 150 mg daily - Diagnosed with PE on 9/28/2023- Recommended to switch injection sites while administering injections-Continue follow up with Dr Carpenter SEIZURES - Continue Keppra to 1500 - 1500 mg daily and Remains seizure free - fatigue , improved - Continue Klonopin to 0.25 mg twice a day HYPERTENSION- Continue Metoprolol 25 mg daily. Follow up with PCP for BP management. EXPRESSIVE APHASIA -Patient do not want to do speech therapy - Knows this is recommended FOLLOW UP -  Will do a clinical follow up along with an MRI prior to infusion on 9/24/2024.  In the interim, if any concerns patient knows to call our office.

## 2024-08-13 NOTE — DISCUSSION/SUMMARY
[FreeTextEntry1] : Patient seen and examined In summary, this is a 69 yo man with a left frontal GBM, IDH wt - enrolled in IMVAX clinical trial. Treated with chemoRT 6/26 - 8/7/2023.Avastin added in 11/2023 - noted clinical improvement. Has completed 6 adjuvant cycles of TMZ (last on 2/2024 and now restarted ) and has had 14 infusions of Avastin thus far  GBM - Neurologically stable On TMZ + IV Avastin  TMZ next dose due on 8/23/2024  Will follow up on blood work  CEREBRAL EDEMA -  Taper  Dexamethasone to 3 mg daily ( didnt do this last visit).GI prophylaxis with Pantoprazole. PE - Continue Lovenox 150 mg daily - Diagnosed with PE on 9/28/2023- Recommended to switch injection sites while administering injections-Continue follow up with Dr Carpenter SEIZURES - Continue Keppra to 1500 - 1500 mg daily and Remains seizure free - fatigue , improved - Continue Klonopin to 0.25 mg twice a day HYPERTENSION- Continue Metoprolol 25 mg daily. Follow up with PCP for BP management. EXPRESSIVE APHASIA -Patient do not want to do speech therapy - Knows this is recommended FOLLOW UP -  Will do a clinical follow up along with an MRI prior to infusion on 9/24/2024.  In the interim, if any concerns patient knows to call our office.

## 2024-08-13 NOTE — HISTORY OF PRESENT ILLNESS
[FreeTextEntry1] : Mr. Liu is a 68 yo male who is here for a follow up for his known GBM diagnosis  In brief  PMHx notable for only orthopedic issues - NKDA    5/9/2023 - I have seen him today with a new MRI. patient and his wife have noted over the past 3 weeks some difficulty with expressive speech - word substitutions and change in personality - more irritable that usual. He denies headaches, focal weakness,numbness, seizures, or gait instability. He saw Dr. Garcia, neurology - had and MRI this am - I have personally reviewed this film - that shows a left temporal heterogeneously enhancing mass measuring 5.0 x 4.5 x 3.3 cm with extension into the left frontal lobe with surrounding edema and left to right midline shift. He was sent to ER for an expedited evaluation  5/9/2023- 5/20/2023- Admitted at [facility]. MR Spect on 5/11 revealed "Reidentified heterogeneously enhancing necrotic mass lesion centered within the left temporal lobe and extending into the left periinsular region with a cystic portion along the inferior aspect of the lesion, with a large degree of surrounding vasogenic edema throughout the left parietal temporal lobes and left basal ganglia This lesion likely reflects a high-grade glioma. Functional imaging demonstrates no hyperactivity in the region of tumor or vasogenic edema."  Patient met criteria and was enrolled in IMVAX Trial.  5/15- had a Left pterional craniotomy for brain tumor resection with gleolan. Frozen path: high-grade glioma. Bilateral abdominal incisions made for later implantation of drug treatment. IMVAX Biologics were implanted into abdomen via incisions during bedside procedure per IMVAX trial on 5/17 under conscious sedation. These biologic agents were removed during bedside procedure on 5/19 under conscious sedation. Patient was  monitored in the NSCU, his post op course remained uncomplicated  5/16/2023 - Post op MRI notable for edema and residual nodular enhancement within the left insular  region and left anterior temporal lobe. CT Serial CT Head imaging post op showing stable resolving post operative changes. Patient was discharged home on 5/20/2023.  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  6/9/2023- Reached out to patient this am. Patient c/o blurred vision Right worse than left for past 2 days. Discussed with Dr Galvez. Will start him on Dexamethasone 2 mg daily.  6/19/2023 - 6/23/2023 - Patient called reporting seizure like symptoms and was admitted to [facility]. He reported frequent episodes of intense fear / uneasiness, feeling "stressed out of my mind," chills, piloerection to all extremities, palpitations, and increased respiratory rate, all of which lasts for less than 1 minute, and completely resolve. Patient's spouse at bedside confirming the above. Patient and spouse reporting that symptoms started about 5-6 days ago w/ about 10 episodes throughout the entire day. The frequency progressively increased over the course of the week. Yesterday, patient had these episodes about every 10-15 minutes, with the same duration of less than 1 minute. No other symptoms at this time, with the exception of varying degrees of word-finding difficulty. EEG was negative, however episodes stopped once Keppra was raised to 1500 mg bid and added clonazepam 0.5 mg bid. During this admission he was noted to be hyponatremic and  6/26/2023- ChemoRT begins. 8/3/2023- Here for a follow up. Reports he feels fatigued. His dexamethasone was tapered to 1 mg daily which started yesterday. 8/7- ChemoRT completed. 8/16/2023- Here for a follow up. Patient started having worsening aphasia since Sunday and worsened further by Monday evening. Last dose of steroids was on 8/11/2023. Denies headaches, seizures, N/V. CTH with increased cerebral edema - Started on Dexamethasone 9/6/2023 - MRI showed Left temporal craniotomy and postoperative changes in the left temporal lobe ring enhancement which is in size compared with 5/16/2023. Significantly less vasogenic edema and mass effect with resolution of midline shift compared with 5/16/2023. Although MR perfusion doesn't demonstrate increased blood in the left temporal lobe suggesting the presence of neoplasm. There does not appear to be progression. Plan was made to start on TMZ maintenance cycles 9/11- TMZ first cycle begin 9/14/2023- Reduced Klonopin to 0.25 mg at bedtime- Two days later he started having " freezing" episodes - Restarted Klonopin 0.25 mg bid 9/28/2023- Patient c/o sob - CT chest angio + PE - Admitted to [facility] - d/c on Lovenox 150 mg daily 10/4/2023 - Reduced Klonopin to 0.25 mg at bedtime only 10/9-10/13- TMZ second cycle- TDD of 450 mg ( dose based on 200 mg/m2) 10/10/2023- Patient's clonazepam was lowered to 0.25 mg at bedtime - Patient started having focal seizures ( freezing and piloerection episodes) - Recommended to take Klonopin 0.5 mg in the am - Patient now feeling better - will continue with Clonazepam 0.5 mg bid 10/17/2023- Spoke with patient's wife. Per wife and his family patient has increased word finding difficulty. I recommended to take Dexamethasone 4 mg bid 10/31/2023- MRI showed Mild increase in extent of a persistent left temporal heterogeneous ring-enhancing mass. Perfusion analysis of the mass demonstrates increased cerebral blood volume compatible with neoplasm. Moderate increase in surrounding nonenhancing FLAIR hyperintense signal abnormality is nonspecific and may represent treatment related changes versus nonenhancing tumor. Mild increase in mass effect. No midline shift..He remains on Dexamethasone 4 mg bid. His expressive Aphasia remains the same. 11/8/2023- Here for a follow up and to discuss further treatment plan. His aphasia and fatigue remains the same despite being on Dexamethasone 4 mg bid. Added IV AVastin to TMZ 11/13-11/17- TMZ third cycle- TDD of 450 mg ( dose based on 200 mg/m2) 11/14/2023- FIRST DOSE OF IV AVASTIN 11/28/2023- Here for a follow up prior to the second dose of IV Avastin. His wife noticed him being a little bit more active a day after the first infusion. Per patient he feels the same; He continues to have tremendous fatigue, speech remains the same. He feels out of breath when he takes a flight of stairs , on resting no sob 12/11-12/15- TMZ 4th cycle - TDD of 450 mg ( dose based on 200 mg/m2) 1/3/2024 - MRI stable Had 4 IV AVastin's thus far. Since after the 3rd infusion, wife noted an improvement in his alertness and energy level. Patient had " freezing episodes" two weeks ago, raised Keppra to 1500 mg bid and Dexamethasone 4 mg daily. Since then no more seizure like activity Denies headaches - his wife notes clinical improvement over the past 2 weeks - more energy and no further seizures. Speech is also improved. 1/5/2023- Patient got diagnosed with acute bronchitis - Started on Augmentin X 10 days - Chemo and immunotherapy on hold. Completed Abx on 1/15/2024 1/16/2024- 5th dose of IV Avastin 1/18/2024- 5th cycle of TMZ 2/1/2024 - Patient went to ER with epistaxis - Rhino rocket placed - No further interventions required, patient followed up with ENT as well. 2/13/2024- He has had no further nasal bleeding - he is following instructions from ENT - Afrin bid and saline spray. He is feeling well - he has no headache- his wife reports that his speech is more fluent. 2/15-2/19- TMZ 6th cycle 3/19/2024- MRI showed Grossly stable postsurgical/treatment changes in the left anterior temporal lobe.However, increasing prominence of small foci of restricted diffusion in the left occipital periventricular region. The largest of these foci demonstrates mild hyperperfusion. Progression of tumor cannot be ruled out. Attention on follow-up recommended. 4/9/2024- Patient had 9 IV Avastin infusion thus far. He was very fatigued when he was off of steroids, so restarted on Dexamethasone 2 mg on March 27th. Since then he has been feeling better. His main challenge is his Right VFD  5/14/2024- Had 12 infusions of IV Avastin thus far. MRI showed -Grossly stable posttreatment changes in the left anterior temporal/insular region. Grossly stable enhancing nodules in the left occipital periventricular/subependymal region. New mildly enhancing nodules in the left posterior hippocampus, right splenium, and right parietal white matter with associated mild hyperperfusion. Degree of enhancement and perfusion may be decreased by Avastin effects. These are suspicious for progressive disease but superimposed treatment changes cannot be ruled out. 6/4/2024- 13th infusion of IV Avastin 7/1/2024- Wife called reporting patient having a " shivering episode" - Gave an extra dose of Klonopin - Since then did not had any further episodes. patient also got bit by his dog - received tetanus shot and course of antibiotics. Later that evening wife mentioned his speech was getting worse - raised Dex to 4 mg daily 7/16/2024 - MRI showed Slightly increased enhancement in the right hippocampus right occipital white matter and splenium of the corpus callosum since 5/15/2024 with increased perfusion suspicious for tumor progression. No change in left temporal postoperative changes with enhancement and likely enhancing neoplasm extending along the left hippocampus to the left occipital lobe.  Tapered Dex to 3 mg daily. Plan was made to restart TMZ 7/26-7/30- TMZ cycle 8/13/2024- Here for a follow up

## 2024-08-13 NOTE — HISTORY OF PRESENT ILLNESS
[FreeTextEntry1] : Mr. Liu is a 68 yo male who is here for a follow up for his known GBM diagnosis  In brief  PMHx notable for only orthopedic issues - NKDA    5/9/2023 - I have seen him today with a new MRI. patient and his wife have noted over the past 3 weeks some difficulty with expressive speech - word substitutions and change in personality - more irritable that usual. He denies headaches, focal weakness,numbness, seizures, or gait instability. He saw Dr. Garcia, neurology - had and MRI this am - I have personally reviewed this film - that shows a left temporal heterogeneously enhancing mass measuring 5.0 x 4.5 x 3.3 cm with extension into the left frontal lobe with surrounding edema and left to right midline shift. He was sent to ER for an expedited evaluation  5/9/2023- 5/20/2023- Admitted at [facility]. MR Spect on 5/11 revealed "Reidentified heterogeneously enhancing necrotic mass lesion centered within the left temporal lobe and extending into the left periinsular region with a cystic portion along the inferior aspect of the lesion, with a large degree of surrounding vasogenic edema throughout the left parietal temporal lobes and left basal ganglia This lesion likely reflects a high-grade glioma. Functional imaging demonstrates no hyperactivity in the region of tumor or vasogenic edema."  Patient met criteria and was enrolled in IMVAX Trial.  5/15- had a Left pterional craniotomy for brain tumor resection with gleolan. Frozen path: high-grade glioma. Bilateral abdominal incisions made for later implantation of drug treatment. IMVAX Biologics were implanted into abdomen via incisions during bedside procedure per IMVAX trial on 5/17 under conscious sedation. These biologic agents were removed during bedside procedure on 5/19 under conscious sedation. Patient was  monitored in the NSCU, his post op course remained uncomplicated  5/16/2023 - Post op MRI notable for edema and residual nodular enhancement within the left insular  region and left anterior temporal lobe. CT Serial CT Head imaging post op showing stable resolving post operative changes. Patient was discharged home on 5/20/2023.  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  6/9/2023- Reached out to patient this am. Patient c/o blurred vision Right worse than left for past 2 days. Discussed with Dr Galvez. Will start him on Dexamethasone 2 mg daily.  6/19/2023 - 6/23/2023 - Patient called reporting seizure like symptoms and was admitted to [facility]. He reported frequent episodes of intense fear / uneasiness, feeling "stressed out of my mind," chills, piloerection to all extremities, palpitations, and increased respiratory rate, all of which lasts for less than 1 minute, and completely resolve. Patient's spouse at bedside confirming the above. Patient and spouse reporting that symptoms started about 5-6 days ago w/ about 10 episodes throughout the entire day. The frequency progressively increased over the course of the week. Yesterday, patient had these episodes about every 10-15 minutes, with the same duration of less than 1 minute. No other symptoms at this time, with the exception of varying degrees of word-finding difficulty. EEG was negative, however episodes stopped once Keppra was raised to 1500 mg bid and added clonazepam 0.5 mg bid. During this admission he was noted to be hyponatremic and  6/26/2023- ChemoRT begins. 8/3/2023- Here for a follow up. Reports he feels fatigued. His dexamethasone was tapered to 1 mg daily which started yesterday. 8/7- ChemoRT completed. 8/16/2023- Here for a follow up. Patient started having worsening aphasia since Sunday and worsened further by Monday evening. Last dose of steroids was on 8/11/2023. Denies headaches, seizures, N/V. CTH with increased cerebral edema - Started on Dexamethasone 9/6/2023 - MRI showed Left temporal craniotomy and postoperative changes in the left temporal lobe ring enhancement which is in size compared with 5/16/2023. Significantly less vasogenic edema and mass effect with resolution of midline shift compared with 5/16/2023. Although MR perfusion doesn't demonstrate increased blood in the left temporal lobe suggesting the presence of neoplasm. There does not appear to be progression. Plan was made to start on TMZ maintenance cycles 9/11- TMZ first cycle begin 9/14/2023- Reduced Klonopin to 0.25 mg at bedtime- Two days later he started having " freezing" episodes - Restarted Klonopin 0.25 mg bid 9/28/2023- Patient c/o sob - CT chest angio + PE - Admitted to [facility] - d/c on Lovenox 150 mg daily 10/4/2023 - Reduced Klonopin to 0.25 mg at bedtime only 10/9-10/13- TMZ second cycle- TDD of 450 mg ( dose based on 200 mg/m2) 10/10/2023- Patient's clonazepam was lowered to 0.25 mg at bedtime - Patient started having focal seizures ( freezing and piloerection episodes) - Recommended to take Klonopin 0.5 mg in the am - Patient now feeling better - will continue with Clonazepam 0.5 mg bid 10/17/2023- Spoke with patient's wife. Per wife and his family patient has increased word finding difficulty. I recommended to take Dexamethasone 4 mg bid 10/31/2023- MRI showed Mild increase in extent of a persistent left temporal heterogeneous ring-enhancing mass. Perfusion analysis of the mass demonstrates increased cerebral blood volume compatible with neoplasm. Moderate increase in surrounding nonenhancing FLAIR hyperintense signal abnormality is nonspecific and may represent treatment related changes versus nonenhancing tumor. Mild increase in mass effect. No midline shift..He remains on Dexamethasone 4 mg bid. His expressive Aphasia remains the same. 11/8/2023- Here for a follow up and to discuss further treatment plan. His aphasia and fatigue remains the same despite being on Dexamethasone 4 mg bid. Added IV AVastin to TMZ 11/13-11/17- TMZ third cycle- TDD of 450 mg ( dose based on 200 mg/m2) 11/14/2023- FIRST DOSE OF IV AVASTIN 11/28/2023- Here for a follow up prior to the second dose of IV Avastin. His wife noticed him being a little bit more active a day after the first infusion. Per patient he feels the same; He continues to have tremendous fatigue, speech remains the same. He feels out of breath when he takes a flight of stairs , on resting no sob 12/11-12/15- TMZ 4th cycle - TDD of 450 mg ( dose based on 200 mg/m2) 1/3/2024 - MRI stable Had 4 IV AVastin's thus far. Since after the 3rd infusion, wife noted an improvement in his alertness and energy level. Patient had " freezing episodes" two weeks ago, raised Keppra to 1500 mg bid and Dexamethasone 4 mg daily. Since then no more seizure like activity Denies headaches - his wife notes clinical improvement over the past 2 weeks - more energy and no further seizures. Speech is also improved. 1/5/2023- Patient got diagnosed with acute bronchitis - Started on Augmentin X 10 days - Chemo and immunotherapy on hold. Completed Abx on 1/15/2024 1/16/2024- 5th dose of IV Avastin 1/18/2024- 5th cycle of TMZ 2/1/2024 - Patient went to ER with epistaxis - Rhino rocket placed - No further interventions required, patient followed up with ENT as well. 2/13/2024- He has had no further nasal bleeding - he is following instructions from ENT - Afrin bid and saline spray. He is feeling well - he has no headache- his wife reports that his speech is more fluent. 2/15-2/19- TMZ 6th cycle 3/19/2024- MRI showed Grossly stable postsurgical/treatment changes in the left anterior temporal lobe.However, increasing prominence of small foci of restricted diffusion in the left occipital periventricular region. The largest of these foci demonstrates mild hyperperfusion. Progression of tumor cannot be ruled out. Attention on follow-up recommended. 4/9/2024- Patient had 9 IV Avastin infusion thus far. He was very fatigued when he was off of steroids, so restarted on Dexamethasone 2 mg on March 27th. Since then he has been feeling better. His main challenge is his Right VFD  5/14/2024- Had 12 infusions of IV Avastin thus far. MRI showed -Grossly stable posttreatment changes in the left anterior temporal/insular region. Grossly stable enhancing nodules in the left occipital periventricular/subependymal region. New mildly enhancing nodules in the left posterior hippocampus, right splenium, and right parietal white matter with associated mild hyperperfusion. Degree of enhancement and perfusion may be decreased by Avastin effects. These are suspicious for progressive disease but superimposed treatment changes cannot be ruled out. 6/4/2024- 13th infusion of IV Avastin 7/1/2024- Wife called reporting patient having a " shivering episode" - Gave an extra dose of Klonopin - Since then did not had any further episodes. patient also got bit by his dog - received tetanus shot and course of antibiotics. Later that evening wife mentioned his speech was getting worse - raised Dex to 4 mg daily 7/16/2024 - MRI showed Slightly increased enhancement in the right hippocampus right occipital white matter and splenium of the corpus callosum since 5/15/2024 with increased perfusion suspicious for tumor progression. No change in left temporal postoperative changes with enhancement and likely enhancing neoplasm extending along the left hippocampus to the left occipital lobe.  Tapered Dex to 3 mg daily. Plan was made to restart TMZ 7/26-7/30- TMZ cycle 8/13/2024- Here for a follow up

## 2024-08-20 RX ORDER — TEMOZOLOMIDE 250 MG/1
250 CAPSULE ORAL
Qty: 5 | Refills: 0 | Status: ACTIVE | COMMUNITY
Start: 2024-08-20 | End: 1900-01-01

## 2024-08-20 RX ORDER — TEMOZOLOMIDE 100 MG/1
100 CAPSULE ORAL
Qty: 10 | Refills: 0 | Status: ACTIVE | COMMUNITY
Start: 2024-08-20 | End: 1900-01-01

## 2024-08-23 ENCOUNTER — LABORATORY RESULT (OUTPATIENT)
Age: 69
End: 2024-08-23

## 2024-08-27 ENCOUNTER — RESULT REVIEW (OUTPATIENT)
Age: 69
End: 2024-08-27

## 2024-08-27 ENCOUNTER — APPOINTMENT (OUTPATIENT)
Dept: INFUSION THERAPY | Facility: HOSPITAL | Age: 69
End: 2024-08-27

## 2024-08-27 ENCOUNTER — APPOINTMENT (OUTPATIENT)
Dept: HEMATOLOGY ONCOLOGY | Facility: CLINIC | Age: 69
End: 2024-08-27

## 2024-08-27 DIAGNOSIS — I82.409 ACUTE EMBOLISM AND THROMBOSIS OF UNSPECIFIED DEEP VEINS OF UNSPECIFIED LOWER EXTREMITY: ICD-10-CM

## 2024-08-28 ENCOUNTER — OUTPATIENT (OUTPATIENT)
Dept: OUTPATIENT SERVICES | Facility: HOSPITAL | Age: 69
LOS: 1 days | Discharge: HOME | End: 2024-08-28

## 2024-08-28 DIAGNOSIS — Z98.890 OTHER SPECIFIED POSTPROCEDURAL STATES: Chronic | ICD-10-CM

## 2024-08-28 DIAGNOSIS — D64.9 ANEMIA, UNSPECIFIED: ICD-10-CM

## 2024-09-05 ENCOUNTER — APPOINTMENT (OUTPATIENT)
Dept: NEUROLOGY | Facility: CLINIC | Age: 69
End: 2024-09-05
Payer: MEDICARE

## 2024-09-05 VITALS
OXYGEN SATURATION: 96 % | TEMPERATURE: 97.8 F | RESPIRATION RATE: 16 BRPM | HEART RATE: 90 BPM | DIASTOLIC BLOOD PRESSURE: 88 MMHG | BODY MASS INDEX: 30.07 KG/M2 | SYSTOLIC BLOOD PRESSURE: 140 MMHG | WEIGHT: 222 LBS | HEIGHT: 72 IN

## 2024-09-05 PROCEDURE — 99215 OFFICE O/P EST HI 40 MIN: CPT

## 2024-09-05 NOTE — DISCUSSION/SUMMARY
[FreeTextEntry1] :  Patient seen and examined In summary, this is a 67 yo man with a left frontal GBM, IDH wt - enrolled in IMVAX clinical trial. Treated with chemoRT 6/26 - 8/7/2023.Avastin added in 11/2023 - noted clinical improvement. Has completed 6 adjuvant cycles of TMZ (last on 2/2024 and now restarted ) and has had 15 infusions of Avastin thus far GBM - Neurologically mildly worse On TMZ + IV Avastin CEREBRAL EDEMA - c/w Dexamethasone to 3 mg daily. GI prophylaxis with Pantoprazole. PE - Continue Lovenox 150 mg daily - Diagnosed with PE on 9/28/2023- Recommended to switch injection sites while administering injections-Continue follow up with Dr Carpenter SEIZURES - Continue Keppra to 1500 - 1500 mg daily and Remains seizure free - fatigue , improved - Continue Klonopin to 0.25 mg twice a day HYPERTENSION- Continue Metoprolol 25 mg daily. Follow up with PCP for BP management. EXPRESSIVE APHASIA -Patient do not want to do speech therapy - Knows this is recommended FOLLOW UP - Will do a sooner MRI as scheduled on 9/6/2024. In the interim, if any concerns patient knows to call our office.

## 2024-09-05 NOTE — HISTORY OF PRESENT ILLNESS
[FreeTextEntry1] :     Mr. Liu is a 68 yo male who is here for a follow up for his known GBM diagnosis  In brief  PMHx notable for only orthopedic issues - NKDA    5/9/2023 - I have seen him today with a new MRI. patient and his wife have noted over the past 3 weeks some difficulty with expressive speech - word substitutions and change in personality - more irritable that usual. He denies headaches, focal weakness,numbness, seizures, or gait instability. He saw Dr. Garcia, neurology - had and MRI this am - I have personally reviewed this film - that shows a left temporal heterogeneously enhancing mass measuring 5.0 x 4.5 x 3.3 cm with extension into the left frontal lobe with surrounding edema and left to right midline shift. He was sent to ER for an expedited evaluation  5/9/2023- 5/20/2023- Admitted at [facility]. MR Spect on 5/11 revealed "Reidentified heterogeneously enhancing necrotic mass lesion centered within the left temporal lobe and extending into the left periinsular region with a cystic portion along the inferior aspect of the lesion, with a large degree of surrounding vasogenic edema throughout the left parietal temporal lobes and left basal ganglia This lesion likely reflects a high-grade glioma. Functional imaging demonstrates no hyperactivity in the region of tumor or vasogenic edema."  Patient met criteria and was enrolled in IMVAX Trial.  5/15- had a Left pterional craniotomy for brain tumor resection with gleolan. Frozen path: high-grade glioma. Bilateral abdominal incisions made for later implantation of drug treatment. IMVAX Biologics were implanted into abdomen via incisions during bedside procedure per IMVAX trial on 5/17 under conscious sedation. These biologic agents were removed during bedside procedure on 5/19 under conscious sedation. Patient was  monitored in the NSCU, his post op course remained uncomplicated  5/16/2023 - Post op MRI notable for edema and residual nodular enhancement within the left insular  region and left anterior temporal lobe. CT Serial CT Head imaging post op showing stable resolving post operative changes. Patient was discharged home on 5/20/2023.  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  6/9/2023- Reached out to patient this am. Patient c/o blurred vision Right worse than left for past 2 days. Discussed with Dr Galvez. Will start him on Dexamethasone 2 mg daily.  6/19/2023 - 6/23/2023 - Patient called reporting seizure like symptoms and was admitted to [facility]. He reported frequent episodes of intense fear / uneasiness, feeling "stressed out of my mind," chills, piloerection to all extremities, palpitations, and increased respiratory rate, all of which lasts for less than 1 minute, and completely resolve. Patient's spouse at bedside confirming the above. Patient and spouse reporting that symptoms started about 5-6 days ago w/ about 10 episodes throughout the entire day. The frequency progressively increased over the course of the week. Yesterday, patient had these episodes about every 10-15 minutes, with the same duration of less than 1 minute. No other symptoms at this time, with the exception of varying degrees of word-finding difficulty. EEG was negative, however episodes stopped once Keppra was raised to 1500 mg bid and added clonazepam 0.5 mg bid. During this admission he was noted to be hyponatremic and  6/26/2023- ChemoRT begins. 8/3/2023- Here for a follow up. Reports he feels fatigued. His dexamethasone was tapered to 1 mg daily which started yesterday. 8/7- ChemoRT completed. 8/16/2023- Here for a follow up. Patient started having worsening aphasia since Sunday and worsened further by Monday evening. Last dose of steroids was on 8/11/2023. Denies headaches, seizures, N/V. CTH with increased cerebral edema - Started on Dexamethasone 9/6/2023 - MRI showed Left temporal craniotomy and postoperative changes in the left temporal lobe ring enhancement which is in size compared with 5/16/2023. Significantly less vasogenic edema and mass effect with resolution of midline shift compared with 5/16/2023. Although MR perfusion doesn't demonstrate increased blood in the left temporal lobe suggesting the presence of neoplasm. There does not appear to be progression. Plan was made to start on TMZ maintenance cycles 9/11- TMZ first cycle begin 9/14/2023- Reduced Klonopin to 0.25 mg at bedtime- Two days later he started having " freezing" episodes - Restarted Klonopin 0.25 mg bid 9/28/2023- Patient c/o sob - CT chest angio + PE - Admitted to [facility] - d/c on Lovenox 150 mg daily 10/4/2023 - Reduced Klonopin to 0.25 mg at bedtime only 10/9-10/13- TMZ second cycle- TDD of 450 mg ( dose based on 200 mg/m2) 10/10/2023- Patient's clonazepam was lowered to 0.25 mg at bedtime - Patient started having focal seizures ( freezing and piloerection episodes) - Recommended to take Klonopin 0.5 mg in the am - Patient now feeling better - will continue with Clonazepam 0.5 mg bid 10/17/2023- Spoke with patient's wife. Per wife and his family patient has increased word finding difficulty. I recommended to take Dexamethasone 4 mg bid 10/31/2023- MRI showed Mild increase in extent of a persistent left temporal heterogeneous ring-enhancing mass. Perfusion analysis of the mass demonstrates increased cerebral blood volume compatible with neoplasm. Moderate increase in surrounding nonenhancing FLAIR hyperintense signal abnormality is nonspecific and may represent treatment related changes versus nonenhancing tumor. Mild increase in mass effect. No midline shift..He remains on Dexamethasone 4 mg bid. His expressive Aphasia remains the same. 11/8/2023- Here for a follow up and to discuss further treatment plan. His aphasia and fatigue remains the same despite being on Dexamethasone 4 mg bid. Added IV AVastin to TMZ 11/13-11/17- TMZ third cycle- TDD of 450 mg ( dose based on 200 mg/m2) 11/14/2023- FIRST DOSE OF IV AVASTIN 11/28/2023- Here for a follow up prior to the second dose of IV Avastin. His wife noticed him being a little bit more active a day after the first infusion. Per patient he feels the same; He continues to have tremendous fatigue, speech remains the same. He feels out of breath when he takes a flight of stairs , on resting no sob 12/11-12/15- TMZ 4th cycle - TDD of 450 mg ( dose based on 200 mg/m2) 1/3/2024 - MRI stable Had 4 IV AVastin's thus far. Since after the 3rd infusion, wife noted an improvement in his alertness and energy level. Patient had " freezing episodes" two weeks ago, raised Keppra to 1500 mg bid and Dexamethasone 4 mg daily. Since then no more seizure like activity Denies headaches - his wife notes clinical improvement over the past 2 weeks - more energy and no further seizures. Speech is also improved. 1/5/2023- Patient got diagnosed with acute bronchitis - Started on Augmentin X 10 days - Chemo and immunotherapy on hold. Completed Abx on 1/15/2024 1/16/2024- 5th dose of IV Avastin 1/18/2024- 5th cycle of TMZ 2/1/2024 - Patient went to ER with epistaxis - Rhino rocket placed - No further interventions required, patient followed up with ENT as well. 2/13/2024- He has had no further nasal bleeding - he is following instructions from ENT - Afrin bid and saline spray. He is feeling well - he has no headache- his wife reports that his speech is more fluent. 2/15-2/19- TMZ 6th cycle 3/19/2024- MRI showed Grossly stable postsurgical/treatment changes in the left anterior temporal lobe.However, increasing prominence of small foci of restricted diffusion in the left occipital periventricular region. The largest of these foci demonstrates mild hyperperfusion. Progression of tumor cannot be ruled out. Attention on follow-up recommended. 4/9/2024- Patient had 9 IV Avastin infusion thus far. He was very fatigued when he was off of steroids, so restarted on Dexamethasone 2 mg on March 27th. Since then he has been feeling better. His main challenge is his Right VFD  5/14/2024- Had 12 infusions of IV Avastin thus far. MRI showed -Grossly stable posttreatment changes in the left anterior temporal/insular region. Grossly stable enhancing nodules in the left occipital periventricular/subependymal region. New mildly enhancing nodules in the left posterior hippocampus, right splenium, and right parietal white matter with associated mild hyperperfusion. Degree of enhancement and perfusion may be decreased by Avastin effects. These are suspicious for progressive disease but superimposed treatment changes cannot be ruled out. 6/4/2024- 13th infusion of IV Avastin 7/1/2024- Wife called reporting patient having a " shivering episode" - Gave an extra dose of Klonopin - Since then did not had any further episodes. patient also got bit by his dog - received tetanus shot and course of antibiotics. Later that evening wife mentioned his speech was getting worse - raised Dex to 4 mg daily 7/16/2024 - MRI showed Slightly increased enhancement in the right hippocampus right occipital white matter and splenium of the corpus callosum since 5/15/2024 with increased perfusion suspicious for tumor progression. No change in left temporal postoperative changes with enhancement and likely enhancing neoplasm extending along the left hippocampus to the left occipital lobe. Tapered Dex to 3 mg daily. Plan was made to restart TMZ 7/26-7/30- TMZ cycle- cycle # 1  8/23/2024- 8/27/2024 - TMZ   cycle cycle # 2 9/5/2024- Here for a follow up. Wife reports she noted more confusion mostly at night

## 2024-09-05 NOTE — PHYSICAL EXAM
[General Appearance - Alert] : alert [General Appearance - In No Acute Distress] : in no acute distress [] : no respiratory distress [Respiration, Rhythm And Depth] : normal respiratory rhythm and effort [Exaggerated Use Of Accessory Muscles For Inspiration] : no accessory muscle use [Edema] : there was no peripheral edema [FreeTextEntry1] : Patient seen and examined Alert and awake Oriented X 3. Mixed aphasia  Can name some objects when choices given Can follow some simple commands PERRLA, EOMI, RVF defect Face symmetrical. Tongue protrudes midline BROWN x 4 with good and equal strength FFM, coordination equal bilaterally Sensation intact bilaterally Gait steady. Ambulating independently.

## 2024-09-06 ENCOUNTER — APPOINTMENT (OUTPATIENT)
Dept: MRI IMAGING | Facility: IMAGING CENTER | Age: 69
End: 2024-09-06

## 2024-09-06 ENCOUNTER — OUTPATIENT (OUTPATIENT)
Dept: OUTPATIENT SERVICES | Facility: HOSPITAL | Age: 69
LOS: 1 days | End: 2024-09-06
Payer: MEDICARE

## 2024-09-06 DIAGNOSIS — Z98.890 OTHER SPECIFIED POSTPROCEDURAL STATES: Chronic | ICD-10-CM

## 2024-09-06 DIAGNOSIS — C71.9 MALIGNANT NEOPLASM OF BRAIN, UNSPECIFIED: ICD-10-CM

## 2024-09-06 PROCEDURE — 70553 MRI BRAIN STEM W/O & W/DYE: CPT

## 2024-09-06 PROCEDURE — A9585: CPT

## 2024-09-06 PROCEDURE — 70553 MRI BRAIN STEM W/O & W/DYE: CPT | Mod: 26,MH

## 2024-09-10 ENCOUNTER — RESULT REVIEW (OUTPATIENT)
Age: 69
End: 2024-09-10

## 2024-09-10 ENCOUNTER — APPOINTMENT (OUTPATIENT)
Dept: INFUSION THERAPY | Facility: HOSPITAL | Age: 69
End: 2024-09-10

## 2024-09-10 ENCOUNTER — APPOINTMENT (OUTPATIENT)
Dept: HEMATOLOGY ONCOLOGY | Facility: CLINIC | Age: 69
End: 2024-09-10

## 2024-09-10 LAB
ALBUMIN SERPL ELPH-MCNC: 4 G/DL — SIGNIFICANT CHANGE UP (ref 3.3–5)
ALP SERPL-CCNC: 71 U/L — SIGNIFICANT CHANGE UP (ref 40–120)
ALT FLD-CCNC: 32 U/L — SIGNIFICANT CHANGE UP (ref 10–45)
ANION GAP SERPL CALC-SCNC: 16 MMOL/L — SIGNIFICANT CHANGE UP (ref 5–17)
ANISOCYTOSIS BLD QL: SLIGHT — SIGNIFICANT CHANGE UP
APPEARANCE UR: CLEAR — SIGNIFICANT CHANGE UP
AST SERPL-CCNC: 22 U/L — SIGNIFICANT CHANGE UP (ref 10–40)
BACTERIA # UR AUTO: NEGATIVE /HPF — SIGNIFICANT CHANGE UP
BASOPHILS # BLD AUTO: 0 K/UL — SIGNIFICANT CHANGE UP (ref 0–0.2)
BASOPHILS NFR BLD AUTO: 0 % — SIGNIFICANT CHANGE UP (ref 0–2)
BILIRUB SERPL-MCNC: 0.3 MG/DL — SIGNIFICANT CHANGE UP (ref 0.2–1.2)
BILIRUB UR-MCNC: NEGATIVE — SIGNIFICANT CHANGE UP
BUN SERPL-MCNC: 22 MG/DL — SIGNIFICANT CHANGE UP (ref 7–23)
CALCIUM SERPL-MCNC: 10.1 MG/DL — SIGNIFICANT CHANGE UP (ref 8.4–10.5)
CAST: 0 /LPF — SIGNIFICANT CHANGE UP (ref 0–4)
CHLORIDE SERPL-SCNC: 101 MMOL/L — SIGNIFICANT CHANGE UP (ref 96–108)
CO2 SERPL-SCNC: 21 MMOL/L — LOW (ref 22–31)
COLOR SPEC: YELLOW — SIGNIFICANT CHANGE UP
CREAT SERPL-MCNC: 0.94 MG/DL — SIGNIFICANT CHANGE UP (ref 0.5–1.3)
DIFF PNL FLD: NEGATIVE — SIGNIFICANT CHANGE UP
EGFR: 89 ML/MIN/1.73M2 — SIGNIFICANT CHANGE UP
ELLIPTOCYTES BLD QL SMEAR: SLIGHT — SIGNIFICANT CHANGE UP
EOSINOPHIL # BLD AUTO: 0 K/UL — SIGNIFICANT CHANGE UP (ref 0–0.5)
EOSINOPHIL NFR BLD AUTO: 0 % — SIGNIFICANT CHANGE UP (ref 0–6)
GLUCOSE SERPL-MCNC: 118 MG/DL — HIGH (ref 70–99)
GLUCOSE UR QL: NEGATIVE MG/DL — SIGNIFICANT CHANGE UP
HCT VFR BLD CALC: 44.1 % — SIGNIFICANT CHANGE UP (ref 39–50)
HGB BLD-MCNC: 15 G/DL — SIGNIFICANT CHANGE UP (ref 13–17)
KETONES UR-MCNC: NEGATIVE MG/DL — SIGNIFICANT CHANGE UP
LEUKOCYTE ESTERASE UR-ACNC: ABNORMAL
LYMPHOCYTES # BLD AUTO: 1.01 K/UL — SIGNIFICANT CHANGE UP (ref 1–3.3)
LYMPHOCYTES # BLD AUTO: 9 % — LOW (ref 13–44)
MCHC RBC-ENTMCNC: 30.1 PG — SIGNIFICANT CHANGE UP (ref 27–34)
MCHC RBC-ENTMCNC: 34 G/DL — SIGNIFICANT CHANGE UP (ref 32–36)
MCV RBC AUTO: 88.4 FL — SIGNIFICANT CHANGE UP (ref 80–100)
METAMYELOCYTES # FLD: 3 % — HIGH (ref 0–0)
MONOCYTES # BLD AUTO: 0.67 K/UL — SIGNIFICANT CHANGE UP (ref 0–0.9)
MONOCYTES NFR BLD AUTO: 6 % — SIGNIFICANT CHANGE UP (ref 2–14)
MYELOCYTES NFR BLD: 1 % — HIGH (ref 0–0)
NEUTROPHILS # BLD AUTO: 9.1 K/UL — HIGH (ref 1.8–7.4)
NEUTROPHILS NFR BLD AUTO: 81 % — HIGH (ref 43–77)
NITRITE UR-MCNC: NEGATIVE — SIGNIFICANT CHANGE UP
NRBC # BLD: 0 /100 WBCS — SIGNIFICANT CHANGE UP (ref 0–0)
NRBC # BLD: SIGNIFICANT CHANGE UP /100 WBCS (ref 0–0)
PH UR: 6 — SIGNIFICANT CHANGE UP (ref 5–8)
PLAT MORPH BLD: NORMAL — SIGNIFICANT CHANGE UP
PLATELET # BLD AUTO: 272 K/UL — SIGNIFICANT CHANGE UP (ref 150–400)
POIKILOCYTOSIS BLD QL AUTO: SLIGHT — SIGNIFICANT CHANGE UP
POTASSIUM SERPL-MCNC: 4.4 MMOL/L — SIGNIFICANT CHANGE UP (ref 3.5–5.3)
POTASSIUM SERPL-SCNC: 4.4 MMOL/L — SIGNIFICANT CHANGE UP (ref 3.5–5.3)
PROT SERPL-MCNC: 7 G/DL — SIGNIFICANT CHANGE UP (ref 6–8.3)
PROT UR-MCNC: 30 MG/DL
RBC # BLD: 4.99 M/UL — SIGNIFICANT CHANGE UP (ref 4.2–5.8)
RBC # FLD: 18.7 % — HIGH (ref 10.3–14.5)
RBC BLD AUTO: ABNORMAL
RBC CASTS # UR COMP ASSIST: 3 /HPF — SIGNIFICANT CHANGE UP (ref 0–4)
SCHISTOCYTES BLD QL AUTO: SLIGHT — SIGNIFICANT CHANGE UP
SODIUM SERPL-SCNC: 138 MMOL/L — SIGNIFICANT CHANGE UP (ref 135–145)
SP GR SPEC: >1.03 — HIGH (ref 1–1.03)
SQUAMOUS # UR AUTO: 1 /HPF — SIGNIFICANT CHANGE UP (ref 0–5)
UROBILINOGEN FLD QL: 0.2 MG/DL — SIGNIFICANT CHANGE UP (ref 0.2–1)
WBC # BLD: 11.23 K/UL — HIGH (ref 3.8–10.5)
WBC # FLD AUTO: 11.23 K/UL — HIGH (ref 3.8–10.5)
WBC UR QL: 1 /HPF — SIGNIFICANT CHANGE UP (ref 0–5)

## 2024-09-10 RX ORDER — LOMUSTINE 100 MG/1
100 CAPSULE, GELATIN COATED ORAL
Qty: 2 | Refills: 0 | Status: ACTIVE | COMMUNITY
Start: 2024-09-10 | End: 1900-01-01

## 2024-09-11 DIAGNOSIS — D50.9 IRON DEFICIENCY ANEMIA, UNSPECIFIED: ICD-10-CM

## 2024-09-16 DIAGNOSIS — Z51.11 ENCOUNTER FOR ANTINEOPLASTIC CHEMOTHERAPY: ICD-10-CM

## 2024-09-16 DIAGNOSIS — C71.9 MALIGNANT NEOPLASM OF BRAIN, UNSPECIFIED: ICD-10-CM

## 2024-09-24 ENCOUNTER — RESULT REVIEW (OUTPATIENT)
Age: 69
End: 2024-09-24

## 2024-09-24 ENCOUNTER — APPOINTMENT (OUTPATIENT)
Dept: NEUROLOGY | Facility: CLINIC | Age: 69
End: 2024-09-24
Payer: MEDICARE

## 2024-09-24 ENCOUNTER — APPOINTMENT (OUTPATIENT)
Dept: HEMATOLOGY ONCOLOGY | Facility: CLINIC | Age: 69
End: 2024-09-24

## 2024-09-24 ENCOUNTER — APPOINTMENT (OUTPATIENT)
Dept: INFUSION THERAPY | Facility: HOSPITAL | Age: 69
End: 2024-09-24

## 2024-09-24 ENCOUNTER — APPOINTMENT (OUTPATIENT)
Dept: MRI IMAGING | Facility: IMAGING CENTER | Age: 69
End: 2024-09-24

## 2024-09-24 VITALS
DIASTOLIC BLOOD PRESSURE: 89 MMHG | SYSTOLIC BLOOD PRESSURE: 134 MMHG | OXYGEN SATURATION: 97 % | HEART RATE: 96 BPM | TEMPERATURE: 98.1 F

## 2024-09-24 LAB
ALBUMIN SERPL ELPH-MCNC: 4 G/DL — SIGNIFICANT CHANGE UP (ref 3.3–5)
ALP SERPL-CCNC: 79 U/L — SIGNIFICANT CHANGE UP (ref 40–120)
ALT FLD-CCNC: 41 U/L — SIGNIFICANT CHANGE UP (ref 10–45)
ANION GAP SERPL CALC-SCNC: 16 MMOL/L — SIGNIFICANT CHANGE UP (ref 5–17)
ANISOCYTOSIS BLD QL: SLIGHT — SIGNIFICANT CHANGE UP
AST SERPL-CCNC: 23 U/L — SIGNIFICANT CHANGE UP (ref 10–40)
BASOPHILS # BLD AUTO: 0 K/UL — SIGNIFICANT CHANGE UP (ref 0–0.2)
BASOPHILS NFR BLD AUTO: 0 % — SIGNIFICANT CHANGE UP (ref 0–2)
BILIRUB SERPL-MCNC: 0.4 MG/DL — SIGNIFICANT CHANGE UP (ref 0.2–1.2)
BUN SERPL-MCNC: 17 MG/DL — SIGNIFICANT CHANGE UP (ref 7–23)
CALCIUM SERPL-MCNC: 9.6 MG/DL — SIGNIFICANT CHANGE UP (ref 8.4–10.5)
CHLORIDE SERPL-SCNC: 98 MMOL/L — SIGNIFICANT CHANGE UP (ref 96–108)
CO2 SERPL-SCNC: 20 MMOL/L — LOW (ref 22–31)
CREAT SERPL-MCNC: 0.79 MG/DL — SIGNIFICANT CHANGE UP (ref 0.5–1.3)
EGFR: 97 ML/MIN/1.73M2 — SIGNIFICANT CHANGE UP
ELLIPTOCYTES BLD QL SMEAR: SLIGHT — SIGNIFICANT CHANGE UP
EOSINOPHIL # BLD AUTO: 0 K/UL — SIGNIFICANT CHANGE UP (ref 0–0.5)
EOSINOPHIL NFR BLD AUTO: 0 % — SIGNIFICANT CHANGE UP (ref 0–6)
GLUCOSE SERPL-MCNC: 140 MG/DL — HIGH (ref 70–99)
HCT VFR BLD CALC: 43.8 % — SIGNIFICANT CHANGE UP (ref 39–50)
HGB BLD-MCNC: 14.9 G/DL — SIGNIFICANT CHANGE UP (ref 13–17)
LYMPHOCYTES # BLD AUTO: 0.5 K/UL — LOW (ref 1–3.3)
LYMPHOCYTES # BLD AUTO: 4 % — LOW (ref 13–44)
MCHC RBC-ENTMCNC: 30.2 PG — SIGNIFICANT CHANGE UP (ref 27–34)
MCHC RBC-ENTMCNC: 34 G/DL — SIGNIFICANT CHANGE UP (ref 32–36)
MCV RBC AUTO: 88.7 FL — SIGNIFICANT CHANGE UP (ref 80–100)
MONOCYTES # BLD AUTO: 0.75 K/UL — SIGNIFICANT CHANGE UP (ref 0–0.9)
MONOCYTES NFR BLD AUTO: 6 % — SIGNIFICANT CHANGE UP (ref 2–14)
MYELOCYTES NFR BLD: 4 % — HIGH (ref 0–0)
NEUTROPHILS # BLD AUTO: 10.74 K/UL — HIGH (ref 1.8–7.4)
NEUTROPHILS NFR BLD AUTO: 86 % — HIGH (ref 43–77)
NRBC # BLD: 0 /100 WBCS — SIGNIFICANT CHANGE UP (ref 0–0)
NRBC # BLD: SIGNIFICANT CHANGE UP /100 WBCS (ref 0–0)
PLAT MORPH BLD: NORMAL — SIGNIFICANT CHANGE UP
PLATELET # BLD AUTO: 178 K/UL — SIGNIFICANT CHANGE UP (ref 150–400)
POIKILOCYTOSIS BLD QL AUTO: SLIGHT — SIGNIFICANT CHANGE UP
POTASSIUM SERPL-MCNC: 4.4 MMOL/L — SIGNIFICANT CHANGE UP (ref 3.5–5.3)
POTASSIUM SERPL-SCNC: 4.4 MMOL/L — SIGNIFICANT CHANGE UP (ref 3.5–5.3)
PROT SERPL-MCNC: 6.9 G/DL — SIGNIFICANT CHANGE UP (ref 6–8.3)
RBC # BLD: 4.94 M/UL — SIGNIFICANT CHANGE UP (ref 4.2–5.8)
RBC # FLD: 18.7 % — HIGH (ref 10.3–14.5)
RBC BLD AUTO: ABNORMAL
SCHISTOCYTES BLD QL AUTO: SLIGHT — SIGNIFICANT CHANGE UP
SODIUM SERPL-SCNC: 134 MMOL/L — LOW (ref 135–145)
WBC # BLD: 12.49 K/UL — HIGH (ref 3.8–10.5)
WBC # FLD AUTO: 12.49 K/UL — HIGH (ref 3.8–10.5)

## 2024-09-24 PROCEDURE — 99215 OFFICE O/P EST HI 40 MIN: CPT

## 2024-09-24 NOTE — DISCUSSION/SUMMARY
[FreeTextEntry1] : Patient seen and examined In summary, this is a 67 yo man with a left frontal GBM, IDH wt - enrolled in IMVAX clinical trial. Treated with chemoRT 6/26 - 8/7/2023.Avastin added in 11/2023 - noted clinical improvement. Has completed 6 adjuvant cycles of TMZ (last on 2/2024 and now restarted ) and has had 15 infusions of Avastin- MRI on 9/6 with POD GBM - Neurologically mildly worse Starting CCNU today  c/w Avastin q 2 weeks CEREBRAL EDEMA - c/w Dexamethasone to 3 mg daily. GI prophylaxis with Pantoprazole. PE - Continue Lovenox 150 mg daily - Diagnosed with PE on 9/28/2023- Recommended to switch injection sites while administering injections-Continue follow up with Dr Carpenter SEIZURES - Continue Keppra to 1500 - 1500 mg daily and Remains seizure free - fatigue , improved - Continue Klonopin to 0.25 mg twice a day HYPERTENSION- Continue Metoprolol 25 mg daily. Follow up with PCP for BP management. FOLLOW UP - Will do a follow up along with an MRI on 10/31.  In the interim, if any concerns patient knows to call our office.

## 2024-09-24 NOTE — HISTORY OF PRESENT ILLNESS
[FreeTextEntry1] :     Mr. Liu is a 70 yo male who is here for a follow up for his known GBM diagnosis  In brief  PMHx notable for only orthopedic issues - NKDA    5/9/2023 - I have seen him today with a new MRI. patient and his wife have noted over the past 3 weeks some difficulty with expressive speech - word substitutions and change in personality - more irritable that usual. He denies headaches, focal weakness,numbness, seizures, or gait instability. He saw Dr. Garcia, neurology - had and MRI this am - I have personally reviewed this film - that shows a left temporal heterogeneously enhancing mass measuring 5.0 x 4.5 x 3.3 cm with extension into the left frontal lobe with surrounding edema and left to right midline shift. He was sent to ER for an expedited evaluation  5/9/2023- 5/20/2023- Admitted at [facility]. MR Spect on 5/11 revealed "Reidentified heterogeneously enhancing necrotic mass lesion centered within the left temporal lobe and extending into the left periinsular region with a cystic portion along the inferior aspect of the lesion, with a large degree of surrounding vasogenic edema throughout the left parietal temporal lobes and left basal ganglia This lesion likely reflects a high-grade glioma. Functional imaging demonstrates no hyperactivity in the region of tumor or vasogenic edema."  Patient met criteria and was enrolled in IMVAX Trial.  5/15- had a Left pterional craniotomy for brain tumor resection with gleolan. Frozen path: high-grade glioma. Bilateral abdominal incisions made for later implantation of drug treatment. IMVAX Biologics were implanted into abdomen via incisions during bedside procedure per IMVAX trial on 5/17 under conscious sedation. These biologic agents were removed during bedside procedure on 5/19 under conscious sedation. Patient was  monitored in the NSCU, his post op course remained uncomplicated  5/16/2023 - Post op MRI notable for edema and residual nodular enhancement within the left insular  region and left anterior temporal lobe. CT Serial CT Head imaging post op showing stable resolving post operative changes. Patient was discharged home on 5/20/2023.  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  6/9/2023- Reached out to patient this am. Patient c/o blurred vision Right worse than left for past 2 days. Discussed with Dr Galvez. Will start him on Dexamethasone 2 mg daily.  6/19/2023 - 6/23/2023 - Patient called reporting seizure like symptoms and was admitted to [facility]. He reported frequent episodes of intense fear / uneasiness, feeling "stressed out of my mind," chills, piloerection to all extremities, palpitations, and increased respiratory rate, all of which lasts for less than 1 minute, and completely resolve. Patient's spouse at bedside confirming the above. Patient and spouse reporting that symptoms started about 5-6 days ago w/ about 10 episodes throughout the entire day. The frequency progressively increased over the course of the week. Yesterday, patient had these episodes about every 10-15 minutes, with the same duration of less than 1 minute. No other symptoms at this time, with the exception of varying degrees of word-finding difficulty. EEG was negative, however episodes stopped once Keppra was raised to 1500 mg bid and added clonazepam 0.5 mg bid. During this admission he was noted to be hyponatremic and  6/26/2023- ChemoRT begins. 8/3/2023- Here for a follow up. Reports he feels fatigued. His dexamethasone was tapered to 1 mg daily which started yesterday. 8/7- ChemoRT completed. 8/16/2023- Here for a follow up. Patient started having worsening aphasia since Sunday and worsened further by Monday evening. Last dose of steroids was on 8/11/2023. Denies headaches, seizures, N/V. CTH with increased cerebral edema - Started on Dexamethasone 9/6/2023 - MRI showed Left temporal craniotomy and postoperative changes in the left temporal lobe ring enhancement which is in size compared with 5/16/2023. Significantly less vasogenic edema and mass effect with resolution of midline shift compared with 5/16/2023. Although MR perfusion doesn't demonstrate increased blood in the left temporal lobe suggesting the presence of neoplasm. There does not appear to be progression. Plan was made to start on TMZ maintenance cycles 9/11- TMZ first cycle begin 9/14/2023- Reduced Klonopin to 0.25 mg at bedtime- Two days later he started having " freezing" episodes - Restarted Klonopin 0.25 mg bid 9/28/2023- Patient c/o sob - CT chest angio + PE - Admitted to [facility] - d/c on Lovenox 150 mg daily 10/4/2023 - Reduced Klonopin to 0.25 mg at bedtime only 10/9-10/13- TMZ second cycle- TDD of 450 mg ( dose based on 200 mg/m2) 10/10/2023- Patient's clonazepam was lowered to 0.25 mg at bedtime - Patient started having focal seizures ( freezing and piloerection episodes) - Recommended to take Klonopin 0.5 mg in the am - Patient now feeling better - will continue with Clonazepam 0.5 mg bid 10/17/2023- Spoke with patient's wife. Per wife and his family patient has increased word finding difficulty. I recommended to take Dexamethasone 4 mg bid 10/31/2023- MRI showed Mild increase in extent of a persistent left temporal heterogeneous ring-enhancing mass. Perfusion analysis of the mass demonstrates increased cerebral blood volume compatible with neoplasm. Moderate increase in surrounding nonenhancing FLAIR hyperintense signal abnormality is nonspecific and may represent treatment related changes versus nonenhancing tumor. Mild increase in mass effect. No midline shift..He remains on Dexamethasone 4 mg bid. His expressive Aphasia remains the same. 11/8/2023- Here for a follow up and to discuss further treatment plan. His aphasia and fatigue remains the same despite being on Dexamethasone 4 mg bid. Added IV AVastin to TMZ 11/13-11/17- TMZ third cycle- TDD of 450 mg ( dose based on 200 mg/m2) 11/14/2023- FIRST DOSE OF IV AVASTIN 11/28/2023- Here for a follow up prior to the second dose of IV Avastin. His wife noticed him being a little bit more active a day after the first infusion. Per patient he feels the same; He continues to have tremendous fatigue, speech remains the same. He feels out of breath when he takes a flight of stairs , on resting no sob 12/11-12/15- TMZ 4th cycle - TDD of 450 mg ( dose based on 200 mg/m2) 1/3/2024 - MRI stable Had 4 IV AVastin's thus far. Since after the 3rd infusion, wife noted an improvement in his alertness and energy level. Patient had " freezing episodes" two weeks ago, raised Keppra to 1500 mg bid and Dexamethasone 4 mg daily. Since then no more seizure like activity Denies headaches - his wife notes clinical improvement over the past 2 weeks - more energy and no further seizures. Speech is also improved. 1/5/2023- Patient got diagnosed with acute bronchitis - Started on Augmentin X 10 days - Chemo and immunotherapy on hold. Completed Abx on 1/15/2024 1/16/2024- 5th dose of IV Avastin 1/18/2024- 5th cycle of TMZ 2/1/2024 - Patient went to ER with epistaxis - Rhino rocket placed - No further interventions required, patient followed up with ENT as well. 2/13/2024- He has had no further nasal bleeding - he is following instructions from ENT - Afrin bid and saline spray. He is feeling well - he has no headache- his wife reports that his speech is more fluent. 2/15-2/19- TMZ 6th cycle 3/19/2024- MRI showed Grossly stable postsurgical/treatment changes in the left anterior temporal lobe.However, increasing prominence of small foci of restricted diffusion in the left occipital periventricular region. The largest of these foci demonstrates mild hyperperfusion. Progression of tumor cannot be ruled out. Attention on follow-up recommended. 4/9/2024- Patient had 9 IV Avastin infusion thus far. He was very fatigued when he was off of steroids, so restarted on Dexamethasone 2 mg on March 27th. Since then he has been feeling better. His main challenge is his Right VFD  5/14/2024- Had 12 infusions of IV Avastin thus far. MRI showed -Grossly stable posttreatment changes in the left anterior temporal/insular region. Grossly stable enhancing nodules in the left occipital periventricular/subependymal region. New mildly enhancing nodules in the left posterior hippocampus, right splenium, and right parietal white matter with associated mild hyperperfusion. Degree of enhancement and perfusion may be decreased by Avastin effects. These are suspicious for progressive disease but superimposed treatment changes cannot be ruled out. 6/4/2024- 13th infusion of IV Avastin 7/1/2024- Wife called reporting patient having a " shivering episode" - Gave an extra dose of Klonopin - Since then did not had any further episodes. patient also got bit by his dog - received tetanus shot and course of antibiotics. Later that evening wife mentioned his speech was getting worse - raised Dex to 4 mg daily 7/16/2024 - MRI showed Slightly increased enhancement in the right hippocampus right occipital white matter and splenium of the corpus callosum since 5/15/2024 with increased perfusion suspicious for tumor progression. No change in left temporal postoperative changes with enhancement and likely enhancing neoplasm extending along the left hippocampus to the left occipital lobe. Tapered Dex to 3 mg daily. Plan was made to restart TMZ 7/26-7/30- TMZ cycle- cycle # 1 8/23/2024- 8/27/2024 - TMZ cycle cycle # 2 9/6/2024- MRI showed Grossly stable posttreatment changes in the left anterior temporal lobe. Extensive surrounding FLAIR signal abnormality is grossly stable, compatible with nonenhancing neoplasm. Multiple new/enlarging enhancing lesions are seen in the left occipital lobe and right cerebral hemisphere with associated hyperperfusion, compatible with progression of disease. 9/24/2024- Here for a follow up. Per wife no worsening of symptoms, gait mildly unsteady - no falls

## 2024-09-24 NOTE — PHYSICAL EXAM
[FreeTextEntry1] : Patient seen and examined Alert and awake Oriented X 3. Mixed aphasia- mildly worsened  Can name some objects when choices given Can follow/mimic  some simple commands PERRLA, EOMI, RVF defect Face symmetrical. Tongue protrudes midline BROWN x 4 with good and equal strength FFM, coordination equal bilaterally Sensation intact bilaterally Gait mildly steady. Ambulating with one assist [General Appearance - Alert] : alert [General Appearance - In No Acute Distress] : in no acute distress [] : no respiratory distress [Respiration, Rhythm And Depth] : normal respiratory rhythm and effort

## 2024-09-24 NOTE — HISTORY OF PRESENT ILLNESS
[FreeTextEntry1] :     Mr. Liu is a 68 yo male who is here for a follow up for his known GBM diagnosis  In brief  PMHx notable for only orthopedic issues - NKDA    5/9/2023 - I have seen him today with a new MRI. patient and his wife have noted over the past 3 weeks some difficulty with expressive speech - word substitutions and change in personality - more irritable that usual. He denies headaches, focal weakness,numbness, seizures, or gait instability. He saw Dr. Garcia, neurology - had and MRI this am - I have personally reviewed this film - that shows a left temporal heterogeneously enhancing mass measuring 5.0 x 4.5 x 3.3 cm with extension into the left frontal lobe with surrounding edema and left to right midline shift. He was sent to ER for an expedited evaluation  5/9/2023- 5/20/2023- Admitted at [facility]. MR Spect on 5/11 revealed "Reidentified heterogeneously enhancing necrotic mass lesion centered within the left temporal lobe and extending into the left periinsular region with a cystic portion along the inferior aspect of the lesion, with a large degree of surrounding vasogenic edema throughout the left parietal temporal lobes and left basal ganglia This lesion likely reflects a high-grade glioma. Functional imaging demonstrates no hyperactivity in the region of tumor or vasogenic edema."  Patient met criteria and was enrolled in IMVAX Trial.  5/15- had a Left pterional craniotomy for brain tumor resection with gleolan. Frozen path: high-grade glioma. Bilateral abdominal incisions made for later implantation of drug treatment. IMVAX Biologics were implanted into abdomen via incisions during bedside procedure per IMVAX trial on 5/17 under conscious sedation. These biologic agents were removed during bedside procedure on 5/19 under conscious sedation. Patient was  monitored in the NSCU, his post op course remained uncomplicated  5/16/2023 - Post op MRI notable for edema and residual nodular enhancement within the left insular  region and left anterior temporal lobe. CT Serial CT Head imaging post op showing stable resolving post operative changes. Patient was discharged home on 5/20/2023.  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  6/9/2023- Reached out to patient this am. Patient c/o blurred vision Right worse than left for past 2 days. Discussed with Dr Galvez. Will start him on Dexamethasone 2 mg daily.  6/19/2023 - 6/23/2023 - Patient called reporting seizure like symptoms and was admitted to [facility]. He reported frequent episodes of intense fear / uneasiness, feeling "stressed out of my mind," chills, piloerection to all extremities, palpitations, and increased respiratory rate, all of which lasts for less than 1 minute, and completely resolve. Patient's spouse at bedside confirming the above. Patient and spouse reporting that symptoms started about 5-6 days ago w/ about 10 episodes throughout the entire day. The frequency progressively increased over the course of the week. Yesterday, patient had these episodes about every 10-15 minutes, with the same duration of less than 1 minute. No other symptoms at this time, with the exception of varying degrees of word-finding difficulty. EEG was negative, however episodes stopped once Keppra was raised to 1500 mg bid and added clonazepam 0.5 mg bid. During this admission he was noted to be hyponatremic and  6/26/2023- ChemoRT begins. 8/3/2023- Here for a follow up. Reports he feels fatigued. His dexamethasone was tapered to 1 mg daily which started yesterday. 8/7- ChemoRT completed. 8/16/2023- Here for a follow up. Patient started having worsening aphasia since Sunday and worsened further by Monday evening. Last dose of steroids was on 8/11/2023. Denies headaches, seizures, N/V. CTH with increased cerebral edema - Started on Dexamethasone 9/6/2023 - MRI showed Left temporal craniotomy and postoperative changes in the left temporal lobe ring enhancement which is in size compared with 5/16/2023. Significantly less vasogenic edema and mass effect with resolution of midline shift compared with 5/16/2023. Although MR perfusion doesn't demonstrate increased blood in the left temporal lobe suggesting the presence of neoplasm. There does not appear to be progression. Plan was made to start on TMZ maintenance cycles 9/11- TMZ first cycle begin 9/14/2023- Reduced Klonopin to 0.25 mg at bedtime- Two days later he started having " freezing" episodes - Restarted Klonopin 0.25 mg bid 9/28/2023- Patient c/o sob - CT chest angio + PE - Admitted to [facility] - d/c on Lovenox 150 mg daily 10/4/2023 - Reduced Klonopin to 0.25 mg at bedtime only 10/9-10/13- TMZ second cycle- TDD of 450 mg ( dose based on 200 mg/m2) 10/10/2023- Patient's clonazepam was lowered to 0.25 mg at bedtime - Patient started having focal seizures ( freezing and piloerection episodes) - Recommended to take Klonopin 0.5 mg in the am - Patient now feeling better - will continue with Clonazepam 0.5 mg bid 10/17/2023- Spoke with patient's wife. Per wife and his family patient has increased word finding difficulty. I recommended to take Dexamethasone 4 mg bid 10/31/2023- MRI showed Mild increase in extent of a persistent left temporal heterogeneous ring-enhancing mass. Perfusion analysis of the mass demonstrates increased cerebral blood volume compatible with neoplasm. Moderate increase in surrounding nonenhancing FLAIR hyperintense signal abnormality is nonspecific and may represent treatment related changes versus nonenhancing tumor. Mild increase in mass effect. No midline shift..He remains on Dexamethasone 4 mg bid. His expressive Aphasia remains the same. 11/8/2023- Here for a follow up and to discuss further treatment plan. His aphasia and fatigue remains the same despite being on Dexamethasone 4 mg bid. Added IV AVastin to TMZ 11/13-11/17- TMZ third cycle- TDD of 450 mg ( dose based on 200 mg/m2) 11/14/2023- FIRST DOSE OF IV AVASTIN 11/28/2023- Here for a follow up prior to the second dose of IV Avastin. His wife noticed him being a little bit more active a day after the first infusion. Per patient he feels the same; He continues to have tremendous fatigue, speech remains the same. He feels out of breath when he takes a flight of stairs , on resting no sob 12/11-12/15- TMZ 4th cycle - TDD of 450 mg ( dose based on 200 mg/m2) 1/3/2024 - MRI stable Had 4 IV AVastin's thus far. Since after the 3rd infusion, wife noted an improvement in his alertness and energy level. Patient had " freezing episodes" two weeks ago, raised Keppra to 1500 mg bid and Dexamethasone 4 mg daily. Since then no more seizure like activity Denies headaches - his wife notes clinical improvement over the past 2 weeks - more energy and no further seizures. Speech is also improved. 1/5/2023- Patient got diagnosed with acute bronchitis - Started on Augmentin X 10 days - Chemo and immunotherapy on hold. Completed Abx on 1/15/2024 1/16/2024- 5th dose of IV Avastin 1/18/2024- 5th cycle of TMZ 2/1/2024 - Patient went to ER with epistaxis - Rhino rocket placed - No further interventions required, patient followed up with ENT as well. 2/13/2024- He has had no further nasal bleeding - he is following instructions from ENT - Afrin bid and saline spray. He is feeling well - he has no headache- his wife reports that his speech is more fluent. 2/15-2/19- TMZ 6th cycle 3/19/2024- MRI showed Grossly stable postsurgical/treatment changes in the left anterior temporal lobe.However, increasing prominence of small foci of restricted diffusion in the left occipital periventricular region. The largest of these foci demonstrates mild hyperperfusion. Progression of tumor cannot be ruled out. Attention on follow-up recommended. 4/9/2024- Patient had 9 IV Avastin infusion thus far. He was very fatigued when he was off of steroids, so restarted on Dexamethasone 2 mg on March 27th. Since then he has been feeling better. His main challenge is his Right VFD  5/14/2024- Had 12 infusions of IV Avastin thus far. MRI showed -Grossly stable posttreatment changes in the left anterior temporal/insular region. Grossly stable enhancing nodules in the left occipital periventricular/subependymal region. New mildly enhancing nodules in the left posterior hippocampus, right splenium, and right parietal white matter with associated mild hyperperfusion. Degree of enhancement and perfusion may be decreased by Avastin effects. These are suspicious for progressive disease but superimposed treatment changes cannot be ruled out. 6/4/2024- 13th infusion of IV Avastin 7/1/2024- Wife called reporting patient having a " shivering episode" - Gave an extra dose of Klonopin - Since then did not had any further episodes. patient also got bit by his dog - received tetanus shot and course of antibiotics. Later that evening wife mentioned his speech was getting worse - raised Dex to 4 mg daily 7/16/2024 - MRI showed Slightly increased enhancement in the right hippocampus right occipital white matter and splenium of the corpus callosum since 5/15/2024 with increased perfusion suspicious for tumor progression. No change in left temporal postoperative changes with enhancement and likely enhancing neoplasm extending along the left hippocampus to the left occipital lobe. Tapered Dex to 3 mg daily. Plan was made to restart TMZ 7/26-7/30- TMZ cycle- cycle # 1 8/23/2024- 8/27/2024 - TMZ cycle cycle # 2 9/6/2024- MRI showed Grossly stable posttreatment changes in the left anterior temporal lobe. Extensive surrounding FLAIR signal abnormality is grossly stable, compatible with nonenhancing neoplasm. Multiple new/enlarging enhancing lesions are seen in the left occipital lobe and right cerebral hemisphere with associated hyperperfusion, compatible with progression of disease. 9/24/2024- Here for a follow up. Per wife no worsening of symptoms, gait mildly unsteady - no falls

## 2024-09-24 NOTE — DISCUSSION/SUMMARY
[FreeTextEntry1] : Patient seen and examined In summary, this is a 69 yo man with a left frontal GBM, IDH wt - enrolled in IMVAX clinical trial. Treated with chemoRT 6/26 - 8/7/2023.Avastin added in 11/2023 - noted clinical improvement. Has completed 6 adjuvant cycles of TMZ (last on 2/2024 and now restarted ) and has had 15 infusions of Avastin- MRI on 9/6 with POD GBM - Neurologically mildly worse Starting CCNU today  c/w Avastin q 2 weeks CEREBRAL EDEMA - c/w Dexamethasone to 3 mg daily. GI prophylaxis with Pantoprazole. PE - Continue Lovenox 150 mg daily - Diagnosed with PE on 9/28/2023- Recommended to switch injection sites while administering injections-Continue follow up with Dr Carpenter SEIZURES - Continue Keppra to 1500 - 1500 mg daily and Remains seizure free - fatigue , improved - Continue Klonopin to 0.25 mg twice a day HYPERTENSION- Continue Metoprolol 25 mg daily. Follow up with PCP for BP management. FOLLOW UP - Will do a follow up along with an MRI on 10/31.  In the interim, if any concerns patient knows to call our office.

## 2024-09-25 LAB
APPEARANCE UR: ABNORMAL
BACTERIA # UR AUTO: NEGATIVE /HPF — SIGNIFICANT CHANGE UP
BILIRUB UR-MCNC: NEGATIVE — SIGNIFICANT CHANGE UP
COLOR SPEC: YELLOW — SIGNIFICANT CHANGE UP
DIFF PNL FLD: NEGATIVE — SIGNIFICANT CHANGE UP
GLUCOSE UR QL: NEGATIVE MG/DL — SIGNIFICANT CHANGE UP
KETONES UR-MCNC: NEGATIVE MG/DL — SIGNIFICANT CHANGE UP
LEUKOCYTE ESTERASE UR-ACNC: NEGATIVE — SIGNIFICANT CHANGE UP
NITRITE UR-MCNC: NEGATIVE — SIGNIFICANT CHANGE UP
PH UR: 5.5 — SIGNIFICANT CHANGE UP (ref 5–8)
PROT UR-MCNC: 30 MG/DL
RBC CASTS # UR COMP ASSIST: 1 /HPF — SIGNIFICANT CHANGE UP (ref 0–4)
SP GR SPEC: >1.03 — HIGH (ref 1–1.03)
SQUAMOUS # UR AUTO: 2 /HPF — SIGNIFICANT CHANGE UP (ref 0–5)
URATE CRY FLD QL MICRO: PRESENT
UROBILINOGEN FLD QL: 0.2 MG/DL — SIGNIFICANT CHANGE UP (ref 0.2–1)
WBC UR QL: 0 /HPF — SIGNIFICANT CHANGE UP (ref 0–5)

## 2024-10-03 ENCOUNTER — LABORATORY RESULT (OUTPATIENT)
Age: 69
End: 2024-10-03

## 2024-10-08 ENCOUNTER — RESULT REVIEW (OUTPATIENT)
Age: 69
End: 2024-10-08

## 2024-10-08 ENCOUNTER — APPOINTMENT (OUTPATIENT)
Dept: INFUSION THERAPY | Facility: HOSPITAL | Age: 69
End: 2024-10-08

## 2024-10-08 ENCOUNTER — APPOINTMENT (OUTPATIENT)
Dept: HEMATOLOGY ONCOLOGY | Facility: CLINIC | Age: 69
End: 2024-10-08

## 2024-10-08 LAB
ALBUMIN SERPL ELPH-MCNC: 3.8 G/DL — SIGNIFICANT CHANGE UP (ref 3.3–5)
ALP SERPL-CCNC: 80 U/L — SIGNIFICANT CHANGE UP (ref 40–120)
ALT FLD-CCNC: 49 U/L — HIGH (ref 10–45)
ANION GAP SERPL CALC-SCNC: 17 MMOL/L — SIGNIFICANT CHANGE UP (ref 5–17)
ANISOCYTOSIS BLD QL: SLIGHT — SIGNIFICANT CHANGE UP
APPEARANCE UR: CLEAR — SIGNIFICANT CHANGE UP
AST SERPL-CCNC: 34 U/L — SIGNIFICANT CHANGE UP (ref 10–40)
BACTERIA # UR AUTO: NEGATIVE /HPF — SIGNIFICANT CHANGE UP
BASOPHILS # BLD AUTO: 0 K/UL — SIGNIFICANT CHANGE UP (ref 0–0.2)
BASOPHILS NFR BLD AUTO: 0 % — SIGNIFICANT CHANGE UP (ref 0–2)
BILIRUB SERPL-MCNC: 0.4 MG/DL — SIGNIFICANT CHANGE UP (ref 0.2–1.2)
BILIRUB UR-MCNC: NEGATIVE — SIGNIFICANT CHANGE UP
BUN SERPL-MCNC: 14 MG/DL — SIGNIFICANT CHANGE UP (ref 7–23)
CALCIUM SERPL-MCNC: 9.8 MG/DL — SIGNIFICANT CHANGE UP (ref 8.4–10.5)
CAST: 0 /LPF — SIGNIFICANT CHANGE UP (ref 0–4)
CHLORIDE SERPL-SCNC: 95 MMOL/L — LOW (ref 96–108)
CO2 SERPL-SCNC: 20 MMOL/L — LOW (ref 22–31)
COLOR SPEC: YELLOW — SIGNIFICANT CHANGE UP
CREAT SERPL-MCNC: 0.71 MG/DL — SIGNIFICANT CHANGE UP (ref 0.5–1.3)
DACRYOCYTES BLD QL SMEAR: SLIGHT — SIGNIFICANT CHANGE UP
DIFF PNL FLD: NEGATIVE — SIGNIFICANT CHANGE UP
EGFR: 101 ML/MIN/1.73M2 — SIGNIFICANT CHANGE UP
ELLIPTOCYTES BLD QL SMEAR: SLIGHT — SIGNIFICANT CHANGE UP
EOSINOPHIL # BLD AUTO: 0 K/UL — SIGNIFICANT CHANGE UP (ref 0–0.5)
EOSINOPHIL NFR BLD AUTO: 0 % — SIGNIFICANT CHANGE UP (ref 0–6)
GLUCOSE SERPL-MCNC: 155 MG/DL — HIGH (ref 70–99)
GLUCOSE UR QL: NEGATIVE MG/DL — SIGNIFICANT CHANGE UP
HCT VFR BLD CALC: 42.1 % — SIGNIFICANT CHANGE UP (ref 39–50)
HGB BLD-MCNC: 14.8 G/DL — SIGNIFICANT CHANGE UP (ref 13–17)
KETONES UR-MCNC: NEGATIVE MG/DL — SIGNIFICANT CHANGE UP
LEUKOCYTE ESTERASE UR-ACNC: NEGATIVE — SIGNIFICANT CHANGE UP
LYMPHOCYTES # BLD AUTO: 1.21 K/UL — SIGNIFICANT CHANGE UP (ref 1–3.3)
LYMPHOCYTES # BLD AUTO: 9 % — LOW (ref 13–44)
MCHC RBC-ENTMCNC: 31.2 PG — SIGNIFICANT CHANGE UP (ref 27–34)
MCHC RBC-ENTMCNC: 35.2 G/DL — SIGNIFICANT CHANGE UP (ref 32–36)
MCV RBC AUTO: 88.8 FL — SIGNIFICANT CHANGE UP (ref 80–100)
MONOCYTES # BLD AUTO: 0.94 K/UL — HIGH (ref 0–0.9)
MONOCYTES NFR BLD AUTO: 7 % — SIGNIFICANT CHANGE UP (ref 2–14)
MYELOCYTES NFR BLD: 3 % — HIGH (ref 0–0)
NEUTROPHILS # BLD AUTO: 10.91 K/UL — HIGH (ref 1.8–7.4)
NEUTROPHILS NFR BLD AUTO: 81 % — HIGH (ref 43–77)
NITRITE UR-MCNC: NEGATIVE — SIGNIFICANT CHANGE UP
NRBC # BLD: 0 /100 WBCS — SIGNIFICANT CHANGE UP (ref 0–0)
NRBC # BLD: SIGNIFICANT CHANGE UP /100 WBCS (ref 0–0)
PH UR: 6 — SIGNIFICANT CHANGE UP (ref 5–8)
PLAT MORPH BLD: NORMAL — SIGNIFICANT CHANGE UP
PLATELET # BLD AUTO: 187 K/UL — SIGNIFICANT CHANGE UP (ref 150–400)
POIKILOCYTOSIS BLD QL AUTO: SLIGHT — SIGNIFICANT CHANGE UP
POTASSIUM SERPL-MCNC: 4.2 MMOL/L — SIGNIFICANT CHANGE UP (ref 3.5–5.3)
POTASSIUM SERPL-SCNC: 4.2 MMOL/L — SIGNIFICANT CHANGE UP (ref 3.5–5.3)
PROT SERPL-MCNC: 6.8 G/DL — SIGNIFICANT CHANGE UP (ref 6–8.3)
PROT UR-MCNC: 30 MG/DL
RBC # BLD: 4.74 M/UL — SIGNIFICANT CHANGE UP (ref 4.2–5.8)
RBC # FLD: 18.3 % — HIGH (ref 10.3–14.5)
RBC BLD AUTO: ABNORMAL
RBC CASTS # UR COMP ASSIST: 2 /HPF — SIGNIFICANT CHANGE UP (ref 0–4)
SCHISTOCYTES BLD QL AUTO: SLIGHT — SIGNIFICANT CHANGE UP
SODIUM SERPL-SCNC: 131 MMOL/L — LOW (ref 135–145)
SP GR SPEC: 1.02 — SIGNIFICANT CHANGE UP (ref 1–1.03)
SQUAMOUS # UR AUTO: 0 /HPF — SIGNIFICANT CHANGE UP (ref 0–5)
UROBILINOGEN FLD QL: 1 MG/DL — SIGNIFICANT CHANGE UP (ref 0.2–1)
WBC # BLD: 13.47 K/UL — HIGH (ref 3.8–10.5)
WBC # FLD AUTO: 13.47 K/UL — HIGH (ref 3.8–10.5)
WBC UR QL: 0 /HPF — SIGNIFICANT CHANGE UP (ref 0–5)

## 2024-10-11 ENCOUNTER — RESULT REVIEW (OUTPATIENT)
Age: 69
End: 2024-10-11

## 2024-10-11 ENCOUNTER — APPOINTMENT (OUTPATIENT)
Dept: HEMATOLOGY ONCOLOGY | Facility: CLINIC | Age: 69
End: 2024-10-11

## 2024-10-11 DIAGNOSIS — Z86.718 PERSONAL HISTORY OF OTHER VENOUS THROMBOSIS AND EMBOLISM: ICD-10-CM

## 2024-10-11 LAB
ALBUMIN SERPL ELPH-MCNC: 4 G/DL
ALP BLD-CCNC: 78 U/L
ALT SERPL-CCNC: 46 U/L
ANION GAP SERPL CALC-SCNC: 16 MMOL/L
ANISOCYTOSIS BLD QL: SLIGHT — SIGNIFICANT CHANGE UP
AST SERPL-CCNC: 21 U/L
BASOPHILS # BLD AUTO: 0 K/UL — SIGNIFICANT CHANGE UP (ref 0–0.2)
BASOPHILS NFR BLD AUTO: 0 % — SIGNIFICANT CHANGE UP (ref 0–2)
BILIRUB SERPL-MCNC: 0.2 MG/DL
BUN SERPL-MCNC: 26 MG/DL
CALCIUM SERPL-MCNC: 9.4 MG/DL
CHLORIDE SERPL-SCNC: 103 MMOL/L
CO2 SERPL-SCNC: 19 MMOL/L
CREAT SERPL-MCNC: 0.82 MG/DL
DACRYOCYTES BLD QL SMEAR: SLIGHT — SIGNIFICANT CHANGE UP
EGFR: 96 ML/MIN/1.73M2
ELLIPTOCYTES BLD QL SMEAR: SLIGHT — SIGNIFICANT CHANGE UP
EOSINOPHIL # BLD AUTO: 0 K/UL — SIGNIFICANT CHANGE UP (ref 0–0.5)
EOSINOPHIL NFR BLD AUTO: 0 % — SIGNIFICANT CHANGE UP (ref 0–6)
GLUCOSE SERPL-MCNC: 104 MG/DL
HCT VFR BLD CALC: 43.8 % — SIGNIFICANT CHANGE UP (ref 39–50)
HGB BLD-MCNC: 14.9 G/DL — SIGNIFICANT CHANGE UP (ref 13–17)
LYMPHOCYTES # BLD AUTO: 1.35 K/UL — SIGNIFICANT CHANGE UP (ref 1–3.3)
LYMPHOCYTES # BLD AUTO: 14 % — SIGNIFICANT CHANGE UP (ref 13–44)
MCHC RBC-ENTMCNC: 31 PG — SIGNIFICANT CHANGE UP (ref 27–34)
MCHC RBC-ENTMCNC: 34 G/DL — SIGNIFICANT CHANGE UP (ref 32–36)
MCV RBC AUTO: 91.1 FL — SIGNIFICANT CHANGE UP (ref 80–100)
MONOCYTES # BLD AUTO: 0.96 K/UL — HIGH (ref 0–0.9)
MONOCYTES NFR BLD AUTO: 10 % — SIGNIFICANT CHANGE UP (ref 2–14)
MYELOCYTES NFR BLD: 7 % — HIGH (ref 0–0)
NEUTROPHILS # BLD AUTO: 6.64 K/UL — SIGNIFICANT CHANGE UP (ref 1.8–7.4)
NEUTROPHILS NFR BLD AUTO: 69 % — SIGNIFICANT CHANGE UP (ref 43–77)
NRBC # BLD: 1 /100 WBCS — HIGH (ref 0–0)
NRBC # BLD: SIGNIFICANT CHANGE UP /100 WBCS (ref 0–0)
PLAT MORPH BLD: NORMAL — SIGNIFICANT CHANGE UP
PLATELET # BLD AUTO: 173 K/UL — SIGNIFICANT CHANGE UP (ref 150–400)
POIKILOCYTOSIS BLD QL AUTO: SLIGHT — SIGNIFICANT CHANGE UP
POTASSIUM SERPL-SCNC: 4 MMOL/L
PROT SERPL-MCNC: 6.3 G/DL
RBC # BLD: 4.81 M/UL — SIGNIFICANT CHANGE UP (ref 4.2–5.8)
RBC # FLD: 18.6 % — HIGH (ref 10.3–14.5)
RBC BLD AUTO: ABNORMAL
SCHISTOCYTES BLD QL AUTO: SLIGHT — SIGNIFICANT CHANGE UP
SODIUM SERPL-SCNC: 138 MMOL/L
WBC # BLD: 9.63 K/UL — SIGNIFICANT CHANGE UP (ref 3.8–10.5)
WBC # FLD AUTO: 9.63 K/UL — SIGNIFICANT CHANGE UP (ref 3.8–10.5)

## 2024-10-17 ENCOUNTER — LABORATORY RESULT (OUTPATIENT)
Age: 69
End: 2024-10-17

## 2024-10-18 ENCOUNTER — APPOINTMENT (OUTPATIENT)
Dept: ULTRASOUND IMAGING | Facility: IMAGING CENTER | Age: 69
End: 2024-10-18
Payer: MEDICARE

## 2024-10-18 ENCOUNTER — OUTPATIENT (OUTPATIENT)
Dept: OUTPATIENT SERVICES | Facility: HOSPITAL | Age: 69
LOS: 1 days | End: 2024-10-18
Payer: MEDICARE

## 2024-10-18 DIAGNOSIS — Z98.890 OTHER SPECIFIED POSTPROCEDURAL STATES: Chronic | ICD-10-CM

## 2024-10-18 DIAGNOSIS — Z86.718 PERSONAL HISTORY OF OTHER VENOUS THROMBOSIS AND EMBOLISM: ICD-10-CM

## 2024-10-18 PROCEDURE — 93970 EXTREMITY STUDY: CPT | Mod: 26

## 2024-10-22 ENCOUNTER — RESULT REVIEW (OUTPATIENT)
Age: 69
End: 2024-10-22

## 2024-10-22 ENCOUNTER — APPOINTMENT (OUTPATIENT)
Dept: HEMATOLOGY ONCOLOGY | Facility: CLINIC | Age: 69
End: 2024-10-22

## 2024-10-22 ENCOUNTER — APPOINTMENT (OUTPATIENT)
Dept: INFUSION THERAPY | Facility: HOSPITAL | Age: 69
End: 2024-10-22

## 2024-10-22 LAB
ALBUMIN SERPL ELPH-MCNC: 3.8 G/DL — SIGNIFICANT CHANGE UP (ref 3.3–5)
ALP SERPL-CCNC: 78 U/L — SIGNIFICANT CHANGE UP (ref 40–120)
ALT FLD-CCNC: 47 U/L — HIGH (ref 10–45)
ANION GAP SERPL CALC-SCNC: 16 MMOL/L — SIGNIFICANT CHANGE UP (ref 5–17)
AST SERPL-CCNC: 26 U/L — SIGNIFICANT CHANGE UP (ref 10–40)
BASOPHILS # BLD AUTO: 0 K/UL — SIGNIFICANT CHANGE UP (ref 0–0.2)
BASOPHILS NFR BLD AUTO: 0 % — SIGNIFICANT CHANGE UP (ref 0–2)
BILIRUB SERPL-MCNC: 0.4 MG/DL — SIGNIFICANT CHANGE UP (ref 0.2–1.2)
BLASTS # FLD: 1 % — HIGH (ref 0–0)
BUN SERPL-MCNC: 17 MG/DL — SIGNIFICANT CHANGE UP (ref 7–23)
BURR CELLS BLD QL SMEAR: PRESENT — SIGNIFICANT CHANGE UP
CALCIUM SERPL-MCNC: 9.6 MG/DL — SIGNIFICANT CHANGE UP (ref 8.4–10.5)
CHLORIDE SERPL-SCNC: 99 MMOL/L — SIGNIFICANT CHANGE UP (ref 96–108)
CO2 SERPL-SCNC: 18 MMOL/L — LOW (ref 22–31)
CREAT SERPL-MCNC: 0.65 MG/DL — SIGNIFICANT CHANGE UP (ref 0.5–1.3)
EGFR: 103 ML/MIN/1.73M2 — SIGNIFICANT CHANGE UP
ELLIPTOCYTES BLD QL SMEAR: SLIGHT — SIGNIFICANT CHANGE UP
EOSINOPHIL # BLD AUTO: 0 K/UL — SIGNIFICANT CHANGE UP (ref 0–0.5)
EOSINOPHIL NFR BLD AUTO: 0 % — SIGNIFICANT CHANGE UP (ref 0–6)
GLUCOSE SERPL-MCNC: 215 MG/DL — HIGH (ref 70–99)
HCT VFR BLD CALC: 42 % — SIGNIFICANT CHANGE UP (ref 39–50)
HGB BLD-MCNC: 14.5 G/DL — SIGNIFICANT CHANGE UP (ref 13–17)
LYMPHOCYTES # BLD AUTO: 0.54 K/UL — LOW (ref 1–3.3)
LYMPHOCYTES # BLD AUTO: 5 % — LOW (ref 13–44)
MCHC RBC-ENTMCNC: 31.4 PG — SIGNIFICANT CHANGE UP (ref 27–34)
MCHC RBC-ENTMCNC: 34.5 G/DL — SIGNIFICANT CHANGE UP (ref 32–36)
MCV RBC AUTO: 90.9 FL — SIGNIFICANT CHANGE UP (ref 80–100)
MONOCYTES # BLD AUTO: 0.43 K/UL — SIGNIFICANT CHANGE UP (ref 0–0.9)
MONOCYTES NFR BLD AUTO: 4 % — SIGNIFICANT CHANGE UP (ref 2–14)
MYELOCYTES NFR BLD: 2 % — HIGH (ref 0–0)
NEUTROPHILS # BLD AUTO: 9.5 K/UL — HIGH (ref 1.8–7.4)
NEUTROPHILS NFR BLD AUTO: 88 % — HIGH (ref 43–77)
NRBC # BLD: 0 /100 WBCS — SIGNIFICANT CHANGE UP (ref 0–0)
NRBC # BLD: SIGNIFICANT CHANGE UP /100 WBCS (ref 0–0)
PLAT MORPH BLD: NORMAL — SIGNIFICANT CHANGE UP
PLATELET # BLD AUTO: 139 K/UL — LOW (ref 150–400)
POIKILOCYTOSIS BLD QL AUTO: SLIGHT — SIGNIFICANT CHANGE UP
POLYCHROMASIA BLD QL SMEAR: SLIGHT — SIGNIFICANT CHANGE UP
POTASSIUM SERPL-MCNC: 4.2 MMOL/L — SIGNIFICANT CHANGE UP (ref 3.5–5.3)
POTASSIUM SERPL-SCNC: 4.2 MMOL/L — SIGNIFICANT CHANGE UP (ref 3.5–5.3)
PROT SERPL-MCNC: 6.7 G/DL — SIGNIFICANT CHANGE UP (ref 6–8.3)
RBC # BLD: 4.62 M/UL — SIGNIFICANT CHANGE UP (ref 4.2–5.8)
RBC # FLD: 18.1 % — HIGH (ref 10.3–14.5)
RBC BLD AUTO: ABNORMAL
SODIUM SERPL-SCNC: 134 MMOL/L — LOW (ref 135–145)
WBC # BLD: 10.8 K/UL — HIGH (ref 3.8–10.5)
WBC # FLD AUTO: 10.8 K/UL — HIGH (ref 3.8–10.5)

## 2024-10-31 ENCOUNTER — RESULT REVIEW (OUTPATIENT)
Age: 69
End: 2024-10-31

## 2024-10-31 ENCOUNTER — APPOINTMENT (OUTPATIENT)
Dept: HEMATOLOGY ONCOLOGY | Facility: CLINIC | Age: 69
End: 2024-10-31

## 2024-10-31 ENCOUNTER — APPOINTMENT (OUTPATIENT)
Dept: MRI IMAGING | Facility: IMAGING CENTER | Age: 69
End: 2024-10-31

## 2024-10-31 ENCOUNTER — NON-APPOINTMENT (OUTPATIENT)
Age: 69
End: 2024-10-31

## 2024-10-31 ENCOUNTER — APPOINTMENT (OUTPATIENT)
Dept: NEUROLOGY | Facility: CLINIC | Age: 69
End: 2024-10-31
Payer: MEDICARE

## 2024-10-31 VITALS
SYSTOLIC BLOOD PRESSURE: 133 MMHG | DIASTOLIC BLOOD PRESSURE: 88 MMHG | RESPIRATION RATE: 16 BRPM | HEART RATE: 95 BPM | TEMPERATURE: 98.1 F | OXYGEN SATURATION: 98 %

## 2024-10-31 PROCEDURE — 99215 OFFICE O/P EST HI 40 MIN: CPT

## 2024-10-31 PROCEDURE — 70553 MRI BRAIN STEM W/O & W/DYE: CPT | Mod: 26,MH

## 2024-11-12 ENCOUNTER — APPOINTMENT (OUTPATIENT)
Dept: NEUROLOGY | Facility: CLINIC | Age: 69
End: 2024-11-12

## 2024-11-19 ENCOUNTER — APPOINTMENT (OUTPATIENT)
Dept: INFUSION THERAPY | Facility: HOSPITAL | Age: 69
End: 2024-11-19

## 2024-11-19 ENCOUNTER — APPOINTMENT (OUTPATIENT)
Dept: HEMATOLOGY ONCOLOGY | Facility: CLINIC | Age: 69
End: 2024-11-19

## 2024-11-20 PROCEDURE — 70553 MRI BRAIN STEM W/O & W/DYE: CPT

## 2024-11-20 PROCEDURE — A9585: CPT

## 2024-11-20 PROCEDURE — 93970 EXTREMITY STUDY: CPT

## 2024-12-03 ENCOUNTER — APPOINTMENT (OUTPATIENT)
Dept: HEMATOLOGY ONCOLOGY | Facility: CLINIC | Age: 69
End: 2024-12-03

## 2024-12-03 ENCOUNTER — APPOINTMENT (OUTPATIENT)
Dept: INFUSION THERAPY | Facility: HOSPITAL | Age: 69
End: 2024-12-03

## 2024-12-17 ENCOUNTER — APPOINTMENT (OUTPATIENT)
Dept: HEMATOLOGY ONCOLOGY | Facility: CLINIC | Age: 69
End: 2024-12-17

## 2024-12-17 ENCOUNTER — APPOINTMENT (OUTPATIENT)
Dept: INFUSION THERAPY | Facility: HOSPITAL | Age: 69
End: 2024-12-17

## 2024-12-18 ENCOUNTER — APPOINTMENT (OUTPATIENT)
Dept: CARDIOLOGY | Facility: CLINIC | Age: 69
End: 2024-12-18

## 2024-12-31 ENCOUNTER — APPOINTMENT (OUTPATIENT)
Dept: HEMATOLOGY ONCOLOGY | Facility: CLINIC | Age: 69
End: 2024-12-31

## 2024-12-31 ENCOUNTER — APPOINTMENT (OUTPATIENT)
Dept: INFUSION THERAPY | Facility: HOSPITAL | Age: 69
End: 2024-12-31

## 2025-06-28 NOTE — H&P PST ADULT - RESPIRATORY RATE (BREATHS/MIN)
Reports ophthalmology clinic some allergy resident be there Monday morning [id number] [place] and having new they will also contact you with the appointment    Patient is medically cleared  
14

## (undated) DEVICE — SUT BIOSYN 4-0 18" P-13

## (undated) DEVICE — ELCTR SUBDERMAL NDL CLASSIC 1.5M X 59" (6 COLOR)

## (undated) DEVICE — DRAPE CRANIAL REFERENCE ARRAY

## (undated) DEVICE — GLV 7.5 PROTEXIS

## (undated) DEVICE — DRAIN JACKSON PRATT 3 SPRING RESERVOIR W 10FR PVC DRAIN

## (undated) DEVICE — SPHERE MARKER (5 SPHERES)

## (undated) DEVICE — SOL IRR POUR H2O 1000ML

## (undated) DEVICE — DRSG TAPE HYPAFIX 4"

## (undated) DEVICE — DRSG ESMARK 4"

## (undated) DEVICE — TUBING BIPOLAR IRRIGATOR AND CORD SET

## (undated) DEVICE — Device

## (undated) DEVICE — STRYKER SONOPET IQ TUBING SET

## (undated) DEVICE — MARKING PEN W RULER

## (undated) DEVICE — FOLEY TRAY 16FR LF URINE METER SURESTEP

## (undated) DEVICE — DRSG CURITY GAUZE SPONGE 4 X 4" 12-PLY

## (undated) DEVICE — SUT VICRYL 2-0 18" CT-1 UNDYED (POP-OFF)

## (undated) DEVICE — WARMING BLANKET FULL ADULT

## (undated) DEVICE — SUT POLYSORB 2-0 30" C-14 UNDYED

## (undated) DEVICE — STRYKER SONOPET IQ TIP 12CM STANDARD

## (undated) DEVICE — DRAPE TOWEL BLUE 17" X 24"

## (undated) DEVICE — ELCTR SUBDERMAL NDL 27G X 1/2" WITH TWISTED PAIR

## (undated) DEVICE — BLANKET WARMER LOWER ADULT

## (undated) DEVICE — SUT ETHILON 3-0 18" FS-1

## (undated) DEVICE — RUBBER BANDS STERILE

## (undated) DEVICE — DRSG TELFA .25 X 3

## (undated) DEVICE — SYR LUER LOK 20CC

## (undated) DEVICE — MIDAS REX MR8 TAPERED SM BORE 2.3MM X 7CM FOOTED

## (undated) DEVICE — MEDICATION LABELS W MARKER

## (undated) DEVICE — DRSG OPSITE 13.75 X 4"

## (undated) DEVICE — SOL IRR POUR H2O 250ML

## (undated) DEVICE — PACK VP SHUNT

## (undated) DEVICE — DRSG MASTISOL

## (undated) DEVICE — SUT SILK 2-0 30" RB-1

## (undated) DEVICE — DRAPE SPLIT SHEET 77" X 108"

## (undated) DEVICE — STAPLER SKIN VISI-STAT 35 WIDE

## (undated) DEVICE — DRAPE MAYO STAND 30"

## (undated) DEVICE — DRAPE LIGHT HANDLE COVER (GREEN)

## (undated) DEVICE — SUT SOFSILK 0 30" V-20

## (undated) DEVICE — GLV 7 PROTEXIS (WHITE)

## (undated) DEVICE — DRSG 4X4

## (undated) DEVICE — SUT MONOSOF 3-0 18" C-14

## (undated) DEVICE — SUT SOFSILK 4-0 18" CV-23

## (undated) DEVICE — GLV 7.5 ESTEEM BLUE

## (undated) DEVICE — DRAPE 3/4 SHEET W REINFORCEMENT 56X77"

## (undated) DEVICE — ENDOTRACHEAL TUBE ENT KIT 30FR 7MM ID - 10MM OD

## (undated) DEVICE — DRAPE C ARM UNIVERSAL

## (undated) DEVICE — GLV 7.5 PROTEXIS (WHITE)

## (undated) DEVICE — SUT POLYSORB 4-0 27" P-12 UNDYED

## (undated) DEVICE — TUBING SUCTION 20FT

## (undated) DEVICE — WARMING BLANKET LOWER ADULT

## (undated) DEVICE — STRYKER SONOPET IQ TIP 12CM MICRO

## (undated) DEVICE — VISITEC 4X4

## (undated) DEVICE — DRAPE CAMERA VIDEO 7"X96"

## (undated) DEVICE — DRSG STERISTRIPS 0.5 X 4"

## (undated) DEVICE — POSITIONER FOAM EGG CRATE ULNAR 2PCS (PINK)

## (undated) DEVICE — CODMAN PERFORATOR 14MM (BLUE)

## (undated) DEVICE — ELCTR BOVIE TIP BLADE INSULATED 2.75" EDGE

## (undated) DEVICE — NDL HYPO SAFE 22G X 1.5" (BLACK)

## (undated) DEVICE — DRSG WEBRIL 4"

## (undated) DEVICE — WRAP COMPRESSION CALF MED

## (undated) DEVICE — PREP BETADINE KIT

## (undated) DEVICE — SUT NUROLON 4-0 8-18" TF (POP-OFF)

## (undated) DEVICE — LONE STAR ELASTIC STAY HOOK 12MM BLUNT

## (undated) DEVICE — BLADE SCALPEL SAFETYLOCK #15

## (undated) DEVICE — BLANKET WARMER UPPER ADULT

## (undated) DEVICE — SUT VICRYL 3-0 18" CP-2 UNDYED (POP-OFF)

## (undated) DEVICE — DRAPE IOBAN 23" X 23"

## (undated) DEVICE — DRSG STOCKINETTE TUBULAR COTTON 2PLY 4X60"

## (undated) DEVICE — ELCTR MONOPOLAR STIMULATOR PROBE FLUSH-TIP

## (undated) DEVICE — SPECIMEN CONTAINER 100ML

## (undated) DEVICE — SUCTION YANKAUER NO CONTROL VENT

## (undated) DEVICE — GLV 8 PROTEXIS (WHITE)

## (undated) DEVICE — SOL IRR POUR NS 0.9% 500ML

## (undated) DEVICE — SUT VICRYL 3-0 18" X-1 (POP-OFF)

## (undated) DEVICE — DRSG SLING ARM LG

## (undated) DEVICE — ELCTR 4-DISC 20MM 49" (RED, BLUE, GREEN, BLACK)

## (undated) DEVICE — DRSG ADAPTIC 3X3"

## (undated) DEVICE — DRSG XEROFORM 1 X 8"

## (undated) DEVICE — DRAPE SURGICAL #1010

## (undated) DEVICE — PREP CHLORAPREP HI-LITE ORANGE 26ML

## (undated) DEVICE — VENODYNE/SCD SLEEVE CALF LARGE

## (undated) DEVICE — ELCTR PEDICLE SCREW PROBE 3MM BALL 1.8MM X 100MM

## (undated) DEVICE — SUT VICRYL 2-0 18" CP-2 UNDYED (POP-OFF)

## (undated) DEVICE — AESCULAP SCALPFIX 10 CLIPS

## (undated) DEVICE — SOL INJ NS 0.9% 1000ML

## (undated) DEVICE — DRSG TELFA .5 X 3

## (undated) DEVICE — PACK UPPER EXTREMITY

## (undated) DEVICE — ELCTR SUBDERMAL CORKSCREW NDL 1.2MM

## (undated) DEVICE — MIDAS REX MR7 LUBRICANT DIFFUSER CARTRIDGE

## (undated) DEVICE — POSITIONER FOAM HEADREST (PINK)

## (undated) DEVICE — SOL IRR POUR NS 0.9% 1000ML

## (undated) DEVICE — DRAPE 1/2 SHEET 40X57"

## (undated) DEVICE — ELCTR BIPOLAR PROBE